# Patient Record
Sex: FEMALE | Race: WHITE | Employment: FULL TIME | URBAN - METROPOLITAN AREA
[De-identification: names, ages, dates, MRNs, and addresses within clinical notes are randomized per-mention and may not be internally consistent; named-entity substitution may affect disease eponyms.]

---

## 2017-03-03 DIAGNOSIS — E03.8 OTHER SPECIFIED HYPOTHYROIDISM: Primary | ICD-10-CM

## 2017-03-03 NOTE — LETTER
[facility]                                           [address] [place]  [address]    March 7, 2017    Marichuy Worrell  [address]  [place] [address]    Dear Marichuy,       We recently received a refill request for levothyroxine .  We have refilled this for a one time 30 day supply only because you are due for a:    Thyroid office visit and non-fasting lab appointment      Please schedule this lab appointment 4-5 days prior to the office visit.     Please call at your earliest convenience so that there will not be a delay with your future refills.          Thank you,   Your [facility] Care Team/kalyan  [phone]

## 2017-03-06 RX ORDER — LEVOTHYROXINE SODIUM 200 UG/1
200 TABLET ORAL DAILY
Qty: 30 TABLET | Refills: 0 | Status: SHIPPED | OUTPATIENT
Start: 2017-03-06 | End: 2017-04-11

## 2017-03-06 NOTE — TELEPHONE ENCOUNTER
send letter.  Pt due for nonfasting lab appointment for further levothyroxine refills.  Salina Bro RN

## 2017-04-10 DIAGNOSIS — E03.8 OTHER SPECIFIED HYPOTHYROIDISM: ICD-10-CM

## 2017-04-10 LAB — TSH SERPL DL<=0.005 MIU/L-ACNC: 1.67 MU/L (ref 0.4–4)

## 2017-04-10 PROCEDURE — 84443 ASSAY THYROID STIM HORMONE: CPT | Performed by: NURSE PRACTITIONER

## 2017-04-10 PROCEDURE — 36415 COLL VENOUS BLD VENIPUNCTURE: CPT | Performed by: NURSE PRACTITIONER

## 2017-04-11 RX ORDER — LEVOTHYROXINE SODIUM 200 UG/1
200 TABLET ORAL DAILY
Qty: 90 TABLET | Refills: 1 | Status: SHIPPED | OUTPATIENT
Start: 2017-04-11 | End: 2017-08-08

## 2017-05-03 DIAGNOSIS — N92.1 MENOMETRORRHAGIA: ICD-10-CM

## 2017-05-04 NOTE — TELEPHONE ENCOUNTER
Enskyce      Last Written Prescription Date: 11/15/16  Last Fill Quantity: 84,  # refills: 1   Last Office Visit with [facility], [facility] or [facility] prescribing provider: 11/15/16    Gianluca Duke CMA

## 2017-05-05 RX ORDER — DESOGESTREL AND ETHINYL ESTRADIOL 0.15-0.03
KIT ORAL
Qty: 84 TABLET | Refills: 0 | Status: SHIPPED | OUTPATIENT
Start: 2017-05-05 | End: 2017-08-08

## 2017-05-05 NOTE — TELEPHONE ENCOUNTER
Noted patient was seen by Kristen Kehr, PA-C on 01-25-16 for WWE  Reviewed last office visit notes from ROXI Perez CNP on 11-15-16. Unsure of f/u plan.  No future appt scheduled.    Will route to ROXI Perez CNP for review & orders. Tegan Aguilar RN, BAN

## 2017-05-05 NOTE — TELEPHONE ENCOUNTER
Phone call to patient and gave orders as below. Offered to assist patient in scheduling an appt now and patient declined stating she is too busy to schedule now. She stated she will call back in the next couple of weeks. Tegan Aguilar RN, BAN

## 2017-05-05 NOTE — TELEPHONE ENCOUNTER
3 month extension sent, patient should schedule follow up with [facility] as she is also due for her pap smear. Thank you. Marely DIANE CNP

## 2017-08-08 ENCOUNTER — OFFICE VISIT (OUTPATIENT)
Dept: OBGYN | Facility: CLINIC | Age: 27
End: 2017-08-08
Payer: COMMERCIAL

## 2017-08-08 VITALS
BODY MASS INDEX: 48.82 KG/M2 | TEMPERATURE: 97.8 F | HEIGHT: 65 IN | DIASTOLIC BLOOD PRESSURE: 84 MMHG | SYSTOLIC BLOOD PRESSURE: 142 MMHG | WEIGHT: 293 LBS | HEART RATE: 53 BPM

## 2017-08-08 DIAGNOSIS — Z23 NEED FOR PROPHYLACTIC VACCINATION AGAINST HUMAN PAPILLOMAVIRUS: ICD-10-CM

## 2017-08-08 DIAGNOSIS — E03.8 OTHER SPECIFIED HYPOTHYROIDISM: ICD-10-CM

## 2017-08-08 DIAGNOSIS — R63.5 WEIGHT GAIN: ICD-10-CM

## 2017-08-08 DIAGNOSIS — Z13.6 CARDIOVASCULAR SCREENING; LDL GOAL LESS THAN 160: ICD-10-CM

## 2017-08-08 DIAGNOSIS — N92.1 MENOMETRORRHAGIA: ICD-10-CM

## 2017-08-08 DIAGNOSIS — Z01.419 ENCOUNTER FOR GYNECOLOGICAL EXAMINATION WITHOUT ABNORMAL FINDING: Primary | ICD-10-CM

## 2017-08-08 PROCEDURE — 99395 PREV VISIT EST AGE 18-39: CPT | Performed by: NURSE PRACTITIONER

## 2017-08-08 PROCEDURE — 90651 9VHPV VACCINE 2/3 DOSE IM: CPT | Performed by: NURSE PRACTITIONER

## 2017-08-08 PROCEDURE — G0476 HPV COMBO ASSAY CA SCREEN: HCPCS | Performed by: NURSE PRACTITIONER

## 2017-08-08 PROCEDURE — G0145 SCR C/V CYTO,THINLAYER,RESCR: HCPCS | Performed by: NURSE PRACTITIONER

## 2017-08-08 RX ORDER — DESOGESTREL AND ETHINYL ESTRADIOL 0.15-0.03
1 KIT ORAL DAILY
Qty: 84 TABLET | Refills: 3 | Status: SHIPPED | OUTPATIENT
Start: 2017-08-08 | End: 2018-11-10

## 2017-08-08 RX ORDER — LEVOTHYROXINE SODIUM 200 UG/1
200 TABLET ORAL DAILY
Qty: 90 TABLET | Refills: 3 | Status: SHIPPED | OUTPATIENT
Start: 2017-08-08 | End: 2018-08-22

## 2017-08-08 ASSESSMENT — PAIN SCALES - GENERAL: PAINLEVEL: NO PAIN (0)

## 2017-08-08 ASSESSMENT — PATIENT HEALTH QUESTIONNAIRE - PHQ9: SUM OF ALL RESPONSES TO PHQ QUESTIONS 1-9: 0

## 2017-08-08 NOTE — NURSING NOTE
"Chief Complaint   Patient presents with     Physical       Initial /84  Pulse 53  Temp 97.8  F (36.6  C) (Oral)  Ht 5' 5.25\" (1.657 m)  Wt (!) 338 lb (153.3 kg)  LMP  (LMP Unknown)  BMI 55.82 kg/m2 Estimated body mass index is 55.82 kg/(m^2) as calculated from the following:    Height as of this encounter: 5' 5.25\" (1.657 m).    Weight as of this encounter: 338 lb (153.3 kg)..  BP completed using cuff size: regular on forearm    Screening Questionnaire for Adult Immunization    Are you sick today?   No   Do you have allergies to medications, food, a vaccine component or latex?   Yes   Have you ever had a serious reaction after receiving a vaccination?   No   Do you have a long-term health problem with heart disease, lung disease, asthma, kidney disease, metabolic disease (e.g. diabetes), anemia, or other blood disorder?   No   Do you have cancer, leukemia, HIV/AIDS, or any other immune system problem?   No   In the past 3 months, have you taken medications that affect  your immune system, such as prednisone, other steroids, or anticancer drugs; drugs for the treatment of rheumatoid arthritis, Crohn s disease, or psoriasis; or have you had radiation treatments?   No   Have you had a seizure, or a brain or other nervous system problem?   No   During the past year, have you received a transfusion of blood or blood     products, or been given immune (gamma) globulin or antiviral drug?   No   For women: Are you pregnant or is there a chance you could become        pregnant during the next month?   No   Have you received any vaccinations in the past 4 weeks?   No     Immunization questionnaire was positive for at least one answer.  Notified Marely DIANE CNP.      MNVFC doesn't apply on this patient    Per orders of Marely DIANE CNP, injection of HPV given by Angelita Sharp. Patient instructed to remain in clinic for 15 minutes afterwards, and to report any adverse reaction to me immediately.     "   Screening performed by Angelita Sharp on 8/8/2017 at 9:14 AM.        Angelita Sharp CMA

## 2017-08-08 NOTE — MR AVS SNAPSHOT
After Visit Summary   8/8/2017    Marichuy Worrell    MRN: [id number]           Patient Information     Date Of Birth          1990        Visit Information        Provider Department      8/8/2017 9:10 AM Marely Barrientos APRN CNP [facility]        Today's Diagnoses     Encounter for gynecological examination without abnormal finding    -  1    Need for prophylactic vaccination against human papillomavirus        Menometrorrhagia        Other specified hypothyroidism        Weight gain        CARDIOVASCULAR SCREENING; LDL GOAL LESS THAN 160           Follow-ups after your visit        Your next 10 appointments already scheduled     Aug 09, 2017  2:45 PM CDT   [facility]    [address] [facility] [address] [phone]           Patient must bring picture ID. Patient should be prepared to give a urine specimen  Please do not eat 10-12 hours before your appointment if you are coming in fasting for labs on lipids, cholesterol, or glucose (sugar). Pregnant women should follow their Care Team instructions. Water with medications is okay. Do not drink coffee or other fluids. If you have concerns about taking  your medications, please ask at office or if scheduling via [employer], send a message by clicking on Secure Messaging, Message Your Care Team.              Future tests that were ordered for you today     Open Future Orders        Priority Expected Expires Ordered    Lipid panel reflex to direct LDL Routine  8/8/2018 8/8/2017    TSH with free T4 reflex Routine  11/8/2017 8/8/2017    Glucose Routine  11/8/2017 8/8/2017            Who to contact     If you have questions or need follow up information about today's clinic visit or your schedule please contact [facility] directly at [phone].  Normal or non-critical lab and imaging results will be communicated to you by [employer], letter or phone  "within 4 business days after the clinic has received the results. If you do not hear from us within 7 days, please contact the clinic through [employer] or phone. If you have a critical or abnormal lab result, we will notify you by phone as soon as possible.  Submit refill requests through [employer] or call your pharmacy and they will forward the refill request to us. Please allow 3 business days for your refill to be completed.          Additional Information About Your Visit        [employer] Information     [employer] gives you secure access to your electronic health record. If you see a primary care provider, you can also send messages to your care team and make appointments. If you have questions, please call your primary care clinic.  If you do not have a primary care provider, please call [phone] and they will assist you.        Care EveryWhere ID     This is your Care EveryWhere ID. This could be used by other organizations to access your [facility] medical records  [id number]        Your Vitals Were     Pulse Temperature Height Last Period BMI (Body Mass Index)       53 97.8  F (36.6  C) (Oral) 5' 5.25\" (1.657 m) (LMP Unknown) 55.82 kg/m2        Blood Pressure from Last 3 Encounters:   08/08/17 142/84   11/15/16 144/89   01/25/16 132/78    Weight from Last 3 Encounters:   08/08/17 (!) 338 lb (153.3 kg)   11/15/16 (!) 361 lb 9.6 oz (164 kg)   01/25/16 (!) 352 lb (159.7 kg)              We Performed the Following     HC HPV VAC 9V 3 DOSE IM     Pap imaged thin layer screen reflex to HPV if ASCUS - recommend age 25 - 29          Today's Medication Changes          These changes are accurate as of: 8/8/17  9:42 AM.  If you have any questions, ask your nurse or doctor.               These medicines have changed or have updated prescriptions.        Dose/Directions    desogestrel-ethinyl estradiol 0.15-30 MG-MCG per tablet   Commonly known as:  ENSKYCE   This may have changed:  See the new instructions.   Used for:  " Menometrorrhagia   Changed by:  Marely Barrientos APRN CNP        Dose:  1 tablet   Take 1 tablet by mouth daily   Quantity:  84 tablet   Refills:  3            Where to get your medicines      These medications were sent to [id number]  [place] - [address]  [address] [place] [address]     Phone:  [phone]     desogestrel-ethinyl estradiol 0.15-30 MG-MCG per tablet    levothyroxine 200 MCG tablet                Primary Care Provider Office Phone # Fax #    Kristen M Kehr, PA-C [phone]       [facility] [address] [facility] [address]        Goals        Diet    Eat more fruits and vegetables     Reduce fast food intake     Reduce portion size        Exercise    Exercise 3x per week (30 min per time)       Equal Access to Services     KELLY LUCAS : Hadii mila john hadasho Soomaali, waaxda luqadaha, qaybta kaalmada adeegyada, virgil chappell . So [facility] [phone].    ATENCIÓN: Si habla español, tiene a aragon disposición servicios gratuitos de asistencia lingüística. Taya al [phone].    We comply with applicable federal civil rights laws and [place] laws. We do not discriminate on the basis of race, color, national origin, age, disability sex, sexual orientation or gender identity.            Thank you!     Thank you for choosing [facility]  for your care. Our goal is always to provide you with excellent care. Hearing back from our patients is one way we can continue to improve our services. Please take a few minutes to complete the written survey that you may receive in the mail after your visit with us. Thank you!             Your Updated Medication List - Protect others around you: Learn how to safely use, store and throw away your medicines at www.disposemymeds.org.          This list is accurate as of: 8/8/17  9:42 AM.  Always use your most recent med list.                   Brand Name  Dispense Instructions for use Diagnosis    desogestrel-ethinyl estradiol 0.15-30 MG-MCG per tablet    ENSKYCE    84 tablet    Take 1 tablet by mouth daily    Menometrorrhagia       levothyroxine 200 MCG tablet    SYNTHROID/LEVOTHROID    90 tablet    Take 1 tablet (200 mcg) by mouth daily    Other specified hypothyroidism

## 2017-08-08 NOTE — PROGRESS NOTES
S: Pt is a 27 year old  0 para 0 who presents today for an annual female exam. LMP: 2-3 weeks ago. Contraception: abstinence. Using combined oral contraceptive pills for cycle regulation.    Last Pap smear: 2014 and was NIL. Immunizations reviewed. Dental Exams: due. Diet and calcium reviewed. Exercise: regular.    Patient is frustrated due to minimal weight loss despite diet and physical activity changes for the better over the last 3 months. Would like her thyroid checked to be sure this continues to be in normal range, if so, planning to see PCP to discuss management/for evaluation.     Past Medical History:   Diagnosis Date     Hypothyroidism      Moderate major depression (H)      Morbidly obese (H)      Past Surgical History:   Procedure Laterality Date     CHOLECYSTECTOMY, LAPOROSCOPIC  2009     Social History   Substance Use Topics     Smoking status: Never Smoker     Smokeless tobacco: Never Used     Alcohol use No         REVIEW OF SYSTEMS:  CONSTITUTIONAL:NEGATIVE for fever, chills, change in weight  EYES: NEGATIVE for vision changes or irritation  ENT/MOUTH: NEGATIVE for ear, mouth and throat problems  RESP:NEGATIVE for significant cough or SOB  CV: NEGATIVE for chest pain, palpitations or peripheral edema  GI: NEGATIVE for nausea, abdominal pain, heartburn, or change in bowel habits  Periods are regular q 28-30 days, Cyclic symptoms include none. No intermenstrual bleeding.  MUSCULOSKELATAL:NEGATIVE for significant arthralgias or myalgia  INTEGUMENTARY/SKIN: NEGATIVE for worrisome rashes, moles or lesions  NEURO: NEGATIVE for weakness, dizziness or paresthesias  ENDOCRINE: NEGATIVE for temperature intolerance, skin/hair changes  HEME/ALLERGY/IMMUNE: NEGATIVE for bleeding problems  PSYCHIATRIC: NEGATIVE for changes in mood or affect      OBJECTIVE: This is a well appearing female in no acute distress. Answers questions and maintains eye contact appropriately. Vital signs noted.      EXAM:  EYES: Eyes grossly normal to inspection, PERRL and conjunctivae and sclerae normal  HENT: ear canals and TM's normal and nose and mouth without ulcers or lesions  NECK: no adenopathy, no asymmetry, masses, or scars and thyroid normal to palpation  RESP: lungs clear to auscultation - no rales, rhonchi or wheezes  CV: regular rates and rhythm, normal S1 S2, no S3 or S4 and no murmur, click or rub  LYMPH: normal ant/post cervical and supraclavicular nodes  ABD/GI: soft, nontender, without hepatosplenomegaly or masses  MS: extremities normal- no gross deformities noted  SKIN: no suspicious lesions or rashes  NEURO: Normal strength and tone, mentation intact and speech normal  PSYCH: mentation appears normal and affect normal/bright  Breast exam: Breasts are symmetrical without masses, lymphadenopathy, retraction, dimpling, or nipple discharge bilaterally.  Pelvic Exam: External genitalia without visible lesions or discharge. Normal BUS. Vaginal mucosa pink, rugated, moist, without lesions or discharge. Cervix is pink, nulliparous, midline, without cervical motion tenderness. Pap smear is obtained. Uterus normal size and shape without tenderness or masses. Adnexa without masses or tenderness bilaterally.    A/P:  1) Normal annual female exam. Health maintenance updated. Regular physical activity and healthy diet encouraged. Continue regular dental exams. Encouraged adequate calcium intake. Last Gardasil today.   2) Menometrorrhagia. Well controlled with oral contraceptive pill, refill x 1 year.  3) Hypothyroid. Refill current dose of medication and check TSH today. If dose adjustment needed, will send in new prescription and notify patient.  4) Weight gain. Check thyroid as we discussed and will also do fasting labs. Patient will plan follow up with her PCP for further discussion on weight concerns, consider nutrition referral.     Marely DIANE CNP

## 2017-08-09 DIAGNOSIS — Z13.6 CARDIOVASCULAR SCREENING; LDL GOAL LESS THAN 160: ICD-10-CM

## 2017-08-09 DIAGNOSIS — R63.5 WEIGHT GAIN: ICD-10-CM

## 2017-08-09 LAB
CHOLEST SERPL-MCNC: 150 MG/DL
GLUCOSE SERPL-MCNC: 81 MG/DL (ref 70–99)
HDLC SERPL-MCNC: 44 MG/DL
LDLC SERPL CALC-MCNC: 88 MG/DL
NONHDLC SERPL-MCNC: 106 MG/DL
T4 FREE SERPL-MCNC: 1.26 NG/DL (ref 0.76–1.46)
TRIGL SERPL-MCNC: 92 MG/DL
TSH SERPL DL<=0.005 MIU/L-ACNC: 14.61 MU/L (ref 0.4–4)

## 2017-08-09 PROCEDURE — 80061 LIPID PANEL: CPT | Performed by: NURSE PRACTITIONER

## 2017-08-09 PROCEDURE — 84439 ASSAY OF FREE THYROXINE: CPT | Performed by: NURSE PRACTITIONER

## 2017-08-09 PROCEDURE — 36415 COLL VENOUS BLD VENIPUNCTURE: CPT | Performed by: NURSE PRACTITIONER

## 2017-08-09 PROCEDURE — 84443 ASSAY THYROID STIM HORMONE: CPT | Performed by: NURSE PRACTITIONER

## 2017-08-09 PROCEDURE — 82947 ASSAY GLUCOSE BLOOD QUANT: CPT | Performed by: NURSE PRACTITIONER

## 2017-08-13 LAB
COPATH REPORT: ABNORMAL
PAP: ABNORMAL

## 2017-08-14 ENCOUNTER — OFFICE VISIT (OUTPATIENT)
Dept: FAMILY MEDICINE | Facility: CLINIC | Age: 27
End: 2017-08-14
Payer: COMMERCIAL

## 2017-08-14 VITALS
TEMPERATURE: 97.8 F | SYSTOLIC BLOOD PRESSURE: 116 MMHG | OXYGEN SATURATION: 99 % | BODY MASS INDEX: 56.15 KG/M2 | DIASTOLIC BLOOD PRESSURE: 80 MMHG | WEIGHT: 293 LBS | HEART RATE: 84 BPM

## 2017-08-14 DIAGNOSIS — E03.8 OTHER SPECIFIED HYPOTHYROIDISM: Primary | ICD-10-CM

## 2017-08-14 PROCEDURE — 99213 OFFICE O/P EST LOW 20 MIN: CPT | Performed by: PHYSICIAN ASSISTANT

## 2017-08-14 RX ORDER — LEVOTHYROXINE SODIUM 25 UG/1
25 TABLET ORAL DAILY
Qty: 90 TABLET | Refills: 1 | Status: SHIPPED | OUTPATIENT
Start: 2017-08-14 | End: 2018-03-12

## 2017-08-14 NOTE — NURSING NOTE
"Chief Complaint   Patient presents with     Results     lab       Initial /85  Pulse 84  Temp 97.8  F (36.6  C) (Oral)  Wt (!) 340 lb (154.2 kg)  LMP  (LMP Unknown)  SpO2 99%  BMI 56.15 kg/m2 Estimated body mass index is 56.15 kg/(m^2) as calculated from the following:    Height as of 8/8/17: 5' 5.25\" (1.657 m).    Weight as of this encounter: 340 lb (154.2 kg).  Medication Reconciliation: complete    NATIVIDAD Hawkins MA    "

## 2017-08-14 NOTE — PROGRESS NOTES
SUBJECTIVE:                                                    Marichuy Worrell is a 27 year old female who presents to clinic today for the following health issues:      Discuss labs.   She has been working on lifestyle changes and weight loss. She was having success with 20 pounds / month, but then stopped last month. She continues to exercise and work on diet. She is taking the 200 mcg of the levothyroxine. She had lab tests done at her routine exam with GYN and instructed for follow up here.       Problem list and histories reviewed & adjusted, as indicated.  Additional history: as documented    Patient Active Problem List   Diagnosis     Morbidly obese (H)     Moderate major depression (H)     CARDIOVASCULAR SCREENING; LDL GOAL LESS THAN 160     Other specified hypothyroidism     Major depression in complete remission (H)     Past Surgical History:   Procedure Laterality Date     CHOLECYSTECTOMY, LAPOROSCOPIC  12/2009       Social History   Substance Use Topics     Smoking status: Never Smoker     Smokeless tobacco: Never Used     Alcohol use No     Family History   Problem Relation Age of Onset     Asthma Mother      Other Cancer Mother      Depression Mother      Obesity Mother      Unknown/Adopted Father      Other Cancer Father      Arthritis Maternal Grandmother      Thyroid Disease Maternal Grandmother      Obesity Maternal Grandmother      CANCER Maternal Grandfather      lung cancer     Unknown/Adopted Paternal Grandmother      Unknown/Adopted Paternal Grandfather      Asthma Brother      Allergies Brother          Current Outpatient Prescriptions   Medication Sig Dispense Refill     levothyroxine (SYNTHROID/LEVOTHROID) 25 MCG tablet Take 1 tablet (25 mcg) by mouth daily 90 tablet 1     desogestrel-ethinyl estradiol (ENSKYCE) 0.15-30 MG-MCG per tablet Take 1 tablet by mouth daily 84 tablet 3     levothyroxine (SYNTHROID/LEVOTHROID) 200 MCG tablet Take 1 tablet (200 mcg) by mouth daily 90 tablet 3      Allergies   Allergen Reactions     Biaxin [Clarithromycin]      rash         Reviewed and updated as needed this visit by clinical staffTobacco  Allergies  Meds  Med Hx  Surg Hx  Fam Hx  Soc Hx      Reviewed and updated as needed this visit by Provider         ROS:  Constitutional, HEENT, cardiovascular, pulmonary, gi and gu systems are negative, except as otherwise noted.      OBJECTIVE:   /80  Pulse 84  Temp 97.8  F (36.6  C) (Oral)  Wt (!) 340 lb (154.2 kg)  LMP  (LMP Unknown)  SpO2 99%  BMI 56.15 kg/m2  Body mass index is 56.15 kg/(m^2).  GENERAL: healthy, alert and no distress  NECK: no adenopathy, no asymmetry, masses, or scars and thyroid normal to palpation  PSYCH: mentation appears normal, affect normal/bright    Diagnostic Test Results:  Results for orders placed or performed in visit on 08/09/17   TSH with free T4 reflex   Result Value Ref Range    TSH 14.61 (H) 0.40 - 4.00 mU/L   Glucose   Result Value Ref Range    Glucose 81 70 - 99 mg/dL   Lipid panel reflex to direct LDL   Result Value Ref Range    Cholesterol 150 <200 mg/dL    Triglycerides 92 <150 mg/dL    HDL Cholesterol 44 (L) >49 mg/dL    LDL Cholesterol Calculated 88 <100 mg/dL    Non HDL Cholesterol 106 <130 mg/dL   T4 free   Result Value Ref Range    T4 Free 1.26 0.76 - 1.46 ng/dL       ASSESSMENT/PLAN:       1. Other specified hypothyroidism  Increase the dose of the levothyroxine to 225 mcg daily.   Recheck with lab only appointment in 2 months.   Encouraged to continue with making lifestyle changes for weight loss.   Briefly discussed appointment or consultation with Dr. Ren in the future.   - levothyroxine (SYNTHROID/LEVOTHROID) 25 MCG tablet; Take 1 tablet (25 mcg) by mouth daily  Dispense: 90 tablet; Refill: 1  - TSH with free T4 reflex; Future      Kristen M. Kehr, PA-C  [facility]

## 2017-08-14 NOTE — MR AVS SNAPSHOT
After Visit Summary   8/14/2017    Marichuy Worrell    MRN: [id number]           Patient Information     Date Of Birth          1990        Visit Information        Provider Department      8/14/2017 11:20 AM Kehr, Kristen M, PA-C [facility]        Today's Diagnoses     Other specified hypothyroidism    -  1      Care Instructions    Appointment for lab only in October for thyroid testing          Follow-ups after your visit        Future tests that were ordered for you today     Open Future Orders        Priority Expected Expires Ordered    TSH with free T4 reflex Routine 10/14/2017 4/14/2018 8/14/2017            Who to contact     If you have questions or need follow up information about today's clinic visit or your schedule please contact [facility] directly at [phone].  Normal or non-critical lab and imaging results will be communicated to you by GIVINGtraxhart, letter or phone within 4 business days after the clinic has received the results. If you do not hear from us within 7 days, please contact the clinic through GIVINGtraxhart or phone. If you have a critical or abnormal lab result, we will notify you by phone as soon as possible.  Submit refill requests through [employer] or call your pharmacy and they will forward the refill request to us. Please allow 3 business days for your refill to be completed.          Additional Information About Your Visit        MyChart Information     [employer] gives you secure access to your electronic health record. If you see a primary care provider, you can also send messages to your care team and make appointments. If you have questions, please call your primary care clinic.  If you do not have a primary care provider, please call [phone] and they will assist you.        Care EveryWhere ID     This is your Care EveryWhere ID. This could be used by other organizations to access your [place] medical records  [id number]        Your Vitals  Were     Pulse Temperature Last Period Pulse Oximetry BMI (Body Mass Index)       84 97.8  F (36.6  C) (Oral) (LMP Unknown) 99% 56.15 kg/m2        Blood Pressure from Last 3 Encounters:   08/14/17 116/80   08/08/17 142/84   11/15/16 144/89    Weight from Last 3 Encounters:   08/14/17 (!) 340 lb (154.2 kg)   08/08/17 (!) 338 lb (153.3 kg)   11/15/16 (!) 361 lb 9.6 oz (164 kg)                 Today's Medication Changes          These changes are accurate as of: 8/14/17 11:57 AM.  If you have any questions, ask your nurse or doctor.               These medicines have changed or have updated prescriptions.        Dose/Directions    * levothyroxine 200 MCG tablet   Commonly known as:  SYNTHROID/LEVOTHROID   This may have changed:  Another medication with the same name was added. Make sure you understand how and when to take each.   Used for:  Other specified hypothyroidism   Changed by:  Marely Barrientos APRN CNP        Dose:  200 mcg   Take 1 tablet (200 mcg) by mouth daily   Quantity:  90 tablet   Refills:  3       * levothyroxine 25 MCG tablet   Commonly known as:  SYNTHROID/LEVOTHROID   This may have changed:  You were already taking a medication with the same name, and this prescription was added. Make sure you understand how and when to take each.   Used for:  Other specified hypothyroidism   Changed by:  Kehr, Kristen M, GIANNA        Dose:  25 mcg   Take 1 tablet (25 mcg) by mouth daily   Quantity:  90 tablet   Refills:  1       * Notice:  This list has 2 medication(s) that are the same as other medications prescribed for you. Read the directions carefully, and ask your doctor or other care provider to review them with you.         Where to get your medicines      These medications were sent to [id number]  [place] - [address]  [address] [place] [address]     Phone:  [phone]     levothyroxine 25 MCG tablet                Primary Care Provider Office Phone # Fax #     Kristen M Kehr, PA-C [phone]       [address] [facility] [address]        Goals        Diet    Eat more fruits and vegetables     Reduce fast food intake     Reduce portion size        Exercise    Exercise 3x per week (30 min per time)       Equal Access to Services     KELLY LUCAS : Hadii aad ku hadasho Soomaali, waaxda luqadaha, qaybta kaalmada adeegyada, waxay yakovin hayneymarn constanza mana misti rhodes. So [facility] [phone].    ATENCIÓN: Si habla español, tiene a aragon disposición servicios gratuitos de asistencia lingüística. Llame al [phone].    We comply with applicable federal civil rights laws and [place] laws. We do not discriminate on the basis of race, color, national origin, age, disability sex, sexual orientation or gender identity.            Thank you!     Thank you for choosing [facility]  for your care. Our goal is always to provide you with excellent care. Hearing back from our patients is one way we can continue to improve our services. Please take a few minutes to complete the written survey that you may receive in the mail after your visit with us. Thank you!             Your Updated Medication List - Protect others around you: Learn how to safely use, store and throw away your medicines at www.disposemymeds.org.          This list is accurate as of: 8/14/17 11:57 AM.  Always use your most recent med list.                   Brand Name Dispense Instructions for use Diagnosis    desogestrel-ethinyl estradiol 0.15-30 MG-MCG per tablet    ENSKYCE    84 tablet    Take 1 tablet by mouth daily    Menometrorrhagia       * levothyroxine 200 MCG tablet    SYNTHROID/LEVOTHROID    90 tablet    Take 1 tablet (200 mcg) by mouth daily    Other specified hypothyroidism       * levothyroxine 25 MCG tablet    SYNTHROID/LEVOTHROID    90 tablet    Take 1 tablet (25 mcg) by mouth daily    Other specified hypothyroidism       * Notice:  This list has 2 medication(s) that are the  same as other medications prescribed for you. Read the directions carefully, and ask your doctor or other care provider to review them with you.

## 2017-08-15 PROBLEM — R87.610 ASCUS OF CERVIX WITH NEGATIVE HIGH RISK HPV: Status: ACTIVE | Noted: 2017-08-08

## 2017-08-15 LAB
FINAL DIAGNOSIS: NORMAL
HPV HR 12 DNA CVX QL NAA+PROBE: NEGATIVE
HPV16 DNA SPEC QL NAA+PROBE: NEGATIVE
HPV18 DNA SPEC QL NAA+PROBE: NEGATIVE
SPECIMEN DESCRIPTION: NORMAL

## 2017-10-12 DIAGNOSIS — E03.8 OTHER SPECIFIED HYPOTHYROIDISM: ICD-10-CM

## 2017-10-12 LAB — TSH SERPL DL<=0.005 MIU/L-ACNC: 1.59 MU/L (ref 0.4–4)

## 2017-10-12 PROCEDURE — 36415 COLL VENOUS BLD VENIPUNCTURE: CPT | Performed by: PHYSICIAN ASSISTANT

## 2017-10-12 PROCEDURE — 84443 ASSAY THYROID STIM HORMONE: CPT | Performed by: PHYSICIAN ASSISTANT

## 2018-03-12 DIAGNOSIS — E03.8 OTHER SPECIFIED HYPOTHYROIDISM: ICD-10-CM

## 2018-03-13 RX ORDER — LEVOTHYROXINE SODIUM 25 UG/1
25 TABLET ORAL DAILY
Qty: 90 TABLET | Refills: 1 | Status: SHIPPED | OUTPATIENT
Start: 2018-03-13 | End: 2018-08-22

## 2018-03-28 ENCOUNTER — TELEPHONE (OUTPATIENT)
Dept: FAMILY MEDICINE | Facility: CLINIC | Age: 28
End: 2018-03-28

## 2018-03-28 DIAGNOSIS — E03.8 OTHER SPECIFIED HYPOTHYROIDISM: Primary | ICD-10-CM

## 2018-03-28 NOTE — TELEPHONE ENCOUNTER
Patient states she would like to come in for a thyroid lab but no orders.  Please call to schedule once orders are in.    Thank you.

## 2018-03-28 NOTE — TELEPHONE ENCOUNTER
Please review and sign Pending Pre-visit Labs in [place].PATIENT REQUESTING LABS FOR TSH,   Tegan JENNINGS

## 2018-04-18 ENCOUNTER — OFFICE VISIT (OUTPATIENT)
Dept: FAMILY MEDICINE | Facility: CLINIC | Age: 28
End: 2018-04-18
Payer: COMMERCIAL

## 2018-04-18 VITALS — RESPIRATION RATE: 16 BRPM | DIASTOLIC BLOOD PRESSURE: 68 MMHG | SYSTOLIC BLOOD PRESSURE: 116 MMHG

## 2018-04-18 DIAGNOSIS — E03.8 OTHER SPECIFIED HYPOTHYROIDISM: Primary | ICD-10-CM

## 2018-04-18 DIAGNOSIS — F32.5 MAJOR DEPRESSION IN COMPLETE REMISSION (H): ICD-10-CM

## 2018-04-18 DIAGNOSIS — E66.01 MORBIDLY OBESE (H): ICD-10-CM

## 2018-04-18 LAB — TSH SERPL DL<=0.005 MIU/L-ACNC: 1.52 MU/L (ref 0.4–4)

## 2018-04-18 PROCEDURE — 36415 COLL VENOUS BLD VENIPUNCTURE: CPT | Performed by: PHYSICIAN ASSISTANT

## 2018-04-18 PROCEDURE — 99213 OFFICE O/P EST LOW 20 MIN: CPT | Performed by: PHYSICIAN ASSISTANT

## 2018-04-18 PROCEDURE — 84443 ASSAY THYROID STIM HORMONE: CPT | Performed by: PHYSICIAN ASSISTANT

## 2018-04-18 ASSESSMENT — ANXIETY QUESTIONNAIRES
7. FEELING AFRAID AS IF SOMETHING AWFUL MIGHT HAPPEN: SEVERAL DAYS
IF YOU CHECKED OFF ANY PROBLEMS ON THIS QUESTIONNAIRE, HOW DIFFICULT HAVE THESE PROBLEMS MADE IT FOR YOU TO DO YOUR WORK, TAKE CARE OF THINGS AT HOME, OR GET ALONG WITH OTHER PEOPLE: VERY DIFFICULT
1. FEELING NERVOUS, ANXIOUS, OR ON EDGE: MORE THAN HALF THE DAYS
5. BEING SO RESTLESS THAT IT IS HARD TO SIT STILL: SEVERAL DAYS
GAD7 TOTAL SCORE: 14
2. NOT BEING ABLE TO STOP OR CONTROL WORRYING: NEARLY EVERY DAY
6. BECOMING EASILY ANNOYED OR IRRITABLE: NEARLY EVERY DAY
3. WORRYING TOO MUCH ABOUT DIFFERENT THINGS: NEARLY EVERY DAY

## 2018-04-18 ASSESSMENT — PATIENT HEALTH QUESTIONNAIRE - PHQ9
SUM OF ALL RESPONSES TO PHQ QUESTIONS 1-9: 17
10. IF YOU CHECKED OFF ANY PROBLEMS, HOW DIFFICULT HAVE THESE PROBLEMS MADE IT FOR YOU TO DO YOUR WORK, TAKE CARE OF THINGS AT HOME, OR GET ALONG WITH OTHER PEOPLE: SOMEWHAT DIFFICULT
5. POOR APPETITE OR OVEREATING: SEVERAL DAYS
SUM OF ALL RESPONSES TO PHQ QUESTIONS 1-9: 17

## 2018-04-18 ASSESSMENT — PAIN SCALES - GENERAL: PAINLEVEL: NO PAIN (0)

## 2018-04-18 NOTE — PATIENT INSTRUCTIONS
1. Check thyroid today    2. Counseling    3. Karley espinoza [place] [phone] for weight loss management

## 2018-04-18 NOTE — PROGRESS NOTES
SUBJECTIVE:   Marichuy Worrell is a 27 year old female who presents to clinic today for the following health issues:  Marichuy is here today for anxiety / depression and also for follow up for her thyroid and weight gain.     She was looking into options for bariatric surgery and found that her insurance does not cover it. She felt more depressed because of it and stopped working out and exercising. She is now trying to get back in a routine. She is frustrated. She has been obese since she was a child. This contributes to her depression. She declines any medications for depression because of the possibility of weight gain associated with the medications.     History of Present Illness     Depression & Anxiety Follow-up:     Depression/Anxiety:  Depression & Anxiety    Status since last visit::  Worsened    Other associated symptoms of depression and anxiety::  None    Significant life event::  YES    Current substance use::  None       Today's PHQ-9         PHQ-9 Total Score:     (P) 17   PHQ-9 Q9 Suicidal ideation:   (P) Not at all   Thoughts of suicide or self harm:      Self-harm Plan:        Self-harm Action:          Safety concerns for self or others:            Hypothyroidism:     Since last visit, patient describes the following symptoms::  Anxiety, Constipation, Depression and Fatigue    Diet:  Regular (no restrictions)  Frequency of exercise:  2-3 days/week  Duration of exercise:  15-30 minutes  Taking medications regularly:  Yes  Medication side effects:  None  Additional concerns today:  No    Problem list and histories reviewed & adjusted, as indicated.  Additional history: as documented      Patient Active Problem List   Diagnosis     Morbidly obese (H)     Moderate major depression (H)     CARDIOVASCULAR SCREENING; LDL GOAL LESS THAN 160     Other specified hypothyroidism     Major depression in complete remission (H)     ASCUS of cervix with negative high risk HPV     Past Surgical History:   Procedure  "Laterality Date     CHOLECYSTECTOMY, LAPOROSCOPIC  12/2009       Social History   Substance Use Topics     Smoking status: Never Smoker     Smokeless tobacco: Never Used     Alcohol use No     Family History   Problem Relation Age of Onset     Asthma Mother      Other Cancer Mother      Depression Mother      Obesity Mother      Melanoma Mother      Unknown/Adopted Father      Other Cancer Father      Arthritis Maternal Grandmother      Thyroid Disease Maternal Grandmother      Obesity Maternal Grandmother      CANCER Maternal Grandfather      lung cancer     Unknown/Adopted Paternal Grandmother      Unknown/Adopted Paternal Grandfather      Asthma Brother      Allergies Brother          Current Outpatient Prescriptions   Medication Sig Dispense Refill     desogestrel-ethinyl estradiol (ENSKYCE) 0.15-30 MG-MCG per tablet Take 1 tablet by mouth daily 84 tablet 3     levothyroxine (SYNTHROID/LEVOTHROID) 200 MCG tablet Take 1 tablet (200 mcg) by mouth daily 90 tablet 3     levothyroxine (SYNTHROID/LEVOTHROID) 25 MCG tablet Take 1 tablet (25 mcg) by mouth daily (Add to 25 mcg 200 mcg to equal 225 mcg daily) 90 tablet 1     Allergies   Allergen Reactions     Biaxin [Clarithromycin]      rash       ROS:  Constitutional, HEENT, cardiovascular, pulmonary, gi and gu systems are negative, except as otherwise noted.    OBJECTIVE:     /68  Pulse (P) 71  Temp (P) 98.3  F (36.8  C) (Oral)  Resp 16  Ht (P) 5' 4.5\" (1.638 m)  Wt (!) (P) 341 lb (154.7 kg)  LMP 03/29/2018  SpO2 (P) 100%  BMI (P) 57.63 kg/m2  Body mass index is 57.63 kg/(m^2) (pended).  GENERAL: healthy, alert and no distress  MS: no gross musculoskeletal defects noted, no edema  SKIN: no suspicious lesions or rashes  NEURO: Normal strength and tone, mentation intact and speech normal  PSYCH: mentation appears normal, affect normal/bright, judgement and insight intact and appearance well groomed    Diagnostic Test Results:  pending    ASSESSMENT/PLAN: "       1. Other specified hypothyroidism  Check her thyroid function and adjust medication if needed.   - TSH with free T4 reflex    2. Major depression in complete remission (H)  3. Morbidly obese (H)  She is agreeable to starting counseling for depression / anxiety. I will have Lucita from [facility] contact her to schedule an appointment. She declines medication. Her moods are closely related to her weight. I have given her contact information for Dr. Ren in [place] to discuss options to help with her weight loss.   Encouraged to work on gradually increasing activity and working on healthy eating habits.       20 minutes spent with Rodgerienriana today Over 50% of time taken for discussion of above, counseling and coordination of care.       Kristen M. Kehr, PA-C  [facility]

## 2018-04-18 NOTE — LETTER
My Depression Action Plan  Name: Marichuy Worrell   Date of Birth 1990  Date: 4/17/2018    My doctor: Kehr, Kristen M   My clinic: [facility]  [id number] [place] [address] [phone]          GREEN    ZONE   Good Control    What it looks like:     Things are going generally well. You have normal up s and down s. You may even feel depressed from time to time, but bad moods usually last less than a day.   What you need to do:  1. Continue to care for yourself (see self care plan)  2. Check your depression survival kit and update it as needed  3. Follow your physician s recommendations including any medication.  4. Do not stop taking medication unless you consult with your physician first.           YELLOW         ZONE Getting Worse    What it looks like:     Depression is starting to interfere with your life.     It may be hard to get out of bed; you may be starting to isolate yourself from others.    Symptoms of depression are starting to last most all day and this has happened for several days.     You may have suicidal thoughts but they are not constant.   What you need to do:     1. Call your care team, your response to treatment will improve if you keep your care team informed of your progress. Yellow periods are signs an adjustment may need to be made.     2. Continue your self-care, even if you have to fake it!    3. Talk to someone in your support network    4. Open up your depression survival kit           RED    ZONE Medical Alert - Get Help    What it looks like:     Depression is seriously interfering with your life.     You may experience these or other symptoms: You can t get out of bed most days, can t work or engage in other necessary activities, you have trouble taking care of basic hygiene, or basic responsibilities, thoughts of suicide or death that will not go away, self-injurious behavior.     What you need to do:  1. Call your care team and request a  same-day appointment. If they are not available (weekends or after hours) call your local crisis line, emergency room or 911.            Depression Self Care Plan / Survival Kit    Self-Care for Depression  Here s the deal. Your body and mind are really not as separate as most people think.  What you do and think affects how you feel and how you feel influences what you do and think. This means if you do things that people who feel good do, it will help you feel better.  Sometimes this is all it takes.  There is also a place for medication and therapy depending on how severe your depression is, so be sure to consult with your medical provider and/ or Behavioral Health Consultant if your symptoms are worsening or not improving.     In order to better manage my stress, I will:    Exercise  Get some form of exercise, every day. This will help reduce pain and release endorphins, the  feel good  chemicals in your brain. This is almost as good as taking antidepressants!  This is not the same as joining a gym and then never going! (they count on that by the way ) It can be as simple as just going for a walk or doing some gardening, anything that will get you moving.      Hygiene   Maintain good hygiene (Get out of bed in the morning, Make your bed, Brush your teeth, Take a shower, and Get dressed like you were going to work, even if you are unemployed).  If your clothes don't fit try to get ones that do.    Diet  I will strive to eat foods that are good for me, drink plenty of water, and avoid excessive sugar, caffeine, alcohol, and other mood-altering substances.  Some foods that are helpful in depression are: complex carbohydrates, B vitamins, flaxseed, fish or fish oil, fresh fruits and vegetables.    Psychotherapy  I agree to participate in Individual Therapy (if recommended).    Medication  If prescribed medications, I agree to take them.  Missing doses can result in serious side effects.  I understand that drinking  alcohol, or other illicit drug use, may cause potential side effects.  I will not stop my medication abruptly without first discussing it with my provider.    Staying Connected With Others  I will stay in touch with my friends, family members, and my primary care provider/team.    Use your imagination  Be creative.  We all have a creative side; it doesn t matter if it s oil painting, sand castles, or mud pies! This will also kick up the endorphins.    Witness Beauty  (AKA stop and smell the roses) Take a look outside, even in mid-winter. Notice colors, textures. Watch the squirrels and birds.     Service to others  Be of service to others.  There is always someone else in need.  By helping others we can  get out of ourselves  and remember the really important things.  This also provides opportunities for practicing all the other parts of the program.    Humor  Laugh and be silly!  Adjust your TV habits for less news and crime-drama and more comedy.    Control your stress  Try breathing deep, massage therapy, biofeedback, and meditation. Find time to relax each day.     My support system    Clinic Contact:  Phone number:    Contact 1:  Phone number:    Contact 2:  Phone number:    [employer]/:  Phone number:    Therapist:  Phone number:    Local crisis center:    Phone number:    Other community support:  Phone number:

## 2018-04-18 NOTE — NURSING NOTE
"Chief Complaint   Patient presents with     Health Maintenance     DAP and PHQ-9     Depression     Anxiety     Thyroid Problem       Initial /68  Pulse (P) 71  Temp (P) 98.3  F (36.8  C) (Oral)  Resp 16  Ht (P) 5' 4.5\" (1.638 m)  Wt (!) (P) 341 lb (154.7 kg)  LMP 03/29/2018  SpO2 (P) 100%  BMI (P) 57.63 kg/m2 Estimated body mass index is 57.63 kg/(m^2) (pended) as calculated from the following:    Height as of this encounter: (P) 5' 4.5\" (1.638 m).    Weight as of this encounter: (P) 341 lb (154.7 kg).  Medication Reconciliation: complete    NATIVIDAD Hawkins MA    "

## 2018-04-18 NOTE — LETTER
[facility]  [address] [facility] [address]  [phone]        April 18, 2018        RE: Marichuy Worrell      To Whom It May Concnern:      Marichuy Worrell is a patient at [facility]. She is being treated for depression and I has a pet cat. Pet's are often a good  and therapy for helping with depression. Please allow her to have the cat in her apartment.         Kristen Kehr PA-C

## 2018-04-18 NOTE — MR AVS SNAPSHOT
After Visit Summary   4/18/2018    Marichuy Worrell    MRN: [id number]           Patient Information     Date Of Birth          1990        Visit Information        Provider Department      4/18/2018 9:40 AM Kehr, Kristen M, PA-C [facility]        Today's Diagnoses     Other specified hypothyroidism    -  1    Major depression in complete remission (H)        Morbidly obese (H)          Care Instructions    1. Check thyroid today    2. Counseling    3. Karley Gray in [place] [phone] for weight loss management          Follow-ups after your visit        Who to contact     If you have questions or need follow up information about today's clinic visit or your schedule please contact [facility] directly at [phone].  Normal or non-critical lab and imaging results will be communicated to you by PowerMessagehart, letter or phone within 4 business days after the clinic has received the results. If you do not hear from us within 7 days, please contact the clinic through "[employer]t or phone. If you have a critical or abnormal lab result, we will notify you by phone as soon as possible.  Submit refill requests through [employer] or call your pharmacy and they will forward the refill request to us. Please allow 3 business days for your refill to be completed.          Additional Information About Your Visit        MyChart Information     [employer] gives you secure access to your electronic health record. If you see a primary care provider, you can also send messages to your care team and make appointments. If you have questions, please call your primary care clinic.  If you do not have a primary care provider, please call [phone] and they will assist you.        Care EveryWhere ID     This is your Care EveryWhere ID. This could be used by other organizations to access your [place] medical records  [id number]        Your Vitals Were     Respirations Last Period                16  03/29/2018           Blood Pressure from Last 3 Encounters:   04/18/18 116/68   08/14/17 116/80   08/08/17 142/84    Weight from Last 3 Encounters:   04/18/18 (!) (P) 341 lb (154.7 kg)   08/14/17 (!) 340 lb (154.2 kg)   08/08/17 (!) 338 lb (153.3 kg)              We Performed the Following     TSH with free T4 reflex        Primary Care Provider Office Phone # Fax #    Kristen M Kehr, PA-C [phone] [address] [facility] [address]        Goals        Diet    Eat more fruits and vegetables     Reduce fast food intake     Reduce portion size        Exercise    Exercise 3x per week (30 min per time)       Equal Access to Services     KELLY LUCAS : Bulmaro montenegroo Sozander, waaxda luqadaha, qaybta kaalmada adeegyada, virgil chappell . So [facility] [phone].    ATENCIÓN: Si habla español, tiene a aragon disposición servicios gratuitos de asistencia lingüística. Llame al [phone].    We comply with applicable federal civil rights laws and [place] laws. We do not discriminate on the basis of race, color, national origin, age, disability, sex, sexual orientation, or gender identity.            Thank you!     Thank you for choosing [facility]  for your care. Our goal is always to provide you with excellent care. Hearing back from our patients is one way we can continue to improve our services. Please take a few minutes to complete the written survey that you may receive in the mail after your visit with us. Thank you!             Your Updated Medication List - Protect others around you: Learn how to safely use, store and throw away your medicines at www.disposemymeds.org.          This list is accurate as of 4/18/18 10:23 AM.  Always use your most recent med list.                   Brand Name Dispense Instructions for use Diagnosis    desogestrel-ethinyl estradiol 0.15-30 MG-MCG per tablet    ENSKYCE    84 tablet    Take 1 tablet by mouth daily     Menometrorrhagia       * levothyroxine 200 MCG tablet    SYNTHROID/LEVOTHROID    90 tablet    Take 1 tablet (200 mcg) by mouth daily    Other specified hypothyroidism       * levothyroxine 25 MCG tablet    SYNTHROID/LEVOTHROID    90 tablet    Take 1 tablet (25 mcg) by mouth daily (Add to 25 mcg 200 mcg to equal 225 mcg daily)    Other specified hypothyroidism       * Notice:  This list has 2 medication(s) that are the same as other medications prescribed for you. Read the directions carefully, and ask your doctor or other care provider to review them with you.

## 2018-04-19 ASSESSMENT — ANXIETY QUESTIONNAIRES: GAD7 TOTAL SCORE: 14

## 2018-04-19 ASSESSMENT — PATIENT HEALTH QUESTIONNAIRE - PHQ9: SUM OF ALL RESPONSES TO PHQ QUESTIONS 1-9: 17

## 2018-04-20 ENCOUNTER — TELEPHONE (OUTPATIENT)
Dept: BEHAVIORAL HEALTH | Facility: CLINIC | Age: 28
End: 2018-04-20

## 2018-04-26 ENCOUNTER — TELEPHONE (OUTPATIENT)
Dept: FAMILY MEDICINE | Facility: CLINIC | Age: 28
End: 2018-04-26

## 2018-05-15 ENCOUNTER — OFFICE VISIT (OUTPATIENT)
Dept: BEHAVIORAL HEALTH | Facility: CLINIC | Age: 28
End: 2018-05-15
Payer: COMMERCIAL

## 2018-05-15 DIAGNOSIS — F41.1 GAD (GENERALIZED ANXIETY DISORDER): ICD-10-CM

## 2018-05-15 DIAGNOSIS — F32.1 MODERATE MAJOR DEPRESSION (H): Primary | ICD-10-CM

## 2018-05-15 PROCEDURE — 90791 PSYCH DIAGNOSTIC EVALUATION: CPT | Performed by: MARRIAGE & FAMILY THERAPIST

## 2018-05-15 ASSESSMENT — PATIENT HEALTH QUESTIONNAIRE - PHQ9
10. IF YOU CHECKED OFF ANY PROBLEMS, HOW DIFFICULT HAVE THESE PROBLEMS MADE IT FOR YOU TO DO YOUR WORK, TAKE CARE OF THINGS AT HOME, OR GET ALONG WITH OTHER PEOPLE: SOMEWHAT DIFFICULT
SUM OF ALL RESPONSES TO PHQ QUESTIONS 1-9: 13
SUM OF ALL RESPONSES TO PHQ QUESTIONS 1-9: 13

## 2018-05-15 ASSESSMENT — ANXIETY QUESTIONNAIRES
2. NOT BEING ABLE TO STOP OR CONTROL WORRYING: MORE THAN HALF THE DAYS
1. FEELING NERVOUS, ANXIOUS, OR ON EDGE: MORE THAN HALF THE DAYS
GAD7 TOTAL SCORE: 11
3. WORRYING TOO MUCH ABOUT DIFFERENT THINGS: MORE THAN HALF THE DAYS
6. BECOMING EASILY ANNOYED OR IRRITABLE: NEARLY EVERY DAY
GAD7 TOTAL SCORE: 11
7. FEELING AFRAID AS IF SOMETHING AWFUL MIGHT HAPPEN: SEVERAL DAYS
7. FEELING AFRAID AS IF SOMETHING AWFUL MIGHT HAPPEN: SEVERAL DAYS
5. BEING SO RESTLESS THAT IT IS HARD TO SIT STILL: NOT AT ALL
GAD7 TOTAL SCORE: 11
4. TROUBLE RELAXING: SEVERAL DAYS

## 2018-05-15 NOTE — MR AVS SNAPSHOT
After Visit Summary   5/15/2018    Marichuy Worrell    MRN: [id number]           Patient Information     Date Of Birth          1990        Visit Information        Provider Department      5/15/2018 9:00 AM Lucita Ring [facility]        Today's Diagnoses     Moderate major depression (H)    -  1    OSCAR (generalized anxiety disorder)           Follow-ups after your visit        Your next 10 appointments already scheduled     May 31, 2018  9:00 AM CDT   [facility]ita [facility]   [facility]    [address] [facility] [id number] [phone]            Jun 18, 2018 10:30 AM CDT   CONSULT with Abby Ren MD   [facility] ([facility]    [address] [facility] [address] [phone]              Who to contact     If you have questions or need follow up information about today's clinic visit or your schedule please contact [facility] directly at [phone].  Normal or non-critical lab and imaging results will be communicated to you by [employer], letter or phone within 4 business days after the clinic has received the results. If you do not hear from us within 7 days, please contact the clinic through Quiet[employer] or phone. If you have a critical or abnormal lab result, we will notify you by phone as soon as possible.  Submit refill requests through [employer] or call your pharmacy and they will forward the refill request to us. Please allow 3 business days for your refill to be completed.          Additional Information About Your Visit        [employer] Information     [employer] gives you secure access to your electronic health record. If you see a primary care provider, you can also send messages to your care team and make appointments. If you have questions, please call your primary care clinic.  If you do not have a primary care provider, please call  [phone] and they will assist you.        Care EveryWhere ID     This is your Care EveryWhere ID. This could be used by other organizations to access your [facility] medical records  [id number]         Blood Pressure from Last 3 Encounters:   04/18/18 116/68   08/14/17 116/80   08/08/17 142/84    Weight from Last 3 Encounters:   04/18/18 (!) (P) 154.7 kg (341 lb)   08/14/17 (!) 154.2 kg (340 lb)   08/08/17 (!) 153.3 kg (338 lb)              Today, you had the following     No orders found for display       Primary Care Provider Office Phone # Fax #    Kristen M Kehr, PA-C [phone] [address] [facility] [address]        Goals        Diet    Eat more fruits and vegetables     Reduce fast food intake     Reduce portion size        Exercise    Exercise 3x per week (30 min per time)       Equal Access to Services     KELLY LUCAS : Hadii mila ku hadasho Soomaali, waaxda luqadaha, qaybta kaalmada adeegyada, waxay bhumi chappell . So [facility] [phone].    ATENCIÓN: Si habla español, tiene a aragon disposición servicios gratuitos de asistencia lingüística. Llame al [phone].    We comply with applicable federal civil rights laws and [place] laws. We do not discriminate on the basis of race, color, national origin, age, disability, sex, sexual orientation, or gender identity.            Thank you!     Thank you for choosing [facility]  for your care. Our goal is always to provide you with excellent care. Hearing back from our patients is one way we can continue to improve our services. Please take a few minutes to complete the written survey that you may receive in the mail after your visit with us. Thank you!             Your Updated Medication List - Protect others around you: Learn how to safely use, store and throw away your medicines at www.disposemymeds.org.          This list is accurate as of 5/15/18 11:05 AM.  Always use your most recent med list.                    Brand Name Dispense Instructions for use Diagnosis    desogestrel-ethinyl estradiol 0.15-30 MG-MCG per tablet    ENSKYCE    84 tablet    Take 1 tablet by mouth daily    Menometrorrhagia       * levothyroxine 200 MCG tablet    SYNTHROID/LEVOTHROID    90 tablet    Take 1 tablet (200 mcg) by mouth daily    Other specified hypothyroidism       * levothyroxine 25 MCG tablet    SYNTHROID/LEVOTHROID    90 tablet    Take 1 tablet (25 mcg) by mouth daily (Add to 25 mcg 200 mcg to equal 225 mcg daily)    Other specified hypothyroidism       * Notice:  This list has 2 medication(s) that are the same as other medications prescribed for you. Read the directions carefully, and ask your doctor or other care provider to review them with you.
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
## 2018-05-16 ASSESSMENT — ANXIETY QUESTIONNAIRES: GAD7 TOTAL SCORE: 11

## 2018-05-16 ASSESSMENT — PATIENT HEALTH QUESTIONNAIRE - PHQ9: SUM OF ALL RESPONSES TO PHQ QUESTIONS 1-9: 13

## 2018-05-31 ENCOUNTER — OFFICE VISIT (OUTPATIENT)
Dept: BEHAVIORAL HEALTH | Facility: CLINIC | Age: 28
End: 2018-05-31
Payer: COMMERCIAL

## 2018-05-31 DIAGNOSIS — F41.1 GAD (GENERALIZED ANXIETY DISORDER): ICD-10-CM

## 2018-05-31 DIAGNOSIS — F32.1 MODERATE MAJOR DEPRESSION (H): Primary | ICD-10-CM

## 2018-05-31 PROCEDURE — 90834 PSYTX W PT 45 MINUTES: CPT | Performed by: MARRIAGE & FAMILY THERAPIST

## 2018-05-31 NOTE — MR AVS SNAPSHOT
After Visit Summary   5/31/2018    Marichuy Worrell    MRN: [id number]           Patient Information     Date Of Birth          1990        Visit Information        Provider Department      5/31/2018 9:00 AM Lucita Ring [facility]        Today's Diagnoses     Moderate major depression (H)    -  1    OSCAR (generalized anxiety disorder)           Follow-ups after your visit        Your next 10 appointments already scheduled     Jun 14, 2018  9:00 AM CDT   [facility] with Lucita Ring   [facility]    [address] [facility] [address] [phone]            Jun 18, 2018 10:30 AM CDT   CONSULT with Abby Ren MD   [facility] ([facility]    [address] [facility] [address] [phone]              Who to contact     If you have questions or need follow up information about today's clinic visit or your schedule please contact [facility] directly at [phone].  Normal or non-critical lab and imaging results will be communicated to you by [employer], letter or phone within 4 business days after the clinic has received the results. If you do not hear from us within 7 days, please contact the clinic through [employer] or phone. If you have a critical or abnormal lab result, we will notify you by phone as soon as possible.  Submit refill requests through [employer] or call your pharmacy and they will forward the refill request to us. Please allow 3 business days for your refill to be completed.          Additional Information About Your Visit        [employer] Information     [employer] gives you secure access to your electronic health record. If you see a primary care provider, you can also send messages to your care team and make appointments. If you have questions, please call your primary care clinic.  If you do not have a primary care provider, please call  [phone] and they will assist you.        Care EveryWhere ID     This is your Care EveryWhere ID. This could be used by other organizations to access your [place] medical records  [id number]         Blood Pressure from Last 3 Encounters:   04/18/18 116/68   08/14/17 116/80   08/08/17 142/84    Weight from Last 3 Encounters:   04/18/18 (!) (P) 154.7 kg (341 lb)   08/14/17 (!) 154.2 kg (340 lb)   08/08/17 (!) 153.3 kg (338 lb)              Today, you had the following     No orders found for display       Primary Care Provider Office Phone # Fax #    Kristen M Kehr, PA-C [phone] [address] [facility] [address]        Goals        Diet    Eat more fruits and vegetables     Reduce fast food intake     Reduce portion size        Exercise    Exercise 3x per week (30 min per time)       Equal Access to Services     KELLY LUCAS : Hadii mila ku hadasho Soomaali, waaxda luqadaha, qaybta kaalmada adeegyada, waxay bhumi chappell . So [facility] [phone].    ATENCIÓN: Si habla español, tiene a aragon disposición servicios gratuitos de asistencia lingüística. Llame al [phone].    We comply with applicable federal civil rights laws and [place] laws. We do not discriminate on the basis of race, color, national origin, age, disability, sex, sexual orientation, or gender identity.            Thank you!     Thank you for choosing [facility]  for your care. Our goal is always to provide you with excellent care. Hearing back from our patients is one way we can continue to improve our services. Please take a few minutes to complete the written survey that you may receive in the mail after your visit with us. Thank you!             Your Updated Medication List - Protect others around you: Learn how to safely use, store and throw away your medicines at www.disposemymeds.org.          This list is accurate as of 5/31/18 11:59 PM.  Always use your most recent med list.                    Brand Name Dispense Instructions for use Diagnosis    desogestrel-ethinyl estradiol 0.15-30 MG-MCG per tablet    ENSKYCE    84 tablet    Take 1 tablet by mouth daily    Menometrorrhagia       * levothyroxine 200 MCG tablet    SYNTHROID/LEVOTHROID    90 tablet    Take 1 tablet (200 mcg) by mouth daily    Other specified hypothyroidism       * levothyroxine 25 MCG tablet    SYNTHROID/LEVOTHROID    90 tablet    Take 1 tablet (25 mcg) by mouth daily (Add to 25 mcg 200 mcg to equal 225 mcg daily)    Other specified hypothyroidism       * Notice:  This list has 2 medication(s) that are the same as other medications prescribed for you. Read the directions carefully, and ask your doctor or other care provider to review them with you.
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
## 2018-06-18 ENCOUNTER — OFFICE VISIT (OUTPATIENT)
Dept: INTERNAL MEDICINE | Facility: CLINIC | Age: 28
End: 2018-06-18
Payer: COMMERCIAL

## 2018-06-18 VITALS
HEART RATE: 84 BPM | SYSTOLIC BLOOD PRESSURE: 128 MMHG | TEMPERATURE: 96.8 F | OXYGEN SATURATION: 100 % | HEIGHT: 65 IN | DIASTOLIC BLOOD PRESSURE: 62 MMHG | BODY MASS INDEX: 48.82 KG/M2 | WEIGHT: 293 LBS

## 2018-06-18 DIAGNOSIS — F41.1 GAD (GENERALIZED ANXIETY DISORDER): ICD-10-CM

## 2018-06-18 DIAGNOSIS — F32.1 MODERATE MAJOR DEPRESSION (H): ICD-10-CM

## 2018-06-18 DIAGNOSIS — E03.8 OTHER SPECIFIED HYPOTHYROIDISM: ICD-10-CM

## 2018-06-18 PROCEDURE — 99214 OFFICE O/P EST MOD 30 MIN: CPT | Performed by: INTERNAL MEDICINE

## 2018-06-18 RX ORDER — TOPIRAMATE 25 MG/1
TABLET, FILM COATED ORAL
Qty: 30 TABLET | Refills: 3 | Status: SHIPPED | OUTPATIENT
Start: 2018-06-18 | End: 2018-08-22

## 2018-06-18 RX ORDER — MULTIPLE VITAMINS W/ MINERALS TAB 9MG-400MCG
1 TAB ORAL DAILY
COMMUNITY
End: 2018-11-27

## 2018-06-18 ASSESSMENT — PAIN SCALES - GENERAL: PAINLEVEL: NO PAIN (0)

## 2018-06-18 NOTE — MR AVS SNAPSHOT
After Visit Summary   6/18/2018    Marichuy Worrell    MRN: [id number]           Patient Information     Date Of Birth          1990        Visit Information        Provider Department      6/18/2018 10:30 AM Abby Ren MD [facility]        Today's Diagnoses     BMI 50.0-59.9, adult (H)    -  1    Moderate major depression (H)        Other specified hypothyroidism        OSCAR (generalized anxiety disorder)          Care Instructions    Call your insurance and ask if Saxenda is covered.    Start Lomaira (phentermine) 1/2 tablet. You can get a coupon on their website.  This has to be taken in the morning.    Start on Topamax, 1/2 tablet daily for 1 week and then 1 tab daily.  Take this at bedtime if it makes you tired.    Eat fresh fruits and vegetables at all meals.    Start doing the 7-minute workout (American Academy of Sports Medicine)    Take your blood pressure and pulse in 1 week and send to me via [employer].    Follow up with myself or Bekah Doll in 1 month.    [facility]    If you have any questions regarding to your visit please contact your care team:     Team Pink:   Clinic Hours Telephone Number   Internal Medicine:  Dr. Abby Doll, NP       7am-7pm  Monday - Thursday   7am-5pm  Fridays  [phone]  (Appointment scheduling available 24/7)    Questions about your recent visit?  Team Line  [phone]   Urgent Care - [place] [facility] - 11am-9pm Monday-Friday Saturday-Sunday- 9am-5pm   [place] - 5pm-9pm Monday-Friday Saturday-Sunday- 9am-5pm  [phone] - [place]  [phone] - [place]       What options do I have for a visit other than an office visit? We offer electronic visits (e-visits) and telephone visits, when medically appropriate.  Please check with your medical insurance to see if these types of visits are covered, as you will be responsible for any charges that are  not paid by your insurance.      You can use [email or web address] (secure electronic communication) to access to your chart, send your primary care provider a message, or make an appointment. Ask a team member how to get started.     For a price quote for your services, please call our Consumer Price Line at [phone] or our Imaging Cost estimation line at [phone] (for imaging tests).    Angelita Gurrola CMA          Follow-ups after your visit        Your next 10 appointments already scheduled     Jun 21, 2018  9:00 AM CDT   [facility] Visit with Lucita Ring   [facility]    [address] [address] [phone]              Who to contact     If you have questions or need follow up information about today's clinic visit or your schedule please contact [facility] directly at [phone].  Normal or non-critical lab and imaging results will be communicated to you by [employer], letter or phone within 4 business days after the clinic has received the results. If you do not hear from us within 7 days, please contact the clinic through [employer] or phone. If you have a critical or abnormal lab result, we will notify you by phone as soon as possible.  Submit refill requests through [email or web address] or call your pharmacy and they will forward the refill request to us. Please allow 3 business days for your refill to be completed.          Additional Information About Your Visit        [employer] Information     [email or web address] gives you secure access to your electronic health record. If you see a primary care provider, you can also send messages to your care team and make appointments. If you have questions, please call your primary care clinic.  If you do not have a primary care provider, please call [phone] and they will assist you.        Care EveryWhere ID     This is your Care EveryWhere ID. This could be used by other organizations to access your [place]  "medical records  [id number]        Your Vitals Were     Pulse Temperature Height Last Period Pulse Oximetry Breastfeeding?    84 96.8  F (36  C) (Oral) 5' 5\" (1.651 m) 05/28/2018 (Approximate) 100% No    BMI (Body Mass Index)                   56.5 kg/m2            Blood Pressure from Last 3 Encounters:   06/18/18 128/62   04/18/18 116/68   08/14/17 116/80    Weight from Last 3 Encounters:   06/18/18 (!) 339 lb 8 oz (154 kg)   04/18/18 (!) (P) 341 lb (154.7 kg)   08/14/17 (!) 340 lb (154.2 kg)              Today, you had the following     No orders found for display         Today's Medication Changes          These changes are accurate as of 6/18/18 11:17 AM.  If you have any questions, ask your nurse or doctor.               Start taking these medicines.        Dose/Directions    Phentermine HCl 8 MG Tabs   Used for:  BMI 50.0-59.9, adult (H)   Started by:  Abby Ren MD        Dose:  4 mg   Take 4 mg by mouth daily Lomaira   Quantity:  30 tablet   Refills:  0       topiramate 25 MG tablet   Commonly known as:  TOPAMAX   Used for:  BMI 50.0-59.9, adult (H)   Started by:  Abby Ren MD        Take 1/2 tab daily for 1 week and then 1 tab daily.   Quantity:  30 tablet   Refills:  3            Where to get your medicines      These medications were sent to [facility] [address] [place] [address] [address]  [address], [place] [address]     Phone:  [phone]     topiramate 25 MG tablet         Some of these will need a paper prescription and others can be bought over the counter.  Ask your nurse if you have questions.     Bring a paper prescription for each of these medications     Phentermine HCl 8 MG Tabs                Primary Care Provider Office Phone # Fax #    Kristen M Kehr, PA-C [phone] [address] [facility] [address]        Goals        Diet    Eat more fruits and vegetables     Reduce fast food intake     Reduce portion size        Exercise    " Exercise 3x per week (30 min per time)       Equal Access to Services     KELLY LUCAS : Hadii aad ku haddeetrev Singh, vignesh gregory, levisahnnan khanmaernestine chamberlain, virgil rhodes. So [facility] [phone].    ATENCIÓN: Si habla español, tiene a aragon disposición servicios gratuitos de asistencia lingüística. Llame al [phone].    We comply with applicable federal civil rights laws and [place] laws. We do not discriminate on the basis of race, color, national origin, age, disability, sex, sexual orientation, or gender identity.            Thank you!     Thank you for choosing [facility]  for your care. Our goal is always to provide you with excellent care. Hearing back from our patients is one way we can continue to improve our services. Please take a few minutes to complete the written survey that you may receive in the mail after your visit with us. Thank you!             Your Updated Medication List - Protect others around you: Learn how to safely use, store and throw away your medicines at www.disposemymeds.org.          This list is accurate as of 6/18/18 11:17 AM.  Always use your most recent med list.                   Brand Name Dispense Instructions for use Diagnosis    desogestrel-ethinyl estradiol 0.15-30 MG-MCG per tablet    ENSKYCE    84 tablet    Take 1 tablet by mouth daily    Menometrorrhagia       * levothyroxine 200 MCG tablet    SYNTHROID/LEVOTHROID    90 tablet    Take 1 tablet (200 mcg) by mouth daily    Other specified hypothyroidism       * levothyroxine 25 MCG tablet    SYNTHROID/LEVOTHROID    90 tablet    Take 1 tablet (25 mcg) by mouth daily (Add to 25 mcg 200 mcg to equal 225 mcg daily)    Other specified hypothyroidism       MELATONIN PO      Take 3 mg by mouth At Bedtime        Multi-vitamin Tabs tablet      Take 1 tablet by mouth daily        Phentermine HCl 8 MG Tabs     30 tablet    Take 4 mg by mouth daily Lomaira    BMI 50.0-59.9, adult (H)        topiramate 25 MG tablet    TOPAMAX    30 tablet    Take 1/2 tab daily for 1 week and then 1 tab daily.    BMI 50.0-59.9, adult (H)       * Notice:  This list has 2 medication(s) that are the same as other medications prescribed for you. Read the directions carefully, and ask your doctor or other care provider to review them with you.

## 2018-06-18 NOTE — PATIENT INSTRUCTIONS
Call your insurance and ask if Saxenda is covered.    Start Lomaira (phentermine) 1/2 tablet. You can get a coupon on their website.  This has to be taken in the morning.    Start on Topamax, 1/2 tablet daily for 1 week and then 1 tab daily.  Take this at bedtime if it makes you tired.    Eat fresh fruits and vegetables at all meals.    Start doing the 7-minute workout (American Academy of Sports Medicine)    Take your blood pressure and pulse in 1 week and send to me via [employer].    Follow up with myself or Bekah Doll in 1 month.    [facility]3]    If you have any questions regarding to your visit please contact your care team:     Team Pink:   Clinic Hours Telephone Number   Internal Medicine:  Dr. Abby Doll, NP       7am-7pm  Monday - Thursday   7am-5pm  Fridays  [phone]  (Appointment scheduling available 24/7)    Questions about your recent visit?  Team Line  [phone]   Urgent Care - [place] [facility] - 11am-9pm Monday-Friday Saturday-Sunday- 9am-5pm   [place] - 5pm-9pm Monday-Friday Saturday-Sunday- 9am-5pm  [phone] - [place]  [phone] - [place]       What options do I have for a visit other than an office visit? We offer electronic visits (e-visits) and telephone visits, when medically appropriate.  Please check with your medical insurance to see if these types of visits are covered, as you will be responsible for any charges that are not paid by your insurance.      You can use [employer] (secure electronic communication) to access to your chart, send your primary care provider a message, or make an appointment. Ask a team member how to get started.     For a price quote for your services, please call our Consumer Price Line at [phone] or our Imaging Cost estimation line at [phone] (for imaging tests).    Angelita Gurrola, CMA

## 2018-06-18 NOTE — Clinical Note
Hi, Liz.  I am starting her on Qysmia for now although I think Saxenda would be more appropriate for her.  It was simply an insurance decision.  I think ultimately she will require surgery but we are going to exhaust all medication options first.  Thanks, Abby

## 2018-06-21 ENCOUNTER — OFFICE VISIT (OUTPATIENT)
Dept: BEHAVIORAL HEALTH | Facility: CLINIC | Age: 28
End: 2018-06-21
Payer: COMMERCIAL

## 2018-06-21 DIAGNOSIS — F32.1 MODERATE MAJOR DEPRESSION (H): Primary | ICD-10-CM

## 2018-06-21 DIAGNOSIS — F41.1 GAD (GENERALIZED ANXIETY DISORDER): ICD-10-CM

## 2018-06-21 PROCEDURE — 90834 PSYTX W PT 45 MINUTES: CPT | Performed by: MARRIAGE & FAMILY THERAPIST

## 2018-06-21 NOTE — MR AVS SNAPSHOT
After Visit Summary   6/21/2018    Marichuy Worrell    MRN: [id number]           Patient Information     Date Of Birth          1990        Visit Information        Provider Department      6/21/2018 9:00 AM Lucita Ring [facility]        Today's Diagnoses     Moderate major depression (H)    -  1    OSCAR (generalized anxiety disorder)           Follow-ups after your visit        Your next 10 appointments already scheduled     Jul 05, 2018  9:00 AM CDT   [facility] Visit with Lucita Ring   [facility]    [address] [facility] [id number] [phone]            Jul 18, 2018  9:00 AM CDT   Office Visit with ROXI Maloney CNP   [facility] ([facility]    [address] [id number] [phone]           Bring a current list of meds and any records pertaining to this visit. For Physicals, please bring immunization records and any forms needing to be filled out. Please arrive 10 minutes early to complete paperwork.              Who to contact     If you have questions or need follow up information about today's clinic visit or your schedule please contact [facility] directly at [phone].  Normal or non-critical lab and imaging results will be communicated to you by Prizzmhart, letter or phone within 4 business days after the clinic has received the results. If you do not hear from us within 7 days, please contact the clinic through Prizzmhart or phone. If you have a critical or abnormal lab result, we will notify you by phone as soon as possible.  Submit refill requests through Tixers or call your pharmacy and they will forward the refill request to us. Please allow 3 business days for your refill to be completed.          Additional Information About Your Visit        Tixers Information     Tixers gives you secure access to your electronic  health record. If you see a primary care provider, you can also send messages to your care team and make appointments. If you have questions, please call your primary care clinic.  If you do not have a primary care provider, please call [phone] and they will assist you.        Care EveryWhere ID     This is your Care EveryWhere ID. This could be used by other organizations to access your [place] medical records  [id number]        Your Vitals Were     Last Period                   05/28/2018 (Approximate)            Blood Pressure from Last 3 Encounters:   06/18/18 128/62   04/18/18 116/68   08/14/17 116/80    Weight from Last 3 Encounters:   06/18/18 (!) 154 kg (339 lb 8 oz)   04/18/18 (!) (P) 154.7 kg (341 lb)   08/14/17 (!) 154.2 kg (340 lb)              Today, you had the following     No orders found for display       Primary Care Provider Office Phone # Fax #    Kristen M Kehr, PA-C [phone] [address] [address] [address]        Goals        Diet    Eat more fruits and vegetables     Reduce fast food intake     Reduce portion size        Exercise    Exercise 3x per week (30 min per time)       Equal Access to Services     KELLY LUCAS : Hadii mila ku hadasho Soomaali, waaxda luqadaha, qaybta kaalmada adeegyada, virgil rhodes. So [facility] [phone].    ATENCIÓN: Si habla español, tiene a aragon disposición servicios gratuitos de asistencia lingüística. Llame al [phone].    We comply with applicable federal civil rights laws and [place] laws. We do not discriminate on the basis of race, color, national origin, age, disability, sex, sexual orientation, or gender identity.            Thank you!     Thank you for choosing [facility]  for your care. Our goal is always to provide you with excellent care. Hearing back from our patients is one way we can continue to improve our services. Please take a few minutes to complete the written  survey that you may receive in the mail after your visit with us. Thank you!             Your Updated Medication List - Protect others around you: Learn how to safely use, store and throw away your medicines at www.disposemymeds.org.          This list is accurate as of 6/21/18 11:59 PM.  Always use your most recent med list.                   Brand Name Dispense Instructions for use Diagnosis    desogestrel-ethinyl estradiol 0.15-30 MG-MCG per tablet    ENSKYCE    84 tablet    Take 1 tablet by mouth daily    Menometrorrhagia       * levothyroxine 200 MCG tablet    SYNTHROID/LEVOTHROID    90 tablet    Take 1 tablet (200 mcg) by mouth daily    Other specified hypothyroidism       * levothyroxine 25 MCG tablet    SYNTHROID/LEVOTHROID    90 tablet    Take 1 tablet (25 mcg) by mouth daily (Add to 25 mcg 200 mcg to equal 225 mcg daily)    Other specified hypothyroidism       MELATONIN PO      Take 3 mg by mouth At Bedtime        Multi-vitamin Tabs tablet      Take 1 tablet by mouth daily        Phentermine HCl 8 MG Tabs     30 tablet    Take 4 mg by mouth daily Lomaira    BMI 50.0-59.9, adult (H)       topiramate 25 MG tablet    TOPAMAX    30 tablet    Take 1/2 tab daily for 1 week and then 1 tab daily.    BMI 50.0-59.9, adult (H)       * Notice:  This list has 2 medication(s) that are the same as other medications prescribed for you. Read the directions carefully, and ask your doctor or other care provider to review them with you.

## 2018-06-22 NOTE — PROGRESS NOTES
[facility]   June 21, 2018      Behavioral Health Clinician Progress Note    Patient Name: Marichuy Worrell           Service Type:  Individual      Service Location:   Face to Face in Clinic     Session Start Time: 9:00  Session End Time: 10:00      Session Length: 53 - 60      Attendees: Patient    Visit Activities (Refresh list every visit): [facility] Only    Diagnostic Assessment Date: 5/15/18  Treatment Plan Review Date: 6/21/18  See Flowsheets for today's PHQ-9 and OSCAR-7 results  Previous PHQ-9:   PHQ-9 SCORE 4/18/2018 4/18/2018 5/15/2018   Total Score - - -   Total Score MyChart - 17 (Moderately severe depression) 13 (Moderate depression)   Total Score 17 17 13     Previous OSCAR-7:   OSCAR-7 SCORE 1/25/2016 4/18/2018 5/15/2018   Total Score - - -   Total Score - - 11 (moderate anxiety)   Total Score 2 14 11       RYAN LEVEL:  RYAN Score (Last Two) 4/1/2014   RYAN Raw Score 44   Activation Score 70.8   RYAN Level 4       DATA  Extended Session (60+ minutes): No  Interactive Complexity: No  Crisis: No  [facility] Patient: No    Treatment Objective(s) Addressed in This Session:  Target Behavior(s): diet/weight loss and disease management/lifestyle changes depression and anxiety     Depressed Mood: Increase interest, engagement, and pleasure in doing things  Decrease frequency and intensity of feeling down, depressed, hopeless  Improve quantity and quality of night time sleep / decrease daytime naps  Feel less tired and more energy during the day   Improve diet, appetite, mindful eating, and / or meal planning  Identify negative self-talk and behaviors: challenge core beliefs, myths, and actions  Improve concentration, focus, and mindfulness in daily activities   Feel less fidgety, restless or slow in daily activities / interpersonal interactions  Anxiety: will experience a reduction in anxiety, will develop more effective coping skills to manage anxiety symptoms, will develop healthy cognitive patterns and  "beliefs and will increase ability to function adaptively    Current Stressors / Issues:  Patient processed maintaining healthy boundaries with mother, brothers and sister in-law. Patient processed impacts, difficulties, barriers with weight management and anxiety/depression. Patient processed not being \"happy\" instead being \"content\". Patient processed negative self talk/negative core beliefs associated to her weight and other people's perceptions of her. Patient processed her 3 goals from weight management doctor appointment: 1. Fruits & Vegetables at every meal 2. 7 minute workout daily ([employer]) 3. Take medications daily        Progress on Treatment Objective(s) / Homework:  New Objective established this session - ACTION (Actively working towards change); Intervened by reinforcing change plan / affirming steps taken    Motivational Interviewing    MI Intervention: Co-Developed Goal: lose 10 pounds by next doctor appointment 7/18/18 , Expressed Empathy/Understanding, Supported Autonomy, Collaboration, Evocation, Permission to raise concern or advise, Open-ended questions, Reflections: simple and complex, Change talk (evoked) and Reframe     Change Talk Expressed by the Patient: Desire to change Reasons to change Need to change    Provider Response to Change Talk: E - Evoked more info from patient about behavior change, A - Affirmed patient's thoughts, decisions, or attempts at behavior change, R - Reflected patient's change talk and S - Summarized patient's change talk statements    Cognitive Behavioral Therapy   Thought Record Worksheet     Also provided psychoeducation about behavioral health condition, symptoms, and treatment options    Care Plan review completed: Yes    Medication Review:  No current psychiatric medications prescribed    Medication Compliance:  NA    Changes in Health Issues:   Yes: Weight / dietary issues, Associated Psychological Distress    Chemical Use Review:   Substance " Use: Chemical use reviewed, no active concerns identified      Tobacco Use: No current tobacco use.      Assessment: Current Emotional / Mental Status (status of significant symptoms):  Risk status (Self / Other harm or suicidal ideation)  Patient has had a history of suicidal ideation: 5-6 years ago  and self-injurious behavior: cutting high school years  Patient denies current fears or concerns for personal safety.  Patient denies current or recent suicidal ideation or behaviors.  Patient denies current or recent homicidal ideation or behaviors.  Patient denies current or recent self injurious behavior or ideation.  Patient denies other safety concerns.  A safety and risk management plan has not been developed at this time, however patient was encouraged to call [place] [phone] should there be a change in any of these risk factors.    Appearance:   Appropriate   Eye Contact:   Good   Psychomotor Behavior: Normal   Attitude:   Cooperative   Orientation:   All  Speech   Rate / Production: Normal    Volume:  Normal   Mood:    Anxious  Depressed  Sad   Affect:    Worrisome   Thought Content:  Rumination   Thought Form:  Coherent  Logical   Insight:    Good     Diagnoses:  1. Moderate major depression (H)    2. OSCAR (generalized anxiety disorder)        Collateral Reports Completed:  Not Applicable    Plan: (Homework, other):  Patient was given information about behavioral services and encouraged to schedule a follow up appointment with the clinic [facility] 7/5/18.  She was also given information about mental health symptoms and treatment options  and Cognitive Behavioral Therapy skills to practice when experiencing anxiety and depression.  CD Recommendations: No indications of CD issues.  SHLOMO Momin, [facility]       ______________________________________________________________________    Integrated Primary Care Behavioral Health Treatment Plan    Patient's Name: Marichuy Worrell  YOB: 1990    Date:  June 21, 2018    DSM-V Diagnoses:            296.32 (F33.1) Major Depressive Disorder, Recurrent Episode, Moderate With anxious distress  300.02 (F41.1) Generalized Anxiety Disorder  Psychosocial & Contextual Factors: None  WHODAS Score: 28    Referral / Collaboration:  Referral to another professional/service is not indicated at this time.  Anticipated number of session or this episode of care: 8    MeasurableTreatment Goal(s) related to diagnosis / functional impairment(s)  Goal 1: Patient will decrease symptoms by 80% and utilize healthy and effective coping strategies 75% of symptom occurrences.      I will know I've met my goal when feel happy, less anxious, healthier weight.      Objective #A (Patient Action)    Patient will use at least 4 coping skills for anxiety management in the next 3 weeks  Increase interest, engagement, and pleasure in doing things  Decrease frequency and intensity of feeling down, depressed, hopeless  Improve quantity and quality of night time sleep / decrease daytime naps  Feel less tired and more energy during the day   Improve diet, appetite, mindful eating, and / or meal planning  Identify negative self-talk and behaviors: challenge core beliefs, myths, and actions  Improve concentration, focus, and mindfulness in daily activities   Feel less fidgety, restless or slow in daily activities / interpersonal interactions  Decrease thoughts that you'd be better off dead or of suicide / self-harm  Status: Continued - Date(s):     Intervention(s)  [facility] will teach emotional recognition/identification. Emotional regulation. Identify unhealthy thought patterns, Re-frame secure/balanced thoguhts, CBT Mindfulness exercises, self esteem, positive behavior activation, estbalishing healthy boundaries .    Objective #B  Patient will use healthy and effective interventions to reach weight management goals  Status: Continued - Date(s):     Intervention(s)  [facility] will teach the client how to complete a  4-part pros and cons. Motivational Interviewing Skills to set and understand personal goals and identify hidden barriers .    Patient has reviewed and agreed to the above plan.    Written by  SHLOMO Momin, [facility]

## 2018-07-05 ENCOUNTER — OFFICE VISIT (OUTPATIENT)
Dept: BEHAVIORAL HEALTH | Facility: CLINIC | Age: 28
End: 2018-07-05
Payer: COMMERCIAL

## 2018-07-05 DIAGNOSIS — F41.1 GAD (GENERALIZED ANXIETY DISORDER): ICD-10-CM

## 2018-07-05 DIAGNOSIS — F32.1 MODERATE MAJOR DEPRESSION (H): Primary | ICD-10-CM

## 2018-07-05 PROCEDURE — 90834 PSYTX W PT 45 MINUTES: CPT | Performed by: MARRIAGE & FAMILY THERAPIST

## 2018-07-05 NOTE — MR AVS SNAPSHOT
After Visit Summary   7/5/2018    Marichuy Worrell    MRN: [id number]           Patient Information     Date Of Birth          1990        Visit Information        Provider Department      7/5/2018 9:00 AM Lucita Ring [facility]        Today's Diagnoses     Moderate major depression (H)    -  1    OSCAR (generalized anxiety disorder)           Follow-ups after your visit        Your next 10 appointments already scheduled     Jul 18, 2018  9:00 AM CDT   [facility] ROXI Maloney CNP   [facility]    [address] [id number] [phone]           Bring a current list of meds and any records pertaining to this visit. For Physicals, please bring immunization records and any forms needing to be filled out. Please arrive 10 minutes early to complete paperwork.            Jul 19, 2018 11:30 AM CDT   [facility] Visit with Lucita Ring   [facility]    [address] [facility] [address] [phone]              Who to contact     If you have questions or need follow up information about today's clinic visit or your schedule please contact [facility] directly at [phone].  Normal or non-critical lab and imaging results will be communicated to you by MyChart, letter or phone within 4 business days after the clinic has received the results. If you do not hear from us within 7 days, please contact the clinic through Flashpointhart or phone. If you have a critical or abnormal lab result, we will notify you by phone as soon as possible.  Submit refill requests through DeciZium or call your pharmacy and they will forward the refill request to us. Please allow 3 business days for your refill to be completed.          Additional Information About Your Visit        DeciZium Information     DeciZium gives you secure access to your electronic  health record. If you see a primary care provider, you can also send messages to your care team and make appointments. If you have questions, please call your primary care clinic.  If you do not have a primary care provider, please call [phone] and they will assist you.        Care EveryWhere ID     This is your Care EveryWhere ID. This could be used by other organizations to access your [place] medical records  [id number]         Blood Pressure from Last 3 Encounters:   06/18/18 128/62   04/18/18 116/68   08/14/17 116/80    Weight from Last 3 Encounters:   06/18/18 (!) 154 kg (339 lb 8 oz)   04/18/18 (!) (P) 154.7 kg (341 lb)   08/14/17 (!) 154.2 kg (340 lb)              Today, you had the following     No orders found for display       Primary Care Provider Office Phone # Fax #    Kristen M Kehr, PA-C [phone]       [address] [address] [address]        Goals        Diet    Eat more fruits and vegetables     Reduce fast food intake     Reduce portion size        Exercise    Exercise 3x per week (30 min per time)       Equal Access to Services     KELLY LUCAS : Hadii aad ku hadasho Soomaali, waaxda luqadaha, qaybta kaalmada adeegyada, waxay yakovin hayneymarn constanza rhodes. So [facility] [phone].    ATENCIÓN: Si habla español, tiene a aragon disposición servicios gratuitos de asistencia lingüística. Taya al [phone].    We comply with applicable federal civil rights laws and [place] laws. We do not discriminate on the basis of race, color, national origin, age, disability, sex, sexual orientation, or gender identity.            Thank you!     Thank you for choosing [facility]  for your care. Our goal is always to provide you with excellent care. Hearing back from our patients is one way we can continue to improve our services. Please take a few minutes to complete the written survey that you may receive in the mail after your visit with us. Thank you!              Your Updated Medication List - Protect others around you: Learn how to safely use, store and throw away your medicines at www.disposemymeds.org.          This list is accurate as of 7/5/18 12:55 PM.  Always use your most recent med list.                   Brand Name Dispense Instructions for use Diagnosis    desogestrel-ethinyl estradiol 0.15-30 MG-MCG per tablet    ENSKYCE    84 tablet    Take 1 tablet by mouth daily    Menometrorrhagia       * levothyroxine 200 MCG tablet    SYNTHROID/LEVOTHROID    90 tablet    Take 1 tablet (200 mcg) by mouth daily    Other specified hypothyroidism       * levothyroxine 25 MCG tablet    SYNTHROID/LEVOTHROID    90 tablet    Take 1 tablet (25 mcg) by mouth daily (Add to 25 mcg 200 mcg to equal 225 mcg daily)    Other specified hypothyroidism       MELATONIN PO      Take 3 mg by mouth At Bedtime        Multi-vitamin Tabs tablet      Take 1 tablet by mouth daily        Phentermine HCl 8 MG Tabs     30 tablet    Take 4 mg by mouth daily Lomaira    BMI 50.0-59.9, adult (H)       topiramate 25 MG tablet    TOPAMAX    30 tablet    Take 1/2 tab daily for 1 week and then 1 tab daily.    BMI 50.0-59.9, adult (H)       * Notice:  This list has 2 medication(s) that are the same as other medications prescribed for you. Read the directions carefully, and ask your doctor or other care provider to review them with you.

## 2018-07-05 NOTE — PROGRESS NOTES
[facility] [place]   July 5, 2018      Behavioral Health Clinician Progress Note    Patient Name: Marichuy Worrell           Service Type:  Individual      Service Location:   Face to Face in Clinic     Session Start Time: 9:00  Session End Time: 9:50      Session Length: 38 - 52      Attendees: Patient    Visit Activities (Refresh list every visit): [facility] Only    Diagnostic Assessment Date: 5/15/18  Treatment Plan Review Date: 6/21/18  See Flowsheets for today's PHQ-9 and OSCAR-7 results  Previous PHQ-9:   PHQ-9 SCORE 4/18/2018 4/18/2018 5/15/2018   Total Score - - -   Total Score MyChart - 17 (Moderately severe depression) 13 (Moderate depression)   Total Score 17 17 13     Previous OSCAR-7:   OSCAR-7 SCORE 1/25/2016 4/18/2018 5/15/2018   Total Score - - -   Total Score - - 11 (moderate anxiety)   Total Score 2 14 11       RYAN LEVEL:  RYAN Score (Last Two) 4/1/2014   RYAN Raw Score 44   Activation Score 70.8   RYAN Level 4       DATA  Extended Session (60+ minutes): No  Interactive Complexity: No  Crisis: No  [facility] Patient: No    Treatment Objective(s) Addressed in This Session:  Target Behavior(s): diet/weight loss and disease management/lifestyle changes depression and anxiety     Depressed Mood: Increase interest, engagement, and pleasure in doing things  Decrease frequency and intensity of feeling down, depressed, hopeless  Improve quantity and quality of night time sleep / decrease daytime naps  Feel less tired and more energy during the day   Improve diet, appetite, mindful eating, and / or meal planning  Identify negative self-talk and behaviors: challenge core beliefs, myths, and actions  Improve concentration, focus, and mindfulness in daily activities   Feel less fidgety, restless or slow in daily activities / interpersonal interactions  Anxiety: will experience a reduction in anxiety, will develop more effective coping skills to manage anxiety symptoms, will develop healthy cognitive patterns and  beliefs and will increase ability to function adaptively    Current Stressors / Issues:  Patient processed Thought Record Worksheet; situations related to conflicts with co-workers.   Patient reports roommate stefanie has arrived and she is looking forward to spending time with her and both of them motivating each other to be more active and eat healthier.   Patient processed weight management goals: eat veggie or fruit at every meal and exercise routine 7 minutes per day. Patient reports she was successful in adding fruit and vegetables to each meal and not eating fast food. Patient reports she did the 7 minute exercise one day. Patient processed barriers to completing the exercise routines and processed motivation factors. Patient reports she has been talking her medications daily as prescribed.        Progress on Treatment Objective(s) / Homework:  New Objective established this session - ACTION (Actively working towards change); Intervened by reinforcing change plan / affirming steps taken    Motivational Interviewing    MI Intervention: Co-Developed Goal: lose 10 pounds by next doctor appointment 7/18/18 , Expressed Empathy/Understanding, Supported Autonomy, Collaboration, Evocation, Permission to raise concern or advise, Open-ended questions, Reflections: simple and complex, Change talk (evoked) and Reframe     Change Talk Expressed by the Patient: Desire to change Reasons to change Need to change    Provider Response to Change Talk: E - Evoked more info from patient about behavior change, A - Affirmed patient's thoughts, decisions, or attempts at behavior change, R - Reflected patient's change talk and S - Summarized patient's change talk statements    Patient processed her list of things she wants to do once she has a healthier weight. Patient made this list 2 years ago.   Patient to post this list somewhere in her room where she can see it daily.     Cognitive Behavioral Therapy   Thought Record Worksheet      Also provided psychoeducation about behavioral health condition, symptoms, and treatment options    Care Plan review completed: Yes    Medication Review:  No current psychiatric medications prescribed    Medication Compliance:  NA    Changes in Health Issues:   Yes: Weight / dietary issues, Associated Psychological Distress    Chemical Use Review:   Substance Use: Chemical use reviewed, no active concerns identified      Tobacco Use: No current tobacco use.      Assessment: Current Emotional / Mental Status (status of significant symptoms):  Risk status (Self / Other harm or suicidal ideation)  Patient has had a history of suicidal ideation: 5-6 years ago  and self-injurious behavior: cutting high school years  Patient denies current fears or concerns for personal safety.  Patient denies current or recent suicidal ideation or behaviors.  Patient denies current or recent homicidal ideation or behaviors.  Patient denies current or recent self injurious behavior or ideation.  Patient denies other safety concerns.  A safety and risk management plan has not been developed at this time, however patient was encouraged to call [place] [phone] should there be a change in any of these risk factors.    Appearance:   Appropriate   Eye Contact:   Good   Psychomotor Behavior: Normal   Attitude:   Cooperative   Orientation:   All  Speech   Rate / Production: Normal    Volume:  Normal   Mood:    Anxious  Depressed  Sad   Affect:    Worrisome   Thought Content:  Rumination   Thought Form:  Coherent  Logical   Insight:    Good     Diagnoses:  1. Moderate major depression (H)    2. OSCAR (generalized anxiety disorder)        Collateral Reports Completed:  Not Applicable    Plan: (Homework, other):  Patient was given information about behavioral services and encouraged to schedule a follow up appointment with the clinic [facility] 7/19/18.  She was also given information about mental health symptoms and treatment options  and Cognitive  Behavioral Therapy skills to practice when experiencing anxiety and depression.  CD Recommendations: No indications of CD issues.  SHLOMO Momin, [facility]       ______________________________________________________________________    Integrated Primary Care Behavioral Health Treatment Plan    Patient's Name: Marichuy Worrell  YOB: 1990    Date: June 21, 2018    DSM-V Diagnoses:            296.32 (F33.1) Major Depressive Disorder, Recurrent Episode, Moderate With anxious distress  300.02 (F41.1) Generalized Anxiety Disorder  Psychosocial & Contextual Factors: None  WHODAS Score: 28    Referral / Collaboration:  Referral to another professional/service is not indicated at this time.  Anticipated number of session or this episode of care: 8    MeasurableTreatment Goal(s) related to diagnosis / functional impairment(s)  Goal 1: Patient will decrease symptoms by 80% and utilize healthy and effective coping strategies 75% of symptom occurrences.      I will know I've met my goal when feel happy, less anxious, healthier weight.      Objective #A (Patient Action)    Patient will use at least 4 coping skills for anxiety management in the next 3 weeks  Increase interest, engagement, and pleasure in doing things  Decrease frequency and intensity of feeling down, depressed, hopeless  Improve quantity and quality of night time sleep / decrease daytime naps  Feel less tired and more energy during the day   Improve diet, appetite, mindful eating, and / or meal planning  Identify negative self-talk and behaviors: challenge core beliefs, myths, and actions  Improve concentration, focus, and mindfulness in daily activities   Feel less fidgety, restless or slow in daily activities / interpersonal interactions  Decrease thoughts that you'd be better off dead or of suicide / self-harm  Status: Continued - Date(s):     Intervention(s)  [facility] will teach emotional recognition/identification. Emotional regulation.  Identify unhealthy thought patterns, Re-frame secure/balanced thoguhts, CBT Mindfulness exercises, self esteem, positive behavior activation, estbalishing healthy boundaries .    Objective #B  Patient will use healthy and effective interventions to reach weight management goals  Status: Continued - Date(s):     Intervention(s)  [facility] will teach the client how to complete a 4-part pros and cons. Motivational Interviewing Skills to set and understand personal goals and identify hidden barriers .    Patient has reviewed and agreed to the above plan.    Written by  SHLOMO Momin, [facility]

## 2018-07-18 ENCOUNTER — OFFICE VISIT (OUTPATIENT)
Dept: FAMILY MEDICINE | Facility: CLINIC | Age: 28
End: 2018-07-18
Payer: COMMERCIAL

## 2018-07-18 VITALS
BODY MASS INDEX: 48.82 KG/M2 | SYSTOLIC BLOOD PRESSURE: 120 MMHG | OXYGEN SATURATION: 97 % | HEART RATE: 78 BPM | RESPIRATION RATE: 18 BRPM | DIASTOLIC BLOOD PRESSURE: 60 MMHG | WEIGHT: 293 LBS | TEMPERATURE: 97 F | HEIGHT: 65 IN

## 2018-07-18 DIAGNOSIS — Z79.899 HIGH RISK MEDICATION USE: ICD-10-CM

## 2018-07-18 LAB
ANION GAP SERPL CALCULATED.3IONS-SCNC: 8 MMOL/L (ref 3–14)
BUN SERPL-MCNC: 10 MG/DL (ref 7–30)
CALCIUM SERPL-MCNC: 9 MG/DL (ref 8.5–10.1)
CHLORIDE SERPL-SCNC: 110 MMOL/L (ref 94–109)
CO2 SERPL-SCNC: 23 MMOL/L (ref 20–32)
CREAT SERPL-MCNC: 0.93 MG/DL (ref 0.52–1.04)
GFR SERPL CREATININE-BSD FRML MDRD: 73 ML/MIN/1.7M2
GLUCOSE SERPL-MCNC: 91 MG/DL (ref 70–99)
POTASSIUM SERPL-SCNC: 4.1 MMOL/L (ref 3.4–5.3)
SODIUM SERPL-SCNC: 141 MMOL/L (ref 133–144)

## 2018-07-18 PROCEDURE — 80048 BASIC METABOLIC PNL TOTAL CA: CPT | Performed by: NURSE PRACTITIONER

## 2018-07-18 PROCEDURE — 36415 COLL VENOUS BLD VENIPUNCTURE: CPT | Performed by: NURSE PRACTITIONER

## 2018-07-18 PROCEDURE — 99213 OFFICE O/P EST LOW 20 MIN: CPT | Performed by: NURSE PRACTITIONER

## 2018-07-18 ASSESSMENT — PAIN SCALES - GENERAL: PAINLEVEL: NO PAIN (0)

## 2018-07-18 NOTE — MR AVS SNAPSHOT
After Visit Summary   7/18/2018    Marichuy Worrell    MRN: [id number]           Patient Information     Date Of Birth          1990        Visit Information        Provider Department      7/18/2018 9:00 AM Bekah Doll APRN [facility]        Today's Diagnoses     BMI 50.0-59.9, adult (H)    -  1    High risk medication use          Care Instructions    Work on adding protein to lunch and dinner.  Aim for snacks to be fruit/veggie and protein.  Check out Fairlife milk.  Keep walking and continue strength training exercise.  Follow-up in 4-6 weeks.  [facility]    If you have any questions regarding to your visit please contact your care team:     Team Pink:   Clinic Hours Telephone Number   Internal Medicine:  Dr. Abby Doll, NP       7am-7pm  Monday - Thursday   7am-5pm  Fridays  [phone]  (Appointment scheduling available 24/7)    Questions about your recent visit?  Team Line  [phone]   Urgent Care - [place] and [place] - 11am-9pm Monday-Friday Saturday-Sunday- 9am-5pm   [place] - 5pm-9pm Monday-Friday Saturday-Sunday- 9am-5pm  [phone] - [place]  [phone] - [place]       What options do I have for a visit other than an office visit? We offer electronic visits (e-visits) and telephone visits, when medically appropriate.  Please check with your medical insurance to see if these types of visits are covered, as you will be responsible for any charges that are not paid by your insurance.      You can use [employer] (secure electronic communication) to access to your chart, send your primary care provider a message, or make an appointment. Ask a team member how to get started.     For a price quote for your services, please call our Consumer Price Line at [phone] or our Imaging Cost estimation line at [phone] (for imaging tests).  Linn Lee CMA              "Follow-ups after your visit        Your next 10 appointments already scheduled     Jul 20, 2018  9:00 AM CDT   [facility]    [id number] [facility] [address] [phone]              Who to contact     If you have questions or need follow up information about today's clinic visit or your schedule please contact [facility] directly at [phone].  Normal or non-critical lab and imaging results will be communicated to you by [employer], letter or phone within 4 business days after the clinic has received the results. If you do not hear from us within 7 days, please contact the clinic through Night[employer] or phone. If you have a critical or abnormal lab result, we will notify you by phone as soon as possible.  Submit refill requests through [employer] or call your pharmacy and they will forward the refill request to us. Please allow 3 business days for your refill to be completed.          Additional Information About Your Visit        [employer] Information     [employer] gives you secure access to your electronic health record. If you see a primary care provider, you can also send messages to your care team and make appointments. If you have questions, please call your primary care clinic.  If you do not have a primary care provider, please call [phone] and they will assist you.        Care EveryWhere ID     This is your Care EveryWhere ID. This could be used by other organizations to access your [facility] medical records  [id number]        Your Vitals Were     Pulse Temperature Respirations Height Last Period Pulse Oximetry    78 97  F (36.1  C) (Oral) 18 5' 5\" (1.651 m) 06/27/2018 97%    BMI (Body Mass Index)                   55.25 kg/m2            Blood Pressure from Last 3 Encounters:   07/18/18 120/60   06/18/18 128/62   04/18/18 116/68    Weight from Last 3 Encounters:   07/18/18 (!) 332 lb (150.6 kg) "   06/18/18 (!) 339 lb 8 oz (154 kg)   04/18/18 (!) (P) 341 lb (154.7 kg)              We Performed the Following     Basic metabolic panel        Primary Care Provider Office Phone # Fax #    Kristen M Kehr, PA-C [phone] [address] [address] [facility] [address]        Goals        Diet    Eat more fruits and vegetables     Reduce fast food intake     Reduce portion size        Exercise    Exercise 3x per week (30 min per time)       Equal Access to Services     KELLY LUCAS : Hadii aad ku hadasho Soomaali, waaxda luqadaha, qaybta kaalmada adeegyada, waxay idiin hayaan constanza kharacely chappell . So [facility] [phone].    ATENCIÓN: Si habla español, tiene a aragon disposición servicios gratuitos de asistencia lingüística. [facility] [phone].    We comply with applicable federal civil rights laws and [place] laws. We do not discriminate on the basis of race, color, national origin, age, disability, sex, sexual orientation, or gender identity.            Thank you!     Thank you for choosing [facility]  for your care. Our goal is always to provide you with excellent care. Hearing back from our patients is one way we can continue to improve our services. Please take a few minutes to complete the written survey that you may receive in the mail after your visit with us. Thank you!             Your Updated Medication List - Protect others around you: Learn how to safely use, store and throw away your medicines at www.disposemymeds.org.          This list is accurate as of 7/18/18  9:32 AM.  Always use your most recent med list.                   Brand Name Dispense Instructions for use Diagnosis    desogestrel-ethinyl estradiol 0.15-30 MG-MCG per tablet    ENSKYCE    84 tablet    Take 1 tablet by mouth daily    Menometrorrhagia       * levothyroxine 200 MCG tablet    SYNTHROID/LEVOTHROID    90 tablet    Take 1 tablet (200 mcg) by mouth daily    Other specified hypothyroidism       *  levothyroxine 25 MCG tablet    SYNTHROID/LEVOTHROID    90 tablet    Take 1 tablet (25 mcg) by mouth daily (Add to 25 mcg 200 mcg to equal 225 mcg daily)    Other specified hypothyroidism       MELATONIN PO      Take 3 mg by mouth At Bedtime        Multi-vitamin Tabs tablet      Take 1 tablet by mouth daily        Phentermine HCl 8 MG Tabs     30 tablet    Take 4 mg by mouth daily Lomaira    BMI 50.0-59.9, adult (H)       topiramate 25 MG tablet    TOPAMAX    30 tablet    Take 1/2 tab daily for 1 week and then 1 tab daily.    BMI 50.0-59.9, adult (H)       * Notice:  This list has 2 medication(s) that are the same as other medications prescribed for you. Read the directions carefully, and ask your doctor or other care provider to review them with you.

## 2018-07-18 NOTE — PROGRESS NOTES
Dear Marichuy,    Your recent test results are attached.      Normal kidney function and electrolytes.      If you have any questions please feel free to contact [phone] or myself via [employer].    Sincerely,  Bekah Doll, CNP

## 2018-07-18 NOTE — PATIENT INSTRUCTIONS
Work on adding protein to lunch and dinner.  Aim for snacks to be fruit/veggie and protein.  Check out Fairlife milk.  Keep walking and continue strength training exercise.  Follow-up in 4-6 weeks.  [facility]    If you have any questions regarding to your visit please contact your care team:     Team Pink:   Clinic Hours Telephone Number   Internal Medicine:  Dr. Abby Doll NP       7am-7pm  Monday - Thursday   7am-5pm  Fridays  [phone]  (Appointment scheduling available 24/7)    Questions about your recent visit?  Team Line  [phone]   Urgent Care - [place] and [facility] - 11am-9pm Monday-Friday Saturday-Sunday- 9am-5pm   [place] - 5pm-9pm Monday-Friday Saturday-Sunday- 9am-5pm  [phone] - [place]  [phone] - [place]       What options do I have for a visit other than an office visit? We offer electronic visits (e-visits) and telephone visits, when medically appropriate.  Please check with your medical insurance to see if these types of visits are covered, as you will be responsible for any charges that are not paid by your insurance.      You can use [employer] (secure electronic communication) to access to your chart, send your primary care provider a message, or make an appointment. Ask a team member how to get started.     For a price quote for your services, please call our Consumer Price Line at [phone] or our Imaging Cost estimation line at [phone] (for imaging tests).  Linn Lee CMA

## 2018-07-18 NOTE — PROGRESS NOTES
SUBJECTIVE:   Marichuy Worrell is a 28 year old female who presents to clinic today for the following health issues:      Chief Complaint   Patient presents with     Weight Check     INTERNAL MEDICINE  Medical Weight Loss - Return Visit      CHIEF COMPLAINT:  Recheck weight      Wt Readings from Last 5 Encounters:   07/18/18 (!) 332 lb (150.6 kg)   06/18/18 (!) 339 lb 8 oz (154 kg)   04/18/18 (!) (P) 341 lb (154.7 kg)   08/14/17 (!) 340 lb (154.2 kg)   08/08/17 (!) 338 lb (153.3 kg)         HISTORY OF PRESENT ILLNESS (HPI):    Patient returns today for medical weight loss follow-up visit. Patient was last seen by me on Visit date not found and has lost 7 pounds and 2.2 % body fat.    Patient notes some mild tingling to her feet with topamax, but otherwise denies side effects.  Patient denies palpitations, chest pain, increased anxiety with phentermine.  She feels both meds help with cravings and overeating.  Diet is as below.  Notes protein is hard for her, as she does not know how to cook it.    Breakfast- yogurt- dannon light, chobani, turkey baron, egg with veggies  Lunch- veggies- cabbage, zuchini and onions  Dinner- same as lunch.    Snacks- granola bar (has decreased quantity- 1 instead of 5); broccoli slaw with chicken    Drinks- soda intermittently, but has decreased.    Patient is going on a cruise next week and will hold her meds for that week.  She know she will be drinking alcohol, but does not plan to get drunk.    Patient has been walking about 30 minutes per day and doing some strength training.        MEDICATIONS:   Current Outpatient Prescriptions   Medication Sig Dispense Refill     desogestrel-ethinyl estradiol (ENSKYCE) 0.15-30 MG-MCG per tablet Take 1 tablet by mouth daily 84 tablet 3     levothyroxine (SYNTHROID/LEVOTHROID) 200 MCG tablet Take 1 tablet (200 mcg) by mouth daily 90 tablet 3     levothyroxine (SYNTHROID/LEVOTHROID) 25 MCG tablet Take 1 tablet (25 mcg) by mouth daily (Add to 25 mcg  "200 mcg to equal 225 mcg daily) 90 tablet 1     MELATONIN PO Take 3 mg by mouth At Bedtime       multivitamin, therapeutic with minerals (MULTI-VITAMIN) TABS tablet Take 1 tablet by mouth daily       Phentermine HCl 8 MG TABS Take 4 mg by mouth daily Lomaira 30 tablet 0     topiramate (TOPAMAX) 25 MG tablet Take 1/2 tab daily for 1 week and then 1 tab daily. 30 tablet 3       ALLERGIES:   Allergies   Allergen Reactions     Biaxin [Clarithromycin]      rash       PHYSICAL EXAMINATION:    VITALS: /60  Pulse 78  Temp 97  F (36.1  C) (Oral)  Resp 18  Ht 5' 5\" (1.651 m)  Wt (!) 332 lb (150.6 kg)  LMP 06/27/2018  SpO2 97%  BMI 55.25 kg/m2  GENERAL: Patient is a 28 year old year old female  in no acute distress.  Patient is alert and orientated x 4, pleasant and cooperative with exam.     CARDIOVASCULAR:  Regular rate and rhythm without murmurs, rubs, or gallops.  RESPIRATORY:  Lungs are clear to auscultation bilaterally, respiratory effort is normal.   No edema  PSYCH: mentation appears normal, affect normal and bright.      ASSESSMENT/PLAN:    1. BMI 50.0-59.9, adult (H)  Patient to continue topamax, phentermine at current doses.  See patient instructions.  Patient to set goals and develop a plan to stick to diet on her cruise.  Handouts given for My Plate and protein sources.    2. High risk medication use    - Basic metabolic panel         Patient is to call the clinic if she experiences any adverse reactions.     Follow up with Provider - 4-6 weeks.         I spent 20 minutes of time with the patient and >50% of it was in education and counseling regarding weight loss.     Bekah Doll, CNP  [facility]    "

## 2018-07-20 ENCOUNTER — OFFICE VISIT (OUTPATIENT)
Dept: BEHAVIORAL HEALTH | Facility: CLINIC | Age: 28
End: 2018-07-20
Payer: COMMERCIAL

## 2018-07-20 DIAGNOSIS — F32.1 MODERATE MAJOR DEPRESSION (H): Primary | ICD-10-CM

## 2018-07-20 DIAGNOSIS — F41.1 GAD (GENERALIZED ANXIETY DISORDER): ICD-10-CM

## 2018-07-20 PROCEDURE — 90834 PSYTX W PT 45 MINUTES: CPT | Performed by: MARRIAGE & FAMILY THERAPIST

## 2018-07-20 NOTE — MR AVS SNAPSHOT
After Visit Summary   7/20/2018    Marichuy Worrell    MRN: [id number]           Patient Information     Date Of Birth          1990        Visit Information        Provider Department      7/20/2018 9:00 AM Lucita Ring [facility]        Today's Diagnoses     Moderate major depression (H)    -  1    OSCAR (generalized anxiety disorder)           Follow-ups after your visit        Your next 10 appointments already scheduled     Aug 01, 2018  9:00 AM CDT   Return Visit with Lucita Ring   [facility] ([facility])    [address] [facility] [address]   [phone]            Aug 22, 2018  9:00 AM CDT   SHORT with ROXI Maloney CNP   [facility] ([facility])    [address] [place] [id number] [phone]            Sep 26, 2018 10:30 AM CDT   SHORT with Abby Ren MD   [facility] ([facility])    [address] [place] [id number] [phone]              Who to contact     If you have questions or need follow up information about today's clinic visit or your schedule please contact [facility] directly at [phone].  Normal or non-critical lab and imaging results will be communicated to you by MyChart, letter or phone within 4 business days after the clinic has received the results. If you do not hear from us within 7 days, please contact the clinic through MyChart or phone. If you have a critical or abnormal lab result, we will notify you by phone as soon as possible.  Submit refill requests through [facility] or call your pharmacy and they will forward the refill request to us. Please allow 3 business days for your refill to be completed.          Additional Information About Your Visit        MyChart Information     [facility] gives you secure access to your electronic health record. If you see a primary care  provider, you can also send messages to your care team and make appointments. If you have questions, please call your primary care clinic.  If you do not have a primary care provider, please call [phone] and they will assist you.        Care EveryWhere ID     This is your Care EveryWhere ID. This could be used by other organizations to access your [facility] medical records  [id number]        Your Vitals Were     Last Period                   06/27/2018            Blood Pressure from Last 3 Encounters:   07/18/18 120/60   06/18/18 128/62   04/18/18 116/68    Weight from Last 3 Encounters:   07/18/18 (!) 150.6 kg (332 lb)   06/18/18 (!) 154 kg (339 lb 8 oz)   04/18/18 (!) (P) 154.7 kg (341 lb)              Today, you had the following     No orders found for display       Primary Care Provider Office Phone # Fax #    Kristen M Kehr, PA-C [phone]       [address] [facility] [address]        Goals        Diet    Eat more fruits and vegetables     Reduce fast food intake     Reduce portion size        Exercise    Exercise 3x per week (30 min per time)       Equal Access to Services     KELLY LUCAS : Hadii aad ku hadasho Soomaali, waaxda luqadaha, qaybta kaalmada adeegyada, virgil rhodes. So [facility] [phone].    ATENCIÓN: Si habla español, tiene a aragon disposición servicios gratuitos de asistencia lingüística. Llame al [phone].    We comply with applicable federal civil rights laws and [place] laws. We do not discriminate on the basis of race, color, national origin, age, disability, sex, sexual orientation, or gender identity.            Thank you!     Thank you for choosing [facility]  for your care. Our goal is always to provide you with excellent care. Hearing back from our patients is one way we can continue to improve our services. Please take a few minutes to complete the written survey that you may receive in the mail after your visit  with us. Thank you!             Your Updated Medication List - Protect others around you: Learn how to safely use, store and throw away your medicines at www.disposemymeds.org.          This list is accurate as of 7/20/18 11:59 PM.  Always use your most recent med list.                   Brand Name Dispense Instructions for use Diagnosis    desogestrel-ethinyl estradiol 0.15-30 MG-MCG per tablet    ENSKYCE    84 tablet    Take 1 tablet by mouth daily    Menometrorrhagia       * levothyroxine 200 MCG tablet    SYNTHROID/LEVOTHROID    90 tablet    Take 1 tablet (200 mcg) by mouth daily    Other specified hypothyroidism       * levothyroxine 25 MCG tablet    SYNTHROID/LEVOTHROID    90 tablet    Take 1 tablet (25 mcg) by mouth daily (Add to 25 mcg 200 mcg to equal 225 mcg daily)    Other specified hypothyroidism       MELATONIN PO      Take 3 mg by mouth At Bedtime        Multi-vitamin Tabs tablet      Take 1 tablet by mouth daily        Phentermine HCl 8 MG Tabs     30 tablet    Take 4 mg by mouth daily Lomaira    BMI 50.0-59.9, adult (H)       topiramate 25 MG tablet    TOPAMAX    30 tablet    Take 1/2 tab daily for 1 week and then 1 tab daily.    BMI 50.0-59.9, adult (H)       * Notice:  This list has 2 medication(s) that are the same as other medications prescribed for you. Read the directions carefully, and ask your doctor or other care provider to review them with you.

## 2018-07-31 NOTE — PROGRESS NOTES
[facility] [place]   July 20, 2018      Behavioral Health Clinician Progress Note    Patient Name: Marichuy Worrell           Service Type:  Individual      Service Location:   Face to Face in Clinic     Session Start Time: 9:00  Session End Time: 9:50      Session Length: 38 - 52      Attendees: Patient    Visit Activities (Refresh list every visit): [facility] Only    Diagnostic Assessment Date: 5/15/18  Treatment Plan Review Date: 6/21/18  See Flowsheets for today's PHQ-9 and OSCAR-7 results  Previous PHQ-9:   PHQ-9 SCORE 4/18/2018 4/18/2018 5/15/2018   Total Score - - -   Total Score MyChart - 17 (Moderately severe depression) 13 (Moderate depression)   Total Score 17 17 13     Previous OSCAR-7:   OSCAR-7 SCORE 1/25/2016 4/18/2018 5/15/2018   Total Score - - -   Total Score - - 11 (moderate anxiety)   Total Score 2 14 11       RYAN LEVEL:  RYAN Score (Last Two) 4/1/2014   RYAN Raw Score 44   Activation Score 70.8   RYAN Level 4       DATA  Extended Session (60+ minutes): No  Interactive Complexity: No  Crisis: No  [facility] Patient: No    Treatment Objective(s) Addressed in This Session:  Target Behavior(s): diet/weight loss and disease management/lifestyle changes depression and anxiety     Depressed Mood: Increase interest, engagement, and pleasure in doing things  Decrease frequency and intensity of feeling down, depressed, hopeless  Improve quantity and quality of night time sleep / decrease daytime naps  Feel less tired and more energy during the day   Improve diet, appetite, mindful eating, and / or meal planning  Identify negative self-talk and behaviors: challenge core beliefs, myths, and actions  Improve concentration, focus, and mindfulness in daily activities   Feel less fidgety, restless or slow in daily activities / interpersonal interactions  Anxiety: will experience a reduction in anxiety, will develop more effective coping skills to manage anxiety symptoms, will develop healthy cognitive patterns and  beliefs and will increase ability to function adaptively    Current Stressors / Issues:  Patient processed her weight loss goal of getting below 230's pounds. Patient reports she has lost 7 pounds so far.   Patient reports she has been talking her medications daily as prescribed.   Patient processed her worries and anxiety provoked thoughts about going on vacation/ cruise with family  (airplane/fitting in the seat, healthy boundaries with family)  Patient processed her negative habit of eating ice cream at night, she has been choosing healthier foods since her best friend and roommate arrived.   Patient reports she has been doing more walking; she has not been doing the 7minute exercises; she does not like the exercise videos and it is not motivating for her.      Progress on Treatment Objective(s) / Homework:  New Objective established this session - ACTION (Actively working towards change); Intervened by reinforcing change plan / affirming steps taken    Motivational Interviewing    MI Intervention: Co-Developed Goal: lose 10 pounds by next doctor appointment 7/18/18 , Expressed Empathy/Understanding, Supported Autonomy, Collaboration, Evocation, Permission to raise concern or advise, Open-ended questions, Reflections: simple and complex, Change talk (evoked) and Reframe     Change Talk Expressed by the Patient: Desire to change Reasons to change Need to change    Provider Response to Change Talk: E - Evoked more info from patient about behavior change, A - Affirmed patient's thoughts, decisions, or attempts at behavior change, R - Reflected patient's change talk and S - Summarized patient's change talk statements    Patient processed her list of things she wants to do once she has a healthier weight. Patient made this list 2 years ago.   Patient to post this list somewhere in her room where she can see it daily.     Cognitive Behavioral Therapy   Thought Record Worksheet     Also provided psychoeducation about  behavioral health condition, symptoms, and treatment options    Care Plan review completed: Yes    Medication Review:  No current psychiatric medications prescribed    Medication Compliance:  NA    Changes in Health Issues:   Yes: Weight / dietary issues, Associated Psychological Distress    Chemical Use Review:   Substance Use: Chemical use reviewed, no active concerns identified      Tobacco Use: No current tobacco use.      Assessment: Current Emotional / Mental Status (status of significant symptoms):  Risk status (Self / Other harm or suicidal ideation)  Patient has had a history of suicidal ideation: 5-6 years ago  and self-injurious behavior: cutting high school years  Patient denies current fears or concerns for personal safety.  Patient denies current or recent suicidal ideation or behaviors.  Patient denies current or recent homicidal ideation or behaviors.  Patient denies current or recent self injurious behavior or ideation.  Patient denies other safety concerns.  A safety and risk management plan has not been developed at this time, however patient was encouraged to call [place] [phone] should there be a change in any of these risk factors.    Appearance:   Appropriate   Eye Contact:   Good   Psychomotor Behavior: Normal   Attitude:   Cooperative   Orientation:   All  Speech   Rate / Production: Normal    Volume:  Normal   Mood:    Anxious  Depressed  Sad   Affect:    Worrisome   Thought Content:  Rumination   Thought Form:  Coherent  Logical   Insight:    Good     Diagnoses:  1. Moderate major depression (H)    2. OSCAR (generalized anxiety disorder)        Collateral Reports Completed:  Not Applicable    Plan: (Homework, other):  Patient was given information about behavioral services and encouraged to schedule a follow up appointment with the clinic [facility] 8/1/18.  She was also given information about mental health symptoms and treatment options  and Cognitive Behavioral Therapy skills to practice  when experiencing anxiety and depression.  CD Recommendations: No indications of CD issues.  SHLOMO Momin, [facility]       ______________________________________________________________________    Integrated Primary Care Behavioral Health Treatment Plan    Patient's Name: Marichuy Worrell  YOB: 1990    Date: June 21, 2018    DSM-V Diagnoses:            296.32 (F33.1) Major Depressive Disorder, Recurrent Episode, Moderate With anxious distress  300.02 (F41.1) Generalized Anxiety Disorder  Psychosocial & Contextual Factors: None  WHODAS Score: 28    Referral / Collaboration:  Referral to another professional/service is not indicated at this time.  Anticipated number of session or this episode of care: 8    MeasurableTreatment Goal(s) related to diagnosis / functional impairment(s)  Goal 1: Patient will decrease symptoms by 80% and utilize healthy and effective coping strategies 75% of symptom occurrences.      I will know I've met my goal when feel happy, less anxious, healthier weight.      Objective #A (Patient Action)    Patient will use at least 4 coping skills for anxiety management in the next 3 weeks  Increase interest, engagement, and pleasure in doing things  Decrease frequency and intensity of feeling down, depressed, hopeless  Improve quantity and quality of night time sleep / decrease daytime naps  Feel less tired and more energy during the day   Improve diet, appetite, mindful eating, and / or meal planning  Identify negative self-talk and behaviors: challenge core beliefs, myths, and actions  Improve concentration, focus, and mindfulness in daily activities   Feel less fidgety, restless or slow in daily activities / interpersonal interactions  Decrease thoughts that you'd be better off dead or of suicide / self-harm  Status: Continued - Date(s):     Intervention(s)  [facility] will teach emotional recognition/identification. Emotional regulation. Identify unhealthy thought patterns, Re-frame  secure/balanced thoguhts, CBT Mindfulness exercises, self esteem, positive behavior activation, estbalishing healthy boundaries .    Objective #B  Patient will use healthy and effective interventions to reach weight management goals  Status: Continued - Date(s):     Intervention(s)  [facility] will teach the client how to complete a 4-part pros and cons. Motivational Interviewing Skills to set and understand personal goals and identify hidden barriers .    Patient has reviewed and agreed to the above plan.    Written by  SHLOMO Momin, [facility]

## 2018-08-09 ENCOUNTER — OFFICE VISIT (OUTPATIENT)
Dept: BEHAVIORAL HEALTH | Facility: CLINIC | Age: 28
End: 2018-08-09
Payer: COMMERCIAL

## 2018-08-09 DIAGNOSIS — F32.5 MAJOR DEPRESSION IN COMPLETE REMISSION (H): ICD-10-CM

## 2018-08-09 DIAGNOSIS — F41.1 GAD (GENERALIZED ANXIETY DISORDER): Primary | ICD-10-CM

## 2018-08-09 PROCEDURE — 90832 PSYTX W PT 30 MINUTES: CPT | Performed by: MARRIAGE & FAMILY THERAPIST

## 2018-08-10 NOTE — PROGRESS NOTES
[facility] [place]   August 9, 2018      Behavioral Health Clinician Progress Note    Patient Name: Marichuy Worrell           Service Type:  Individual      Service Location:   Face to Face in Clinic     Session Start Time: 8:30  Session End Time: 9:00      Session Length: 16 - 37      Attendees: Patient    Visit Activities (Refresh list every visit): [facility] Only    Diagnostic Assessment Date: 5/15/18  Treatment Plan Review Date: 6/21/18  See Flowsheets for today's PHQ-9 and OSCAR-7 results  Previous PHQ-9:   PHQ-9 SCORE 4/18/2018 4/18/2018 5/15/2018   Total Score - - -   Total Score MyChart - 17 (Moderately severe depression) 13 (Moderate depression)   Total Score 17 17 13     Previous OSCAR-7:   OSCAR-7 SCORE 1/25/2016 4/18/2018 5/15/2018   Total Score - - -   Total Score - - 11 (moderate anxiety)   Total Score 2 14 11       RYAN LEVEL:  RYAN Score (Last Two) 4/1/2014   RYAN Raw Score 44   Activation Score 70.8   RYAN Level 4       DATA  Extended Session (60+ minutes): No  Interactive Complexity: No  Crisis: No  [facility] Patient: No    Treatment Objective(s) Addressed in This Session:  Target Behavior(s): diet/weight loss and disease management/lifestyle changes depression and anxiety     Depressed Mood: Increase interest, engagement, and pleasure in doing things  Decrease frequency and intensity of feeling down, depressed, hopeless  Improve quantity and quality of night time sleep / decrease daytime naps  Feel less tired and more energy during the day   Improve diet, appetite, mindful eating, and / or meal planning  Identify negative self-talk and behaviors: challenge core beliefs, myths, and actions  Improve concentration, focus, and mindfulness in daily activities   Feel less fidgety, restless or slow in daily activities / interpersonal interactions  Anxiety: will experience a reduction in anxiety, will develop more effective coping skills to manage anxiety symptoms, will develop healthy cognitive patterns and  beliefs and will increase ability to function adaptively    Current Stressors / Issues:  Patient processed work stressors and conflicts. Patient reports a co-worker who has not been competent in his work responsibilities and not open to feedback was involved in money being missing/taken from a resident. Patient reports she is frustrated with management as she has made many complaints about this co-worker and she feels they have not taken action to correct the issue. Patient processed parallels of feeling angry and unfairness as it relates to her family dynamics; her mother letting her brother's get away with treating patient bad.    Patient reports this issue at work is so bothersome that she is thinking of quitting.      Progress on Treatment Objective(s) / Homework:  New Objective established this session - ACTION (Actively working towards change); Intervened by reinforcing change plan / affirming steps taken    Motivational Interviewing    MI Intervention: Co-Developed Goal: lose 10 pounds by next doctor appointment 7/18/18 , Expressed Empathy/Understanding, Supported Autonomy, Collaboration, Evocation, Permission to raise concern or advise, Open-ended questions, Reflections: simple and complex, Change talk (evoked) and Reframe     Change Talk Expressed by the Patient: Desire to change Reasons to change Need to change    Provider Response to Change Talk: E - Evoked more info from patient about behavior change, A - Affirmed patient's thoughts, decisions, or attempts at behavior change, R - Reflected patient's change talk and S - Summarized patient's change talk statements    Patient processed her list of things she wants to do once she has a healthier weight. Patient made this list 2 years ago.   Patient to post this list somewhere in her room where she can see it daily.     Cognitive Behavioral Therapy  -Identified areas in which patient does have personal power     Also provided psychoeducation about behavioral  health condition, symptoms, and treatment options    Care Plan review completed: Yes    Medication Review:  No current psychiatric medications prescribed    Medication Compliance:  NA    Changes in Health Issues:   Yes: Weight / dietary issues, Associated Psychological Distress    Chemical Use Review:   Substance Use: Chemical use reviewed, no active concerns identified      Tobacco Use: No current tobacco use.      Assessment: Current Emotional / Mental Status (status of significant symptoms):  Risk status (Self / Other harm or suicidal ideation)  Patient has had a history of suicidal ideation: 5-6 years ago  and self-injurious behavior: cutting high school years  Patient denies current fears or concerns for personal safety.  Patient denies current or recent suicidal ideation or behaviors.  Patient denies current or recent homicidal ideation or behaviors.  Patient denies current or recent self injurious behavior or ideation.  Patient denies other safety concerns.  A safety and risk management plan has not been developed at this time, however patient was encouraged to call [place] [phone] should there be a change in any of these risk factors.    Appearance:   Appropriate   Eye Contact:   Good   Psychomotor Behavior: Normal   Attitude:   Cooperative   Orientation:   All  Speech   Rate / Production: Normal    Volume:  Normal   Mood:    Anxious  Depressed  Sad   Affect:    Worrisome   Thought Content:  Rumination   Thought Form:  Coherent  Logical   Insight:    Good     Diagnoses:  1. OSCAR (generalized anxiety disorder)    2. Major depression in complete remission (H)        Collateral Reports Completed:  Not Applicable    Plan: (Homework, other):  Patient was given information about behavioral services and encouraged to schedule a follow up appointment with the clinic [facility] 8/30/18.  She was also given information about mental health symptoms and treatment options  and Cognitive Behavioral Therapy skills to practice  when experiencing anxiety and depression.  CD Recommendations: No indications of CD issues.  SHLOMO Momin, [facility]       ______________________________________________________________________    Integrated Primary Care Behavioral Health Treatment Plan    Patient's Name: Marichuy Worrell  YOB: 1990    Date: June 21, 2018    DSM-V Diagnoses:            296.32 (F33.1) Major Depressive Disorder, Recurrent Episode, Moderate With anxious distress  300.02 (F41.1) Generalized Anxiety Disorder  Psychosocial & Contextual Factors: None  WHODAS Score: 28    Referral / Collaboration:  Referral to another professional/service is not indicated at this time.  Anticipated number of session or this episode of care: 8    MeasurableTreatment Goal(s) related to diagnosis / functional impairment(s)  Goal 1: Patient will decrease symptoms by 80% and utilize healthy and effective coping strategies 75% of symptom occurrences.      I will know I've met my goal when feel happy, less anxious, healthier weight.      Objective #A (Patient Action)    Patient will use at least 4 coping skills for anxiety management in the next 3 weeks  Increase interest, engagement, and pleasure in doing things  Decrease frequency and intensity of feeling down, depressed, hopeless  Improve quantity and quality of night time sleep / decrease daytime naps  Feel less tired and more energy during the day   Improve diet, appetite, mindful eating, and / or meal planning  Identify negative self-talk and behaviors: challenge core beliefs, myths, and actions  Improve concentration, focus, and mindfulness in daily activities   Feel less fidgety, restless or slow in daily activities / interpersonal interactions  Decrease thoughts that you'd be better off dead or of suicide / self-harm  Status: Continued - Date(s):     Intervention(s)  [facility] will teach emotional recognition/identification. Emotional regulation. Identify unhealthy thought patterns, Re-frame  secure/balanced thoguhts, CBT Mindfulness exercises, self esteem, positive behavior activation, estbalishing healthy boundaries .    Objective #B  Patient will use healthy and effective interventions to reach weight management goals  Status: Continued - Date(s):     Intervention(s)  [facility] will teach the client how to complete a 4-part pros and cons. Motivational Interviewing Skills to set and understand personal goals and identify hidden barriers .    Patient has reviewed and agreed to the above plan.    Written by  SHLOMO Momin, [facility]

## 2018-08-22 ENCOUNTER — OFFICE VISIT (OUTPATIENT)
Dept: FAMILY MEDICINE | Facility: CLINIC | Age: 28
End: 2018-08-22
Payer: COMMERCIAL

## 2018-08-22 VITALS
BODY MASS INDEX: 48.82 KG/M2 | WEIGHT: 293 LBS | TEMPERATURE: 97.7 F | HEIGHT: 65 IN | OXYGEN SATURATION: 99 % | RESPIRATION RATE: 16 BRPM | DIASTOLIC BLOOD PRESSURE: 70 MMHG | SYSTOLIC BLOOD PRESSURE: 130 MMHG | HEART RATE: 82 BPM

## 2018-08-22 DIAGNOSIS — E03.8 OTHER SPECIFIED HYPOTHYROIDISM: ICD-10-CM

## 2018-08-22 PROCEDURE — 99213 OFFICE O/P EST LOW 20 MIN: CPT | Performed by: NURSE PRACTITIONER

## 2018-08-22 RX ORDER — LEVOTHYROXINE SODIUM 25 UG/1
25 TABLET ORAL DAILY
Qty: 90 TABLET | Refills: 1 | Status: SHIPPED | OUTPATIENT
Start: 2018-08-22 | End: 2019-02-28

## 2018-08-22 RX ORDER — TOPIRAMATE 25 MG/1
25 TABLET, FILM COATED ORAL DAILY
Qty: 30 TABLET | Refills: 3 | Status: SHIPPED | OUTPATIENT
Start: 2018-08-22 | End: 2018-09-26

## 2018-08-22 RX ORDER — LEVOTHYROXINE SODIUM 200 UG/1
200 TABLET ORAL DAILY
Qty: 90 TABLET | Refills: 1 | Status: SHIPPED | OUTPATIENT
Start: 2018-08-22 | End: 2019-02-08

## 2018-08-22 ASSESSMENT — PAIN SCALES - GENERAL: PAINLEVEL: NO PAIN (0)

## 2018-08-22 NOTE — MR AVS SNAPSHOT
After Visit Summary   8/22/2018    Marichuy Worrell    MRN: [id number]           Patient Information     Date Of Birth          1990        Visit Information        Provider Department      8/22/2018 9:00 AM Bekah Doll APRN [facility]        Today's Diagnoses     BMI 50.0-59.9, adult (H)    -  1    Other specified hypothyroidism          Care Instructions    Increase lomaira to 8 mg (1 tab daily).  MyChart with blood pressure and pulse in 2 weeks.  Follow-up in 1 month.    [facility]    If you have any questions regarding to your visit please contact your care team:     Team Pink:   Clinic Hours Telephone Number   Internal Medicine:  Dr. Abby Doll, NP       7am-7pm  Monday - Thursday   7am-5pm  Fridays  [phone]  (Appointment scheduling available 24/7)    Questions about your recent visit?  Team Line  [phone]   Urgent Care - [place] and [facility] - 11am-9pm Monday-Friday Saturday-Sunday- 9am-5pm   [place] - 5pm-9pm Monday-Friday Saturday-Sunday- 9am-5pm  [phone] - Lita Chavis  [phone] - [place]       What options do I have for a visit other than an office visit? We offer electronic visits (e-visits) and telephone visits, when medically appropriate.  Please check with your medical insurance to see if these types of visits are covered, as you will be responsible for any charges that are not paid by your insurance.      You can use [employer] (secure electronic communication) to access to your chart, send your primary care provider a message, or make an appointment. Ask a team member how to get started.     For a price quote for your services, please call our Consumer Price Line at [phone] or our Imaging Cost estimation line at [phone] (for imaging tests).  Linn Lee CMA             Follow-ups after your visit        Your next 10 appointments already scheduled      "Aug 30, 2018  9:00 AM CDT   Return Visit with Lucita Ring   [facility] ([facility]    [address] [facility] [address] [phone]            Sep 26, 2018 10:30 AM CDT   SHORT with Abby Ren MD   [facility]    [address] [facility] [address] [phone]              Who to contact     If you have questions or need follow up information about today's clinic visit or your schedule please contact [facility] directly at [phone].  Normal or non-critical lab and imaging results will be communicated to you by IRIhart, letter or phone within 4 business days after the clinic has received the results. If you do not hear from us within 7 days, please contact the clinic through Tiempot or phone. If you have a critical or abnormal lab result, we will notify you by phone as soon as possible.  Submit refill requests through [employer] or call your pharmacy and they will forward the refill request to us. Please allow 3 business days for your refill to be completed.          Additional Information About Your Visit        [employer] Information     [employer] gives you secure access to your electronic health record. If you see a primary care provider, you can also send messages to your care team and make appointments. If you have questions, please call your primary care clinic.  If you do not have a primary care provider, please call [phone] and they will assist you.        Care EveryWhere ID     This is your Care EveryWhere ID. This could be used by other organizations to access your [place] medical records  [id number]        Your Vitals Were     Pulse Temperature Respirations Height Last Period Pulse Oximetry    82 97.7  F (36.5  C) (Oral) 16 5' 5\" (1.651 m) 08/21/2018 99%    BMI (Body Mass Index)                   55.08 kg/m2            Blood Pressure from Last 3 Encounters:   08/22/18 130/70 "   07/18/18 120/60   06/18/18 128/62    Weight from Last 3 Encounters:   08/22/18 (!) 331 lb (150.1 kg)   07/18/18 (!) 332 lb (150.6 kg)   06/18/18 (!) 339 lb 8 oz (154 kg)              Today, you had the following     No orders found for display         Today's Medication Changes          These changes are accurate as of 8/22/18  9:28 AM.  If you have any questions, ask your nurse or doctor.               These medicines have changed or have updated prescriptions.        Dose/Directions    Phentermine HCl 8 MG Tabs   This may have changed:  how much to take   Used for:  BMI 50.0-59.9, adult (H)   Changed by:  Bekah Doll APRN CNP        Dose:  8 mg   Take 8 mg by mouth daily Lomaira   Quantity:  30 tablet   Refills:  0       topiramate 25 MG tablet   Commonly known as:  TOPAMAX   This may have changed:    - how much to take  - how to take this  - when to take this  - additional instructions   Used for:  BMI 50.0-59.9, adult (H)   Changed by:  Bekah Doll APRN CNP        Dose:  25 mg   Take 1 tablet (25 mg) by mouth daily   Quantity:  30 tablet   Refills:  3            Where to get your medicines      These medications were sent to [facility] [address] [place] [address] [id number]  [id number], [place] [address]     Phone:  [phone]     levothyroxine 200 MCG tablet    levothyroxine 25 MCG tablet    topiramate 25 MG tablet         Some of these will need a paper prescription and others can be bought over the counter.  Ask your nurse if you have questions.     Bring a paper prescription for each of these medications     Phentermine HCl 8 MG Tabs                Primary Care Provider Office Phone # Fax #    Kristen M Kehr, PA-C [phone] [address] [facility] [address]        Goals        Diet    Eat more fruits and vegetables     Reduce fast food intake     Reduce portion size        Exercise    Exercise 3x per week (30 min per time)       Equal  Access to Services     [facility]: Hadii aad ku haddeetrev Bernardali, wajenniferda luqadaha, qaybta kasvetlanavirgil monreal. So [facility] [phone].    ATENCIÓN: Si kandice hodges, tiene a aragon disposición servicios gratuitos de asistencia lingüística. Llame al [phone].    We comply with applicable federal civil rights laws and [place] laws. We do not discriminate on the basis of race, color, national origin, age, disability, sex, sexual orientation, or gender identity.            Thank you!     Thank you for choosing [facility]  for your care. Our goal is always to provide you with excellent care. Hearing back from our patients is one way we can continue to improve our services. Please take a few minutes to complete the written survey that you may receive in the mail after your visit with us. Thank you!             Your Updated Medication List - Protect others around you: Learn how to safely use, store and throw away your medicines at www.disposemymeds.org.          This list is accurate as of 8/22/18  9:28 AM.  Always use your most recent med list.                   Brand Name Dispense Instructions for use Diagnosis    desogestrel-ethinyl estradiol 0.15-30 MG-MCG per tablet    ENSKYCE    84 tablet    Take 1 tablet by mouth daily    Menometrorrhagia       * levothyroxine 25 MCG tablet    SYNTHROID/LEVOTHROID    90 tablet    Take 1 tablet (25 mcg) by mouth daily (Add to 25 mcg 200 mcg to equal 225 mcg daily)    Other specified hypothyroidism       * levothyroxine 200 MCG tablet    SYNTHROID/LEVOTHROID    90 tablet    Take 1 tablet (200 mcg) by mouth daily    Other specified hypothyroidism       MELATONIN PO      Take 3 mg by mouth At Bedtime        Multi-vitamin Tabs tablet      Take 1 tablet by mouth daily        Phentermine HCl 8 MG Tabs     30 tablet    Take 8 mg by mouth daily Lomaira    BMI 50.0-59.9, adult (H)       topiramate 25 MG tablet    TOPAMAX    30 tablet     Take 1 tablet (25 mg) by mouth daily    BMI 50.0-59.9, adult (H)       * Notice:  This list has 2 medication(s) that are the same as other medications prescribed for you. Read the directions carefully, and ask your doctor or other care provider to review them with you.

## 2018-08-22 NOTE — PROGRESS NOTES
SUBJECTIVE:   Marichuy Worrell is a 28 year old female who presents to clinic today for the following health issues:      Chief Complaint   Patient presents with     Weight Check     follow up     Health Maintenance     HIV screening     INTERNAL MEDICINE  Medical Weight Loss - Return Visit      CHIEF COMPLAINT:  Recheck weight      Wt Readings from Last 5 Encounters:   08/22/18 (!) 331 lb (150.1 kg)   07/18/18 (!) 332 lb (150.6 kg)   06/18/18 (!) 339 lb 8 oz (154 kg)   04/18/18 (!) (P) 341 lb (154.7 kg)   08/14/17 (!) 340 lb (154.2 kg)         HISTORY OF PRESENT ILLNESS (HPI):    Patient returns today for medical weight loss follow-up visit. Patient was last seen by me on 7/18/2018 and has lost 1 pounds and gained 3.6 % body fat.      Patient recently went a cruise.  She found it difficult to stick to diet, as choices were limited.  She also notes increased bingeing since she returned.  She denies any side effects from phentermine and topamax.      Patient continues to exercise regularly and do some strength training.    MEDICATIONS:   Current Outpatient Prescriptions   Medication Sig Dispense Refill     desogestrel-ethinyl estradiol (ENSKYCE) 0.15-30 MG-MCG per tablet Take 1 tablet by mouth daily 84 tablet 3     levothyroxine (SYNTHROID/LEVOTHROID) 200 MCG tablet Take 1 tablet (200 mcg) by mouth daily 90 tablet 3     levothyroxine (SYNTHROID/LEVOTHROID) 25 MCG tablet Take 1 tablet (25 mcg) by mouth daily (Add to 25 mcg 200 mcg to equal 225 mcg daily) 90 tablet 1     MELATONIN PO Take 3 mg by mouth At Bedtime       multivitamin, therapeutic with minerals (MULTI-VITAMIN) TABS tablet Take 1 tablet by mouth daily       Phentermine HCl 8 MG TABS Take 4 mg by mouth daily Lomaira 30 tablet 0     topiramate (TOPAMAX) 25 MG tablet Take 1/2 tab daily for 1 week and then 1 tab daily. 30 tablet 3       ALLERGIES:   Allergies   Allergen Reactions     Biaxin [Clarithromycin]      rash       PHYSICAL EXAMINATION:    VITALS: BP  "130/70  Pulse 82  Temp 97.7  F (36.5  C) (Oral)  Resp 16  Ht 5' 5\" (1.651 m)  Wt (!) 331 lb (150.1 kg)  LMP 08/21/2018  SpO2 99%  BMI 55.08 kg/m2  GENERAL: Patient is a 28 year old year old female  in no acute distress.  Patient is alert and orientated x 4, pleasant and cooperative with exam.     CARDIOVASCULAR:  Regular rate and rhythm without murmurs, rubs, or gallops.  RESPIRATORY:  Lungs are clear to auscultation bilaterally, respiratory effort is normal.   No edema  PSYCH: mentation appears normal, affect normal and bright.      ASSESSMENT/PLAN:    1. BMI 50.0-59.9, adult (H)  Will increase phentermine to 8 mg daily to help with bingeing.  Patient to MyChart blood pressure and pulse in 2 weeks and follow-up in 1 month as planned.  - topiramate (TOPAMAX) 25 MG tablet; Take 1 tablet (25 mg) by mouth daily  Dispense: 30 tablet; Refill: 3  - Phentermine HCl 8 MG TABS; Take 8 mg by mouth daily Lomaira  Dispense: 30 tablet; Refill: 0    2. Other specified hypothyroidism    - levothyroxine (SYNTHROID/LEVOTHROID) 25 MCG tablet; Take 1 tablet (25 mcg) by mouth daily (Add to 25 mcg 200 mcg to equal 225 mcg daily)  Dispense: 90 tablet; Refill: 1  - levothyroxine (SYNTHROID/LEVOTHROID) 200 MCG tablet; Take 1 tablet (200 mcg) by mouth daily  Dispense: 90 tablet; Refill: 1       Patient is to call the clinic if she experiences any adverse reactions.     Follow up with Provider - 1 month         I spent 15 minutes of time with the patient and >50% of it was in education and counseling regarding weight loss.     Bekah Doll, CNP  [facility]    "

## 2018-08-22 NOTE — PATIENT INSTRUCTIONS
Increase lomaira to 8 mg (1 tab daily).  MyChart with blood pressure and pulse in 2 weeks.  Follow-up in 1 month.    [facility]    If you have any questions regarding to your visit please contact your care team:     Team Pink:   Clinic Hours Telephone Number   Internal Medicine:  Dr. Abby Doll, NP       7am-7pm  Monday - Thursday   7am-5pm  Fridays  [phone]  (Appointment scheduling available 24/7)    Questions about your recent visit?  Team Line  [phone]   Urgent Care - [place] - 11am-9pm Monday-Friday Saturday-Sunday- 9am-5pm   [place] - 5pm-9pm Monday-Friday Saturday-Sunday- 9am-5pm  [phone] - [place]  [phone] - [place]       What options do I have for a visit other than an office visit? We offer electronic visits (e-visits) and telephone visits, when medically appropriate.  Please check with your medical insurance to see if these types of visits are covered, as you will be responsible for any charges that are not paid by your insurance.      You can use [employer] (secure electronic communication) to access to your chart, send your primary care provider a message, or make an appointment. Ask a team member how to get started.     For a price quote for your services, please call our Consumer Price Line at [phone] or our Imaging Cost estimation line at [phone] (for imaging tests).  Linn Lee CMA

## 2018-08-30 ENCOUNTER — OFFICE VISIT (OUTPATIENT)
Dept: BEHAVIORAL HEALTH | Facility: CLINIC | Age: 28
End: 2018-08-30
Payer: COMMERCIAL

## 2018-08-30 DIAGNOSIS — F41.1 GAD (GENERALIZED ANXIETY DISORDER): ICD-10-CM

## 2018-08-30 DIAGNOSIS — F32.5 MAJOR DEPRESSION IN COMPLETE REMISSION (H): Primary | ICD-10-CM

## 2018-08-30 PROCEDURE — 90832 PSYTX W PT 30 MINUTES: CPT | Performed by: MARRIAGE & FAMILY THERAPIST

## 2018-08-30 NOTE — MR AVS SNAPSHOT
After Visit Summary   8/30/2018    Marichuy Worrell    MRN: [id number]           Patient Information     Date Of Birth          1990        Visit Information        Provider Department      8/30/2018 9:00 AM Lucita Ring [facility]        Today's Diagnoses     Major depression in complete remission (H)    -  1    OSCAR (generalized anxiety disorder)           Follow-ups after your visit        Your next 10 appointments already scheduled     Sep 19, 2018  1:30 PM CDT   Return Visit with Lucita Ring   [facility] ([facility])    [address] [facility] [id number] [phone]            Sep 26, 2018 10:30 AM CDT   SHORT with Abby Ren MD   [facility] ([facility]    [address] [facility] [address] [phone]              Who to contact     If you have questions or need follow up information about today's clinic visit or your schedule please contact [facility] directly at [phone].  Normal or non-critical lab and imaging results will be communicated to you by CloudC[employer], letter or phone within 4 business days after the clinic has received the results. If you do not hear from us within 7 days, please contact the clinic through WorkingPointt or phone. If you have a critical or abnormal lab result, we will notify you by phone as soon as possible.  Submit refill requests through [employer] or call your pharmacy and they will forward the refill request to us. Please allow 3 business days for your refill to be completed.          Additional Information About Your Visit        [employer] Information     [employer] gives you secure access to your electronic health record. If you see a primary care provider, you can also send messages to your care team and make appointments. If you have questions, please call your primary care clinic.  If you do not have a primary care provider, please  call [phone] and they will assist you.        Care EveryWhere ID     This is your Care EveryWhere ID. This could be used by other organizations to access your [place] medical records  [id number]        Your Vitals Were     Last Period                   08/21/2018            Blood Pressure from Last 3 Encounters:   08/22/18 130/70   07/18/18 120/60   06/18/18 128/62    Weight from Last 3 Encounters:   08/22/18 (!) 150.1 kg (331 lb)   07/18/18 (!) 150.6 kg (332 lb)   06/18/18 (!) 154 kg (339 lb 8 oz)              Today, you had the following     No orders found for display       Primary Care Provider Office Phone # Fax #    Kristen M Kehr, PA-C [phone] [address] [facility] [address]        Goals        Diet    Eat more fruits and vegetables     Reduce fast food intake     Reduce portion size        Exercise    Exercise 3x per week (30 min per time)       Equal Access to Services     [facility] : Hadii aad ku hadasho Soomaali, waaxda luqadaha, qaybta kaalmada adeegyada, waxay idiin haylance chappell . So [facility] [phone].    ATENCIÓN: Si habla español, tiene a aragon disposición servicios gratuitos de asistencia lingüística. Llame al [phone].    We comply with applicable federal civil rights laws and [place] laws. We do not discriminate on the basis of race, color, national origin, age, disability, sex, sexual orientation, or gender identity.            Thank you!     Thank you for choosing [facility] [place]  for your care. Our goal is always to provide you with excellent care. Hearing back from our patients is one way we can continue to improve our services. Please take a few minutes to complete the written survey that you may receive in the mail after your visit with us. Thank you!             Your Updated Medication List - Protect others around you: Learn how to safely use, store and throw away your medicines at www.disposemymeds.org.          This list  is accurate as of 8/30/18 11:59 PM.  Always use your most recent med list.                   Brand Name Dispense Instructions for use Diagnosis    desogestrel-ethinyl estradiol 0.15-30 MG-MCG per tablet    ENSKYCE    84 tablet    Take 1 tablet by mouth daily    Menometrorrhagia       * levothyroxine 25 MCG tablet    SYNTHROID/LEVOTHROID    90 tablet    Take 1 tablet (25 mcg) by mouth daily (Add to 25 mcg 200 mcg to equal 225 mcg daily)    Other specified hypothyroidism       * levothyroxine 200 MCG tablet    SYNTHROID/LEVOTHROID    90 tablet    Take 1 tablet (200 mcg) by mouth daily    Other specified hypothyroidism       MELATONIN PO      Take 3 mg by mouth At Bedtime        Multi-vitamin Tabs tablet      Take 1 tablet by mouth daily        Phentermine HCl 8 MG Tabs     30 tablet    Take 8 mg by mouth daily Lomaira    BMI 50.0-59.9, adult (H)       topiramate 25 MG tablet    TOPAMAX    30 tablet    Take 1 tablet (25 mg) by mouth daily    BMI 50.0-59.9, adult (H)       * Notice:  This list has 2 medication(s) that are the same as other medications prescribed for you. Read the directions carefully, and ask your doctor or other care provider to review them with you.
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
## 2018-09-19 ENCOUNTER — OFFICE VISIT (OUTPATIENT)
Dept: BEHAVIORAL HEALTH | Facility: CLINIC | Age: 28
End: 2018-09-19
Payer: COMMERCIAL

## 2018-09-19 DIAGNOSIS — F41.1 GAD (GENERALIZED ANXIETY DISORDER): ICD-10-CM

## 2018-09-19 DIAGNOSIS — F32.1 MODERATE MAJOR DEPRESSION (H): Primary | ICD-10-CM

## 2018-09-19 PROCEDURE — 90834 PSYTX W PT 45 MINUTES: CPT | Performed by: MARRIAGE & FAMILY THERAPIST

## 2018-09-19 NOTE — MR AVS SNAPSHOT
After Visit Summary   9/19/2018    Marichuy Worrell    MRN: [id number]           Patient Information     Date Of Birth          1990        Visit Information        Provider Department      9/19/2018 1:30 PM Lucita Ring [facility]        Today's Diagnoses     Moderate major depression (H)    -  1    OSCAR (generalized anxiety disorder)           Follow-ups after your visit        Your next 10 appointments already scheduled     Sep 26, 2018 10:30 AM CDT   SHORT with Abby Ren MD   [facility] ([facility]    [address] [facility] [id number] [phone]            Oct 03, 2018  2:30 PM CDT   [facility] Visit with Lucita Ring   [facility]    [address] [facility] [address] [phone]              Who to contact     If you have questions or need follow up information about today's clinic visit or your schedule please contact [facility] directly at [phone].  Normal or non-critical lab and imaging results will be communicated to you by MoBeamhart, letter or phone within 4 business days after the clinic has received the results. If you do not hear from us within 7 days, please contact the clinic through [employer] Airl[employer] or phone. If you have a critical or abnormal lab result, we will notify you by phone as soon as possible.  Submit refill requests through TherMark or call your pharmacy and they will forward the refill request to us. Please allow 3 business days for your refill to be completed.          Additional Information About Your Visit        MoBeamhart Information     TherMark gives you secure access to your electronic health record. If you see a primary care provider, you can also send messages to your care team and make appointments. If you have questions, please call your primary care clinic.  If you do not have a primary care provider, please call  [phone] and they will assist you.        Care EveryWhere ID     This is your Care EveryWhere ID. This could be used by other organizations to access your [facility] records  [id number]        Your Vitals Were     Last Period                   08/21/2018            Blood Pressure from Last 3 Encounters:   08/22/18 130/70   07/18/18 120/60   06/18/18 128/62    Weight from Last 3 Encounters:   08/22/18 (!) 150.1 kg (331 lb)   07/18/18 (!) 150.6 kg (332 lb)   06/18/18 (!) 154 kg (339 lb 8 oz)              Today, you had the following     No orders found for display       Primary Care Provider Office Phone # Fax #    Kristen M Kehr, PA-C [phone] [address] [facility] [address]        Goals        Diet    Eat more fruits and vegetables     Reduce fast food intake     Reduce portion size        Exercise    Exercise 3x per week (30 min per time)       Equal Access to Services     KELLY LUCAS : Hadii aad ku hadasho Soomaali, waaxda luqadaha, qaybta kaalmada adeegyada, waxay idiin hayneymarn constanza chappell . So [facility] [phone].    ATENCIÓN: Si habla español, tiene a aragon disposición servicios gratuitos de asistencia lingüística. Llame al [phone].    We comply with applicable federal civil rights laws and [place] laws. We do not discriminate on the basis of race, color, national origin, age, disability, sex, sexual orientation, or gender identity.            Thank you!     Thank you for choosing [facility]  for your care. Our goal is always to provide you with excellent care. Hearing back from our patients is one way we can continue to improve our services. Please take a few minutes to complete the written survey that you may receive in the mail after your visit with us. Thank you!             Your Updated Medication List - Protect others around you: Learn how to safely use, store and throw away your medicines at www.disposemymeds.org.          This list is  accurate as of 9/19/18 11:59 PM.  Always use your most recent med list.                   Brand Name Dispense Instructions for use Diagnosis    desogestrel-ethinyl estradiol 0.15-30 MG-MCG per tablet    ENSKYCE    84 tablet    Take 1 tablet by mouth daily    Menometrorrhagia       * levothyroxine 25 MCG tablet    SYNTHROID/LEVOTHROID    90 tablet    Take 1 tablet (25 mcg) by mouth daily (Add to 25 mcg 200 mcg to equal 225 mcg daily)    Other specified hypothyroidism       * levothyroxine 200 MCG tablet    SYNTHROID/LEVOTHROID    90 tablet    Take 1 tablet (200 mcg) by mouth daily    Other specified hypothyroidism       MELATONIN PO      Take 3 mg by mouth At Bedtime        Multi-vitamin Tabs tablet      Take 1 tablet by mouth daily        Phentermine HCl 8 MG Tabs     30 tablet    Take 8 mg by mouth daily Lomaira    BMI 50.0-59.9, adult (H)       topiramate 25 MG tablet    TOPAMAX    30 tablet    Take 1 tablet (25 mg) by mouth daily    BMI 50.0-59.9, adult (H)       * Notice:  This list has 2 medication(s) that are the same as other medications prescribed for you. Read the directions carefully, and ask your doctor or other care provider to review them with you.

## 2018-09-21 NOTE — PROGRESS NOTES
[facility]   [place] 2018      Behavioral Health Clinician Progress Note    Patient Name: Marichuy Worrell           Service Type:  Individual      Service Location:   Face to Face in Clinic     Session Start Time: 1:30  Session End Time: 2:30      Session Length: 53 - 60      Attendees: Patient    Visit Activities (Refresh list every visit): [facility] Only    Diagnostic Assessment Date: 5/15/18  Treatment Plan Review Date: 6/21/18  See Flowsheets for today's PHQ-9 and OSCAR-7 results  Previous PHQ-9:   PHQ-9 SCORE 4/18/2018 4/18/2018 5/15/2018   Total Score - - -   Total Score MyChart - 17 (Moderately severe depression) 13 (Moderate depression)   Total Score 17 17 13     Previous OSCAR-7:   OSCAR-7 SCORE 1/25/2016 4/18/2018 5/15/2018   Total Score - - -   Total Score - - 11 (moderate anxiety)   Total Score 2 14 11       RYAN LEVEL:  RYAN Score (Last Two) 4/1/2014   RYAN Raw Score 44   Activation Score 70.8   RYAN Level 4       DATA  Extended Session (60+ minutes): No  Interactive Complexity: No  Crisis: No  [facility] Patient: No    Treatment Objective(s) Addressed in This Session:  Target Behavior(s): diet/weight loss and disease management/lifestyle changes depression and anxiety     Depressed Mood: Increase interest, engagement, and pleasure in doing things  Decrease frequency and intensity of feeling down, depressed, hopeless  Improve quantity and quality of night time sleep / decrease daytime naps  Feel less tired and more energy during the day   Improve diet, appetite, mindful eating, and / or meal planning  Identify negative self-talk and behaviors: challenge core beliefs, myths, and actions  Improve concentration, focus, and mindfulness in daily activities   Feel less fidgety, restless or slow in daily activities / interpersonal interactions  Anxiety: will experience a reduction in anxiety, will develop more effective coping skills to manage anxiety symptoms, will develop healthy cognitive patterns  and beliefs and will increase ability to function adaptively    Current Stressors / Issues:  Patient reports did not get the job at [employer]. Patient reports she did interview for a scheduling job at [employer] and she did get the job offer, but needs to decide if she will take it as her current employer is creating a new job role for her.  Patent processed her unhealthy reactions to not getting the job at [employer]; isolating, withdrawing to her bedroom, making unhealthy food choices. Patient also balanced these unhealthy reactions with healthy reaction of editing her resume and applying to more jobs.    Patient processed her lack of self confidence and negative core beliefs about herself.       Progress on Treatment Objective(s) / Homework:  New Objective established this session - ACTION (Actively working towards change); Intervened by reinforcing change plan / affirming steps taken    Motivational Interviewing    MI Intervention: Co-Developed Goal: lose 10 pounds by next doctor appointment 7/18/18 , Expressed Empathy/Understanding, Supported Autonomy, Collaboration, Evocation, Permission to raise concern or advise, Open-ended questions, Reflections: simple and complex, Change talk (evoked) and Reframe     Change Talk Expressed by the Patient: Desire to change Reasons to change Need to change    Provider Response to Change Talk: E - Evoked more info from patient about behavior change, A - Affirmed patient's thoughts, decisions, or attempts at behavior change, R - Reflected patient's change talk and S - Summarized patient's change talk statements    Patient processed her list of things she wants to do once she has a healthier weight. Patient made this list 2 years ago.   Patient to post this list somewhere in her room where she can see it daily.     Cognitive Behavioral Therapy  -Identified patterns of patient's trust in other's can be vulnerability     Also provided psychoeducation about behavioral  health condition, symptoms, and treatment options    Care Plan review completed: Yes    Medication Review:  No current psychiatric medications prescribed    Medication Compliance:  NA    Changes in Health Issues:   Yes: Weight / dietary issues, Associated Psychological Distress    Chemical Use Review:   Substance Use: Chemical use reviewed, no active concerns identified      Tobacco Use: No current tobacco use.      Assessment: Current Emotional / Mental Status (status of significant symptoms):  Risk status (Self / Other harm or suicidal ideation)  Patient has had a history of suicidal ideation: 5-6 years ago  and self-injurious behavior: cutting high school years  Patient denies current fears or concerns for personal safety.  Patient denies current or recent suicidal ideation or behaviors.  Patient denies current or recent homicidal ideation or behaviors.  Patient denies current or recent self injurious behavior or ideation.  Patient denies other safety concerns.  A safety and risk management plan has not been developed at this time, however patient was encouraged to call [place] [phone] should there be a change in any of these risk factors.    Appearance:   Appropriate   Eye Contact:   Good   Psychomotor Behavior: Normal   Attitude:   Cooperative   Orientation:   All  Speech   Rate / Production: Normal    Volume:  Normal   Mood:    Anxious  Depressed  Sad   Affect:    Worrisome   Thought Content:  Rumination   Thought Form:  Coherent  Logical   Insight:    Good     Diagnoses:  1. Moderate major depression (H)    2. OSCAR (generalized anxiety disorder)        Collateral Reports Completed:  Not Applicable    Plan: (Homework, other):  Patient was given information about behavioral services and encouraged to schedule a follow up appointment with the clinic [facility] 10/3//18.  She was also given information about mental health symptoms and treatment options  and Cognitive Behavioral Therapy skills to practice when  experiencing anxiety and depression.  CD Recommendations: No indications of CD issues.  SHLOMO Momin, [facility] [place]       ______________________________________________________________________    Integrated Primary Care Behavioral Health Treatment Plan    Patient's Name: Marichuy Worrell  YOB: 1990    Date: June 21, 2018    DSM-V Diagnoses:            296.32 (F33.1) Major Depressive Disorder, Recurrent Episode, Moderate With anxious distress  300.02 (F41.1) Generalized Anxiety Disorder  Psychosocial & Contextual Factors: None  WHODAS Score: 28    Referral / Collaboration:  Referral to another professional/service is not indicated at this time.  Anticipated number of session or this episode of care: 8    MeasurableTreatment Goal(s) related to diagnosis / functional impairment(s)  Goal 1: Patient will decrease symptoms by 80% and utilize healthy and effective coping strategies 75% of symptom occurrences.      I will know I've met my goal when feel happy, less anxious, healthier weight.      Objective #A (Patient Action)    Patient will use at least 4 coping skills for anxiety management in the next 3 weeks  Increase interest, engagement, and pleasure in doing things  Decrease frequency and intensity of feeling down, depressed, hopeless  Improve quantity and quality of night time sleep / decrease daytime naps  Feel less tired and more energy during the day   Improve diet, appetite, mindful eating, and / or meal planning  Identify negative self-talk and behaviors: challenge core beliefs, myths, and actions  Improve concentration, focus, and mindfulness in daily activities   Feel less fidgety, restless or slow in daily activities / interpersonal interactions  Decrease thoughts that you'd be better off dead or of suicide / self-harm  Status: Continued - Date(s):     Intervention(s)  [facility] will teach emotional recognition/identification. Emotional regulation. Identify unhealthy thought patterns, Re-frame  secure/balanced thoguhts, CBT Mindfulness exercises, self esteem, positive behavior activation, estbalishing healthy boundaries .    Objective #B  Patient will use healthy and effective interventions to reach weight management goals  Status: Continued - Date(s):     Intervention(s)  [facility] will teach the client how to complete a 4-part pros and cons. Motivational Interviewing Skills to set and understand personal goals and identify hidden barriers .    Patient has reviewed and agreed to the above plan.    Written by  SHLOMO Momin, [facility]

## 2018-09-26 ENCOUNTER — OFFICE VISIT (OUTPATIENT)
Dept: INTERNAL MEDICINE | Facility: CLINIC | Age: 28
End: 2018-09-26
Payer: COMMERCIAL

## 2018-09-26 VITALS
SYSTOLIC BLOOD PRESSURE: 128 MMHG | HEART RATE: 95 BPM | WEIGHT: 293 LBS | TEMPERATURE: 97 F | BODY MASS INDEX: 54.17 KG/M2 | DIASTOLIC BLOOD PRESSURE: 74 MMHG | OXYGEN SATURATION: 97 % | RESPIRATION RATE: 18 BRPM

## 2018-09-26 DIAGNOSIS — E66.01 MORBIDLY OBESE (H): Primary | ICD-10-CM

## 2018-09-26 DIAGNOSIS — F41.1 GAD (GENERALIZED ANXIETY DISORDER): ICD-10-CM

## 2018-09-26 PROCEDURE — 99213 OFFICE O/P EST LOW 20 MIN: CPT | Performed by: INTERNAL MEDICINE

## 2018-09-26 RX ORDER — TOPIRAMATE 25 MG/1
25 TABLET, FILM COATED ORAL DAILY
Qty: 90 TABLET | Refills: 1 | Status: SHIPPED | OUTPATIENT
Start: 2018-09-26 | End: 2018-10-31

## 2018-09-26 ASSESSMENT — PAIN SCALES - GENERAL: PAINLEVEL: NO PAIN (0)

## 2018-09-26 NOTE — PROGRESS NOTES
INTERNAL MEDICINE  Medical Weight Loss - Return Visit      CHIEF COMPLAINT:  Recheck weight      Wt Readings from Last 5 Encounters:   08/22/18 (!) 331 lb (150.1 kg)   07/18/18 (!) 332 lb (150.6 kg)   06/18/18 (!) 339 lb 8 oz (154 kg)   04/18/18 (!) (P) 341 lb (154.7 kg)   08/14/17 (!) 340 lb (154.2 kg)         HISTORY OF PRESENT ILLNESS (HPI):    Patient returns today for medical weight loss follow-up visit. Patient was last seen by me on 6/18/2018     She was binging by taking extra helpings and not having spacing between meals.  The phentermine was increased to 8 mg and that helped.  She grazes more when she works the night shift.  She is no longer going to work the night shift.     She has lost 14 pounds in 12 weeks but body fat is unchanged.   She is working on eating more protein since her last appointment.   She gets tired on meats.  She prefers eggs, yogurt.  Exercise has been better now that she works days.  This has helped her weight loss the last week or two.  Has an exercise journal as well.    Patient denies any exertional chest pain, dyspnea, palpitations, syncope, orthopnea, edema or paroxysmal nocturnal dyspnea. On phentermine and topamax.     MEDICATIONS:   Current Outpatient Prescriptions   Medication Sig Dispense Refill     desogestrel-ethinyl estradiol (ENSKYCE) 0.15-30 MG-MCG per tablet Take 1 tablet by mouth daily 84 tablet 3     levothyroxine (SYNTHROID/LEVOTHROID) 200 MCG tablet Take 1 tablet (200 mcg) by mouth daily 90 tablet 1     levothyroxine (SYNTHROID/LEVOTHROID) 25 MCG tablet Take 1 tablet (25 mcg) by mouth daily (Add to 25 mcg 200 mcg to equal 225 mcg daily) 90 tablet 1     MELATONIN PO Take 3 mg by mouth At Bedtime       multivitamin, therapeutic with minerals (MULTI-VITAMIN) TABS tablet Take 1 tablet by mouth daily       Phentermine HCl 8 MG TABS Take 8 mg by mouth daily Lomaira 30 tablet 0     topiramate (TOPAMAX) 25 MG tablet Take 1 tablet (25 mg) by mouth daily 30 tablet 3        ALLERGIES:   Allergies   Allergen Reactions     Biaxin [Clarithromycin]      rash       PHYSICAL EXAMINATION:    VITALS: There were no vitals taken for this visit.  GENERAL: Patient is a 28 year old year old female  in no acute distress.  Patient is alert and orientated x 4, pleasant and cooperative with exam.     CARDIOVASCULAR:  Regular rate and rhythm without murmurs, rubs, or gallops.  RESPIRATORY:  Lungs are clear to auscultation bilaterally, respiratory effort is normal.   No edema  PSYCH: mentation appears normal, affect normal and bright.    LAB RESULTS:   Lab Results   Component Value Date    TSH 1.52 04/18/2018     Last Comprehensive Metabolic Panel:  Sodium   Date Value Ref Range Status   07/18/2018 141 133 - 144 mmol/L Final     Potassium   Date Value Ref Range Status   07/18/2018 4.1 3.4 - 5.3 mmol/L Final     Chloride   Date Value Ref Range Status   07/18/2018 110 (H) 94 - 109 mmol/L Final     Carbon Dioxide   Date Value Ref Range Status   07/18/2018 23 20 - 32 mmol/L Final     Anion Gap   Date Value Ref Range Status   07/18/2018 8 3 - 14 mmol/L Final     Glucose   Date Value Ref Range Status   07/18/2018 91 70 - 99 mg/dL Final     Urea Nitrogen   Date Value Ref Range Status   07/18/2018 10 7 - 30 mg/dL Final     Creatinine   Date Value Ref Range Status   07/18/2018 0.93 0.52 - 1.04 mg/dL Final     GFR Estimate   Date Value Ref Range Status   07/18/2018 73 >60 mL/min/1.7m2 Final     Comment:     Non  GFR Calc     Calcium   Date Value Ref Range Status   07/18/2018 9.0 8.5 - 10.1 mg/dL Final       ASSESSMENT/PLAN:    1. BMI 50.0-59.9, adult (H)  Doing well with current treatment plan.  Per patient instructions.   - Phentermine HCl 8 MG TABS; Take 8 mg by mouth daily Lomaira  Dispense: 30 tablet; Refill: 1  - topiramate (TOPAMAX) 25 MG tablet; Take 1 tablet (25 mg) by mouth daily  Dispense: 90 tablet; Refill: 1     Anxiety - no change with the phentermine   Patient is to call the  clinic if she experiences any adverse reactions.     Patient Instructions     Consider adding in protein supplement (see handout).  Move your medication to lunch or later and see if this helps.  Follow up in 6-8 weeks with Bekah Doll or myself.  [facility]    If you have any questions regarding to your visit please contact your care team:     Team Pink:   Clinic Hours Telephone Number   Internal Medicine:  Dr. Abby Doll, NP       7am-7pm  Monday - Thursday   7am-5pm  Fridays  [phone]  (Appointment scheduling available 24/7)    Questions about your visit?  Team Line  [phone]   Ur[facility] - [place] [facility] - 11am-9pm Monday-Friday Saturday-Sunday- 9am-5pm   [place] - 5pm-9pm Monday-Friday Saturday-Sunday- 9am-5pm  [phone] - [place]   [phone] - [place]       What options do I have for visits at the clinic other than the traditional office visit?  To expand how we care for you, many of our providers are utilizing electronic visits (e-visits) and telephone visits, when medically appropriate, for interactions with their patients rather than a visit in the clinic.   We also offer nurse visits for many medical concerns. Just like any other service, we will bill your insurance company for this type of visit based on time spent on the phone with your provider. Not all insurance companies cover these visits. Please check with your medical insurance if this type of visit is covered. You will be responsible for any charges that are not paid by your insurance.      E-visits via [employer]:  generally incur a $35.00 fee.  Telephone visits:  Time spent on the phone: *charged based on time that is spent on the phone in increments of 10 minutes. Estimated cost:   5-10 mins $30.00   11-20 mins. $59.00   21-30 mins. $85.00   Use [employer] (secure email communication and access to your chart) to send your primary care provider a  message or make an appointment. Ask someone on your Team how to sign up for Qbix.    For a Price Quote for your services, please call our Consumer Price Line at [phone].    As always, Thank you for trusting us with your health care needs  Ruel Ren MD  Internal Medicine    American Board of Obesity Medicine Diplomate

## 2018-09-26 NOTE — MR AVS SNAPSHOT
After Visit Summary   9/26/2018    Marichuy Worrell    MRN: [id number]           Patient Information     Date Of Birth          1990        Visit Information        Provider Department      9/26/2018 10:30 AM Abby Ren MD [facility]        Today's Diagnoses     BMI 50.0-59.9, adult (H)          Care Instructions    Consider adding in protein supplement (see handout).  Move your medication to lunch or later and see if this helps.  Follow up in 6-8 weeks with Bekah Doll or myself.  [facility]    If you have any questions regarding to your visit please contact your care team:     Team Pink:   Clinic Hours Telephone Number   Internal Medicine:  Dr. Abby Doll, NP       7am-7pm  Monday - Thursday   7am-5pm  Fridays  [phone]  (Appointment scheduling available 24/7)    Questions about your visit?  Team Line  [phone]   Urgent Care - [place] - 11am-9pm Monday-Friday Saturday-Sunday- 9am-5pm   [place] - 5pm-9pm Monday-Friday Saturday-Sunday- 9am-5pm  [phone] - [place]   [phone] - [place]       What options do I have for visits at the clinic other than the traditional office visit?  To expand how we care for you, many of our providers are utilizing electronic visits (e-visits) and telephone visits, when medically appropriate, for interactions with their patients rather than a visit in the clinic.   We also offer nurse visits for many medical concerns. Just like any other service, we will bill your insurance company for this type of visit based on time spent on the phone with your provider. Not all insurance companies cover these visits. Please check with your medical insurance if this type of visit is covered. You will be responsible for any charges that are not paid by your insurance.      E-visits via [employer]:  generally incur a $35.00 fee.  Telephone visits:  Time spent  on the phone: *charged based on time that is spent on the phone in increments of 10 minutes. Estimated cost:   5-10 mins $30.00   11-20 mins. $59.00   21-30 mins. $85.00   Use [employer] (secure email communication and access to your chart) to send your primary care provider a message or make an appointment. Ask someone on your Team how to sign up for [employer].    For a Price Quote for your services, please call our [employer] Line at [phone].    As always, Thank you for trusting us with your health care needs  Ruel Santamaria            Follow-ups after your visit        Follow-up notes from your care team     Return in about 6 weeks (around 11/7/2018) for weight management.      Your next 10 appointments already scheduled     Oct 03, 2018  2:30 PM CDT   Return Visit with Lucita Ring   [facility]    [address] [address] [phone]              Who to contact     If you have questions or need follow up information about today's clinic visit or your schedule please contact [facility] directly at [phone].  Normal or non-critical lab and imaging results will be communicated to you by MyChart, letter or phone within 4 business days after the clinic has received the results. If you do not hear from us within 7 days, please contact the clinic through MyChart or phone. If you have a critical or abnormal lab result, we will notify you by phone as soon as possible.  Submit refill requests through [employer] or call your pharmacy and they will forward the refill request to us. Please allow 3 business days for your refill to be completed.          Additional Information About Your Visit        GMG33hart Information     [employer] gives you secure access to your electronic health record. If you see a primary care provider, you can also send messages to your care team and make appointments. If you have questions, please call your  primary care clinic.  If you do not have a primary care provider, please call [phone] and they will assist you.        Care EveryWhere ID     This is your Care EveryWhere ID. This could be used by other organizations to access your [place] medical records  [id number]        Your Vitals Were     Pulse Temperature Respirations Pulse Oximetry BMI (Body Mass Index)       95 97  F (36.1  C) (Oral) 18 97% 54.17 kg/m2        Blood Pressure from Last 3 Encounters:   09/26/18 128/74   08/22/18 130/70   07/18/18 120/60    Weight from Last 3 Encounters:   09/26/18 325 lb 8 oz (147.6 kg)   08/22/18 (!) 331 lb (150.1 kg)   07/18/18 (!) 332 lb (150.6 kg)              Today, you had the following     No orders found for display         Where to get your medicines      These medications were sent to [facility] [address] [place] [address] [address]  [address], [place] [address]     Phone:  [phone]     topiramate 25 MG tablet         Some of these will need a paper prescription and others can be bought over the counter.  Ask your nurse if you have questions.     Bring a paper prescription for each of these medications     Phentermine HCl 8 MG Tabs          Primary Care Provider Office Phone # Fax #    Kristen M Kehr, PA-C [phone] [address] [facility] [address]        Goals        Diet    Eat more fruits and vegetables     Reduce fast food intake     Reduce portion size        Exercise    Exercise 3x per week (30 min per time)       Equal Access to Services     [facility] : Hadii mila john hadasho Sozander, waaxda luqadaha, qaybta kaalmada virgil chamberlain idialexis rhodes. So [facility] [phone].    ATENCIÓN: Si habla español, tiene a aragon disposición servicios gratuitos de asistencia lingüística. Llame al [phone].    We comply with applicable federal civil rights laws and [place] laws. We do not discriminate on the basis of race, color, national  origin, age, disability, sex, sexual orientation, or gender identity.            Thank you!     Thank you for choosing [facility]  for your care. Our goal is always to provide you with excellent care. Hearing back from our patients is one way we can continue to improve our services. Please take a few minutes to complete the written survey that you may receive in the mail after your visit with us. Thank you!             Your Updated Medication List - Protect others around you: Learn how to safely use, store and throw away your medicines at www.disposemymeds.org.          This list is accurate as of 9/26/18 10:51 AM.  Always use your most recent med list.                   Brand Name Dispense Instructions for use Diagnosis    desogestrel-ethinyl estradiol 0.15-30 MG-MCG per tablet    ENSKYCE    84 tablet    Take 1 tablet by mouth daily    Menometrorrhagia       * levothyroxine 25 MCG tablet    SYNTHROID/LEVOTHROID    90 tablet    Take 1 tablet (25 mcg) by mouth daily (Add to 25 mcg 200 mcg to equal 225 mcg daily)    Other specified hypothyroidism       * levothyroxine 200 MCG tablet    SYNTHROID/LEVOTHROID    90 tablet    Take 1 tablet (200 mcg) by mouth daily    Other specified hypothyroidism       MELATONIN PO      Take 3 mg by mouth At Bedtime        Multi-vitamin Tabs tablet      Take 1 tablet by mouth daily        Phentermine HCl 8 MG Tabs     30 tablet    Take 8 mg by mouth daily Lomaira    BMI 50.0-59.9, adult (H)       topiramate 25 MG tablet    TOPAMAX    90 tablet    Take 1 tablet (25 mg) by mouth daily    BMI 50.0-59.9, adult (H)       * Notice:  This list has 2 medication(s) that are the same as other medications prescribed for you. Read the directions carefully, and ask your doctor or other care provider to review them with you.

## 2018-09-26 NOTE — PATIENT INSTRUCTIONS
Consider adding in protein supplement (see handout).  Move your medication to lunch or later and see if this helps.  Follow up in 6-8 weeks with Bekah Doll or myself.  [facility]    If you have any questions regarding to your visit please contact your care team:     Team Pink:   Clinic Hours Telephone Number   Internal Medicine:  Dr. Abby Doll, NP       7am-7pm  Monday - Thursday   7am-5pm  Fridays  [phone]  (Appointment scheduling available 24/7)    Questions about your visit?  Team Line  [phone]   [facility] [place] [facility] 11am-9pm Monday-Friday Saturday-Sunday- 9am-5pm   [place] - 5pm-9pm Monday-Friday Saturday-Sunday- 9am-5pm  [phone] - [place]   [phone] - [place]       What options do I have for visits at the clinic other than the traditional office visit?  To expand how we care for you, many of our providers are utilizing electronic visits (e-visits) and telephone visits, when medically appropriate, for interactions with their patients rather than a visit in the clinic.   We also offer nurse visits for many medical concerns. Just like any other service, we will bill your insurance company for this type of visit based on time spent on the phone with your provider. Not all insurance companies cover these visits. Please check with your medical insurance if this type of visit is covered. You will be responsible for any charges that are not paid by your insurance.      E-visits via [employer]:  generally incur a $35.00 fee.  Telephone visits:  Time spent on the phone: *charged based on time that is spent on the phone in increments of 10 minutes. Estimated cost:   5-10 mins $30.00   11-20 mins. $59.00   21-30 mins. $85.00   Use [employer] (secure email communication and access to your chart) to send your primary care provider a message or make an appointment. Ask someone on your Team how to sign up for  Lisa.    For a Price Quote for your services, please call our Consumer Price Line at [phone].    As always, Thank you for trusting us with your health care needs  Ruel Santamaria

## 2018-10-03 ENCOUNTER — OFFICE VISIT (OUTPATIENT)
Dept: BEHAVIORAL HEALTH | Facility: CLINIC | Age: 28
End: 2018-10-03
Payer: COMMERCIAL

## 2018-10-03 DIAGNOSIS — F41.1 GAD (GENERALIZED ANXIETY DISORDER): ICD-10-CM

## 2018-10-03 DIAGNOSIS — F32.1 MODERATE MAJOR DEPRESSION (H): Primary | ICD-10-CM

## 2018-10-03 PROCEDURE — 90834 PSYTX W PT 45 MINUTES: CPT | Performed by: MARRIAGE & FAMILY THERAPIST

## 2018-10-03 NOTE — MR AVS SNAPSHOT
After Visit Summary   10/3/2018    Marichuy Worrell    MRN: [id number]           Patient Information     Date Of Birth          1990        Visit Information        Provider Department      10/3/2018 2:30 PM Lucita Ring [facility]        Today's Diagnoses     Moderate major depression (H)    -  1    OSCAR (generalized anxiety disorder)           Follow-ups after your visit        Your next 10 appointments already scheduled     Oct 24, 2018  8:30 AM CDT   Return Visit with Lucita Ring   [facility]    [address] [address] [phone]              Who to contact     If you have questions or need follow up information about today's clinic visit or your schedule please contact [facility] directly at [phone].  Normal or non-critical lab and imaging results will be communicated to you by MyChart, letter or phone within 4 business days after the clinic has received the results. If you do not hear from us within 7 days, please contact the clinic through MyChart or phone. If you have a critical or abnormal lab result, we will notify you by phone as soon as possible.  Submit refill requests through [employer] or call your pharmacy and they will forward the refill request to us. Please allow 3 business days for your refill to be completed.          Additional Information About Your Visit        MyChart Information     [employer] gives you secure access to your electronic health record. If you see a primary care provider, you can also send messages to your care team and make appointments. If you have questions, please call your primary care clinic.  If you do not have a primary care provider, please call [phone] and they will assist you.        Care EveryWhere ID     This is your Care EveryWhere ID. This could be used by other organizations to access your [facility]  records  [id number]         Blood Pressure from Last 3 Encounters:   09/26/18 128/74   08/22/18 130/70   07/18/18 120/60    Weight from Last 3 Encounters:   09/26/18 147.6 kg (325 lb 8 oz)   08/22/18 (!) 150.1 kg (331 lb)   07/18/18 (!) 150.6 kg (332 lb)              Today, you had the following     No orders found for display       Primary Care Provider Office Phone # Fax #    Kristen M Kehr, PA-C [phone] [address] [facility] [address]        Goals        Diet    Eat more fruits and vegetables     Reduce fast food intake     Reduce portion size        Exercise    Exercise 3x per week (30 min per time)       Equal Access to Services     KELLY LUCAS : Hadelma montenegroo Sozander, waaxda luqadaha, qaybta kaalmada adeegyada, virgil chappell . So [facility] [phone].    ATENCIÓN: Si habla español, tiene a aragon disposición servicios gratuitos de asistencia lingüística. Llame al [phone].    We comply with applicable federal civil rights laws and [place] laws. We do not discriminate on the basis of race, color, national origin, age, disability, sex, sexual orientation, or gender identity.            Thank you!     Thank you for choosing [facility] [place]  for your care. Our goal is always to provide you with excellent care. Hearing back from our patients is one way we can continue to improve our services. Please take a few minutes to complete the written survey that you may receive in the mail after your visit with us. Thank you!             Your Updated Medication List - Protect others around you: Learn how to safely use, store and throw away your medicines at www.disposemymeds.org.          This list is accurate as of 10/3/18 11:59 PM.  Always use your most recent med list.                   Brand Name Dispense Instructions for use Diagnosis    desogestrel-ethinyl estradiol 0.15-30 MG-MCG per tablet    ENSKYCE    84 tablet    Take 1 tablet by mouth  daily    Menometrorrhagia       * levothyroxine 25 MCG tablet    SYNTHROID/LEVOTHROID    90 tablet    Take 1 tablet (25 mcg) by mouth daily (Add to 25 mcg 200 mcg to equal 225 mcg daily)    Other specified hypothyroidism       * levothyroxine 200 MCG tablet    SYNTHROID/LEVOTHROID    90 tablet    Take 1 tablet (200 mcg) by mouth daily    Other specified hypothyroidism       MELATONIN PO      Take 3 mg by mouth At Bedtime        Multi-vitamin Tabs tablet      Take 1 tablet by mouth daily        Phentermine HCl 8 MG Tabs     30 tablet    Take 8 mg by mouth daily Lomaira    BMI 50.0-59.9, adult (H)       topiramate 25 MG tablet    TOPAMAX    90 tablet    Take 1 tablet (25 mg) by mouth daily    BMI 50.0-59.9, adult (H)       * Notice:  This list has 2 medication(s) that are the same as other medications prescribed for you. Read the directions carefully, and ask your doctor or other care provider to review them with you.

## 2018-10-03 NOTE — PROGRESS NOTES
[facility]   October 3, 2018      Behavioral Health Clinician Progress Note    Patient Name: Marichuy Worrell           Service Type:  Individual      Service Location:   Face to Face in Clinic     Session Start Time: 2:30  Session End Time: 3:30      Session Length: 53 - 60      Attendees: Patient    Visit Activities (Refresh list every visit): [facility] Only    Diagnostic Assessment Date: 5/15/18  Treatment Plan Review Date: 6/21/18  See Flowsheets for today's PHQ-9 and OSCAR-7 results  Previous PHQ-9:   PHQ-9 SCORE 4/18/2018 4/18/2018 5/15/2018   Total Score - - -   Total Score MyChart - 17 (Moderately severe depression) 13 (Moderate depression)   Total Score 17 17 13     Previous OSCAR-7:   OSCAR-7 SCORE 1/25/2016 4/18/2018 5/15/2018   Total Score - - -   Total Score - - 11 (moderate anxiety)   Total Score 2 14 11       RYAN LEVEL:  RYAN Score (Last Two) 4/1/2014   RYAN Raw Score 44   Activation Score 70.8   RYAN Level 4       DATA  Extended Session (60+ minutes): No  Interactive Complexity: No  Crisis: No  [facility] Patient: No    Treatment Objective(s) Addressed in This Session:  Target Behavior(s): diet/weight loss and disease management/lifestyle changes depression and anxiety     Depressed Mood: Increase interest, engagement, and pleasure in doing things  Decrease frequency and intensity of feeling down, depressed, hopeless  Improve quantity and quality of night time sleep / decrease daytime naps  Feel less tired and more energy during the day   Improve diet, appetite, mindful eating, and / or meal planning  Identify negative self-talk and behaviors: challenge core beliefs, myths, and actions  Improve concentration, focus, and mindfulness in daily activities   Feel less fidgety, restless or slow in daily activities / interpersonal interactions  Anxiety: will experience a reduction in anxiety, will develop more effective coping skills to manage anxiety symptoms, will develop healthy cognitive patterns and  beliefs and will increase ability to function adaptively    Current Stressors / Issues:  Patient got the new job with her employer at [employer] and she has switched to daytime hours. Patient reports had stressful week during interview process due to another co-worker who applied for same role. Patient reports during stressful time of waiting to here about job patient increased her isolation in her bedroom, poor sleep, didn't care what she ate, bad food choices, but she did not binge eat/over eat, and stopped exercise routine. Patient reports she did try to distract herself from negative thoughts by engaging in other activities. Patient reports she is able ot have healthier breakfast and lunch, but tends to have less healthy foods for dinner. Patient is focusing more on her exercise routine, and she has lost 5 pounds in this past month. Patient processed ACE -Adverse Childhood Experiences and how they impact her today (physical health, mental/emotional health, self esteem, relational dynamics).           Progress on Treatment Objective(s) / Homework:  New Objective established this session - ACTION (Actively working towards change); Intervened by reinforcing change plan / affirming steps taken    Motivational Interviewing    MI Intervention: Co-Developed Goal: lose 10 pounds by next doctor appointment 7/18/18 , Expressed Empathy/Understanding, Supported Autonomy, Collaboration, Evocation, Permission to raise concern or advise, Open-ended questions, Reflections: simple and complex, Change talk (evoked) and Reframe     Change Talk Expressed by the Patient: Desire to change Reasons to change Need to change    Provider Response to Change Talk: E - Evoked more info from patient about behavior change, A - Affirmed patient's thoughts, decisions, or attempts at behavior change, R - Reflected patient's change talk and S - Summarized patient's change talk statements    Patient processed her list of things she wants to do once she  has a healthier weight. Patient made this list 2 years ago.   Patient to post this list somewhere in her room where she can see it daily.     Cognitive Behavioral Therapy  -Identified barriers to positive self esteem/ patient not knowing howng to have confidence in her body   -Identified her difficulties with not being close to men (brothers, supervisors, co-workers, friends, romantic)  -Identified her difficulties with physical touching and difficulties with emotional vulnerability      Homework:  -complete ACE questionnaire   -track automatic negative thoughts and replace with secure thought  -Motivation strategies for exercise: pack work out bag/in the car, go exercise right after work, work out with partner/mother, track in work out journal, join roommate when she is working out at home      Also provided psychoeducation about behavioral health condition, symptoms, and treatment options    Care Plan review completed: Yes    Medication Review:  No current psychiatric medications prescribed    Medication Compliance:  NA    Changes in Health Issues:   Yes: Weight / dietary issues, Associated Psychological Distress    Chemical Use Review:   Substance Use: Chemical use reviewed, no active concerns identified      Tobacco Use: No current tobacco use.      Assessment: Current Emotional / Mental Status (status of significant symptoms):  Risk status (Self / Other harm or suicidal ideation)  Patient has had a history of suicidal ideation: 5-6 years ago  and self-injurious behavior: cutting high school years  Patient denies current fears or concerns for personal safety.  Patient denies current or recent suicidal ideation or behaviors.  Patient denies current or recent homicidal ideation or behaviors.  Patient denies current or recent self injurious behavior or ideation.  Patient denies other safety concerns.  A safety and risk management plan has not been developed at this time, however patient was encouraged to call  [place] [phone] should there be a change in any of these risk factors.    Appearance:   Appropriate   Eye Contact:   Good   Psychomotor Behavior: Normal   Attitude:   Cooperative   Orientation:   All  Speech   Rate / Production: Normal    Volume:  Normal   Mood:    Anxious  Depressed  Sad   Affect:    Worrisome   Thought Content:  Rumination   Thought Form:  Coherent  Logical   Insight:    Good     Diagnoses:  1. Moderate major depression (H)    2. OSCAR (generalized anxiety disorder)        Collateral Reports Completed:  Not Applicable    Plan: (Homework, other):  Patient was given information about behavioral services and encouraged to schedule a follow up appointment with the clinic [facility] 10/24//18.  She was also given information about mental health symptoms and treatment options  and Cognitive Behavioral Therapy skills to practice when experiencing anxiety and depression.  CD Recommendations: No indications of CD issues.  SHLOMO Momin, [facility]       ______________________________________________________________________    Integrated Primary Care Behavioral Health Treatment Plan    Patient's Name: Marichuy Worrell  YOB: 1990    Date: June 21, 2018    DSM-V Diagnoses:            296.32 (F33.1) Major Depressive Disorder, Recurrent Episode, Moderate With anxious distress  300.02 (F41.1) Generalized Anxiety Disorder  Psychosocial & Contextual Factors: None  WHODAS Score: 28    Referral / Collaboration:  Referral to another professional/service is not indicated at this time.  Anticipated number of session or this episode of care: 8    MeasurableTreatment Goal(s) related to diagnosis / functional impairment(s)  Goal 1: Patient will decrease symptoms by 80% and utilize healthy and effective coping strategies 75% of symptom occurrences.      I will know I've met my goal when feel happy, less anxious, healthier weight.      Objective #A (Patient Action)    Patient will use at least 4 coping skills  for anxiety management in the next 3 weeks  Increase interest, engagement, and pleasure in doing things  Decrease frequency and intensity of feeling down, depressed, hopeless  Improve quantity and quality of night time sleep / decrease daytime naps  Feel less tired and more energy during the day   Improve diet, appetite, mindful eating, and / or meal planning  Identify negative self-talk and behaviors: challenge core beliefs, myths, and actions  Improve concentration, focus, and mindfulness in daily activities   Feel less fidgety, restless or slow in daily activities / interpersonal interactions  Decrease thoughts that you'd be better off dead or of suicide / self-harm  Status: Continued - Date(s):     Intervention(s)  [facility] will teach emotional recognition/identification. Emotional regulation. Identify unhealthy thought patterns, Re-frame secure/balanced thoguhts, CBT Mindfulness exercises, self esteem, positive behavior activation, estbalishing healthy boundaries .    Objective #B  Patient will use healthy and effective interventions to reach weight management goals  Status: Continued - Date(s):     Intervention(s)  [facility] will teach the client how to complete a 4-part pros and cons. Motivational Interviewing Skills to set and understand personal goals and identify hidden barriers .    Patient has reviewed and agreed to the above plan.    Written by  SHLOMO Momin, [facility]

## 2018-10-24 ENCOUNTER — OFFICE VISIT (OUTPATIENT)
Dept: BEHAVIORAL HEALTH | Facility: CLINIC | Age: 28
End: 2018-10-24
Payer: COMMERCIAL

## 2018-10-24 DIAGNOSIS — F32.1 MODERATE MAJOR DEPRESSION (H): ICD-10-CM

## 2018-10-24 DIAGNOSIS — F41.1 GAD (GENERALIZED ANXIETY DISORDER): Primary | ICD-10-CM

## 2018-10-24 PROCEDURE — 90834 PSYTX W PT 45 MINUTES: CPT | Performed by: MARRIAGE & FAMILY THERAPIST

## 2018-10-24 NOTE — MR AVS SNAPSHOT
After Visit Summary   10/24/2018    Marichuy Worrell    MRN: [id number]           Patient Information     Date Of Birth          1990        Visit Information        Provider Department      10/24/2018 8:30 AM Lucita Ring [facility]        Today's Diagnoses     OSCAR (generalized anxiety disorder)    -  1    Moderate major depression (H)           Follow-ups after your visit        Your next 10 appointments already scheduled     Oct 31, 2018  8:20 AM CDT   [facility] with ROXI Maloney CNP   [facility]    [address] [address] [phone]            Nov 07, 2018  8:30 AM CST   [facility]ita [facility]   [facility]    [address] [facility] [place] [address] [phone]              Who to contact     If you have questions or need follow up information about today's clinic visit or your schedule please contact [facility] directly at [phone].  Normal or non-critical lab and imaging results will be communicated to you by [employer], letter or phone within 4 business days after the clinic has received the results. If you do not hear from us within 7 days, please contact the clinic through [email or web address] or phone. If you have a critical or abnormal lab result, we will notify you by phone as soon as possible.  Submit refill requests through [email or web address] or call your pharmacy and they will forward the refill request to us. Please allow 3 business days for your refill to be completed.          Additional Information About Your Visit        [employer] Information     [email or web address] gives you secure access to your electronic health record. If you see a primary care provider, you can also send messages to your care team and make appointments. If you have questions, please call your primary care clinic.  If you do not have a primary care provider, please  call [phone] and they will assist you.        Care EveryWhere ID     This is your Care EveryWhere ID. This could be used by other organizations to access your [facility] medical records  [id number]         Blood Pressure from Last 3 Encounters:   09/26/18 128/74   08/22/18 130/70   07/18/18 120/60    Weight from Last 3 Encounters:   09/26/18 147.6 kg (325 lb 8 oz)   08/22/18 (!) 150.1 kg (331 lb)   07/18/18 (!) 150.6 kg (332 lb)              Today, you had the following     No orders found for display       Primary Care Provider Office Phone # Fax #    Kristen M Kehr, PA-C [phone] [address] [facility] [address]        Goals        Diet    Eat more fruits and vegetables     Reduce fast food intake     Reduce portion size        Exercise    Exercise 3x per week (30 min per time)       Equal Access to Services     KELLY LUCAS : Hadii mila ku hadasho Soomaali, waaxda luqadaha, qaybta kaalmada adeegyada, virgil chappell . So [facility] [phone].    ATENCIÓN: Si habla español, tiene a aragon disposición servicios gratuitos de asistencia lingüística. Llame al [phone].    We comply with applicable federal civil rights laws and [place] laws. We do not discriminate on the basis of race, color, national origin, age, disability, sex, sexual orientation, or gender identity.            Thank you!     Thank you for choosing [facility]  for your care. Our goal is always to provide you with excellent care. Hearing back from our patients is one way we can continue to improve our services. Please take a few minutes to complete the written survey that you may receive in the mail after your visit with us. Thank you!             Your Updated Medication List - Protect others around you: Learn how to safely use, store and throw away your medicines at www.disposemymeds.org.          This list is accurate as of 10/24/18 11:59 PM.  Always use your most recent med  list.                   Brand Name Dispense Instructions for use Diagnosis    desogestrel-ethinyl estradiol 0.15-30 MG-MCG per tablet    ENSKYCE    84 tablet    Take 1 tablet by mouth daily    Menometrorrhagia       * levothyroxine 25 MCG tablet    SYNTHROID/LEVOTHROID    90 tablet    Take 1 tablet (25 mcg) by mouth daily (Add to 25 mcg 200 mcg to equal 225 mcg daily)    Other specified hypothyroidism       * levothyroxine 200 MCG tablet    SYNTHROID/LEVOTHROID    90 tablet    Take 1 tablet (200 mcg) by mouth daily    Other specified hypothyroidism       MELATONIN PO      Take 3 mg by mouth At Bedtime        Multi-vitamin Tabs tablet      Take 1 tablet by mouth daily        Phentermine HCl 8 MG Tabs     30 tablet    Take 8 mg by mouth daily Lomaira    BMI 50.0-59.9, adult (H)       topiramate 25 MG tablet    TOPAMAX    90 tablet    Take 1 tablet (25 mg) by mouth daily    BMI 50.0-59.9, adult (H)       * Notice:  This list has 2 medication(s) that are the same as other medications prescribed for you. Read the directions carefully, and ask your doctor or other care provider to review them with you.

## 2018-10-29 NOTE — PROGRESS NOTES
[facility]   October 24, 2018      Behavioral Health Clinician Progress Note    Patient Name: Marichuy Worrell           Service Type:  Individual      Service Location:   Face to Face in Clinic     Session Start Time: 8:30  Session End Time: 9:30      Session Length: 53 - 60      Attendees: Patient    Visit Activities (Refresh list every visit): [facility] Only    Diagnostic Assessment Date: 5/15/18  Treatment Plan Review Date: 6/21/18  See Flowsheets for today's PHQ-9 and OSCAR-7 results  Previous PHQ-9:   PHQ-9 SCORE 4/18/2018 4/18/2018 5/15/2018   Total Score - - -   Total Score MyChart - 17 (Moderately severe depression) 13 (Moderate depression)   Total Score 17 17 13     Previous OSCAR-7:   OSCAR-7 SCORE 1/25/2016 4/18/2018 5/15/2018   Total Score - - -   Total Score - - 11 (moderate anxiety)   Total Score 2 14 11       RYAN LEVEL:  RYAN Score (Last Two) 4/1/2014   RYAN Raw Score 44   Activation Score 70.8   RYAN Level 4       DATA  Extended Session (60+ minutes): No  Interactive Complexity: No  Crisis: No  [facility] Patient: No    Treatment Objective(s) Addressed in This Session:  Target Behavior(s): diet/weight loss and disease management/lifestyle changes depression and anxiety     Depressed Mood: Increase interest, engagement, and pleasure in doing things  Decrease frequency and intensity of feeling down, depressed, hopeless  Improve quantity and quality of night time sleep / decrease daytime naps  Feel less tired and more energy during the day   Improve diet, appetite, mindful eating, and / or meal planning  Identify negative self-talk and behaviors: challenge core beliefs, myths, and actions  Improve concentration, focus, and mindfulness in daily activities   Feel less fidgety, restless or slow in daily activities / interpersonal interactions  Anxiety: will experience a reduction in anxiety, will develop more effective coping skills to manage anxiety symptoms, will develop healthy cognitive patterns  and beliefs and will increase ability to function adaptively    Current Stressors / Issues:  Patient processed low self esteem, self acceptance and barriers to self acceptance.          Progress on Treatment Objective(s) / Homework:  New Objective established this session - ACTION (Actively working towards change); Intervened by reinforcing change plan / affirming steps taken    Motivational Interviewing    MI Intervention: Co-Developed Goal: lose 10 pounds by next doctor appointment 7/18/18 , Expressed Empathy/Understanding, Supported Autonomy, Collaboration, Evocation, Permission to raise concern or advise, Open-ended questions, Reflections: simple and complex, Change talk (evoked) and Reframe     Change Talk Expressed by the Patient: Desire to change Reasons to change Need to change    Provider Response to Change Talk: E - Evoked more info from patient about behavior change, A - Affirmed patient's thoughts, decisions, or attempts at behavior change, R - Reflected patient's change talk and S - Summarized patient's change talk statements    Patient processed her list of things she wants to do once she has a healthier weight. Patient made this list 2 years ago.   Patient to post this list somewhere in her room where she can see it daily.     Cognitive Behavioral Therapy  -Identified barriers to positive self esteem/ patient not knowing howng to have confidence in her body   -Identified items she does not accept about herself (intellegence, weight/body, decision making)  -Identified items she does accept about herself (work ethic, career/job, dedication to work projects)   -Processed social and personal experiences of body image impacts and beauty standards     Homework:  -Bring plus size images of beauty   -track automatic negative thoughts and replace with secure thought  -Motivation strategies for exercise: pack work out bag/in the car, go exercise right after work, work out with partner/mother, track in work out  journal, join roommate when she is working out at home      Also provided psychoeducation about behavioral health condition, symptoms, and treatment options    Care Plan review completed: Yes    Medication Review:  No current psychiatric medications prescribed    Medication Compliance:  NA    Changes in Health Issues:   Yes: Weight / dietary issues, Associated Psychological Distress    Chemical Use Review:   Substance Use: Chemical use reviewed, no active concerns identified      Tobacco Use: No current tobacco use.      Assessment: Current Emotional / Mental Status (status of significant symptoms):  Risk status (Self / Other harm or suicidal ideation)  Patient has had a history of suicidal ideation: 5-6 years ago  and self-injurious behavior: cutting high school years  Patient denies current fears or concerns for personal safety.  Patient denies current or recent suicidal ideation or behaviors.  Patient denies current or recent homicidal ideation or behaviors.  Patient denies current or recent self injurious behavior or ideation.  Patient denies other safety concerns.  A safety and risk management plan has not been developed at this time, however patient was encouraged to call [place] [phone] should there be a change in any of these risk factors.    Appearance:   Appropriate   Eye Contact:   Good   Psychomotor Behavior: Normal   Attitude:   Cooperative   Orientation:   All  Speech   Rate / Production: Normal    Volume:  Normal   Mood:    Anxious  Depressed  Sad   Affect:    Worrisome   Thought Content:  Rumination   Thought Form:  Coherent  Logical   Insight:    Good     Diagnoses:  1. OSCAR (generalized anxiety disorder)    2. Moderate major depression (H)        Collateral Reports Completed:  Not Applicable    Plan: (Homework, other):  Patient was given information about behavioral services and encouraged to schedule a follow up appointment with the clinic [facility] 11/7/18.  She was also given information about  mental health symptoms and treatment options  and Cognitive Behavioral Therapy skills to practice when experiencing anxiety and depression.  CD Recommendations: No indications of CD issues.  SHLOMO Momin, [facility]       ______________________________________________________________________    Integrated Primary Care Behavioral Health Treatment Plan    Patient's Name: Marichuy Worrell  YOB: 1990    Date: June 21, 2018    DSM-V Diagnoses:            296.32 (F33.1) Major Depressive Disorder, Recurrent Episode, Moderate With anxious distress  300.02 (F41.1) Generalized Anxiety Disorder  Psychosocial & Contextual Factors: None  WHODAS Score: 28    Referral / Collaboration:  Referral to another professional/service is not indicated at this time.  Anticipated number of session or this episode of care: 8    MeasurableTreatment Goal(s) related to diagnosis / functional impairment(s)  Goal 1: Patient will decrease symptoms by 80% and utilize healthy and effective coping strategies 75% of symptom occurrences.      I will know I've met my goal when feel happy, less anxious, healthier weight.      Objective #A (Patient Action)    Patient will use at least 4 coping skills for anxiety management in the next 3 weeks  Increase interest, engagement, and pleasure in doing things  Decrease frequency and intensity of feeling down, depressed, hopeless  Improve quantity and quality of night time sleep / decrease daytime naps  Feel less tired and more energy during the day   Improve diet, appetite, mindful eating, and / or meal planning  Identify negative self-talk and behaviors: challenge core beliefs, myths, and actions  Improve concentration, focus, and mindfulness in daily activities   Feel less fidgety, restless or slow in daily activities / interpersonal interactions  Decrease thoughts that you'd be better off dead or of suicide / self-harm  Status: Continued - Date(s):     Intervention(s)  [facility] will teach  emotional recognition/identification. Emotional regulation. Identify unhealthy thought patterns, Re-frame secure/balanced thoguhts, CBT Mindfulness exercises, self esteem, positive behavior activation, estbalishing healthy boundaries .    Objective #B  Patient will use healthy and effective interventions to reach weight management goals  Status: Continued - Date(s):     Intervention(s)  [facility] will teach the client how to complete a 4-part pros and cons. Motivational Interviewing Skills to set and understand personal goals and identify hidden barriers .    Patient has reviewed and agreed to the above plan.    Written by  SHLOMO Momin, [facility]

## 2018-10-30 NOTE — PROGRESS NOTES
SUBJECTIVE:   Marichuy Worrell is a 28 year old female who presents to clinic today for the following health issues:        Chief Complaint   Patient presents with     Weight Check     Health Maintenance     HIV Screen, FLU, PHQ9     INTERNAL MEDICINE  Medical Weight Loss - Return Visit      CHIEF COMPLAINT:  Recheck weight      Wt Readings from Last 5 Encounters:   10/31/18 321 lb (145.6 kg)   09/26/18 325 lb 8 oz (147.6 kg)   08/22/18 (!) 331 lb (150.1 kg)   07/18/18 (!) 332 lb (150.6 kg)   06/18/18 (!) 339 lb 8 oz (154 kg)         HISTORY OF PRESENT ILLNESS (HPI):    Patient returns today for medical weight loss follow-up visit. Patient was last seen by me on 8/22/2018 and has lost 4 pounds and 0.3 % body fat.    Patient has lost 60 lbs total.    Patient notes that she has not been making as good of food choices this past 2 months.  She has been eating more carbs at lunch, instead of eating salads.  She is trying to eat more protein, but finds it difficult.  She has not been bingeing, but notes increased cravings.  She is unsure of how many calories she should aim for.  She continues to work with a psychologist.    Patient is exercising 3-4 days per week, 15-30 minutes each time.    MEDICATIONS:   Current Outpatient Prescriptions   Medication Sig Dispense Refill     desogestrel-ethinyl estradiol (ENSKYCE) 0.15-30 MG-MCG per tablet Take 1 tablet by mouth daily 84 tablet 3     levothyroxine (SYNTHROID/LEVOTHROID) 200 MCG tablet Take 1 tablet (200 mcg) by mouth daily 90 tablet 1     levothyroxine (SYNTHROID/LEVOTHROID) 25 MCG tablet Take 1 tablet (25 mcg) by mouth daily (Add to 25 mcg 200 mcg to equal 225 mcg daily) 90 tablet 1     MELATONIN PO Take 3 mg by mouth At Bedtime       multivitamin, therapeutic with minerals (MULTI-VITAMIN) TABS tablet Take 1 tablet by mouth daily       Phentermine HCl 8 MG TABS Take 8 mg by mouth daily Lomaira 30 tablet 1     topiramate (TOPAMAX) 25 MG tablet Take 1 tablet (25 mg) by mouth  "daily 90 tablet 1       ALLERGIES:   Allergies   Allergen Reactions     Biaxin [Clarithromycin]      rash       PHYSICAL EXAMINATION:    VITALS: /70  Pulse 99  Temp 96.6  F (35.9  C) (Oral)  Resp 20  Ht 5' 5\" (1.651 m)  Wt 321 lb (145.6 kg)  SpO2 100%  BMI 53.42 kg/m2  GENERAL: Patient is a 28 year old year old female  in no acute distress.  Patient is alert and orientated x 4, pleasant and cooperative with exam.     CARDIOVASCULAR:  Regular rate and rhythm without murmurs, rubs, or gallops.  RESPIRATORY:  Lungs are clear to auscultation bilaterally, respiratory effort is normal.   No edema  PSYCH: mentation appears normal, affect normal and bright.      ASSESSMENT/PLAN:    1. BMI 50.0-59.9, adult (H)  Patient to work on increasing protein, tracking food.  She will also work on making better choices and monitoring portions.  Patient to increase physical activity as well, aiming for 5-6 days per week.  Will increase topamax to help with cravings.  - Phentermine HCl 8 MG TABS; Take 8 mg by mouth daily Lomaira  Dispense: 30 tablet; Refill: 1  - topiramate (TOPAMAX) 50 MG tablet; Take 1 tablet (50 mg) by mouth daily  Dispense: 90 tablet; Refill: 1       Patient is to call the clinic if she experiences any adverse reactions.     Follow up with Provider - 6-8 weeks.         I spent 15 minutes of time with the patient and >50% of it was in education and counseling regarding weight loss.     Bekah Doll, CNP  [facility]    "

## 2018-10-31 ENCOUNTER — OFFICE VISIT (OUTPATIENT)
Dept: FAMILY MEDICINE | Facility: CLINIC | Age: 28
End: 2018-10-31
Payer: COMMERCIAL

## 2018-10-31 VITALS
BODY MASS INDEX: 48.82 KG/M2 | SYSTOLIC BLOOD PRESSURE: 120 MMHG | RESPIRATION RATE: 20 BRPM | WEIGHT: 293 LBS | DIASTOLIC BLOOD PRESSURE: 70 MMHG | HEIGHT: 65 IN | TEMPERATURE: 96.6 F | HEART RATE: 99 BPM | OXYGEN SATURATION: 100 %

## 2018-10-31 PROCEDURE — 99213 OFFICE O/P EST LOW 20 MIN: CPT | Performed by: NURSE PRACTITIONER

## 2018-10-31 RX ORDER — TOPIRAMATE 50 MG/1
50 TABLET, FILM COATED ORAL DAILY
Qty: 90 TABLET | Refills: 1 | Status: SHIPPED | OUTPATIENT
Start: 2018-10-31 | End: 2019-04-03

## 2018-10-31 ASSESSMENT — PAIN SCALES - GENERAL: PAINLEVEL: NO PAIN (0)

## 2018-10-31 ASSESSMENT — PATIENT HEALTH QUESTIONNAIRE - PHQ9: SUM OF ALL RESPONSES TO PHQ QUESTIONS 1-9: 2

## 2018-10-31 NOTE — MR AVS SNAPSHOT
After Visit Summary   10/31/2018    Marichuy Worrell    MRN: [id number]           Patient Information     Date Of Birth          1990        Visit Information        Provider Department      10/31/2018 8:20 AM Bekah Doll APRN [facility]        Today's Diagnoses     BMI 50.0-59.9, adult (H)    -  1      Care Instructions    Aim for 1340-3409 calories per day.  Aim for 110 g of protein each day.  [facility]    If you have any questions regarding to your visit please contact your care team:     Team Pink:   Clinic Hours Telephone Number   Internal Medicine:  Dr. Abby Doll, NP 7am-7pm  Monday - Thursday   7am-5pm  Fridays  [phone]  (Appointment scheduling available 24/7)   Urgent Care - [place] and [facility] - 11am-9pm Monday-Friday Saturday-Sunday- 9am-5pm   [place] - 5pm-9pm Monday-Friday Saturday-Sunday- 9am-5pm  [phone] - [place]  [phone] - [place]       What options do I have for a visit other than an office visit? We offer electronic visits (e-visits) and telephone visits, when medically appropriate.  Please check with your medical insurance to see if these types of visits are covered, as you will be responsible for any charges that are not paid by your insurance.      You can use [employer] (secure electronic communication) to access to your chart, send your primary care provider a message, or make an appointment. Ask a team member how to get started.     For a price quote for your services, please call our Consumer Price Line at [phone] or our Imaging Cost estimation line at [phone] (for imaging tests).  Annette Zamora MA            Follow-ups after your visit        Follow-up notes from your care team     Return in about 6 weeks (around 12/12/2018).      Your next 10 appointments already scheduled     Nov 07, 2018  8:30 AM CST   Return [facility] with Lucita LAW  [facility]hon[facility]    [address] [place] [facility] [address] [phone]              Who to contact     If you have questions or need follow up information about today's clinic visit or your schedule please contact [facility] directly at [phone].  Normal or non-critical lab and imaging results will be communicated to you by MyChart, letter or phone within 4 business days after the clinic has received the results. If you do not hear from us within 7 days, please contact the clinic through Maana Mobilehart or phone. If you have a critical or abnormal lab result, we will notify you by phone as soon as possible.  Submit refill requests through [employer] or call your pharmacy and they will forward the refill request to us. Please allow 3 business days for your refill to be completed.          Additional Information About Your Visit        Maana MobileharShip It Bag Check Information     [employer] gives you secure access to your electronic health record. If you see a primary care provider, you can also send messages to your care team and make appointments. If you have questions, please call your primary care clinic.  If you do not have a primary care provider, please call [phone] and they will assist you.        Care EveryWhere ID     This is your Care EveryWhere ID. This could be used by other organizations to access your [place] medical records  [id number]        Your Vitals Were     Pulse Temperature Respirations Height Pulse Oximetry BMI (Body Mass Index)    99 96.6  F (35.9  C) (Oral) 20 5' 5\" (1.651 m) 100% 53.42 kg/m2       Blood Pressure from Last 3 Encounters:   10/31/18 120/70   09/26/18 128/74   08/22/18 130/70    Weight from Last 3 Encounters:   10/31/18 321 lb (145.6 kg)   09/26/18 325 lb 8 oz (147.6 kg)   08/22/18 (!) 331 lb (150.1 kg)              Today, you had the following     No orders found for display         Today's Medication Changes          These " changes are accurate as of 10/31/18  9:06 AM.  If you have any questions, ask your nurse or doctor.               These medicines have changed or have updated prescriptions.        Dose/Directions    topiramate 50 MG tablet   Commonly known as:  TOPAMAX   This may have changed:    - medication strength  - how much to take   Used for:  BMI 50.0-59.9, adult (H)   Changed by:  Bekah Doll APRN CNP        Dose:  50 mg   Take 1 tablet (50 mg) by mouth daily   Quantity:  90 tablet   Refills:  1            Where to get your medicines      These medications were sent to [facility] [address] [place], [place] - [address]  [address], [facility] [address]     Phone:  [phone]     topiramate 50 MG tablet         Some of these will need a paper prescription and others can be bought over the counter.  Ask your nurse if you have questions.     Bring a paper prescription for each of these medications     Phentermine HCl 8 MG Tabs                Primary Care Provider Office Phone # Fax #    Kristen M Kehr, PA-C [phone] [address] [address]        Goals        Diet    Eat more fruits and vegetables     Reduce fast food intake     Reduce portion size        Exercise    Exercise 3x per week (30 min per time)       Equal Access to Services     KELLY LUCAS AH: Hadii mila ku hadasho Soomaali, waaxda luqadaha, qaybta kaalmada adeegyada, waxay bhumi rhodes. So [facility] [phone].    ATENCIÓN: Si habla español, tiene a aragon disposición servicios gratuitos de asistencia lingüística. Llame al [phone].    We comply with applicable federal civil rights laws and [place] laws. We do not discriminate on the basis of race, color, national origin, age, disability, sex, sexual orientation, or gender identity.            Thank you!     Thank you for choosing [facility]  for your care. Our goal is always to provide you with excellent care.  Hearing back from our patients is one way we can continue to improve our services. Please take a few minutes to complete the written survey that you may receive in the mail after your visit with us. Thank you!             Your Updated Medication List - Protect others around you: Learn how to safely use, store and throw away your medicines at www.disposemymeds.org.          This list is accurate as of 10/31/18  9:06 AM.  Always use your most recent med list.                   Brand Name Dispense Instructions for use Diagnosis    desogestrel-ethinyl estradiol 0.15-30 MG-MCG per tablet    ENSKYCE    84 tablet    Take 1 tablet by mouth daily    Menometrorrhagia       * levothyroxine 25 MCG tablet    SYNTHROID/LEVOTHROID    90 tablet    Take 1 tablet (25 mcg) by mouth daily (Add to 25 mcg 200 mcg to equal 225 mcg daily)    Other specified hypothyroidism       * levothyroxine 200 MCG tablet    SYNTHROID/LEVOTHROID    90 tablet    Take 1 tablet (200 mcg) by mouth daily    Other specified hypothyroidism       MELATONIN PO      Take 3 mg by mouth At Bedtime        Multi-vitamin Tabs tablet      Take 1 tablet by mouth daily        Phentermine HCl 8 MG Tabs     30 tablet    Take 8 mg by mouth daily Lomaira    BMI 50.0-59.9, adult (H)       topiramate 50 MG tablet    TOPAMAX    90 tablet    Take 1 tablet (50 mg) by mouth daily    BMI 50.0-59.9, adult (H)       * Notice:  This list has 2 medication(s) that are the same as other medications prescribed for you. Read the directions carefully, and ask your doctor or other care provider to review them with you.

## 2018-10-31 NOTE — PATIENT INSTRUCTIONS
Aim for 3216-8689 calories per day.  Aim for 110 g of protein each day.  [facility]    If you have any questions regarding to your visit please contact your care team:     Team Pink:   Clinic Hours Telephone Number   Internal Medicine:  Dr. Abby Doll NP 7am-7pm  Monday - Thursday   7am-5pm  Fridays  [phone]  (Appointment scheduling available 24/7)   Urgent Care - [place] and [facility] - 11am-9pm Monday-Friday Saturday-Sunday- 9am-5pm   [place] - 5pm-9pm Monday-Friday Saturday-Sunday- 9am-5pm  [phone] - [place]  [phone] - [place]       What options do I have for a visit other than an office visit? We offer electronic visits (e-visits) and telephone visits, when medically appropriate.  Please check with your medical insurance to see if these types of visits are covered, as you will be responsible for any charges that are not paid by your insurance.      You can use [employer] (secure electronic communication) to access to your chart, send your primary care provider a message, or make an appointment. Ask a team member how to get started.     For a price quote for your services, please call our Consumer Price Line at [phone] or our Imaging Cost estimation line at [phone] (for imaging tests).  Annette Zamora MA

## 2018-11-07 ENCOUNTER — OFFICE VISIT (OUTPATIENT)
Dept: BEHAVIORAL HEALTH | Facility: CLINIC | Age: 28
End: 2018-11-07
Payer: COMMERCIAL

## 2018-11-07 DIAGNOSIS — F41.1 GAD (GENERALIZED ANXIETY DISORDER): ICD-10-CM

## 2018-11-07 DIAGNOSIS — F32.1 MODERATE MAJOR DEPRESSION (H): Primary | ICD-10-CM

## 2018-11-07 PROCEDURE — 90834 PSYTX W PT 45 MINUTES: CPT | Performed by: MARRIAGE & FAMILY THERAPIST

## 2018-11-07 NOTE — MR AVS SNAPSHOT
After Visit Summary   11/7/2018    Marichuy Worrell    MRN: [id number]           Patient Information     Date Of Birth          1990        Visit Information        Provider Department      11/7/2018 8:30 AM Lucita Ring [facility]        Today's Diagnoses     Moderate major depression (H)    -  1    OSCAR (generalized anxiety disorder)           Follow-ups after your visit        Your next 10 appointments already scheduled     Nov 29, 2018  3:00 PM CST   Return Visit with Lucita Ring   [facility] ([facility])    [address] [facility] [id number] [phone]            Dec 12, 2018  8:00 AM CST   Office Visit with ROXI Maloney CNP   [facility]    [address] [facility] [id number] [phone]           Bring a current list of meds and any records pertaining to this visit. For Physicals, please bring immunization records and any forms needing to be filled out. Please arrive 10 minutes early to complete paperwork.              Who to contact     If you have questions or need follow up information about today's clinic visit or your schedule please contact [facility] directly at [phone].  Normal or non-critical lab and imaging results will be communicated to you by fos4Xhart, letter or phone within 4 business days after the clinic has received the results. If you do not hear from us within 7 days, please contact the clinic through fos4Xhart or phone. If you have a critical or abnormal lab result, we will notify you by phone as soon as possible.  Submit refill requests through [employer] or call your pharmacy and they will forward the refill request to us. Please allow 3 business days for your refill to be completed.          Additional Information About Your Visit        Shopping Mail Information     [employer] gives you secure access to your electronic  health record. If you see a primary care provider, you can also send messages to your care team and make appointments. If you have questions, please call your primary care clinic.  If you do not have a primary care provider, please call [phone] and they will assist you.        Care EveryWhere ID     This is your Care EveryWhere ID. This could be used by other organizations to access your [place] medical records  [id number]         Blood Pressure from Last 3 Encounters:   10/31/18 120/70   09/26/18 128/74   08/22/18 130/70    Weight from Last 3 Encounters:   10/31/18 145.6 kg (321 lb)   09/26/18 147.6 kg (325 lb 8 oz)   08/22/18 (!) 150.1 kg (331 lb)              Today, you had the following     No orders found for display       Primary Care Provider Office Phone # Fax #    Kristen M Kehr, PA-C [phone] [address] [facility] [address]        Goals        Diet    Eat more fruits and vegetables     Reduce fast food intake     Reduce portion size        Exercise    Exercise 3x per week (30 min per time)       Equal Access to Services     KELLY LUCAS : Hadii mila ku hadasho Soomaali, waaxda luqadaha, qaybta kaalmada adeegyada, virgil chappell . So [facility] [phone].    ATENCIÓN: Si habla español, tiene a aragon disposición servicios gratuitos de asistencia lingüística. Rejiame al [phone].    We comply with applicable federal civil rights laws and [place] laws. We do not discriminate on the basis of race, color, national origin, age, disability, sex, sexual orientation, or gender identity.            Thank you!     Thank you for choosing [facility]  for your care. Our goal is always to provide you with excellent care. Hearing back from our patients is one way we can continue to improve our services. Please take a few minutes to complete the written survey that you may receive in the mail after your visit with us. Thank you!             Your  Updated Medication List - Protect others around you: Learn how to safely use, store and throw away your medicines at www.disposemymeds.org.          This list is accurate as of 11/7/18 11:59 PM.  Always use your most recent med list.                   Brand Name Dispense Instructions for use Diagnosis    * levothyroxine 25 MCG tablet    SYNTHROID/LEVOTHROID    90 tablet    Take 1 tablet (25 mcg) by mouth daily (Add to 25 mcg 200 mcg to equal 225 mcg daily)    Other specified hypothyroidism       * levothyroxine 200 MCG tablet    SYNTHROID/LEVOTHROID    90 tablet    Take 1 tablet (200 mcg) by mouth daily    Other specified hypothyroidism       MELATONIN PO      Take 3 mg by mouth At Bedtime        Multi-vitamin Tabs tablet      Take 1 tablet by mouth daily        Phentermine HCl 8 MG Tabs     30 tablet    Take 8 mg by mouth daily Lomaira    BMI 50.0-59.9, adult (H)       topiramate 50 MG tablet    TOPAMAX    90 tablet    Take 1 tablet (50 mg) by mouth daily    BMI 50.0-59.9, adult (H)       * Notice:  This list has 2 medication(s) that are the same as other medications prescribed for you. Read the directions carefully, and ask your doctor or other care provider to review them with you.

## 2018-11-10 DIAGNOSIS — N92.1 MENOMETRORRHAGIA: ICD-10-CM

## 2018-11-10 NOTE — LETTER
[facility]  [address] [facility] [address]  Phone: [phone]    11/13/18    Marichuy Worrell  [address]   [place] [address]      Dear Marichuy-    Regarding a recent refill request we received- one refill was sent to the pharmacy.  You will be due to be seen in clinic prior to receiving additional refill's.  Please contact our office to schedule this appointment.     Sincerely,      ROXI Henderson CNP

## 2018-11-12 NOTE — TELEPHONE ENCOUNTER
"Requested Prescriptions   Pending Prescriptions Disp Refills     ENSKYCE 0.15-30 MG-MCG per tablet [Pharmacy Med Name: ENSKYCE TAB] 84 tablet 3     Sig: TAKE ONE TABLET BY MOUTH ONCE DAILY    Contraceptives Protocol Failed    11/12/2018  3:31 PM       Failed - Recent (12 mo) or future (30 days) visit within the authorizing provider's specialty    Patient had office visit in the last 12 months or has a visit in the next 30 days with authorizing provider or within the authorizing provider's specialty.  See \"Patient Info\" tab in inbasket, or \"Choose Columns\" in Meds & Orders section of the refill encounter.             Passed - Patient is not a current smoker if age is 35 or older       Passed - No active pregnancy on record       Passed - No positive pregnancy test in past 12 months        Routing refill request to provider for review/approval because:  Patient needs to be seen because it has been more than 1 year since last office visit.  Last physical with prescribing provider was on 8/8/17.  Break in medication.  Last prescribed on 8/8/17 with a year supply.    Nuha Weinberg RN          "

## 2018-11-13 RX ORDER — DESOGESTREL AND ETHINYL ESTRADIOL 0.15-0.03
1 KIT ORAL DAILY
Qty: 28 TABLET | Refills: 0 | Status: SHIPPED | OUTPATIENT
Start: 2018-11-13 | End: 2018-11-27

## 2018-11-13 NOTE — PROGRESS NOTES
[facility] [place]   November 7, 2018      Behavioral Health Clinician Progress Note    Patient Name: Marichuy Worrell           Service Type:  Individual      Service Location:   Face to Face in Clinic     Session Start Time: 8:30  Session End Time: 9:30      Session Length: 53 - 60      Attendees: Patient    Visit Activities (Refresh list every visit): [facility] Only    Diagnostic Assessment Date: 5/15/18  Treatment Plan Review Date: 6/21/18  See Flowsheets for today's PHQ-9 and OSCAR-7 results  Previous PHQ-9:   PHQ-9 SCORE 4/18/2018 5/15/2018 10/31/2018   Total Score - - -   Total Score MyChart 17 (Moderately severe depression) 13 (Moderate depression) -   Total Score 17 13 2     Previous OSCAR-7:   OSCAR-7 SCORE 1/25/2016 4/18/2018 5/15/2018   Total Score - - -   Total Score - - 11 (moderate anxiety)   Total Score 2 14 11       RYAN LEVEL:  RYAN Score (Last Two) 4/1/2014   RYAN Raw Score 44   Activation Score 70.8   RYAN Level 4       DATA  Extended Session (60+ minutes): No  Interactive Complexity: No  Crisis: No  [facility] Patient: No    Treatment Objective(s) Addressed in This Session:  Target Behavior(s): diet/weight loss and disease management/lifestyle changes depression and anxiety     Depressed Mood: Increase interest, engagement, and pleasure in doing things  Decrease frequency and intensity of feeling down, depressed, hopeless  Improve quantity and quality of night time sleep / decrease daytime naps  Feel less tired and more energy during the day   Improve diet, appetite, mindful eating, and / or meal planning  Identify negative self-talk and behaviors: challenge core beliefs, myths, and actions  Improve concentration, focus, and mindfulness in daily activities   Feel less fidgety, restless or slow in daily activities / interpersonal interactions  Anxiety: will experience a reduction in anxiety, will develop more effective coping skills to manage anxiety symptoms, will develop healthy cognitive patterns  and beliefs and will increase ability to function adaptively    Current Stressors / Issues:  Patient processed images of plus size model she follows on [employer], that she feels has the same body type. Patient processed her barriers to self confidence and her struggle to understand how this plus size model got the confidence she has.   Patient processed her needing to focus on in her characteristics and positive traits about her personality before she can feel confident with her body size and physical appearance.   Patient processed low self esteem, self acceptance and barriers to self acceptance.      Progress on Treatment Objective(s) / Homework:  New Objective established this session - ACTION (Actively working towards change); Intervened by reinforcing change plan / affirming steps taken    Motivational Interviewing    MI Intervention: Co-Developed Goal: lose 10 pounds by next doctor appointment 7/18/18 , Expressed Empathy/Understanding, Supported Autonomy, Collaboration, Evocation, Permission to raise concern or advise, Open-ended questions, Reflections: simple and complex, Change talk (evoked) and Reframe     Change Talk Expressed by the Patient: Desire to change Reasons to change Need to change    Provider Response to Change Talk: E - Evoked more info from patient about behavior change, A - Affirmed patient's thoughts, decisions, or attempts at behavior change, R - Reflected patient's change talk and S - Summarized patient's change talk statements    Patient processed her list of things she wants to do once she has a healthier weight. Patient made this list 2 years ago.   Patient to post this list somewhere in her room where she can see it daily.     Cognitive Behavioral Therapy  -Identified barriers to positive self esteem/ patient not knowing howng to have confidence in her body   -Identified items she does not accept about herself (intellegence, weight/body, decision making)  -Identified items she does  accept about herself (work ethic, career/job, dedication to work projects)   -Processed social and personal experiences of body image impacts and beauty standards     Homework:  -Positive traits and characteristics worksheet   -track automatic negative thoughts and replace with secure thought  -Motivation strategies for exercise: pack work out bag/in the car, go exercise right after work, work out with partner/mother, track in work out journal, join roommate when she is working out at home      Also provided psychoeducation about behavioral health condition, symptoms, and treatment options    Care Plan review completed: Yes    Medication Review:  No current psychiatric medications prescribed    Medication Compliance:  NA    Changes in Health Issues:   Yes: Weight / dietary issues, Associated Psychological Distress    Chemical Use Review:   Substance Use: Chemical use reviewed, no active concerns identified      Tobacco Use: No current tobacco use.      Assessment: Current Emotional / Mental Status (status of significant symptoms):  Risk status (Self / Other harm or suicidal ideation)  Patient has had a history of suicidal ideation: 5-6 years ago  and self-injurious behavior: cutting high school years  Patient denies current fears or concerns for personal safety.  Patient denies current or recent suicidal ideation or behaviors.  Patient denies current or recent homicidal ideation or behaviors.  Patient denies current or recent self injurious behavior or ideation.  Patient denies other safety concerns.  A safety and risk management plan has not been developed at this time, however patient was encouraged to call [facility] should there be a change in any of these risk factors.    Appearance:   Appropriate   Eye Contact:   Good   Psychomotor Behavior: Normal   Attitude:   Cooperative   Orientation:   All  Speech   Rate / Production: Normal    Volume:  Normal   Mood:    Anxious  Depressed  Sad    Affect:    Worrisome   Thought Content:  Rumination   Thought Form:  Coherent  Logical   Insight:    Good     Diagnoses:  1. Moderate major depression (H)    2. OSCAR (generalized anxiety disorder)        Collateral Reports Completed:  Not Applicable    Plan: (Homework, other):  Patient was given information about behavioral services and encouraged to schedule a follow up appointment with the clinic [facility] 11/29/18.  She was also given information about mental health symptoms and treatment options  and Cognitive Behavioral Therapy skills to practice when experiencing anxiety and depression.  CD Recommendations: No indications of CD issues.  SHLOMO Momin, [facility]       ______________________________________________________________________    Integrated Primary Care Behavioral Health Treatment Plan    Patient's Name: Marichuy Worrell  YOB: 1990    Date: June 21, 2018    DSM-V Diagnoses:            296.32 (F33.1) Major Depressive Disorder, Recurrent Episode, Moderate With anxious distress  300.02 (F41.1) Generalized Anxiety Disorder  Psychosocial & Contextual Factors: None  WHODAS Score: 28    Referral / Collaboration:  Referral to another professional/service is not indicated at this time.  Anticipated number of session or this episode of care: 8    MeasurableTreatment Goal(s) related to diagnosis / functional impairment(s)  Goal 1: Patient will decrease symptoms by 80% and utilize healthy and effective coping strategies 75% of symptom occurrences.      I will know I've met my goal when feel happy, less anxious, healthier weight.      Objective #A (Patient Action)    Patient will use at least 4 coping skills for anxiety management in the next 3 weeks  Increase interest, engagement, and pleasure in doing things  Decrease frequency and intensity of feeling down, depressed, hopeless  Improve quantity and quality of night time sleep / decrease daytime naps  Feel less tired and more energy during the day    Improve diet, appetite, mindful eating, and / or meal planning  Identify negative self-talk and behaviors: challenge core beliefs, myths, and actions  Improve concentration, focus, and mindfulness in daily activities   Feel less fidgety, restless or slow in daily activities / interpersonal interactions  Decrease thoughts that you'd be better off dead or of suicide / self-harm  Status: Continued - Date(s):     Intervention(s)  [facility] [place] will teach emotional recognition/identification. Emotional regulation. Identify unhealthy thought patterns, Re-frame secure/balanced thoguhts, CBT Mindfulness exercises, self esteem, positive behavior activation, estbalishing healthy boundaries .    Objective #B  Patient will use healthy and effective interventions to reach weight management goals  Status: Continued - Date(s):     Intervention(s)  [facility] [place] will teach the client how to complete a 4-part pros and cons. Motivational Interviewing Skills to set and understand personal goals and identify hidden barriers .    Patient has reviewed and agreed to the above plan.    Written by  SHLOMO Momin, [facility] [place]

## 2018-11-13 NOTE — TELEPHONE ENCOUNTER
1 pack extension sent, please notify patient she needs to be seen for further refills. Thank you. Marely DIANE CNP

## 2018-11-27 ENCOUNTER — OFFICE VISIT (OUTPATIENT)
Dept: FAMILY MEDICINE | Facility: CLINIC | Age: 28
End: 2018-11-27
Payer: COMMERCIAL

## 2018-11-27 VITALS
WEIGHT: 293 LBS | SYSTOLIC BLOOD PRESSURE: 122 MMHG | DIASTOLIC BLOOD PRESSURE: 70 MMHG | TEMPERATURE: 97.2 F | HEART RATE: 95 BPM | OXYGEN SATURATION: 100 % | BODY MASS INDEX: 53.08 KG/M2 | RESPIRATION RATE: 14 BRPM

## 2018-11-27 DIAGNOSIS — N92.1 MENOMETRORRHAGIA: ICD-10-CM

## 2018-11-27 PROCEDURE — 99213 OFFICE O/P EST LOW 20 MIN: CPT | Performed by: PHYSICIAN ASSISTANT

## 2018-11-27 RX ORDER — LEVOTHYROXINE SODIUM 25 UG/1
25 TABLET ORAL DAILY
Qty: 90 TABLET | Refills: 1 | Status: CANCELLED | OUTPATIENT
Start: 2018-11-27

## 2018-11-27 RX ORDER — DESOGESTREL AND ETHINYL ESTRADIOL 0.15-0.03
1 KIT ORAL DAILY
Qty: 84 TABLET | Refills: 3 | Status: SHIPPED | OUTPATIENT
Start: 2018-11-27 | End: 2020-02-13

## 2018-11-27 RX ORDER — LEVOTHYROXINE SODIUM 200 UG/1
200 TABLET ORAL DAILY
Qty: 90 TABLET | Refills: 1 | Status: CANCELLED | OUTPATIENT
Start: 2018-11-27

## 2018-11-27 ASSESSMENT — PAIN SCALES - GENERAL: PAINLEVEL: NO PAIN (0)

## 2018-11-27 NOTE — PROGRESS NOTES
SUBJECTIVE:   Marichuy Worrell is a 28 year old female who presents to clinic today for the following health issues:      Refill meds.   She is due for refills of her contraception. This was previously prescribed by GYN for control of her heavy menses. It has been working well.     She is seeing Dr. Ren in [place] for weight management and she started topamax. She feels she is doing well.       Problem list and histories reviewed & adjusted, as indicated.  Additional history: as documented    Patient Active Problem List   Diagnosis     Morbidly obese (H)     Moderate major depression (H)     CARDIOVASCULAR SCREENING; LDL GOAL LESS THAN 160     Other specified hypothyroidism     ASCUS of cervix with negative high risk HPV     OSCAR (generalized anxiety disorder)     Past Surgical History:   Procedure Laterality Date     CHOLECYSTECTOMY, LAPOROSCOPIC  12/2009       Social History   Substance Use Topics     Smoking status: Never Smoker     Smokeless tobacco: Never Used     Alcohol use No     Family History   Problem Relation Age of Onset     Asthma Mother      Other Cancer Mother      Depression Mother      Obesity Mother      Melanoma Mother      Unknown/Adopted Father      Other Cancer Father      Arthritis Maternal Grandmother      Thyroid Disease Maternal Grandmother      Obesity Maternal Grandmother      Cancer Maternal Grandfather      lung cancer     Unknown/Adopted Paternal Grandmother      Unknown/Adopted Paternal Grandfather      Asthma Brother      Allergies Brother          Current Outpatient Prescriptions   Medication Sig Dispense Refill     desogestrel-ethinyl estradiol (ENSKYCE) 0.15-30 MG-MCG per tablet Take 1 tablet by mouth daily 84 tablet 3     levothyroxine (SYNTHROID/LEVOTHROID) 200 MCG tablet Take 1 tablet (200 mcg) by mouth daily 90 tablet 1     levothyroxine (SYNTHROID/LEVOTHROID) 25 MCG tablet Take 1 tablet (25 mcg) by mouth daily (Add to 25 mcg 200 mcg to equal 225 mcg daily) 90 tablet 1      Phentermine HCl 8 MG TABS Take 8 mg by mouth daily Lomaira 30 tablet 1     topiramate (TOPAMAX) 50 MG tablet Take 1 tablet (50 mg) by mouth daily 90 tablet 1     [DISCONTINUED] desogestrel-ethinyl estradiol (ENSKYCE) 0.15-30 MG-MCG per tablet Take 1 tablet by mouth daily Needs to be seen for further refills. 28 tablet 0     Allergies   Allergen Reactions     Biaxin [Clarithromycin]      rash       Reviewed and updated as needed this visit by clinical staff  Tobacco  Med Hx  Surg Hx  Fam Hx  Soc Hx      Reviewed and updated as needed this visit by Provider         ROS:  Constitutional, HEENT, cardiovascular, pulmonary, GI, , musculoskeletal, neuro, skin, endocrine and psych systems are negative, except as otherwise noted.    OBJECTIVE:     /70  Pulse 95  Temp 97.2  F (36.2  C) (Oral)  Resp 14  Wt 319 lb (144.7 kg)  LMP 11/04/2018  SpO2 100%  BMI 53.08 kg/m2  Body mass index is 53.08 kg/(m^2).  GENERAL: healthy, alert and no distress  MS: no gross musculoskeletal defects noted, no edema  SKIN: no suspicious lesions or rashes  PSYCH: mentation appears normal, affect normal/bright    Diagnostic Test Results:  none     ASSESSMENT/PLAN:       1. Menometrorrhagia  Stable with the use of contraception.   Refills sent to the pharmacy.   - desogestrel-ethinyl estradiol (ENSKYCE) 0.15-30 MG-MCG per tablet; Take 1 tablet by mouth daily  Dispense: 84 tablet; Refill: 3    She will be due for thyroid check in April with lab tests. She can contact via [facility] for a lab only appointment and refills of medication.   Health maintenance reviewed and she is up to date.   Continue working on weight loss.       Kristen M. Kehr, PA-C  [facility]

## 2018-11-27 NOTE — MR AVS SNAPSHOT
After Visit Summary   11/27/2018    Marichuy Worrell    MRN: [id number]           Patient Information     Date Of Birth          1990        Visit Information        Provider Department      11/27/2018 8:20 AM Kehr, Kristen M, [facility]        Today's Diagnoses     Menometrorrhagia           Follow-ups after your visit        Follow-up notes from your care team     Return in about 5 months (around 4/27/2019) for with primary provider, medication check.      Your next 10 appointments already scheduled     Nov 29, 2018  3:00 PM CST   [facility] Lucita Ring   [facility]    [address] [place] [address] [phone]            Dec 12, 2018  8:00 AM CST   Office Visit with ROXI Maloney CNP   [facility]    [address] [address] [phone]           Bring a current list of meds and any records pertaining to this visit. For Physicals, please bring immunization records and any forms needing to be filled out. Please arrive 10 minutes early to complete paperwork.              Who to contact     If you have questions or need follow up information about today's clinic visit or your schedule please contact [facility] directly at [phone].  Normal or non-critical lab and imaging results will be communicated to you by MyChart, letter or phone within 4 business days after the clinic has received the results. If you do not hear from us within 7 days, please contact the clinic through MyChart or phone. If you have a critical or abnormal lab result, we will notify you by phone as soon as possible.  Submit refill requests through [employer] or call your pharmacy and they will forward the refill request to us. Please allow 3 business days for your refill to be completed.          Additional Information About Your Visit         [employer] Information     [employer] gives you secure access to your electronic health record. If you see a primary care provider, you can also send messages to your care team and make appointments. If you have questions, please call your primary care clinic.  If you do not have a primary care provider, please call [phone] and they will assist you.        Care EveryWhere ID     This is your Care EveryWhere ID. This could be used by other organizations to access your [place] medical records  [id number]        Your Vitals Were     Pulse Temperature Respirations Last Period Pulse Oximetry BMI (Body Mass Index)    95 97.2  F (36.2  C) (Oral) 14 11/04/2018 100% 53.08 kg/m2       Blood Pressure from Last 3 Encounters:   11/27/18 122/70   10/31/18 120/70   09/26/18 128/74    Weight from Last 3 Encounters:   11/27/18 319 lb (144.7 kg)   10/31/18 321 lb (145.6 kg)   09/26/18 325 lb 8 oz (147.6 kg)              Today, you had the following     No orders found for display         Today's Medication Changes          These changes are accurate as of 11/27/18  8:48 AM.  If you have any questions, ask your nurse or doctor.               These medicines have changed or have updated prescriptions.        Dose/Directions    desogestrel-ethinyl estradiol 0.15-30 MG-MCG per tablet   Commonly known as:  ENSKYCE   This may have changed:  additional instructions   Used for:  Menometrorrhagia   Changed by:  Kehr, Kristen M, PA-C        Dose:  1 tablet   Take 1 tablet by mouth daily   Quantity:  84 tablet   Refills:  3            Where to get your medicines      These medications were sent to [id number]  [place] - [address]  [address], [place] [address]     Phone:  [phone]     desogestrel-ethinyl estradiol 0.15-30 MG-MCG per tablet                Primary Care Provider Office Phone # Fax #    Kristen M Kehr, PA-C [phone] [address] [address] [facility] [address]        Goals         Diet    Eat more fruits and vegetables     Reduce fast food intake     Reduce portion size        Exercise    Exercise 3x per week (30 min per time)       Equal Access to Services     KELLY LUCAS : Hadii aad ku haddeetrev Singh, cristoda peytonfranciscoha, kevin lynn emabayron, waxalex bhumi suyapaariel fishereliseocely rhodes. So [facility] [phone].    ATENCIÓN: Si habla español, tiene a aragon disposición servicios gratuitos de asistencia lingüística. Llame al [phone].    We comply with applicable federal civil rights laws and [place] laws. We do not discriminate on the basis of race, color, national origin, age, disability, sex, sexual orientation, or gender identity.            Thank you!     [facility] you for [facility]. Our goal is always to provide you with excellent care. Hearing back from our patients is one way we can continue to improve our services. Please take a few minutes to complete the written survey that you may receive in the mail after your visit with us. Thank you!             Your Updated Medication List - Protect others around you: Learn how to safely use, store and throw away your medicines at www.disposemymeds.org.          This list is accurate as of 11/27/18  8:48 AM.  Always use your most recent med list.                   Brand Name Dispense Instructions for use Diagnosis    desogestrel-ethinyl estradiol 0.15-30 MG-MCG per tablet    ENSKYCE    84 tablet    Take 1 tablet by mouth daily    Menometrorrhagia       * levothyroxine 25 MCG tablet    SYNTHROID/LEVOTHROID    90 tablet    Take 1 tablet (25 mcg) by mouth daily (Add to 25 mcg 200 mcg to equal 225 mcg daily)    Other specified hypothyroidism       * levothyroxine 200 MCG tablet    SYNTHROID/LEVOTHROID    90 tablet    Take 1 tablet (200 mcg) by mouth daily    Other specified hypothyroidism       Phentermine HCl 8 MG Tabs     30 tablet    Take 8 mg by mouth daily Lomaira    BMI 50.0-59.9, adult (H)        topiramate 50 MG tablet    TOPAMAX    90 tablet    Take 1 tablet (50 mg) by mouth daily    BMI 50.0-59.9, adult (H)       * Notice:  This list has 2 medication(s) that are the same as other medications prescribed for you. Read the directions carefully, and ask your doctor or other care provider to review them with you.

## 2018-11-27 NOTE — NURSING NOTE
"Chief Complaint   Patient presents with     Refill Request       Initial /74  Pulse 95  Temp 97.2  F (36.2  C) (Oral)  Resp 14  Wt 319 lb (144.7 kg)  LMP 11/04/2018  SpO2 100%  BMI 53.08 kg/m2 Estimated body mass index is 53.08 kg/(m^2) as calculated from the following:    Height as of 10/31/18: 5' 5\" (1.651 m).    Weight as of this encounter: 319 lb (144.7 kg).  Medication Reconciliation: complete    NATIVIDAD Hawkins MA      "

## 2018-11-29 ENCOUNTER — OFFICE VISIT (OUTPATIENT)
Dept: BEHAVIORAL HEALTH | Facility: CLINIC | Age: 28
End: 2018-11-29
Payer: COMMERCIAL

## 2018-11-29 DIAGNOSIS — F41.1 GAD (GENERALIZED ANXIETY DISORDER): Primary | ICD-10-CM

## 2018-11-29 DIAGNOSIS — F32.1 MODERATE MAJOR DEPRESSION (H): ICD-10-CM

## 2018-11-29 PROCEDURE — 90832 PSYTX W PT 30 MINUTES: CPT | Performed by: MARRIAGE & FAMILY THERAPIST

## 2018-11-29 NOTE — MR AVS SNAPSHOT
After Visit Summary   11/29/2018    Marichuy Worrell    MRN: [id number]           Patient Information     Date Of Birth          1990        Visit Information        Provider Department      11/29/2018 3:00 PM Lucita Ring [facility]        Today's Diagnoses     OSCAR (generalized anxiety disorder)    -  1    Moderate major depression (H)           Follow-ups after your visit        Your next 10 appointments already scheduled     Dec 12, 2018  8:00 AM CST   Office Visit with ROXI Maloney CNP   [facility]    [address] [facility] [id number] [phone]           Bring a current list of meds and any records pertaining to this visit. For Physicals, please bring immunization records and any forms needing to be filled out. Please arrive 10 minutes early to complete paperwork.            Dec 12, 2018  2:30 PM CST   [facility] Return with Lucita Ring   [facility]    [address] [facility] [address] [phone]              Who to contact     If you have questions or need follow up information about today's clinic visit or your schedule please contact [facility] directly at [phone].  Normal or non-critical lab and imaging results will be communicated to you by MyChart, letter or phone within 4 business days after the clinic has received the results. If you do not hear from us within 7 days, please contact the clinic through MyChart or phone. If you have a critical or abnormal lab result, we will notify you by phone as soon as possible.  Submit refill requests through [employer] or call your pharmacy and they will forward the refill request to us. Please allow 3 business days for your refill to be completed.          Additional Information About Your Visit        MyChart Information     [facility] gives you  secure access to your electronic health record. If you see a primary care provider, you can also send messages to your care team and make appointments. If you have questions, please call your primary care clinic.  If you do not have a primary care provider, please call [phone] and they will assist you.        Care EveryWhere ID     This is your Care EveryWhere ID. This could be used by other organizations to access your [place] medical records  [id number]        Your Vitals Were     Last Period                   11/04/2018            Blood Pressure from Last 3 Encounters:   11/27/18 122/70   10/31/18 120/70   09/26/18 128/74    Weight from Last 3 Encounters:   11/27/18 144.7 kg (319 lb)   10/31/18 145.6 kg (321 lb)   09/26/18 147.6 kg (325 lb 8 oz)              Today, you had the following     No orders found for display       Primary Care Provider Office Phone # Fax #    Kristen M Kehr, PA-C [phone]       [address] [facility] [address]        Goals        Diet    Eat more fruits and vegetables     Reduce fast food intake     Reduce portion size        Exercise    Exercise 3x per week (30 min per time)       Equal Access to Services     KELLY LUCAS : Hadii mila john hadasho Soanaali, waaxda luqadaha, qaybta kaalmada adeegyada, virgil rhodes. So [facility] [phone].    ATENCIÓN: Si habla español, tiene a aragon disposición servicios gratuitos de asistencia lingüística. [facility] [phone].    We comply with applicable federal civil rights laws and [place] laws. We do not discriminate on the basis of race, color, national origin, age, disability, sex, sexual orientation, or gender identity.            Thank you!     Thank you for choosing [facility]  for your care. Our goal is always to provide you with excellent care. Hearing back from our patients is one way we can continue to improve our services. Please take a few minutes to complete the  written survey that you may receive in the mail after your visit with us. Thank you!             Your Updated Medication List - Protect others around you: Learn how to safely use, store and throw away your medicines at www.disposemymeds.org.          This list is accurate as of 11/29/18 11:59 PM.  Always use your most recent med list.                   Brand Name Dispense Instructions for use Diagnosis    desogestrel-ethinyl estradiol 0.15-30 MG-MCG tablet    ENSKYCE    84 tablet    Take 1 tablet by mouth daily    Menometrorrhagia       * levothyroxine 25 MCG tablet    SYNTHROID/LEVOTHROID    90 tablet    Take 1 tablet (25 mcg) by mouth daily (Add to 25 mcg 200 mcg to equal 225 mcg daily)    Other specified hypothyroidism       * levothyroxine 200 MCG tablet    SYNTHROID/LEVOTHROID    90 tablet    Take 1 tablet (200 mcg) by mouth daily    Other specified hypothyroidism       Phentermine HCl 8 MG Tabs     30 tablet    Take 8 mg by mouth daily Lomaira    BMI 50.0-59.9, adult (H)       topiramate 50 MG tablet    TOPAMAX    90 tablet    Take 1 tablet (50 mg) by mouth daily    BMI 50.0-59.9, adult (H)       * Notice:  This list has 2 medication(s) that are the same as other medications prescribed for you. Read the directions carefully, and ask your doctor or other care provider to review them with you.

## 2018-12-03 NOTE — PROGRESS NOTES
[facility] [place]   November 29, 2018      Behavioral Health Clinician Progress Note    Patient Name: Marichuy Worrell           Service Type:  Individual      Service Location:   Face to Face in Clinic     Session Start Time: 3:00  Session End Time: 3:30      Session Length: 16 - 37      Attendees: Patient    Visit Activities (Refresh list every visit): [facility]    Diagnostic Assessment Date: 5/15/18  Treatment Plan Review Date: 6/21/18  See Flowsheets for today's PHQ-9 and OSCAR-7 results  Previous PHQ-9:   PHQ-9 SCORE 4/18/2018 5/15/2018 10/31/2018   PHQ-9 Total Score - - -   PHQ-9 Total Score MyChart 17 (Moderately severe depression) 13 (Moderate depression) -   PHQ-9 Total Score 17 13 2     Previous OSCAR-7:   OSCAR-7 SCORE 1/25/2016 4/18/2018 5/15/2018   Total Score - - -   Total Score - - 11 (moderate anxiety)   Total Score 2 14 11       RYAN LEVEL:  RYAN Score (Last Two) 4/1/2014   RYAN Raw Score 44   Activation Score 70.8   RYAN Level 4       DATA  Extended Session (60+ minutes): No  Interactive Complexity: No  Crisis: No  [facility] Patient: No    Treatment Objective(s) Addressed in This Session:  Target Behavior(s): diet/weight loss and disease management/lifestyle changes depression and anxiety     Depressed Mood: Increase interest, engagement, and pleasure in doing things  Decrease frequency and intensity of feeling down, depressed, hopeless  Improve quantity and quality of night time sleep / decrease daytime naps  Feel less tired and more energy during the day   Improve diet, appetite, mindful eating, and / or meal planning  Identify negative self-talk and behaviors: challenge core beliefs, myths, and actions  Improve concentration, focus, and mindfulness in daily activities   Feel less fidgety, restless or slow in daily activities / interpersonal interactions  Anxiety: will experience a reduction in anxiety, will develop more effective coping skills to manage anxiety symptoms, will develop healthy  cognitive patterns and beliefs and will increase ability to function adaptively    Current Stressors / Issues:  Patient reports adjusting well to new job role and feeling more confident and comfortable with co-workers. Patient processed ACE results and identified current mental and physical health conditions related to adverse childhood experiences.     Progress on Treatment Objective(s) / Homework:  New Objective established this session - ACTION (Actively working towards change); Intervened by reinforcing change plan / affirming steps taken    Motivational Interviewing    MI Intervention: Co-Developed Goal: lose 10 pounds by next doctor appointment 7/18/18 , Expressed Empathy/Understanding, Supported Autonomy, Collaboration, Evocation, Permission to raise concern or advise, Open-ended questions, Reflections: simple and complex, Change talk (evoked) and Reframe     Change Talk Expressed by the Patient: Desire to change Reasons to change Need to change    Provider Response to Change Talk: E - Evoked more info from patient about behavior change, A - Affirmed patient's thoughts, decisions, or attempts at behavior change, R - Reflected patient's change talk and S - Summarized patient's change talk statements    Patient processed her list of things she wants to do once she has a healthier weight. Patient made this list 2 years ago.   Patient to post this list somewhere in her room where she can see it daily.     Cognitive Behavioral Therapy  -Identified barriers to positive self esteem/ patient not knowing howng to have confidence in her body   -Identified items she does not accept about herself (intellegence, weight/body, decision making)  -Identified items she does accept about herself (work ethic, career/job, dedication to work projects)   -Processed social and personal experiences of body image impacts and beauty standards     Homework:  -Positive traits and characteristics worksheet   -track automatic negative  thoughts and replace with secure thought  -Motivation strategies for exercise: pack work out bag/in the car, go exercise right after work, work out with partner/mother, track in work out journal, join roommate when she is working out at home      Also provided psychoeducation about behavioral health condition, symptoms, and treatment options    Care Plan review completed: Yes    Medication Review:  No current psychiatric medications prescribed    Medication Compliance:  NA    Changes in Health Issues:   Yes: Weight / dietary issues, Associated Psychological Distress    Chemical Use Review:   Substance Use: Chemical use reviewed, no active concerns identified      Tobacco Use: No current tobacco use.      Assessment: Current Emotional / Mental Status (status of significant symptoms):  Risk status (Self / Other harm or suicidal ideation)  Patient has had a history of suicidal ideation: 5-6 years ago  and self-injurious behavior: cutting high school years  Patient denies current fears or concerns for personal safety.  Patient denies current or recent suicidal ideation or behaviors.  Patient denies current or recent homicidal ideation or behaviors.  Patient denies current or recent self injurious behavior or ideation.  Patient denies other safety concerns.  A safety and risk management plan has not been developed at this time, however patient was encouraged to call [facility] should there be a change in any of these risk factors.    Appearance:   Appropriate   Eye Contact:   Good   Psychomotor Behavior: Normal   Attitude:   Cooperative   Orientation:   All  Speech   Rate / Production: Normal    Volume:  Normal   Mood:    Anxious  Depressed  Sad   Affect:    Worrisome   Thought Content:  Rumination   Thought Form:  Coherent  Logical   Insight:    Good     Diagnoses:  1. OSCAR (generalized anxiety disorder)    2. Moderate major depression (H)        Collateral Reports Completed:  Not Applicable    Plan: (Homework,  other):  Patient was given information about behavioral services and encouraged to schedule a follow up appointment with the clinic [facility] 11/29/18.  She was also given information about mental health symptoms and treatment options  and Cognitive Behavioral Therapy skills to practice when experiencing anxiety and depression.  CD Recommendations: No indications of CD issues.  Lucita Ring, SHLOMO, [facility]       ______________________________________________________________________    Integrated Primary Care Behavioral Health Treatment Plan    Patient's Name: Marichuy Worrell  YOB: 1990    Date: June 21, 2018    DSM-V Diagnoses:            296.32 (F33.1) Major Depressive Disorder, Recurrent Episode, Moderate With anxious distress  300.02 (F41.1) Generalized Anxiety Disorder  Psychosocial & Contextual Factors: None  WHODAS Score: 28    Referral / Collaboration:  Referral to another professional/service is not indicated at this time.  Anticipated number of session or this episode of care: 8    MeasurableTreatment Goal(s) related to diagnosis / functional impairment(s)  Goal 1: Patient will decrease symptoms by 80% and utilize healthy and effective coping strategies 75% of symptom occurrences.      I will know I've met my goal when feel happy, less anxious, healthier weight.      Objective #A (Patient Action)    Patient will use at least 4 coping skills for anxiety management in the next 3 weeks  Increase interest, engagement, and pleasure in doing things  Decrease frequency and intensity of feeling down, depressed, hopeless  Improve quantity and quality of night time sleep / decrease daytime naps  Feel less tired and more energy during the day   Improve diet, appetite, mindful eating, and / or meal planning  Identify negative self-talk and behaviors: challenge core beliefs, myths, and actions  Improve concentration, focus, and mindfulness in daily activities   Feel less fidgety, restless or slow in daily  activities / interpersonal interactions  Decrease thoughts that you'd be better off dead or of suicide / self-harm  Status: Continued - Date(s):     Intervention(s)  [facility] will teach emotional recognition/identification. Emotional regulation. Identify unhealthy thought patterns, Re-frame secure/balanced thoguhts, CBT Mindfulness exercises, self esteem, positive behavior activation, estbalishing healthy boundaries .    Objective #B  Patient will use healthy and effective interventions to reach weight management goals  Status: Continued - Date(s):     Intervention(s)  [facility] will teach the client how to complete a 4-part pros and cons. Motivational Interviewing Skills to set and understand personal goals and identify hidden barriers .    Patient has reviewed and agreed to the above plan.    Written by  SHLOMO Momin, [facility]

## 2018-12-11 NOTE — PROGRESS NOTES
SUBJECTIVE:   Marichuy Worrell is a 28 year old female who presents to clinic today for the following health issues:      Chief Complaint   Patient presents with     Weight Check     INTERNAL MEDICINE  Medical Weight Loss - Return Visit      CHIEF COMPLAINT:  Recheck weight      Wt Readings from Last 5 Encounters:   12/12/18 139.9 kg (308 lb 8.2 oz)   11/27/18 144.7 kg (319 lb)   10/31/18 145.6 kg (321 lb)   09/26/18 147.6 kg (325 lb 8 oz)   08/22/18 (!) 150.1 kg (331 lb)         HISTORY OF PRESENT ILLNESS (HPI):    Patient returns today for medical weight loss follow-up visit. Patient was last seen by me on 10/31/2018 and has lost 13 pounds and 0.6 % body fat.  Patient has lost 73 lbs total.  She has had close to 10% weight loss since starting meds in 6/18.    Patient notes that she has been working on protein, but is only eating about 50 g per day.  She notes that she overate and ate the wrong foods around Thanksgiving and did not feel well. She has been persistent with eating fruits and vegetables and managing portions.  She feels good about her diet.  The increased dose of topamax has helped her cravings and she denies additional side effects.    Patient has been going to gym every other day.  She has been alternating cardiovascular exercise and strength training.  She wants to add 10-15 minutes of exercise at home on the days she doesn't go to the gym.    MEDICATIONS:   Current Outpatient Medications   Medication Sig Dispense Refill     desogestrel-ethinyl estradiol (ENSKYCE) 0.15-30 MG-MCG per tablet Take 1 tablet by mouth daily 84 tablet 3     levothyroxine (SYNTHROID/LEVOTHROID) 200 MCG tablet Take 1 tablet (200 mcg) by mouth daily 90 tablet 1     levothyroxine (SYNTHROID/LEVOTHROID) 25 MCG tablet Take 1 tablet (25 mcg) by mouth daily (Add to 25 mcg 200 mcg to equal 225 mcg daily) 90 tablet 1     Phentermine HCl 8 MG TABS Take 8 mg by mouth daily Lomaira 30 tablet 1     topiramate (TOPAMAX) 50 MG tablet Take 1  "tablet (50 mg) by mouth daily 90 tablet 1       ALLERGIES:   Allergies   Allergen Reactions     Biaxin [Clarithromycin]      rash       PHYSICAL EXAMINATION:    VITALS: /68   Pulse 121   Temp 97.3  F (36.3  C) (Oral)   Resp 20   Ht 1.651 m (5' 5\")   Wt 139.9 kg (308 lb 8.2 oz)   SpO2 98%   BMI 51.34 kg/m    GENERAL: Patient is a 28 year old year old female  in no acute distress.  Patient is alert and orientated x 4, pleasant and cooperative with exam.     CARDIOVASCULAR:  Regular rate and rhythm without murmurs, rubs, or gallops.  RESPIRATORY:  Lungs are clear to auscultation bilaterally, respiratory effort is normal.   No edema  PSYCH: mentation appears normal, affect normal and bright.      ASSESSMENT/PLAN:    1. BMI 50.0-59.9, adult (H)  Patient to continue current dose of meds.  See patient instructions below.    - Phentermine HCl 8 MG TABS; Take 8 mg by mouth daily Lomaira  Dispense: 30 tablet; Refill: 1  - topiramate (TOPAMAX) 50 MG tablet; Take 1 tablet (50 mg) by mouth daily  Dispense: 90 tablet; Refill: 1         Patient is to call the clinic if she experiences any adverse reactions.     Follow up with Provider - 8 weeks.     Patient Instructions     Keep trying to work on protein intake.  Maybe aim for 60-70 grams per day and we'll work on gradually increasing to goal.  Aim to add 10-15 minutes of exercise at home when you do not exercise.      [facility]    If you have any questions regarding to your visit please contact your care team:     Team Pink:   Clinic Hours Telephone Number   Internal Medicine:  Dr. Abby Doll NP 7am-7pm  Monday - Thursday   7am-5pm  Fridays  [phone]  (Appointment scheduling available 24/7)   Urgent Care - [place] - 11am-9pm Monday-Friday Saturday-Sunday- 9am-5pm   [place] - 5pm-9pm Monday-Friday Saturday-Sunday- 9am-5pm  [phone] - Lita Chavis  [phone] - [place] "       What options do I have for a visit other than an office visit? We offer electronic visits (e-visits) and telephone visits, when medically appropriate.  Please check with your medical insurance to see if these types of visits are covered, as you will be responsible for any charges that are not paid by your insurance.      You can use [employer] (secure electronic communication) to access to your chart, send your primary care provider a message, or make an appointment. Ask a team member how to get started.     For a price quote for your services, please call our Consumer Price Line at [phone] or our Imaging Cost estimation line at [phone] (for imaging tests).            I spent 15 minutes of time with the patient and >50% of it was in education and counseling regarding weight loss.     Bekah Doll, CNP

## 2018-12-12 ENCOUNTER — OFFICE VISIT (OUTPATIENT)
Dept: BEHAVIORAL HEALTH | Facility: CLINIC | Age: 28
End: 2018-12-12
Payer: COMMERCIAL

## 2018-12-12 ENCOUNTER — OFFICE VISIT (OUTPATIENT)
Dept: FAMILY MEDICINE | Facility: CLINIC | Age: 28
End: 2018-12-12
Payer: COMMERCIAL

## 2018-12-12 VITALS
HEIGHT: 65 IN | SYSTOLIC BLOOD PRESSURE: 104 MMHG | HEART RATE: 78 BPM | RESPIRATION RATE: 20 BRPM | DIASTOLIC BLOOD PRESSURE: 68 MMHG | TEMPERATURE: 97.3 F | WEIGHT: 293 LBS | BODY MASS INDEX: 48.82 KG/M2 | OXYGEN SATURATION: 98 %

## 2018-12-12 DIAGNOSIS — F32.1 MODERATE MAJOR DEPRESSION (H): Primary | ICD-10-CM

## 2018-12-12 DIAGNOSIS — F41.1 GAD (GENERALIZED ANXIETY DISORDER): ICD-10-CM

## 2018-12-12 PROCEDURE — 99213 OFFICE O/P EST LOW 20 MIN: CPT | Performed by: NURSE PRACTITIONER

## 2018-12-12 PROCEDURE — 90834 PSYTX W PT 45 MINUTES: CPT | Performed by: MARRIAGE & FAMILY THERAPIST

## 2018-12-12 RX ORDER — TOPIRAMATE 50 MG/1
50 TABLET, FILM COATED ORAL DAILY
Qty: 90 TABLET | Refills: 1 | Status: CANCELLED | OUTPATIENT
Start: 2018-12-12

## 2018-12-12 ASSESSMENT — MIFFLIN-ST. JEOR: SCORE: 2140.27

## 2018-12-12 ASSESSMENT — PAIN SCALES - GENERAL: PAINLEVEL: NO PAIN (0)

## 2018-12-12 NOTE — PATIENT INSTRUCTIONS
Keep trying to work on protein intake.  Maybe aim for 60-70 grams per day and we'll work on gradually increasing to goal.  Aim to add 10-15 minutes of exercise at home when you do not exercise.      [facility]    If you have any questions regarding to your visit please contact your care team:     Team Pink:   Clinic Hours Telephone Number   Internal Medicine:  Dr. Abby Doll, NP 7am-7pm  Monday - Thursday   7am-5pm  Fridays  [phone]  (Appointment scheduling available 24/7)   Urgent Care - [place] and [facility] - 11am-9pm Monday-Friday Saturday-Sunday- 9am-5pm   [place] - 5pm-9pm Monday-Friday Saturday-Sunday- 9am-5pm  [phone] - [place]  [phone] - [place]       What options do I have for a visit other than an office visit? We offer electronic visits (e-visits) and telephone visits, when medically appropriate.  Please check with your medical insurance to see if these types of visits are covered, as you will be responsible for any charges that are not paid by your insurance.      You can use [employer] (secure electronic communication) to access to your chart, send your primary care provider a message, or make an appointment. Ask a team member how to get started.     For a price quote for your services, please call our Consumer Price Line at [phone] or our Imaging Cost estimation line at [phone] (for imaging tests).

## 2018-12-13 NOTE — PROGRESS NOTES
[facility] [place], [place]      Behavioral Health Clinician Progress Note    Patient Name: Marichuy Worrell           Service Type:  Individual      Service Location:   Face to Face in Clinic     Session Start Time: 2:30  Session End Time: 3:20      Session Length: 38 - 52      Attendees: Patient    Visit Activities (Refresh list every visit): [facility] [place] Only    Diagnostic Assessment Date: 5/15/18  Treatment Plan Review Date: 6/21/18  See Flowsheets for today's PHQ-9 and OSCAR-7 results  Previous PHQ-9:   PHQ-9 SCORE 4/18/2018 5/15/2018 10/31/2018   PHQ-9 Total Score - - -   PHQ-9 Total Score MyChart 17 (Moderately severe depression) 13 (Moderate depression) -   PHQ-9 Total Score 17 13 2     Previous OSCAR-7:   OSCAR-7 SCORE 1/25/2016 4/18/2018 5/15/2018   Total Score - - -   Total Score - - 11 (moderate anxiety)   Total Score 2 14 11       RYAN LEVEL:  RYAN Score (Last Two) 4/1/2014   RYAN Raw Score 44   Activation Score 70.8   RYAN Level 4       DATA  Extended Session (60+ minutes): No  Interactive Complexity: No  Crisis: No  [facility] Patient: No    Treatment Objective(s) Addressed in This Session:  Target Behavior(s): diet/weight loss and disease management/lifestyle changes depression and anxiety     Depressed Mood: Increase interest, engagement, and pleasure in doing things  Decrease frequency and intensity of feeling down, depressed, hopeless  Improve quantity and quality of night time sleep / decrease daytime naps  Feel less tired and more energy during the day   Improve diet, appetite, mindful eating, and / or meal planning  Identify negative self-talk and behaviors: challenge core beliefs, myths, and actions  Improve concentration, focus, and mindfulness in daily activities   Feel less fidgety, restless or slow in daily activities / interpersonal interactions  Anxiety: will experience a reduction in anxiety, will develop more effective coping skills to manage anxiety symptoms, will develop healthy  "cognitive patterns and beliefs and will increase ability to function adaptively    Current Stressors / Issues:  Patient processed feeling inadequate at work and comparing herself to other coworkers. Patient processed how she responded to these negative self talk and negative self core beliefs about her work performance in new and healthier ways.   Patient processed relational dynamics with her brother Napoleon and his partner Miriam and their daughter Alexsandra 7yr old. Patient processed her role as their \"helper\" and how these roles can be unhealthy. Patient processed her worries and concerns with niece Alexsandra being exposed to their negative family patterns. Patient processed adjustment of the boundaries with her brother and Miriam and how she also is triangulated in their relationship conflicts.   Reviewed with patient mental health diagnosis.       Progress on Treatment Objective(s) / Homework:  New Objective established this session - ACTION (Actively working towards change); Intervened by reinforcing change plan / affirming steps taken    Motivational Interviewing    MI Intervention: Co-Developed Goal: lose 10 pounds by next doctor appointment 7/18/18 , Expressed Empathy/Understanding, Supported Autonomy, Collaboration, Evocation, Permission to raise concern or advise, Open-ended questions, Reflections: simple and complex, Change talk (evoked) and Reframe     Change Talk Expressed by the Patient: Desire to change Reasons to change Need to change    Provider Response to Change Talk: E - Evoked more info from patient about behavior change, A - Affirmed patient's thoughts, decisions, or attempts at behavior change, R - Reflected patient's change talk and S - Summarized patient's change talk statements    Patient processed her list of things she wants to do once she has a healthier weight. Patient made this list 2 years ago.   Patient to post this list somewhere in her room where she can see it daily.     Cognitive Behavioral " Therapy  -Identified barriers to positive self esteem/ patient not knowing howng to have confidence in her body   -Identified items she does not accept about herself (intellegence, weight/body, decision making)  -Identified items she does accept about herself (work ethic, career/job, dedication to work projects)   -Processed social and personal experiences of body image impacts and beauty standards     Homework:  -Positive traits and characteristics worksheet   -track automatic negative thoughts and replace with secure thought  -Motivation strategies for exercise: pack work out bag/in the car, go exercise right after work, work out with partner/mother, track in work out journal, join roommate when she is working out at home      Also provided psychoeducation about behavioral health condition, symptoms, and treatment options    Care Plan review completed: Yes    Medication Review:  No current psychiatric medications prescribed    Medication Compliance:  NA    Changes in Health Issues:   Yes: Weight / dietary issues, Associated Psychological Distress    Chemical Use Review:   Substance Use: Chemical use reviewed, no active concerns identified      Tobacco Use: No current tobacco use.      Assessment: Current Emotional / Mental Status (status of significant symptoms):  Risk status (Self / Other harm or suicidal ideation)  Patient has had a history of suicidal ideation: 5-6 years ago  and self-injurious behavior: cutting high school years  Patient denies current fears or concerns for personal safety.  Patient denies current or recent suicidal ideation or behaviors.  Patient denies current or recent homicidal ideation or behaviors.  Patient denies current or recent self injurious behavior or ideation.  Patient denies other safety concerns.  A safety and risk management plan has not been developed at this time, however patient was encouraged to call [facility] / [facility] should there be a change in any of these risk  factors.    Appearance:   Appropriate   Eye Contact:   Good   Psychomotor Behavior: Normal   Attitude:   Cooperative   Orientation:   All  Speech   Rate / Production: Normal    Volume:  Normal   Mood:    Anxious  Depressed  Sad   Affect:    Worrisome   Thought Content:  Rumination   Thought Form:  Coherent  Logical   Insight:    Good     Diagnoses:  No diagnosis found.    Collateral Reports Completed:  Not Applicable    Plan: (Homework, other):  Patient was given information about behavioral services and encouraged to schedule a follow up appointment with the clinic [facility] 1/4/19.  She was also given information about mental health symptoms and treatment options  and Cognitive Behavioral Therapy skills to practice when experiencing anxiety and depression.  CD Recommendations: No indications of CD issues.  SHLOMO Momin, [facility]       ______________________________________________________________________    Integrated Primary Care Behavioral Health Treatment Plan    Patient's Name: Marichuy Worrell  YOB: 1990    Date: June 21, 2018    DSM-V Diagnoses:            296.32 (F33.1) Major Depressive Disorder, Recurrent Episode, Moderate With anxious distress  300.02 (F41.1) Generalized Anxiety Disorder  Psychosocial & Contextual Factors: None  WHODAS Score: 28    Referral / Collaboration:  Referral to another professional/service is not indicated at this time.  Anticipated number of session or this episode of care: 8    MeasurableTreatment Goal(s) related to diagnosis / functional impairment(s)  Goal 1: Patient will decrease symptoms by 80% and utilize healthy and effective coping strategies 75% of symptom occurrences.      I will know I've met my goal when feel happy, less anxious, healthier weight.      Objective #A (Patient Action)    Patient will use at least 4 coping skills for anxiety management in the next 3 weeks  Increase interest, engagement, and pleasure in doing things  Decrease frequency and  intensity of feeling down, depressed, hopeless  Improve quantity and quality of night time sleep / decrease daytime naps  Feel less tired and more energy during the day   Improve diet, appetite, mindful eating, and / or meal planning  Identify negative self-talk and behaviors: challenge core beliefs, myths, and actions  Improve concentration, focus, and mindfulness in daily activities   Feel less fidgety, restless or slow in daily activities / interpersonal interactions  Decrease thoughts that you'd be better off dead or of suicide / self-harm  Status: Continued - Date(s):     Intervention(s)  [facility] will teach emotional recognition/identification. Emotional regulation. Identify unhealthy thought patterns, Re-frame secure/balanced thoguhts, CBT Mindfulness exercises, self esteem, positive behavior activation, estbalishing healthy boundaries .    Objective #B  Patient will use healthy and effective interventions to reach weight management goals  Status: Continued - Date(s):     Intervention(s)  [facility] will teach the client how to complete a 4-part pros and cons. Motivational Interviewing Skills to set and understand personal goals and identify hidden barriers .    Patient has reviewed and agreed to the above plan.    Written by  SHLOMO Momin, [facility]

## 2019-01-04 ENCOUNTER — OFFICE VISIT (OUTPATIENT)
Dept: BEHAVIORAL HEALTH | Facility: CLINIC | Age: 29
End: 2019-01-04
Payer: COMMERCIAL

## 2019-01-04 DIAGNOSIS — F41.1 GAD (GENERALIZED ANXIETY DISORDER): ICD-10-CM

## 2019-01-04 DIAGNOSIS — F32.1 MODERATE MAJOR DEPRESSION (H): Primary | ICD-10-CM

## 2019-01-04 PROCEDURE — 90834 PSYTX W PT 45 MINUTES: CPT | Performed by: MARRIAGE & FAMILY THERAPIST

## 2019-01-08 NOTE — PROGRESS NOTES
[facility] [place] [facility] [place], [place]      Behavioral Health Clinician Progress Note    Patient Name: Marichuy Worrell           Service Type:  Individual      Service Location:   Face to Face in Clinic     Session Start Time: 2:00  Session End Time: 2:50      Session Length: 38 - 52      Attendees: Patient    Visit Activities (Refresh list every visit): [facility]    Diagnostic Assessment Date: 5/15/18  Treatment Plan Review Date: 6/21/18  See Flowsheets for today's PHQ-9 and OSCAR-7 results  Previous PHQ-9:   PHQ-9 SCORE 4/18/2018 5/15/2018 10/31/2018   PHQ-9 Total Score - - -   PHQ-9 Total Score MyChart 17 (Moderately severe depression) 13 (Moderate depression) -   PHQ-9 Total Score 17 13 2     Previous OSCAR-7:   OSCAR-7 SCORE 1/25/2016 4/18/2018 5/15/2018   Total Score - - -   Total Score - - 11 (moderate anxiety)   Total Score 2 14 11       RYAN LEVEL:  RYAN Score (Last Two) 4/1/2014   RYAN Raw Score 44   Activation Score 70.8   RYAN Level 4       DATA  Extended Session (60+ minutes): No  Interactive Complexity: No  Crisis: No  [facility] Patient: No    Treatment Objective(s) Addressed in This Session:  Target Behavior(s): diet/weight loss and disease management/lifestyle changes depression and anxiety     Depressed Mood: Increase interest, engagement, and pleasure in doing things  Decrease frequency and intensity of feeling down, depressed, hopeless  Improve quantity and quality of night time sleep / decrease daytime naps  Feel less tired and more energy during the day   Improve diet, appetite, mindful eating, and / or meal planning  Identify negative self-talk and behaviors: challenge core beliefs, myths, and actions  Improve concentration, focus, and mindfulness in daily activities   Feel less fidgety, restless or slow in daily activities / interpersonal interactions  Anxiety: will experience a reduction in anxiety, will develop more effective coping skills to manage anxiety symptoms, will develop healthy  cognitive patterns and beliefs and will increase ability to function adaptively    Current Stressors / Issues:  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 Patient processed mental health diagnosis if anxiety and depression and symptom criteria.         Progress on Treatment Objective(s) / Homework:  New Objective established this session - ACTION (Actively working towards change); Intervened by reinforcing change plan / affirming steps taken    Motivational Interviewing    MI Intervention: Co-Developed Goal: lose 10 pounds by next doctor appointment 7/18/18 , Expressed Empathy/Understanding, Supported Autonomy, Collaboration, Evocation, Permission to raise concern or advise, Open-ended questions, Reflections: simple and complex, Change talk (evoked) and Reframe     Change Talk Expressed by the Patient: Desire to change Reasons to change Need to change    Provider Response to Change Talk: E - Evoked more info from patient about behavior change, A - Affirmed patient's thoughts, decisions, or attempts at behavior change, R - Reflected patient's change talk and S - Summarized patient's change talk statements    Patient processed her list of things she wants to do once she has a healthier weight. Patient made this list 2 years ago.   Patient to post this list somewhere in her room where she can see it daily.     Cognitive Behavioral Therapy  -Identified barriers to positive self esteem/ patient not knowing howng to have confidence in her body   -Identified items she does not accept about herself (intellegence, weight/body, decision  making)  -Identified items she does accept about herself (work ethic, career/job, dedication to work projects)   -Processed social and personal experiences of body image impacts and beauty standards     Homework:  -Positive traits and characteristics worksheet   -track automatic negative thoughts and replace with secure thought  -Motivation strategies for exercise: pack work out bag/in the car, go exercise right after work, work out with partner/mother, track in work out journal, join roommate when she is working out at home      Also provided psychoeducation about behavioral health condition, symptoms, and treatment options    Care Plan review completed: Yes    Medication Review:  No current psychiatric medications prescribed    Medication Compliance:  NA    Changes in Health Issues:   Yes: Weight / dietary issues, Associated Psychological Distress    Chemical Use Review:   Substance Use: Chemical use reviewed, no active concerns identified      Tobacco Use: No current tobacco use.      Assessment: Current Emotional / Mental Status (status of significant symptoms):  Risk status (Self / Other harm or suicidal ideation)  Patient has had a history of suicidal ideation: 5-6 years ago  and self-injurious behavior: cutting high school years  Patient denies current fears or concerns for personal safety.  Patient denies current or recent suicidal ideation or behaviors.  Patient denies current or recent homicidal ideation or behaviors.  Patient denies current or recent self injurious behavior or ideation.  Patient denies other safety concerns.  A safety and risk management plan has not been developed at this time, however patient was encouraged to call [facility] / [facility] should there be a change in any of these risk factors.    Appearance:   Appropriate   Eye Contact:   Good   Psychomotor Behavior: Normal   Attitude:   Cooperative   Orientation:   All  Speech   Rate / Production: Normal    Volume:  Normal    Mood:    Anxious  Depressed  Sad   Affect:    Worrisome   Thought Content:  Rumination   Thought Form:  Coherent  Logical   Insight:    Good     Diagnoses:  1. mild major depression (H)    2. OSCAR (generalized anxiety disorder)        Collateral Reports Completed:  Not Applicable    Plan: (Homework, other):  Patient was given information about behavioral services and encouraged to schedule a follow up appointment with the clinic [facility] 1/18/19.  She was also given information about mental health symptoms and treatment options  and Cognitive Behavioral Therapy skills to practice when experiencing anxiety and depression.  CD Recommendations: No indications of CD issues.  SHLOMO Momin, [facility]       ______________________________________________________________________    Integrated Primary Care Behavioral Health Treatment Plan    Patient's Name: Marichuy Worrell  YOB: 1990    Date: June 21, 2018    DSM-V Diagnoses:            296.32 (F33.1) Major Depressive Disorder, Recurrent Episode, Moderate With anxious distress  300.02 (F41.1) Generalized Anxiety Disorder  Psychosocial & Contextual Factors: None  WHODAS Score: 28    Referral / Collaboration:  Referral to another professional/service is not indicated at this time.  Anticipated number of session or this episode of care: 8    MeasurableTreatment Goal(s) related to diagnosis / functional impairment(s)  Goal 1: Patient will decrease symptoms by 80% and utilize healthy and effective coping strategies 75% of symptom occurrences.      I will know I've met my goal when feel happy, less anxious, healthier weight.      Objective #A (Patient Action)    Patient will use at least 4 coping skills for anxiety management in the next 3 weeks  Increase interest, engagement, and pleasure in doing things  Decrease frequency and intensity of feeling down, depressed, hopeless  Improve quantity and quality of night time sleep / decrease daytime naps  Feel less tired  and more energy during the day   Improve diet, appetite, mindful eating, and / or meal planning  Identify negative self-talk and behaviors: challenge core beliefs, myths, and actions  Improve concentration, focus, and mindfulness in daily activities   Feel less fidgety, restless or slow in daily activities / interpersonal interactions  Decrease thoughts that you'd be better off dead or of suicide / self-harm  Status: Continued - Date(s):     Intervention(s)  [facility] [place] will teach emotional recognition/identification. Emotional regulation. Identify unhealthy thought patterns, Re-frame secure/balanced thoguhts, CBT Mindfulness exercises, self esteem, positive behavior activation, estbalishing healthy boundaries .    Objective #B  Patient will use healthy and effective interventions to reach weight management goals  Status: Continued - Date(s):     Intervention(s)  [facility] [place] will teach the client how to complete a 4-part pros and cons. Motivational Interviewing Skills to set and understand personal goals and identify hidden barriers .    Patient has reviewed and agreed to the above plan.    Written by  SHLOMO Momin, [facility] [place]

## 2019-01-18 ENCOUNTER — OFFICE VISIT (OUTPATIENT)
Dept: BEHAVIORAL HEALTH | Facility: CLINIC | Age: 29
End: 2019-01-18
Payer: COMMERCIAL

## 2019-01-18 DIAGNOSIS — F41.1 GAD (GENERALIZED ANXIETY DISORDER): ICD-10-CM

## 2019-01-18 DIAGNOSIS — F32.1 MODERATE MAJOR DEPRESSION (H): Primary | ICD-10-CM

## 2019-01-18 PROCEDURE — 90834 PSYTX W PT 45 MINUTES: CPT | Performed by: MARRIAGE & FAMILY THERAPIST

## 2019-02-01 NOTE — PROGRESS NOTES
[facility] [place]   January 18, 2019      Behavioral Health Clinician Progress Note    Patient Name: Marichuy Worrell           Service Type:  Individual      Service Location:   Face to Face in Clinic     Session Start Time: 8:30  Session End Time: 9:20      Session Length: 38 - 52      Attendees: Patient    Visit Activities (Refresh list every visit): [facility] Only    Diagnostic Assessment Date: 5/15/18  Treatment Plan Review Date: 6/21/18  See Flowsheets for today's PHQ-9 and OSCAR-7 results  Previous PHQ-9:   PHQ-9 SCORE 4/18/2018 5/15/2018 10/31/2018   PHQ-9 Total Score - - -   PHQ-9 Total Score MyChart 17 (Moderately severe depression) 13 (Moderate depression) -   PHQ-9 Total Score 17 13 2     Previous OSCAR-7:   OSCAR-7 SCORE 1/25/2016 4/18/2018 5/15/2018   Total Score - - -   Total Score - - 11 (moderate anxiety)   Total Score 2 14 11       RYAN LEVEL:  RYAN Score (Last Two) 4/1/2014   RYAN Raw Score 44   Activation Score 70.8   RYAN Level 4       DATA  Extended Session (60+ minutes): No  Interactive Complexity: No  Crisis: No  [facility] Patient: No    Treatment Objective(s) Addressed in This Session:  Target Behavior(s): diet/weight loss and disease management/lifestyle changes depression and anxiety     Depressed Mood: Increase interest, engagement, and pleasure in doing things  Decrease frequency and intensity of feeling down, depressed, hopeless  Improve quantity and quality of night time sleep / decrease daytime naps  Feel less tired and more energy during the day   Improve diet, appetite, mindful eating, and / or meal planning  Identify negative self-talk and behaviors: challenge core beliefs, myths, and actions  Improve concentration, focus, and mindfulness in daily activities   Feel less fidgety, restless or slow in daily activities / interpersonal interactions  Anxiety: will experience a reduction in anxiety, will develop more effective coping skills to manage anxiety symptoms, will develop healthy  "cognitive patterns and beliefs and will increase ability to function adaptively    Current Stressors / Issues:  Patient processed negative interaction with her mother. Patient reports her mother was being overly critical and mean towards her while patient was driving with her mother and two friends to an event. Patient reports she ended up yelling at her mother after mother would not stop talking when patient was asking her calmly. Patient processed how she is hurt by her relationship with mother as she feels mother does not make attempts to connect with her, patient is the one always trying to \"fix\" or improve their interactions.    Patient reports she is away with friends at bed and breakfast in [facility], but left early this morning to attend therapy appointment then will drive back up there. Patient reports she did this because she did not want to cancel the appointment even though it was a big inconvenience for her. Patient processed her pattern of always trying to make things \"work\" instead of simply saying \"no\" or it will not work, and how this connects pattern of her constantly putting other people's needs before hers.          Progress on Treatment Objective(s) / Homework:  New Objective established this session - ACTION (Actively working towards change); Intervened by reinforcing change plan / affirming steps taken    Motivational Interviewing    MI Intervention: Co-Developed Goal: lose 10 pounds by next doctor appointment 7/18/18 , Expressed Empathy/Understanding, Supported Autonomy, Collaboration, Evocation, Permission to raise concern or advise, Open-ended questions, Reflections: simple and complex, Change talk (evoked) and Reframe     Change Talk Expressed by the Patient: Desire to change Reasons to change Need to change    Provider Response to Change Talk: E - Evoked more info from patient about behavior change, A - Affirmed patient's thoughts, decisions, or attempts at behavior change, R - " Reflected patient's change talk and S - Summarized patient's change talk statements    Patient processed her list of things she wants to do once she has a healthier weight. Patient made this list 2 years ago.   Patient to post this list somewhere in her room where she can see it daily.     Cognitive Behavioral Therapy  -Setting boundaries/getting comfortable with saying no    -Identified barriers to positive self esteem/ patient not knowing howng to have confidence in her body   -Identified items she does not accept about herself (intellegence, weight/body, decision making)  -Identified items she does accept about herself (work ethic, career/job, dedication to work projects)   -Processed social and personal experiences of body image impacts and beauty standards     Homework:  -Practice setting boundaries with canceling an appointment and saying no without any excuses or reasons      Also provided psychoeducation about behavioral health condition, symptoms, and treatment options    Care Plan review completed: Yes    Medication Review:  No current psychiatric medications prescribed    Medication Compliance:  NA    Changes in Health Issues:   Yes: Weight / dietary issues, Associated Psychological Distress    Chemical Use Review:   Substance Use: Chemical use reviewed, no active concerns identified      Tobacco Use: No current tobacco use.      Assessment: Current Emotional / Mental Status (status of significant symptoms):  Risk status (Self / Other harm or suicidal ideation)  Patient has had a history of suicidal ideation: 5-6 years ago  and self-injurious behavior: cutting high school years  Patient denies current fears or concerns for personal safety.  Patient denies current or recent suicidal ideation or behaviors.  Patient denies current or recent homicidal ideation or behaviors.  Patient denies current or recent self injurious behavior or ideation.  Patient denies other safety concerns.  A safety and risk  management plan has not been developed at this time, however patient was encouraged to call [place] [phone] should there be a change in any of these risk factors.    Appearance:   Appropriate   Eye Contact:   Good   Psychomotor Behavior: Normal   Attitude:   Cooperative   Orientation:   All  Speech   Rate / Production: Normal    Volume:  Normal   Mood:    Anxious  Depressed  Sad   Affect:    Worrisome   Thought Content:  Rumination   Thought Form:  Coherent  Logical   Insight:    Good     Diagnoses:  1. mild major depression (H)    2. OSCAR (generalized anxiety disorder)        Collateral Reports Completed:  Not Applicable    Plan: (Homework, other):  Patient was given information about behavioral services and encouraged to schedule a follow up appointment with the clinic [facility] 2/7/19.  She was also given information about mental health symptoms and treatment options  and Cognitive Behavioral Therapy skills to practice when experiencing anxiety and depression.  CD Recommendations: No indications of CD issues.  SHLOMO Momin, [facility]       ______________________________________________________________________    Integrated Primary Care Behavioral Health Treatment Plan    Patient's Name: Marichuy Worrell  YOB: 1990    Date: June 21, 2018    DSM-V Diagnoses:            296.32 (F33.1) Major Depressive Disorder, Recurrent Episode, Moderate With anxious distress  300.02 (F41.1) Generalized Anxiety Disorder  Psychosocial & Contextual Factors: None  WHODAS Score: 28    Referral / Collaboration:  Referral to another professional/service is not indicated at this time.  Anticipated number of session or this episode of care: 8    MeasurableTreatment Goal(s) related to diagnosis / functional impairment(s)  Goal 1: Patient will decrease symptoms by 80% and utilize healthy and effective coping strategies 75% of symptom occurrences.      I will know I've met my goal when feel happy, less anxious, healthier  weight.      Objective #A (Patient Action)    Patient will use at least 4 coping skills for anxiety management in the next 3 weeks  Increase interest, engagement, and pleasure in doing things  Decrease frequency and intensity of feeling down, depressed, hopeless  Improve quantity and quality of night time sleep / decrease daytime naps  Feel less tired and more energy during the day   Improve diet, appetite, mindful eating, and / or meal planning  Identify negative self-talk and behaviors: challenge core beliefs, myths, and actions  Improve concentration, focus, and mindfulness in daily activities   Feel less fidgety, restless or slow in daily activities / interpersonal interactions  Decrease thoughts that you'd be better off dead or of suicide / self-harm  Status: Continued - Date(s):     Intervention(s)  [facility] [place] will teach emotional recognition/identification. Emotional regulation. Identify unhealthy thought patterns, Re-frame secure/balanced thoguhts, CBT Mindfulness exercises, self esteem, positive behavior activation, estbalishing healthy boundaries .    Objective #B  Patient will use healthy and effective interventions to reach weight management goals  Status: Continued - Date(s):     Intervention(s)  [facility] [place] will teach the client how to complete a 4-part pros and cons. Motivational Interviewing Skills to set and understand personal goals and identify hidden barriers .    Patient has reviewed and agreed to the above plan.    Written by  SHLOMO Momin, [facility] [place]

## 2019-02-06 NOTE — PROGRESS NOTES
SUBJECTIVE:   Marichuy Worrell is a 28 year old female who presents to clinic today for the following health issues:      Chief Complaint   Patient presents with     Weight Check     INTERNAL MEDICINE  Medical Weight Loss - Return Visit      CHIEF COMPLAINT:  Recheck weight      Wt Readings from Last 5 Encounters:   02/08/19 140.8 kg (310 lb 8 oz)   12/12/18 139.9 kg (308 lb 8.2 oz)   11/27/18 144.7 kg (319 lb)   10/31/18 145.6 kg (321 lb)   09/26/18 147.6 kg (325 lb 8 oz)         HISTORY OF PRESENT ILLNESS (HPI):    Patient returns today for medical weight loss follow-up visit. Patient was last seen by me on 12/12/2018 and has gained 2 pounds and 0 % body fat.      Patient notes that she has been working 10-12 hour days and has started school full time in the evening for a psychology degree.  She feels overwhelmed.  She notes that she has not been doing well with her diet at dinner.  She has been getting home late from work and then reverting to eating what's easiest and not necessarily healthy.  She has also not been exercising as much and finds it difficult to make it to the gym.  She denies side effects from phentermine and topamax.    MEDICATIONS:   Current Outpatient Medications   Medication Sig Dispense Refill     desogestrel-ethinyl estradiol (ENSKYCE) 0.15-30 MG-MCG per tablet Take 1 tablet by mouth daily 84 tablet 3     levothyroxine (SYNTHROID/LEVOTHROID) 200 MCG tablet Take 1 tablet (200 mcg) by mouth daily 90 tablet 1     levothyroxine (SYNTHROID/LEVOTHROID) 25 MCG tablet Take 1 tablet (25 mcg) by mouth daily (Add to 25 mcg 200 mcg to equal 225 mcg daily) 90 tablet 1     Phentermine HCl 8 MG TABS Take 8 mg by mouth daily Lomaira 30 tablet 1     topiramate (TOPAMAX) 50 MG tablet Take 1 tablet (50 mg) by mouth daily 90 tablet 1       ALLERGIES:   Allergies   Allergen Reactions     Biaxin [Clarithromycin]      rash       PHYSICAL EXAMINATION:    VITALS: /60   Pulse 109   Temp 96.6  F (35.9  C) (Oral)  "  Resp 20   Ht 1.651 m (5' 5\")   Wt 140.8 kg (310 lb 8 oz)   LMP  (LMP Unknown)   SpO2 100%   Breastfeeding? No   BMI 51.67 kg/m    GENERAL: alert, no distress and obese  RESP: lungs clear to auscultation - no rales, rhonchi or wheezes  CV: regular rate and rhythm, normal S1 S2, no S3 or S4, no murmur, click or rub, no peripheral edema and peripheral pulses strong  MS: no gross musculoskeletal defects noted, no edema    LAB RESULTS:   Reviewed in Epic    ASSESSMENT/PLAN:    1. BMI 50.0-59.9, adult (H)  See patient instructions.  - Phentermine HCl 8 MG TABS; Take 8 mg by mouth daily Lomaira  Dispense: 30 tablet; Refill: 1    2. Other specified hypothyroidism    - levothyroxine (SYNTHROID/LEVOTHROID) 200 MCG tablet; Take 1 tablet (200 mcg) by mouth daily  Dispense: 90 tablet; Refill: 0       Patient is to call the clinic if she experiences any adverse reactions.     Follow up with Provider - 8 weeks.   Patient Instructions     Try to meal prep 3-4 healthy dinners per week.  Aim to add more activity into everyday life.    [facility]    If you have any questions regarding to your visit please contact your care team:     Team Pink:   Clinic Hours Telephone Number   Internal Medicine:  Dr. Abby Doll, NP 7am-7pm  Monday - Thursday   7am-5pm  Fridays  [phone]  (Appointment scheduling available 24/7)   Urgent Care - [place] and [place] - 11am-9pm Monday-Friday Saturday-Sunday- 9am-5pm   [place] - 5pm-9pm Monday-Friday Saturday-Sunday- 9am-5pm  [phone] - [place]  [phone] - [place]       What options do I have for a visit other than an office visit? We offer electronic visits (e-visits) and telephone visits, when medically appropriate.  Please check with your medical insurance to see if these types of visits are covered, as you will be responsible for any charges that are not paid by your insurance.      You can use InVisMt " (secure electronic communication) to access to your chart, send your primary care provider a message, or make an appointment. Ask a team member how to get started.     For a price quote for your services, please call our Consumer Price Line at [phone] or our Imaging Cost estimation line at [phone] (for imaging tests).  Linn BRASWELL CMA            I spent 15 minutes of time with the patient and >50% of it was in education and counseling regarding weight loss.     ROXI Song [facility]

## 2019-02-08 ENCOUNTER — OFFICE VISIT (OUTPATIENT)
Dept: FAMILY MEDICINE | Facility: CLINIC | Age: 29
End: 2019-02-08
Payer: COMMERCIAL

## 2019-02-08 VITALS
OXYGEN SATURATION: 100 % | WEIGHT: 293 LBS | HEART RATE: 109 BPM | SYSTOLIC BLOOD PRESSURE: 106 MMHG | HEIGHT: 65 IN | RESPIRATION RATE: 20 BRPM | BODY MASS INDEX: 48.82 KG/M2 | DIASTOLIC BLOOD PRESSURE: 60 MMHG | TEMPERATURE: 96.6 F

## 2019-02-08 DIAGNOSIS — E03.8 OTHER SPECIFIED HYPOTHYROIDISM: ICD-10-CM

## 2019-02-08 PROCEDURE — 99213 OFFICE O/P EST LOW 20 MIN: CPT | Performed by: NURSE PRACTITIONER

## 2019-02-08 RX ORDER — LEVOTHYROXINE SODIUM 200 UG/1
200 TABLET ORAL DAILY
Qty: 90 TABLET | Refills: 0 | Status: SHIPPED | OUTPATIENT
Start: 2019-02-08 | End: 2019-05-02

## 2019-02-08 ASSESSMENT — PAIN SCALES - GENERAL: PAINLEVEL: NO PAIN (0)

## 2019-02-08 ASSESSMENT — MIFFLIN-ST. JEOR: SCORE: 2149.3

## 2019-02-08 NOTE — PATIENT INSTRUCTIONS
Try to meal prep 3-4 healthy dinners per week.  Aim to add more activity into everyday life.    [facility]    If you have any questions regarding to your visit please contact your care team:     Team Pink:   Clinic Hours Telephone Number   Internal Medicine:  Dr. Abby Doll NP 7am-7pm  Monday - Thursday   7am-5pm  Fridays  [phone]  (Appointment scheduling available 24/7)   Urgent Care - [place] and [facility] - 11am-9pm Monday-Friday Saturday-Sunday- 9am-5pm   [place] - 5pm-9pm Monday-Friday Saturday-Sunday- 9am-5pm  [phone] - [place]  [phone] - [place]       What options do I have for a visit other than an office visit? We offer electronic visits (e-visits) and telephone visits, when medically appropriate.  Please check with your medical insurance to see if these types of visits are covered, as you will be responsible for any charges that are not paid by your insurance.      You can use [employer] (secure electronic communication) to access to your chart, send your primary care provider a message, or make an appointment. Ask a team member how to get started.     For a price quote for your services, please call our Consumer Price Line at [phone] or our Imaging Cost estimation line at [phone] (for imaging tests).  Linn BRASWELL CMA

## 2019-02-14 ENCOUNTER — OFFICE VISIT (OUTPATIENT)
Dept: BEHAVIORAL HEALTH | Facility: CLINIC | Age: 29
End: 2019-02-14
Payer: COMMERCIAL

## 2019-02-14 DIAGNOSIS — F41.1 GAD (GENERALIZED ANXIETY DISORDER): ICD-10-CM

## 2019-02-14 DIAGNOSIS — F32.1 MODERATE MAJOR DEPRESSION (H): Primary | ICD-10-CM

## 2019-02-14 PROCEDURE — 90834 PSYTX W PT 45 MINUTES: CPT | Performed by: MARRIAGE & FAMILY THERAPIST
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
## 2019-02-27 DIAGNOSIS — E03.8 OTHER SPECIFIED HYPOTHYROIDISM: ICD-10-CM

## 2019-02-28 RX ORDER — LEVOTHYROXINE SODIUM 25 UG/1
25 TABLET ORAL DAILY
Qty: 90 TABLET | Refills: 0 | Status: SHIPPED | OUTPATIENT
Start: 2019-02-28 | End: 2019-05-02 | Stop reason: DRUGHIGH

## 2019-03-13 ENCOUNTER — OFFICE VISIT (OUTPATIENT)
Dept: BEHAVIORAL HEALTH | Facility: CLINIC | Age: 29
End: 2019-03-13
Payer: COMMERCIAL

## 2019-03-13 DIAGNOSIS — F41.1 GAD (GENERALIZED ANXIETY DISORDER): ICD-10-CM

## 2019-03-13 DIAGNOSIS — F32.1 MODERATE MAJOR DEPRESSION (H): Primary | ICD-10-CM

## 2019-03-13 PROCEDURE — 90834 PSYTX W PT 45 MINUTES: CPT | Performed by: MARRIAGE & FAMILY THERAPIST
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
## 2019-03-26 ENCOUNTER — OFFICE VISIT (OUTPATIENT)
Dept: BEHAVIORAL HEALTH | Facility: CLINIC | Age: 29
End: 2019-03-26
Payer: COMMERCIAL

## 2019-03-26 DIAGNOSIS — F32.1 MODERATE MAJOR DEPRESSION (H): Primary | ICD-10-CM

## 2019-03-26 DIAGNOSIS — F41.1 GAD (GENERALIZED ANXIETY DISORDER): ICD-10-CM

## 2019-03-26 PROCEDURE — 90834 PSYTX W PT 45 MINUTES: CPT | Performed by: MARRIAGE & FAMILY THERAPIST

## 2019-04-02 NOTE — PROGRESS NOTES
SUBJECTIVE:   Marichuy Worrell is a 28 year old female who presents to clinic today for the following health issues:    Chief Complaint   Patient presents with     Weight Check     INTERNAL MEDICINE  Medical Weight Loss - Return Visit      CHIEF COMPLAINT:  Recheck weight      Wt Readings from Last 5 Encounters:   04/03/19 137.9 kg (304 lb)   02/08/19 140.8 kg (310 lb 8 oz)   12/12/18 139.9 kg (308 lb 8.2 oz)   11/27/18 144.7 kg (319 lb)   10/31/18 145.6 kg (321 lb)         HISTORY OF PRESENT ILLNESS (HPI):    Patient returns today for medical weight loss follow-up visit. Patient was last seen by me on 2/8/2019 and has lost 6 pounds and 0.1 % body fat.      Patient notes that she continues to be under a lot of stress.  She has been trying to meal plan, but sometimes notes that she stress eats.  She denies bingeing, which she feels is a huge improvement.  She also notes that she has been trying to focus on protein when stress eating to decrease the amount she is eating.  Patient has not been able to exercise much, but returned to the gym last week.  She has plans to go with a friend this week.  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  She denies any side effects from phentermine or topamax.    MEDICATIONS:   Current Outpatient Medications   Medication Sig Dispense Refill     desogestrel-ethinyl estradiol (ENSKYCE) 0.15-30 MG-MCG per tablet Take 1 tablet by mouth daily 84 tablet 3     levothyroxine (SYNTHROID/LEVOTHROID) 200 MCG tablet Take 1 tablet (200 mcg) by mouth daily 90 tablet 0     levothyroxine (SYNTHROID/LEVOTHROID) 25 MCG tablet Take 1 tablet (25 mcg) by mouth daily (Add to 25 mcg 200 mcg to equal 225 mcg daily) 90 tablet 0     Phentermine HCl 8 MG TABS Take 8 mg by mouth daily Lomaira 30 tablet 1     topiramate (TOPAMAX) 50 MG tablet Take 1 tablet (50 mg) by mouth daily 90 tablet 1       ALLERGIES:   Allergies   Allergen Reactions     Biaxin [Clarithromycin]       "rash       PHYSICAL EXAMINATION:    VITALS: /68   Pulse 74   Temp 97  F (36.1  C) (Oral)   Resp 20   Ht 1.651 m (5' 5\")   Wt 137.9 kg (304 lb)   SpO2 100%   BMI 50.59 kg/m    GENERAL: alert, no distress and obese  RESP: lungs clear to auscultation - no rales, rhonchi or wheezes  CV: regular rate and rhythm, normal S1 S2, no S3 or S4, no murmur, click or rub, no peripheral edema and peripheral pulses strong  MS: no gross musculoskeletal defects noted, no edema    LAB RESULTS:   Reviewed in Epic    ASSESSMENT/PLAN:    1. BMI 50.0-59.9, adult (H)  Patient to work on increasing exercise. Weight has decreased, but body fat percentage has not.  Patient congratulated on new approach to handling stress eating.  She continues to work with her counselor.  Patient to continue meds as below.  - topiramate (TOPAMAX) 50 MG tablet; Take 1 tablet (50 mg) by mouth daily  Dispense: 90 tablet; Refill: 1  - Phentermine HCl 8 MG TABS; Take 8 mg by mouth daily Lomaira  Dispense: 30 tablet; Refill: 1         Patient is to call the clinic if she experiences any adverse reactions.     Follow up with Provider - 8 weeks.         I spent 15 minutes of time with the patient and >50% of it was in education and counseling regarding weight loss.       [facility]XI Song [facility]  "

## 2019-04-03 ENCOUNTER — OFFICE VISIT (OUTPATIENT)
Dept: FAMILY MEDICINE | Facility: CLINIC | Age: 29
End: 2019-04-03
Payer: COMMERCIAL

## 2019-04-03 VITALS
RESPIRATION RATE: 20 BRPM | DIASTOLIC BLOOD PRESSURE: 68 MMHG | HEIGHT: 65 IN | OXYGEN SATURATION: 100 % | TEMPERATURE: 97 F | WEIGHT: 293 LBS | BODY MASS INDEX: 48.82 KG/M2 | HEART RATE: 74 BPM | SYSTOLIC BLOOD PRESSURE: 104 MMHG

## 2019-04-03 PROCEDURE — 99213 OFFICE O/P EST LOW 20 MIN: CPT | Performed by: NURSE PRACTITIONER

## 2019-04-03 RX ORDER — TOPIRAMATE 50 MG/1
50 TABLET, FILM COATED ORAL DAILY
Qty: 90 TABLET | Refills: 1 | Status: SHIPPED | OUTPATIENT
Start: 2019-04-03 | End: 2019-08-07

## 2019-04-03 ASSESSMENT — PAIN SCALES - GENERAL: PAINLEVEL: NO PAIN (0)

## 2019-04-03 ASSESSMENT — MIFFLIN-ST. JEOR: SCORE: 2119.81

## 2019-04-05 NOTE — PROGRESS NOTES
[facility] [place]      Behavioral Health Clinician Progress Note    Patient Name: Marichuy Worrell           Service Type:  Individual      Service Location:   Face to Face in Clinic     Session Start Time: 3:30  Session End Time: 4:20      Session Length: 38 - 52      Attendees: Patient    Visit Activities (Refresh list every visit): [facility] Only    Diagnostic Assessment Date: 5/15/18  Treatment Plan Review Date: 3/26/19   See Flowsheets for today's PHQ-9 and OSCAR-7 results  Previous PHQ-9:   PHQ-9 SCORE 4/18/2018 5/15/2018 10/31/2018   PHQ-9 Total Score - - -   PHQ-9 Total Score MyChart 17 (Moderately severe depression) 13 (Moderate depression) -   PHQ-9 Total Score 17 13 2     Previous OSCAR-7:   OSCAR-7 SCORE 1/25/2016 4/18/2018 5/15/2018   Total Score - - -   Total Score - - 11 (moderate anxiety)   Total Score 2 14 11       RYAN LEVEL:  RYAN Score (Last Two) 4/1/2014   RYAN Raw Score 44   Activation Score 70.8   RYAN Level 4       DATA  Extended Session (60+ minutes): No  Interactive Complexity: No  Crisis: No  [facility] Patient: No    Treatment Objective(s) Addressed in This Session:  Target Behavior(s): diet/weight loss and disease management/lifestyle changes depression and anxiety     Depressed Mood: Increase interest, engagement, and pleasure in doing things  Decrease frequency and intensity of feeling down, depressed, hopeless  Improve quantity and quality of night time sleep / decrease daytime naps  Feel less tired and more energy during the day   Improve diet, appetite, mindful eating, and / or meal planning  Identify negative self-talk and behaviors: challenge core beliefs, myths, and actions  Improve concentration, focus, and mindfulness in daily activities   Feel less fidgety, restless or slow in daily activities / interpersonal interactions  Anxiety: will experience a reduction in anxiety, will develop more effective coping skills to manage anxiety symptoms, will develop healthy  cognitive patterns and beliefs and will increase ability to function adaptively    Current Stressors / Issues:  Patient 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 Patient processed increased irritation and frustration with other people, being more agitated. Patient reports difficulty with keeping nutrition goals and exercise. Patient processed upcoming changes to roommate and living situation as roommate will be getting  and moving out and patient will be rooming with a friend from work. Patient processed conflicted interactions with mother, patient self defense of pushing people away.        Progress on Treatment Objective(s) / Homework:  New Objective established this session - ACTION (Actively working towards change); Intervened by reinforcing change plan / affirming steps taken    Motivational Interviewing    MI Intervention: Co-Developed Goal: lose 10 pounds by next doctor appointment 7/18/18 , Expressed Empathy/Understanding, Supported Autonomy, Collaboration, Evocation, Permission to raise concern or advise, Open-ended questions, Reflections: simple and complex, Change talk (evoked) and Reframe     Change Talk Expressed by the Patient: Desire to change Reasons to change Need to change    Provider Response to Change Talk: E - Evoked more info from patient about behavior change, A - Affirmed patient's thoughts, decisions, or attempts at behavior change, R - Reflected patient's change talk and S - Summarized patient's change talk statements    Patient processed her list of things she wants to do once she has a healthier weight. Patient made this list 2 years ago.   Patient to post this list somewhere in her room where she can see it daily.     Cognitive Behavioral Therapy    Patient is ready for EMDR referral      Also provided psychoeducation about behavioral health condition, symptoms, and treatment options    Care Plan  review completed: Yes    Medication Review:  No current psychiatric medications prescribed    Medication Compliance:  NA    Changes in Health Issues:   Yes: Weight / dietary issues, Associated Psychological Distress    Chemical Use Review:   Substance Use: Chemical use reviewed, no active concerns identified      Tobacco Use: No current tobacco use.      Assessment: Current Emotional / Mental Status (status of significant symptoms):  Risk status (Self / Other harm or suicidal ideation)  Patient has had a history of suicidal ideation: 5-6 years ago  and self-injurious behavior: cutting high school years  Patient denies current fears or concerns for personal safety.  Patient denies current or recent suicidal ideation or behaviors.  Patient denies current or recent homicidal ideation or behaviors.  Patient denies current or recent self injurious behavior or ideation.  Patient denies other safety concerns.  A safety and risk management plan has not been developed at this time, however patient was encouraged to call [place] [phone] should there be a change in any of these risk factors.    Appearance:   Appropriate   Eye Contact:   Good   Psychomotor Behavior: Normal   Attitude:   Cooperative   Orientation:   All  Speech   Rate / Production: Normal    Volume:  Normal   Mood:    Anxious  Depressed  Sad   Affect:    Worrisome   Thought Content:  Rumination   Thought Form:  Coherent  Logical   Insight:    Good     Diagnoses:  1. mild major depression (H)    2. OSCAR (generalized anxiety disorder)        Collateral Reports Completed:  Not Applicable    Plan: (Homework, other):  Patient was given information about behavioral services and encouraged to schedule a follow up appointment with the clinic [facility] 4/23/19.  She was also given information about mental health symptoms and treatment options  and Cognitive Behavioral Therapy skills to practice when experiencing anxiety and depression.  CD Recommendations: No indications of  CD issues.  Lucita Ring, SHLOMO, [facility] [place]       ______________________________________________________________________    Integrated Primary Care Behavioral Health Treatment Plan    Patient's Name: Marichuy Worrell  YOB: 1990    Date: March 26, 2019    DSM-V Diagnoses:            296.32 (F33.1) Major Depressive Disorder, Recurrent Episode, Moderate With anxious distress  300.02 (F41.1) Generalized Anxiety Disorder  Psychosocial & Contextual Factors: None  WHODAS Score: 28    Referral / Collaboration:  The following referral(s) will be initiated: EMDR therapy   Anticipated number of session or this episode of care: 8    MeasurableTreatment Goal(s) related to diagnosis / functional impairment(s)  Goal 1: Patient will decrease symptoms by 80% and utilize healthy and effective coping strategies 75% of symptom occurrences.      I will know I've met my goal when feel happy, less anxious, healthier weight.      Objective #A (Patient Action)    Patient will use at least 4 coping skills for anxiety management in the next 3 weeks  Increase interest, engagement, and pleasure in doing things  Decrease frequency and intensity of feeling down, depressed, hopeless  Improve quantity and quality of night time sleep / decrease daytime naps  Feel less tired and more energy during the day   Improve diet, appetite, mindful eating, and / or meal planning  Identify negative self-talk and behaviors: challenge core beliefs, myths, and actions  Improve concentration, focus, and mindfulness in daily activities   Feel less fidgety, restless or slow in daily activities / interpersonal interactions  Decrease thoughts that you'd be better off dead or of suicide / self-harm  Status: Continued - Date(s):     Intervention(s)  [facility] [place] will teach emotional recognition/identification. Emotional regulation. Identify unhealthy thought patterns, Re-frame secure/balanced thoguhts, CBT Mindfulness exercises, self esteem, positive behavior  activation, estbalishing healthy boundaries .    Objective #B  Patient will use healthy and effective interventions to reach weight management goals  Status: Continued - Date(s):     Intervention(s)  [facility] will teach the client how to complete a 4-part pros and cons. Motivational Interviewing Skills to set and understand personal goals and identify hidden barriers .    Patient has reviewed and agreed to the above plan.    Written by  SHLOMO Momin, [facility]

## 2019-04-23 ENCOUNTER — OFFICE VISIT (OUTPATIENT)
Dept: BEHAVIORAL HEALTH | Facility: CLINIC | Age: 29
End: 2019-04-23
Payer: COMMERCIAL

## 2019-04-23 DIAGNOSIS — F32.1 MODERATE MAJOR DEPRESSION (H): Primary | ICD-10-CM

## 2019-04-23 DIAGNOSIS — F41.1 GAD (GENERALIZED ANXIETY DISORDER): ICD-10-CM

## 2019-04-23 PROCEDURE — 90834 PSYTX W PT 45 MINUTES: CPT | Performed by: MARRIAGE & FAMILY THERAPIST

## 2019-04-24 ENCOUNTER — TELEPHONE (OUTPATIENT)
Dept: FAMILY MEDICINE | Facility: CLINIC | Age: 29
End: 2019-04-24

## 2019-04-24 DIAGNOSIS — E03.8 OTHER SPECIFIED HYPOTHYROIDISM: Primary | ICD-10-CM

## 2019-04-24 NOTE — TELEPHONE ENCOUNTER
Reason for Call: Request for an order or referral:    Order or referral being requested: lab orders    Date needed: as soon as possible    Has the patient been seen by the PCP for this problem? Not Applicable    Additional comments: Patient is calling for lab orders to check thyroid. Patient has an lab appointment on 4.30.19    Phone number Patient can be reached at:  Home number on file [phone] (home)    Best Time:  Anytime     Can we leave a detailed message on this number?  YES    Call taken on 4/24/2019 at 11:56 AM by Ema Mariee

## 2019-04-30 DIAGNOSIS — E03.8 OTHER SPECIFIED HYPOTHYROIDISM: ICD-10-CM

## 2019-04-30 LAB
T4 FREE SERPL-MCNC: 1.5 NG/DL (ref 0.76–1.46)
TSH SERPL DL<=0.005 MIU/L-ACNC: 0.07 MU/L (ref 0.4–4)

## 2019-04-30 PROCEDURE — 36415 COLL VENOUS BLD VENIPUNCTURE: CPT | Performed by: PHYSICIAN ASSISTANT

## 2019-04-30 PROCEDURE — 84443 ASSAY THYROID STIM HORMONE: CPT | Performed by: PHYSICIAN ASSISTANT

## 2019-04-30 PROCEDURE — 84439 ASSAY OF FREE THYROXINE: CPT | Performed by: PHYSICIAN ASSISTANT

## 2019-05-02 ENCOUNTER — TELEPHONE (OUTPATIENT)
Dept: FAMILY MEDICINE | Facility: CLINIC | Age: 29
End: 2019-05-02

## 2019-05-02 DIAGNOSIS — E03.8 OTHER SPECIFIED HYPOTHYROIDISM: ICD-10-CM

## 2019-05-02 RX ORDER — LEVOTHYROXINE SODIUM 200 UG/1
200 TABLET ORAL DAILY
Qty: 90 TABLET | Refills: 0 | Status: SHIPPED | OUTPATIENT
Start: 2019-05-02 | End: 2019-09-09

## 2019-05-02 NOTE — TELEPHONE ENCOUNTER
Please call Marichuy and let her know that I am going to adjust her thyroid medication based on her recent tests.     Stop the 25 mcg tablet.   She will just be taking the 200 mcg tablet daily.   A new prescription for the 200  Mcg dose was sent.     Repeat testing in 2 months.         Results for orders placed or performed in visit on 04/30/19   **TSH with free T4 reflex FUTURE anytime   Result Value Ref Range    TSH 0.07 (L) 0.40 - 4.00 mU/L   T4 free   Result Value Ref Range    T4 Free 1.50 (H) 0.76 - 1.46 ng/dL

## 2019-05-02 NOTE — TELEPHONE ENCOUNTER
Attempted to reach pt regarding provider message below. There was no answer. LM to return call to RN at [phone].    Ines Carcamo RN

## 2019-05-02 NOTE — TELEPHONE ENCOUNTER
Pt returned call to clinic. Pt notified via phone of results and plan per provider note below as written. Pt indicates understanding of issues and agrees with the plan.    Ines Carcamo RN

## 2019-05-02 NOTE — PROGRESS NOTES
[facility] [place]      Behavioral Health Clinician Progress Note    Patient Name: Marichuy Worrell           Service Type:  Individual      Service Location:   Face to Face in Clinic     Session Start Time: 3:00  Session End Time: 4:00      Session Length: 53 - 60      Attendees: Patient    Visit Activities (Refresh list every visit): [facility] Only    Diagnostic Assessment Date: 5/15/18  Treatment Plan Review Date: 3/26/19   See Flowsheets for today's PHQ-9 and OSCAR-7 results  Previous PHQ-9:   PHQ-9 SCORE 4/18/2018 5/15/2018 10/31/2018   PHQ-9 Total Score - - -   PHQ-9 Total Score MyChart 17 (Moderately severe depression) 13 (Moderate depression) -   PHQ-9 Total Score 17 13 2     Previous OSCAR-7:   OSCAR-7 SCORE 1/25/2016 4/18/2018 5/15/2018   Total Score - - -   Total Score - - 11 (moderate anxiety)   Total Score 2 14 11       RYAN LEVEL:  RYAN Score (Last Two) 4/1/2014   RYAN Raw Score 44   Activation Score 70.8   RYAN Level 4       DATA  Extended Session (60+ minutes): No  Interactive Complexity: No  Crisis: No  [facility] Patient: No    Treatment Objective(s) Addressed in This Session:  Target Behavior(s): diet/weight loss and disease management/lifestyle changes depression and anxiety     Depressed Mood: Increase interest, engagement, and pleasure in doing things  Decrease frequency and intensity of feeling down, depressed, hopeless  Improve quantity and quality of night time sleep / decrease daytime naps  Feel less tired and more energy during the day   Improve diet, appetite, mindful eating, and / or meal planning  Identify negative self-talk and behaviors: challenge core beliefs, myths, and actions  Improve concentration, focus, and mindfulness in daily activities   Feel less fidgety, restless or slow in daily activities / interpersonal interactions  Anxiety: will experience a reduction in anxiety, will develop more effective coping skills to manage anxiety symptoms, will develop healthy  cognitive patterns and beliefs and will increase ability to function adaptively    Current Stressors / Issues:  Patient processed transition and adjustment with best friend and roommate leaving earlier. Patient processed pattern of avoiding difficult emotions and vulnerability in relationships.       Progress on Treatment Objective(s) / Homework:  New Objective established this session - ACTION (Actively working towards change); Intervened by reinforcing change plan / affirming steps taken    Motivational Interviewing    MI Intervention: Co-Developed Goal: lose 10 pounds by next doctor appointment 7/18/18 , Expressed Empathy/Understanding, Supported Autonomy, Collaboration, Evocation, Permission to raise concern or advise, Open-ended questions, Reflections: simple and complex, Change talk (evoked) and Reframe     Change Talk Expressed by the Patient: Desire to change Reasons to change Need to change    Provider Response to Change Talk: E - Evoked more info from patient about behavior change, A - Affirmed patient's thoughts, decisions, or attempts at behavior change, R - Reflected patient's change talk and S - Summarized patient's change talk statements    Patient processed her list of things she wants to do once she has a healthier weight. Patient made this list 2 years ago.   Patient to post this list somewhere in her room where she can see it daily.     Cognitive Behavioral Therapy    EMDR referral- [facility]       Also provided psychoeducation about behavioral health condition, symptoms, and treatment options    Care Plan review completed: Yes    Medication Review:  No current psychiatric medications prescribed    Medication Compliance:  NA    Changes in Health Issues:   Yes: Weight / dietary issues, Associated Psychological Distress    Chemical Use Review:   Substance Use: Chemical use reviewed, no active concerns identified      Tobacco Use: No current tobacco use.      Assessment: Current Emotional  / Mental Status (status of significant symptoms):  Risk status (Self / Other harm or suicidal ideation)  Patient has had a history of suicidal ideation: 5-6 years ago  and self-injurious behavior: cutting high school years  Patient denies current fears or concerns for personal safety.  Patient denies current or recent suicidal ideation or behaviors.  Patient denies current or recent homicidal ideation or behaviors.  Patient denies current or recent self injurious behavior or ideation.  Patient denies other safety concerns.  A safety and risk management plan has not been developed at this time, however patient was encouraged to call [place] [phone] should there be a change in any of these risk factors.    Appearance:   Appropriate   Eye Contact:   Good   Psychomotor Behavior: Normal   Attitude:   Cooperative   Orientation:   All  Speech   Rate / Production: Normal    Volume:  Normal   Mood:    Anxious  Depressed  Sad   Affect:    Worrisome   Thought Content:  Rumination   Thought Form:  Coherent  Logical   Insight:    Good     Diagnoses:  1. mild major depression (H)    2. OSCAR (generalized anxiety disorder)        Collateral Reports Completed:  Not Applicable    Plan: (Homework, other):  Patient was given information about behavioral services and encouraged to schedule a follow up appointment with the clinic [facility] 5/9/19.  She was also given information about mental health symptoms and treatment options  and Cognitive Behavioral Therapy skills to practice when experiencing anxiety and depression.  CD Recommendations: No indications of CD issues.  SHLOMO Momin, [facility]       ______________________________________________________________________    Integrated Primary Care Behavioral Health Treatment Plan    Patient's Name: Marichuy Worrell  YOB: 1990    Date: March 26, 2019    DSM-V Diagnoses:            296.32 (F33.1) Major Depressive Disorder, Recurrent Episode, Moderate With anxious  distress  300.02 (F41.1) Generalized Anxiety Disorder  Psychosocial & Contextual Factors: None  WHODAS Score: 28    Referral / Collaboration:  The following referral(s) will be initiated: EMDR therapy   Anticipated number of session or this episode of care: 8    MeasurableTreatment Goal(s) related to diagnosis / functional impairment(s)  Goal 1: Patient will decrease symptoms by 80% and utilize healthy and effective coping strategies 75% of symptom occurrences.      I will know I've met my goal when feel happy, less anxious, healthier weight.      Objective #A (Patient Action)    Patient will use at least 4 coping skills for anxiety management in the next 3 weeks  Increase interest, engagement, and pleasure in doing things  Decrease frequency and intensity of feeling down, depressed, hopeless  Improve quantity and quality of night time sleep / decrease daytime naps  Feel less tired and more energy during the day   Improve diet, appetite, mindful eating, and / or meal planning  Identify negative self-talk and behaviors: challenge core beliefs, myths, and actions  Improve concentration, focus, and mindfulness in daily activities   Feel less fidgety, restless or slow in daily activities / interpersonal interactions  Decrease thoughts that you'd be better off dead or of suicide / self-harm  Status: Continued - Date(s):     Intervention(s)  [facility] will teach emotional recognition/identification. Emotional regulation. Identify unhealthy thought patterns, Re-frame secure/balanced thoguhts, CBT Mindfulness exercises, self esteem, positive behavior activation, estbalishing healthy boundaries .    Objective #B  Patient will use healthy and effective interventions to reach weight management goals  Status: Continued - Date(s):     Intervention(s)  [facility] will teach the client how to complete a 4-part pros and cons. Motivational Interviewing Skills to set and understand personal goals and identify hidden barriers .    Patient has  reviewed and agreed to the above plan.    Written by  Lucita Ring, SHLOMO, [facility]

## 2019-05-09 ENCOUNTER — OFFICE VISIT (OUTPATIENT)
Dept: BEHAVIORAL HEALTH | Facility: CLINIC | Age: 29
End: 2019-05-09
Payer: COMMERCIAL

## 2019-05-09 DIAGNOSIS — F32.1 MODERATE MAJOR DEPRESSION (H): Primary | ICD-10-CM

## 2019-05-09 DIAGNOSIS — F41.1 GAD (GENERALIZED ANXIETY DISORDER): ICD-10-CM

## 2019-05-09 PROCEDURE — 90834 PSYTX W PT 45 MINUTES: CPT | Performed by: MARRIAGE & FAMILY THERAPIST

## 2019-05-15 NOTE — PROGRESS NOTES
[facility] [place]    May 9, 2019      Behavioral Health Clinician Progress Note    Patient Name: Marichuy Worrell           Service Type:  Individual      Service Location:   Face to Face in Clinic     Session Start Time: 3:30  Session End Time: 4:30      Session Length: 53 - 60      Attendees: Patient    Visit Activities (Refresh list every visit): [facility] Only    Diagnostic Assessment Date: 5/15/18  Treatment Plan Review Date: 3/26/19   See Flowsheets for today's PHQ-9 and OSCAR-7 results  Previous PHQ-9:   PHQ-9 SCORE 4/18/2018 5/15/2018 10/31/2018   PHQ-9 Total Score - - -   PHQ-9 Total Score MyChart 17 (Moderately severe depression) 13 (Moderate depression) -   PHQ-9 Total Score 17 13 2     Previous OSCAR-7:   OSCAR-7 SCORE 1/25/2016 4/18/2018 5/15/2018   Total Score - - -   Total Score - - 11 (moderate anxiety)   Total Score 2 14 11       RYAN LEVEL:  RYAN Score (Last Two) 4/1/2014   RYAN Raw Score 44   Activation Score 70.8   RYAN Level 4       DATA  Extended Session (60+ minutes): No  Interactive Complexity: No  Crisis: No  [facility] Patient: No    Treatment Objective(s) Addressed in This Session:  Target Behavior(s): diet/weight loss and disease management/lifestyle changes depression and anxiety     Depressed Mood: Increase interest, engagement, and pleasure in doing things  Decrease frequency and intensity of feeling down, depressed, hopeless  Improve quantity and quality of night time sleep / decrease daytime naps  Feel less tired and more energy during the day   Improve diet, appetite, mindful eating, and / or meal planning  Identify negative self-talk and behaviors: challenge core beliefs, myths, and actions  Improve concentration, focus, and mindfulness in daily activities   Feel less fidgety, restless or slow in daily activities / interpersonal interactions  Anxiety: will experience a reduction in anxiety, will develop more effective coping skills to manage anxiety symptoms, will develop healthy  cognitive patterns and beliefs and will increase ability to function adaptively    Current Stressors / Issues:  Patient processed barriers in healthy nutrition, exercise and weight management.   Patient processed emotional stressors at work with her supervisor and authority role.       Progress on Treatment Objective(s) / Homework:  New Objective established this session - ACTION (Actively working towards change); Intervened by reinforcing change plan / affirming steps taken    Motivational Interviewing    MI Intervention: Co-Developed Goal: lose 10 pounds by next doctor appointment 7/18/18 , Expressed Empathy/Understanding, Supported Autonomy, Collaboration, Evocation, Permission to raise concern or advise, Open-ended questions, Reflections: simple and complex, Change talk (evoked) and Reframe     Change Talk Expressed by the Patient: Desire to change Reasons to change Need to change    Provider Response to Change Talk: E - Evoked more info from patient about behavior change, A - Affirmed patient's thoughts, decisions, or attempts at behavior change, R - Reflected patient's change talk and S - Summarized patient's change talk statements    Patient processed her list of things she wants to do once she has a healthier weight. Patient made this list 2 years ago.   Patient to post this list somewhere in her room where she can see it daily.     Cognitive Behavioral Therapy  -Preparation (diet and exercise)  -Mindfulness practice before meals/ to assist with food choices     EMDR referral- [facility]       Also provided psychoeducation about behavioral health condition, symptoms, and treatment options    Care Plan review completed: Yes    Medication Review:  No current psychiatric medications prescribed    Medication Compliance:  NA    Changes in Health Issues:   Yes: Weight / dietary issues, Associated Psychological Distress    Chemical Use Review:   Substance Use: Chemical use reviewed, no active concerns  identified      Tobacco Use: No current tobacco use.      Assessment: Current Emotional / Mental Status (status of significant symptoms):  Risk status (Self / Other harm or suicidal ideation)  Patient has had a history of suicidal ideation: 5-6 years ago  and self-injurious behavior: cutting high school years  Patient denies current fears or concerns for personal safety.  Patient denies current or recent suicidal ideation or behaviors.  Patient denies current or recent homicidal ideation or behaviors.  Patient denies current or recent self injurious behavior or ideation.  Patient denies other safety concerns.  A safety and risk management plan has not been developed at this time, however patient was encouraged to call [place] [phone] should there be a change in any of these risk factors.    Appearance:   Appropriate   Eye Contact:   Good   Psychomotor Behavior: Normal   Attitude:   Cooperative   Orientation:   All  Speech   Rate / Production: Normal    Volume:  Normal   Mood:    Anxious  Depressed  Sad   Affect:    Worrisome   Thought Content:  Rumination   Thought Form:  Coherent  Logical   Insight:    Good     Diagnoses:  1. mild major depression (H)    2. OSCAR (generalized anxiety disorder)        Collateral Reports Completed:  Not Applicable    Plan: (Homework, other):  Patient was given information about behavioral services and encouraged to schedule a follow up appointment with the clinic [facility] 6/6/19.  She was also given information about mental health symptoms and treatment options  and Cognitive Behavioral Therapy skills to practice when experiencing anxiety and depression.  CD Recommendations: No indications of CD issues.  SHLOMO Momin, [facility]       ______________________________________________________________________    Integrated Primary Care Behavioral Health Treatment Plan    Patient's Name: Marichuy Worrell  YOB: 1990    Date: March 26, 2019    DSM-V Diagnoses:             296.32 (F33.1) Major Depressive Disorder, Recurrent Episode, Moderate With anxious distress  300.02 (F41.1) Generalized Anxiety Disorder  Psychosocial & Contextual Factors: None  WHODAS Score: 28    Referral / Collaboration:  The following referral(s) will be initiated: EMDR therapy   Anticipated number of session or this episode of care: 8    MeasurableTreatment Goal(s) related to diagnosis / functional impairment(s)  Goal 1: Patient will decrease symptoms by 80% and utilize healthy and effective coping strategies 75% of symptom occurrences.      I will know I've met my goal when feel happy, less anxious, healthier weight.      Objective #A (Patient Action)    Patient will use at least 4 coping skills for anxiety management in the next 3 weeks  Increase interest, engagement, and pleasure in doing things  Decrease frequency and intensity of feeling down, depressed, hopeless  Improve quantity and quality of night time sleep / decrease daytime naps  Feel less tired and more energy during the day   Improve diet, appetite, mindful eating, and / or meal planning  Identify negative self-talk and behaviors: challenge core beliefs, myths, and actions  Improve concentration, focus, and mindfulness in daily activities   Feel less fidgety, restless or slow in daily activities / interpersonal interactions  Decrease thoughts that you'd be better off dead or of suicide / self-harm  Status: Continued - Date(s):     Intervention(s)  [facility] will teach emotional recognition/identification. Emotional regulation. Identify unhealthy thought patterns, Re-frame secure/balanced thoguhts, CBT Mindfulness exercises, self esteem, positive behavior activation, estbalishing healthy boundaries .    Objective #B  Patient will use healthy and effective interventions to reach weight management goals  Status: Continued - Date(s):     Intervention(s)  [facility] will teach the client how to complete a 4-part pros and cons. Motivational Interviewing Skills to  set and understand personal goals and identify hidden barriers .    Patient has reviewed and agreed to the above plan.    Written by  SHLOMO Momin, [facility]

## 2019-05-28 NOTE — PROGRESS NOTES
Subjective     Marichuy Worrell is a 28 year old female who presents to clinic today for the following health issues:    HPI     Chief Complaint   Patient presents with     Weight Check     Patient/info Update     went through the med list , pt acknowledge taking them. pended the med that she needed refilled     INTERNAL MEDICINE  Medical Weight Loss - Return Visit      CHIEF COMPLAINT:  Recheck weight      Wt Readings from Last 5 Encounters:   05/29/19 137.7 kg (303 lb 8 oz)   04/03/19 137.9 kg (304 lb)   02/08/19 140.8 kg (310 lb 8 oz)   12/12/18 139.9 kg (308 lb 8.2 oz)   11/27/18 144.7 kg (319 lb)       Patient returns today for medical weight loss follow-up visit. Patient was last seen by me on 4/3/2019 and has lost 1 pounds and 1.1 % body fat.      Patient notes increased work stress and work hours.  She notes that she is not bingeing, but is sometimes skipping meals due to work hours.  She is using food for comfort.  She notes that she is working with her therapist to develop different techniques.  She notes cravings.  She continues on phentermine and topamax, but is unsure if they are helping as much as they used to.  Patient notes that she has not been exercising as much due to stress.  She notes feeling more depressed recently.  She feels it may be situational and has follow-up planned with her therapist next week.  She denies suicidal plan or ideation.  She has not been on medication in the past for depression.        Patient Active Problem List   Diagnosis     Morbidly obese (H)     Moderate major depression (H)     CARDIOVASCULAR SCREENING; LDL GOAL LESS THAN 160     Other specified hypothyroidism     ASCUS of cervix with negative high risk HPV     OSCAR (generalized anxiety disorder)     Past Surgical History:   Procedure Laterality Date     CHOLECYSTECTOMY, LAPOROSCOPIC  12/2009       Social History     Tobacco Use     Smoking status: Never Smoker     Smokeless tobacco: Never Used   Substance Use Topics      Alcohol use: No     Family History   Problem Relation Age of Onset     Asthma Mother      Other Cancer Mother      Depression Mother      Obesity Mother      Melanoma Mother      Unknown/Adopted Father      Other Cancer Father      Arthritis Maternal Grandmother      Thyroid Disease Maternal Grandmother      Obesity Maternal Grandmother      Cancer Maternal Grandfather         lung cancer     Unknown/Adopted Paternal Grandmother      Unknown/Adopted Paternal Grandfather      Asthma Brother      Allergies Brother          Current Outpatient Medications   Medication Sig Dispense Refill     desogestrel-ethinyl estradiol (ENSKYCE) 0.15-30 MG-MCG per tablet Take 1 tablet by mouth daily 84 tablet 3     levothyroxine (SYNTHROID/LEVOTHROID) 200 MCG tablet Take 1 tablet (200 mcg) by mouth daily 90 tablet 0     phentermine (ADIPEX-P) 15 MG capsule Take 1 capsule (15 mg) by mouth every morning 30 capsule 1     topiramate (TOPAMAX) 50 MG tablet Take 1 tablet (50 mg) by mouth daily 90 tablet 1     Allergies   Allergen Reactions     Biaxin [Clarithromycin]      rash     BP Readings from Last 3 Encounters:   05/29/19 124/60   04/03/19 104/68   02/08/19 106/60    Wt Readings from Last 3 Encounters:   05/29/19 137.7 kg (303 lb 8 oz)   04/03/19 137.9 kg (304 lb)   02/08/19 140.8 kg (310 lb 8 oz)                    Reviewed and updated as needed this visit by Provider         Review of Systems   Constitutional: Negative for chills, fever and unexpected weight change.   Respiratory: Negative for cough and shortness of breath.    Cardiovascular: Negative for chest pain, palpitations and peripheral edema.   Skin: Negative for rash.   Psychiatric/Behavioral: Positive for mood changes. The patient is not nervous/anxious.             Objective    There were no vitals taken for this visit.  There is no height or weight on file to calculate BMI.  Physical Exam   Constitutional: She is oriented to person, place, and time. She appears  "well-developed and well-nourished. No distress.   Cardiovascular: Normal rate, regular rhythm and normal heart sounds. Exam reveals no friction rub.   No murmur heard.  Pulmonary/Chest: Effort normal and breath sounds normal. No respiratory distress.   Musculoskeletal: Normal range of motion. She exhibits no deformity.   Neurological: She is alert and oriented to person, place, and time.   Psychiatric: Her speech is normal and behavior is normal. Judgment and thought content normal. She exhibits a depressed mood.   Tearful   Nursing note and vitals reviewed.                 Assessment & Plan     1. BMI 50.0-59.9, adult (H)  Patient to increase phentermine to 15 mg as below to help with cravings.  Patient to work on self care, mindfulness.  She will continue to follow-up with her counselor.  - phentermine (ADIPEX-P) 15 MG capsule; Take 1 capsule (15 mg) by mouth every morning  Dispense: 30 capsule; Refill: 1    2. Major depression in complete remission (H)  Patient to consider starting sertraline if mood not improving.       BMI:   Estimated body mass index is 50.51 kg/m  as calculated from the following:    Height as of this encounter: 1.651 m (5' 5\").    Weight as of this encounter: 137.7 kg (303 lb 8 oz).   Weight management plan: Discussed healthy diet and exercise guidelines            Return in about 8 weeks (around 7/24/2019) for Weight Recheck.    ROXI Song [facility] FRIALICIAY    "

## 2019-05-29 ENCOUNTER — OFFICE VISIT (OUTPATIENT)
Dept: FAMILY MEDICINE | Facility: CLINIC | Age: 29
End: 2019-05-29
Payer: COMMERCIAL

## 2019-05-29 VITALS
OXYGEN SATURATION: 97 % | SYSTOLIC BLOOD PRESSURE: 124 MMHG | HEIGHT: 65 IN | WEIGHT: 293 LBS | TEMPERATURE: 98.7 F | BODY MASS INDEX: 48.82 KG/M2 | RESPIRATION RATE: 20 BRPM | DIASTOLIC BLOOD PRESSURE: 60 MMHG | HEART RATE: 87 BPM

## 2019-05-29 DIAGNOSIS — F32.5 MAJOR DEPRESSION IN COMPLETE REMISSION (H): Primary | ICD-10-CM

## 2019-05-29 PROCEDURE — 99213 OFFICE O/P EST LOW 20 MIN: CPT | Performed by: NURSE PRACTITIONER

## 2019-05-29 RX ORDER — PHENTERMINE HYDROCHLORIDE 15 MG/1
15 CAPSULE ORAL EVERY MORNING
Qty: 30 CAPSULE | Refills: 1 | Status: SHIPPED | OUTPATIENT
Start: 2019-05-29 | End: 2019-08-07

## 2019-05-29 ASSESSMENT — PAIN SCALES - GENERAL: PAINLEVEL: NO PAIN (0)

## 2019-05-29 ASSESSMENT — ENCOUNTER SYMPTOMS
COUGH: 0
UNEXPECTED WEIGHT CHANGE: 0
FEVER: 0
SHORTNESS OF BREATH: 0
CHILLS: 0
PALPITATIONS: 0
NERVOUS/ANXIOUS: 0

## 2019-05-29 ASSESSMENT — MIFFLIN-ST. JEOR: SCORE: 2117.55

## 2019-05-29 ASSESSMENT — PATIENT HEALTH QUESTIONNAIRE - PHQ9: SUM OF ALL RESPONSES TO PHQ QUESTIONS 1-9: 18

## 2019-06-20 ENCOUNTER — OFFICE VISIT (OUTPATIENT)
Dept: BEHAVIORAL HEALTH | Facility: CLINIC | Age: 29
End: 2019-06-20
Payer: COMMERCIAL

## 2019-06-20 DIAGNOSIS — F32.5 MAJOR DEPRESSION IN COMPLETE REMISSION (H): ICD-10-CM

## 2019-06-20 DIAGNOSIS — F41.1 GAD (GENERALIZED ANXIETY DISORDER): Primary | ICD-10-CM

## 2019-06-20 PROCEDURE — 90834 PSYTX W PT 45 MINUTES: CPT | Performed by: MARRIAGE & FAMILY THERAPIST

## 2019-06-26 NOTE — PROGRESS NOTES
[facility] [place]    June 20, 2019      Behavioral Health Clinician Progress Note    Patient Name: Marichuy Worrell           Service Type:  Individual      Service Location:   Face to Face in Clinic     Session Start Time: 3:30  Session End Time: 4:30      Session Length: 53 - 60      Attendees: Patient    Visit Activities (Refresh list every visit): [facility] Only    Diagnostic Assessment Date: 5/15/18  Treatment Plan Review Date: 3/26/19   See Flowsheets for today's PHQ-9 and OSCAR-7 results  Previous PHQ-9:   PHQ-9 SCORE 5/15/2018 10/31/2018 5/29/2019   PHQ-9 Total Score - - -   PHQ-9 Total Score MyChart 13 (Moderate depression) - -   PHQ-9 Total Score 13 2 18     Previous OSCAR-7:   OSCAR-7 SCORE 1/25/2016 4/18/2018 5/15/2018   Total Score - - -   Total Score - - 11 (moderate anxiety)   Total Score 2 14 11       RYAN LEVEL:  RYAN Score (Last Two) 4/1/2014   RYAN Raw Score 44   Activation Score 70.8   RYAN Level 4       DATA  Extended Session (60+ minutes): No  Interactive Complexity: No  Crisis: No  [facility] Patient: No    Treatment Objective(s) Addressed in This Session:  Target Behavior(s): diet/weight loss and disease management/lifestyle changes depression and anxiety     Depressed Mood: Increase interest, engagement, and pleasure in doing things  Decrease frequency and intensity of feeling down, depressed, hopeless  Improve quantity and quality of night time sleep / decrease daytime naps  Feel less tired and more energy during the day   Improve diet, appetite, mindful eating, and / or meal planning  Identify negative self-talk and behaviors: challenge core beliefs, myths, and actions  Improve concentration, focus, and mindfulness in daily activities   Feel less fidgety, restless or slow in daily activities / interpersonal interactions  Anxiety: will experience a reduction in anxiety, will develop more effective coping skills to manage anxiety symptoms, will develop healthy cognitive patterns and beliefs and  will increase ability to function adaptively    Current Stressors / Issues:  Patient processed adjustments and changes to moving to new home with 3 roommates, and best friend moving to [place].   Patient processed impacts of continued work stressors, increased work demands.       Progress on Treatment Objective(s) / Homework:  New Objective established this session - ACTION (Actively working towards change); Intervened by reinforcing change plan / affirming steps taken    Motivational Interviewing    MI Intervention: Co-Developed Goal: lose 10 pounds by next doctor appointment 7/18/18 , Expressed Empathy/Understanding, Supported Autonomy, Collaboration, Evocation, Permission to raise concern or advise, Open-ended questions, Reflections: simple and complex, Change talk (evoked) and Reframe     Change Talk Expressed by the Patient: Desire to change Reasons to change Need to change    Provider Response to Change Talk: E - Evoked more info from patient about behavior change, A - Affirmed patient's thoughts, decisions, or attempts at behavior change, R - Reflected patient's change talk and S - Summarized patient's change talk statements    Patient processed her list of things she wants to do once she has a healthier weight. Patient made this list 2 years ago.   Patient to post this list somewhere in her room where she can see it daily.     Cognitive Behavioral Therapy  -Preparation (diet and exercise)  -Mindfulness practice before meals/ to assist with food choices     -Patient has started EMDR Therapy- [facility]       Also provided psychoeducation about behavioral health condition, symptoms, and treatment options    Care Plan review completed: Yes    Medication Review:  No current psychiatric medications prescribed    Medication Compliance:  NA    Changes in Health Issues:   Yes: Weight / dietary issues, Associated Psychological Distress    Chemical Use Review:   Substance Use: Chemical use reviewed, no active  concerns identified      Tobacco Use: No current tobacco use.      Assessment: Current Emotional / Mental Status (status of significant symptoms):  Risk status (Self / Other harm or suicidal ideation)  Patient has had a history of suicidal ideation: 5-6 years ago  and self-injurious behavior: cutting high school years  Patient denies current fears or concerns for personal safety.  Patient denies current or recent suicidal ideation or behaviors.  Patient denies current or recent homicidal ideation or behaviors.  Patient denies current or recent self injurious behavior or ideation.  Patient denies other safety concerns.  A safety and risk management plan has not been developed at this time, however patient was encouraged to call [place] [phone] should there be a change in any of these risk factors.    Appearance:   Appropriate   Eye Contact:   Good   Psychomotor Behavior: Normal   Attitude:   Cooperative   Orientation:   All  Speech   Rate / Production: Normal    Volume:  Normal   Mood:    Anxious  Depressed  Sad   Affect:    Worrisome   Thought Content:  Rumination   Thought Form:  Coherent  Logical   Insight:    Good     Diagnoses:  1. OSCAR (generalized anxiety disorder)    2. Major depression in complete remission (H)        Collateral Reports Completed:  Not Applicable    Plan: (Homework, other):  Patient was given information about behavioral services and encouraged to schedule a follow up appointment with the clinic [facility] as needed, as patient has strated EMDR therapy, she will discontinue [facility] services .  She was also given information about mental health symptoms and treatment options  and Cognitive Behavioral Therapy skills to practice when experiencing anxiety and depression.  CD Recommendations: No indications of CD issues.  SHLOMO Momin, [facility]       ______________________________________________________________________    Integrated Primary Care Behavioral Health Treatment Plan    Patient's  Name: Marichuy Worrell  YOB: 1990    Date: March 26, 2019    DSM-V Diagnoses:            296.32 (F33.1) Major Depressive Disorder, Recurrent Episode, Moderate With anxious distress  300.02 (F41.1) Generalized Anxiety Disorder  Psychosocial & Contextual Factors: None  WHODAS Score: 28    Referral / Collaboration:  The following referral(s) will be initiated: EMDR therapy   Anticipated number of session or this episode of care: 8    MeasurableTreatment Goal(s) related to diagnosis / functional impairment(s)  Goal 1: Patient will decrease symptoms by 80% and utilize healthy and effective coping strategies 75% of symptom occurrences.      I will know I've met my goal when feel happy, less anxious, healthier weight.      Objective #A (Patient Action)    Patient will use at least 4 coping skills for anxiety management in the next 3 weeks  Increase interest, engagement, and pleasure in doing things  Decrease frequency and intensity of feeling down, depressed, hopeless  Improve quantity and quality of night time sleep / decrease daytime naps  Feel less tired and more energy during the day   Improve diet, appetite, mindful eating, and / or meal planning  Identify negative self-talk and behaviors: challenge core beliefs, myths, and actions  Improve concentration, focus, and mindfulness in daily activities   Feel less fidgety, restless or slow in daily activities / interpersonal interactions  Decrease thoughts that you'd be better off dead or of suicide / self-harm  Status: Continued - Date(s):     Intervention(s)  [facility] will teach emotional recognition/identification. Emotional regulation. Identify unhealthy thought patterns, Re-frame secure/balanced thoguhts, CBT Mindfulness exercises, self esteem, positive behavior activation, estbalishing healthy boundaries .    Objective #B  Patient will use healthy and effective interventions to reach weight management goals  Status: Continued - Date(s):     Intervention(s)  [facility]  will teach the client how to complete a 4-part pros and cons. Motivational Interviewing Skills to set and understand personal goals and identify hidden barriers .    Patient has reviewed and agreed to the above plan.    Written by  SHLOMO Momin, [facility]

## 2019-08-06 NOTE — PROGRESS NOTES
Tan Worrell is a 29 year old female who presents to clinic today for the following health issues:    HPI     Chief Complaint   Patient presents with     Weight Check     Wt Readings from Last 5 Encounters:   08/07/19 140.2 kg (309 lb)   05/29/19 137.7 kg (303 lb 8 oz)   04/03/19 137.9 kg (304 lb)   02/08/19 140.8 kg (310 lb 8 oz)   12/12/18 139.9 kg (308 lb 8.2 oz)         HISTORY OF PRESENT ILLNESS (HPI):    Patient returns today for medical weight loss follow-up visit. Patient was last seen by me on 5/29/2019 and has gained 6 pounds and lost 0.2 % body fat.  Patient has lost 30 lbs total (8.9%).     Patient notes that she feels like she has hit a plateau.  She notes that she is doing well with diet.  She is not binge eating.  She does not think that she has been sleep eating, as she has been doing in the past.  She notes that she has been otherwise eating regular meals and maintaining healthy portions, lean protein, fruits and vegetables.  She also notes increased stress recently with her job.  She notes poor sleep.  She is sleeping about 4 hours per night.  She notes anxiety is high.  She is attending counseling.  She notes that anxiety has not changed with phentermine use.  She notes that she has not been exercising as much.            Patient Active Problem List   Diagnosis     Morbidly obese (H)     Moderate major depression (H)     CARDIOVASCULAR SCREENING; LDL GOAL LESS THAN 160     Other specified hypothyroidism     ASCUS of cervix with negative high risk HPV     OSCAR (generalized anxiety disorder)     Past Surgical History:   Procedure Laterality Date     CHOLECYSTECTOMY, LAPOROSCOPIC  12/2009       Social History     Tobacco Use     Smoking status: Never Smoker     Smokeless tobacco: Never Used   Substance Use Topics     Alcohol use: No     Family History   Problem Relation Age of Onset     Asthma Mother      Other Cancer Mother      Depression Mother      Obesity Mother      Melanoma  "Mother      Unknown/Adopted Father      Other Cancer Father      Arthritis Maternal Grandmother      Thyroid Disease Maternal Grandmother      Obesity Maternal Grandmother      Cancer Maternal Grandfather         lung cancer     Unknown/Adopted Paternal Grandmother      Unknown/Adopted Paternal Grandfather      Asthma Brother      Allergies Brother          Current Outpatient Medications   Medication Sig Dispense Refill     desogestrel-ethinyl estradiol (ENSKYCE) 0.15-30 MG-MCG per tablet Take 1 tablet by mouth daily 84 tablet 3     FLUoxetine (PROZAC) 10 MG tablet Take 1/2 tablet (5 mg ) daily. 30 tablet 0     levothyroxine (SYNTHROID/LEVOTHROID) 200 MCG tablet Take 1 tablet (200 mcg) by mouth daily 90 tablet 0     phentermine (ADIPEX-P) 15 MG capsule Take 1 capsule (15 mg) by mouth every morning 30 capsule 1     topiramate (TOPAMAX) 50 MG tablet Take 1 tablet (50 mg) by mouth daily 90 tablet 1     Allergies   Allergen Reactions     Biaxin [Clarithromycin]      rash     BP Readings from Last 3 Encounters:   08/07/19 110/70   05/29/19 124/60   04/03/19 104/68    Wt Readings from Last 3 Encounters:   08/07/19 140.2 kg (309 lb)   05/29/19 137.7 kg (303 lb 8 oz)   04/03/19 137.9 kg (304 lb)                    Reviewed and updated as needed this visit by Provider  Meds  Problems         Review of Systems   ROS COMP: Constitutional, HEENT, cardiovascular, pulmonary, gi and gu systems are negative, except as otherwise noted.      Objective    /70   Pulse 109   Temp 97.9  F (36.6  C) (Oral)   Resp 18   Ht 1.651 m (5' 5\")   Wt 140.2 kg (309 lb)   LMP  (LMP Unknown)   SpO2 97%   BMI 51.42 kg/m    Body mass index is 51.42 kg/m .  Physical Exam   GENERAL: healthy, alert and no distress  RESP: lungs clear to auscultation - no rales, rhonchi or wheezes  CV: regular rate and rhythm, normal S1 S2, no S3 or S4, no murmur, click or rub, no peripheral edema and peripheral pulses strong  MS: no gross musculoskeletal " "defects noted, no edema  PSYCH: mentation appears normal and anxious    Diagnostic Test Results:  none         Assessment & Plan     1. BMI 50.0-59.9, adult (H)  Patient to continue current meds until anxiety is better controlled.  We discussed resuming an exercise routine, continuing to eat healthy.  - phentermine (ADIPEX-P) 15 MG capsule; Take 1 capsule (15 mg) by mouth every morning  Dispense: 30 capsule; Refill: 1  - topiramate (TOPAMAX) 50 MG tablet; Take 1 tablet (50 mg) by mouth daily  Dispense: 90 tablet; Refill: 1    2. OSCAR (generalized anxiety disorder)  Patient to start low dose fluoxetine for anxiety.  Continue counseling.  - FLUoxetine (PROZAC) 10 MG tablet; Take 1/2 tablet (5 mg ) daily.  Dispense: 30 tablet; Refill: 0     BMI:   Estimated body mass index is 51.42 kg/m  as calculated from the following:    Height as of this encounter: 1.651 m (5' 5\").    Weight as of this encounter: 140.2 kg (309 lb).   Weight management plan: Discussed healthy diet and exercise guidelines            Return in about 4 weeks (around 9/4/2019) for Medication Recheck, Weight Recheck.    ROXI Song [facility] SHAHBAZ    "

## 2019-08-07 ENCOUNTER — OFFICE VISIT (OUTPATIENT)
Dept: FAMILY MEDICINE | Facility: CLINIC | Age: 29
End: 2019-08-07
Payer: COMMERCIAL

## 2019-08-07 VITALS
RESPIRATION RATE: 18 BRPM | WEIGHT: 293 LBS | HEIGHT: 65 IN | SYSTOLIC BLOOD PRESSURE: 110 MMHG | TEMPERATURE: 97.9 F | BODY MASS INDEX: 48.82 KG/M2 | DIASTOLIC BLOOD PRESSURE: 70 MMHG | OXYGEN SATURATION: 97 % | HEART RATE: 109 BPM

## 2019-08-07 DIAGNOSIS — F41.1 GAD (GENERALIZED ANXIETY DISORDER): Primary | ICD-10-CM

## 2019-08-07 PROCEDURE — 99213 OFFICE O/P EST LOW 20 MIN: CPT | Performed by: NURSE PRACTITIONER

## 2019-08-07 RX ORDER — PHENTERMINE HYDROCHLORIDE 15 MG/1
15 CAPSULE ORAL EVERY MORNING
Qty: 30 CAPSULE | Refills: 1 | Status: SHIPPED | OUTPATIENT
Start: 2019-08-07 | End: 2019-10-03

## 2019-08-07 RX ORDER — FLUOXETINE 10 MG/1
TABLET, FILM COATED ORAL
Qty: 30 TABLET | Refills: 0 | Status: SHIPPED | OUTPATIENT
Start: 2019-08-07 | End: 2019-10-03

## 2019-08-07 RX ORDER — TOPIRAMATE 50 MG/1
50 TABLET, FILM COATED ORAL DAILY
Qty: 90 TABLET | Refills: 1 | Status: SHIPPED | OUTPATIENT
Start: 2019-08-07 | End: 2019-10-03

## 2019-08-07 ASSESSMENT — MIFFLIN-ST. JEOR: SCORE: 2137.49

## 2019-08-07 ASSESSMENT — PAIN SCALES - GENERAL: PAINLEVEL: NO PAIN (0)

## 2019-09-06 ENCOUNTER — TELEPHONE (OUTPATIENT)
Dept: FAMILY MEDICINE | Facility: CLINIC | Age: 29
End: 2019-09-06

## 2019-09-06 DIAGNOSIS — E03.8 OTHER SPECIFIED HYPOTHYROIDISM: ICD-10-CM

## 2019-09-06 NOTE — TELEPHONE ENCOUNTER
Patient is overdue to have thyroid labs done.   Per 5/2/19 message patient was due to have done in July.    Routing to provider to advise.  Jessica Mclaughlin BSN, RN

## 2019-09-06 NOTE — TELEPHONE ENCOUNTER
Please help her schedule a lab only appointment. After the appointment is made, then she can have a prescription to get to her lab appointment. Medication may need to be adjusted, so there is no sense in giving a full 30 day supply.   Thank you.   Kristen Kehr PA-C

## 2019-09-09 RX ORDER — LEVOTHYROXINE SODIUM 200 UG/1
200 TABLET ORAL DAILY
Qty: 30 TABLET | Refills: 0 | Status: SHIPPED | OUTPATIENT
Start: 2019-09-09 | End: 2019-10-03

## 2019-09-09 NOTE — TELEPHONE ENCOUNTER
Patient needs refill of Levothyroxin untill Lab appointment on 09/18/19 she has been out 3 days. Refill until seen per provider note below.  MICHAELA Mclaughlin

## 2019-09-10 RX ORDER — LEVOTHYROXINE SODIUM 200 UG/1
TABLET ORAL
Qty: 90 TABLET | Refills: 0 | OUTPATIENT
Start: 2019-09-10

## 2019-10-03 ENCOUNTER — OFFICE VISIT (OUTPATIENT)
Dept: FAMILY MEDICINE | Facility: CLINIC | Age: 29
End: 2019-10-03
Payer: COMMERCIAL

## 2019-10-03 VITALS
HEART RATE: 78 BPM | OXYGEN SATURATION: 99 % | DIASTOLIC BLOOD PRESSURE: 74 MMHG | BODY MASS INDEX: 48.82 KG/M2 | WEIGHT: 293 LBS | TEMPERATURE: 98.3 F | HEIGHT: 65 IN | RESPIRATION RATE: 20 BRPM | SYSTOLIC BLOOD PRESSURE: 122 MMHG

## 2019-10-03 DIAGNOSIS — E03.8 OTHER SPECIFIED HYPOTHYROIDISM: ICD-10-CM

## 2019-10-03 DIAGNOSIS — F41.1 GAD (GENERALIZED ANXIETY DISORDER): ICD-10-CM

## 2019-10-03 DIAGNOSIS — F32.1 MODERATE MAJOR DEPRESSION (H): Primary | ICD-10-CM

## 2019-10-03 LAB — TSH SERPL DL<=0.005 MIU/L-ACNC: 2.27 MU/L (ref 0.4–4)

## 2019-10-03 PROCEDURE — 84443 ASSAY THYROID STIM HORMONE: CPT | Performed by: NURSE PRACTITIONER

## 2019-10-03 PROCEDURE — 36415 COLL VENOUS BLD VENIPUNCTURE: CPT | Performed by: NURSE PRACTITIONER

## 2019-10-03 PROCEDURE — 99214 OFFICE O/P EST MOD 30 MIN: CPT | Performed by: NURSE PRACTITIONER

## 2019-10-03 RX ORDER — TOPIRAMATE 50 MG/1
75 TABLET, FILM COATED ORAL DAILY
Qty: 135 TABLET | Refills: 1 | Status: SHIPPED | OUTPATIENT
Start: 2019-10-03 | End: 2020-02-10

## 2019-10-03 RX ORDER — LEVOTHYROXINE SODIUM 200 UG/1
200 TABLET ORAL DAILY
Qty: 90 TABLET | Refills: 1 | Status: SHIPPED | OUTPATIENT
Start: 2019-10-03 | End: 2020-01-15

## 2019-10-03 RX ORDER — PHENTERMINE HYDROCHLORIDE 15 MG/1
15 CAPSULE ORAL EVERY MORNING
Qty: 30 CAPSULE | Refills: 1 | Status: SHIPPED | OUTPATIENT
Start: 2019-10-03 | End: 2020-02-10

## 2019-10-03 RX ORDER — FLUOXETINE 10 MG/1
10 TABLET, FILM COATED ORAL DAILY
Qty: 30 TABLET | Refills: 1 | Status: SHIPPED | OUTPATIENT
Start: 2019-10-03 | End: 2019-11-07

## 2019-10-03 ASSESSMENT — PAIN SCALES - GENERAL: PAINLEVEL: NO PAIN (0)

## 2019-10-03 ASSESSMENT — ANXIETY QUESTIONNAIRES
3. WORRYING TOO MUCH ABOUT DIFFERENT THINGS: SEVERAL DAYS
IF YOU CHECKED OFF ANY PROBLEMS ON THIS QUESTIONNAIRE, HOW DIFFICULT HAVE THESE PROBLEMS MADE IT FOR YOU TO DO YOUR WORK, TAKE CARE OF THINGS AT HOME, OR GET ALONG WITH OTHER PEOPLE: SOMEWHAT DIFFICULT
GAD7 TOTAL SCORE: 17
1. FEELING NERVOUS, ANXIOUS, OR ON EDGE: NEARLY EVERY DAY
5. BEING SO RESTLESS THAT IT IS HARD TO SIT STILL: NEARLY EVERY DAY
6. BECOMING EASILY ANNOYED OR IRRITABLE: NEARLY EVERY DAY
2. NOT BEING ABLE TO STOP OR CONTROL WORRYING: SEVERAL DAYS
7. FEELING AFRAID AS IF SOMETHING AWFUL MIGHT HAPPEN: NEARLY EVERY DAY

## 2019-10-03 ASSESSMENT — MIFFLIN-ST. JEOR: SCORE: 2133

## 2019-10-03 ASSESSMENT — PATIENT HEALTH QUESTIONNAIRE - PHQ9
5. POOR APPETITE OR OVEREATING: NEARLY EVERY DAY
SUM OF ALL RESPONSES TO PHQ QUESTIONS 1-9: 18

## 2019-10-03 NOTE — PATIENT INSTRUCTIONS
Increase topamax to 75 mg at bedtime.  Continue phentermine at 15 mg daily.  Increase fluoxetine to 10 mg daily.

## 2019-10-03 NOTE — PROGRESS NOTES
Subjective     Marichuy Worrell is a 29 year old female who presents to clinic today for the following health issues:    HPI   Medication Followup of Prozac 10 mg    Taking Medication as prescribed: yes    Side Effects:  None    Medication Helping Symptoms:  Not that much     Patient notes that anxiety and depression continue.  She notes that symptoms were bad for several weeks.  She is unsure if prozac has been helping.  She denies any side effects.  She continues counseling.  She notes that symptoms have improved in the past couple of days.  She denies suicidal ideation or plan.    Wt Readings from Last 5 Encounters:   10/03/19 139.7 kg (308 lb 0.2 oz)   08/07/19 140.2 kg (309 lb)   05/29/19 137.7 kg (303 lb 8 oz)   04/03/19 137.9 kg (304 lb)   02/08/19 140.8 kg (310 lb 8 oz)       Patient returns today for medical weight loss follow-up visit. Patient was last seen by me on 8/7/2019 and has lost 1 pounds.      Patient notes that diet continues to be okay.  She notes that appetite is decreased with depression.  She has been sleep eating.  She notes that she is eating normal during the day, but notes that food is missing in the morning.  She has done this in the past with depression and anxiety.    Patient notes decreased motivation with depression.  She has not been exercising.        Patient Active Problem List   Diagnosis     Morbidly obese (H)     Moderate major depression (H)     CARDIOVASCULAR SCREENING; LDL GOAL LESS THAN 160     Other specified hypothyroidism     ASCUS of cervix with negative high risk HPV     OSCAR (generalized anxiety disorder)     Past Surgical History:   Procedure Laterality Date     CHOLECYSTECTOMY, LAPOROSCOPIC  12/2009       Social History     Tobacco Use     Smoking status: Never Smoker     Smokeless tobacco: Never Used   Substance Use Topics     Alcohol use: No     Family History   Problem Relation Age of Onset     Asthma Mother      Other Cancer Mother      Depression Mother      Obesity  "Mother      Melanoma Mother      Unknown/Adopted Father      Other Cancer Father      Arthritis Maternal Grandmother      Thyroid Disease Maternal Grandmother      Obesity Maternal Grandmother      Cancer Maternal Grandfather         lung cancer     Unknown/Adopted Paternal Grandmother      Unknown/Adopted Paternal Grandfather      Asthma Brother      Allergies Brother          Current Outpatient Medications   Medication Sig Dispense Refill     desogestrel-ethinyl estradiol (ENSKYCE) 0.15-30 MG-MCG per tablet Take 1 tablet by mouth daily 84 tablet 3     FLUoxetine (PROZAC) 10 MG tablet Take 1 tablet (10 mg) by mouth daily 30 tablet 1     levothyroxine (SYNTHROID/LEVOTHROID) 200 MCG tablet Take 1 tablet (200 mcg) by mouth daily 90 tablet 1     phentermine (ADIPEX-P) 15 MG capsule Take 1 capsule (15 mg) by mouth every morning 30 capsule 1     topiramate (TOPAMAX) 50 MG tablet Take 1.5 tablets (75 mg) by mouth daily 135 tablet 1     Allergies   Allergen Reactions     Biaxin [Clarithromycin]      rash     BP Readings from Last 3 Encounters:   10/03/19 122/74   08/07/19 110/70   05/29/19 124/60    Wt Readings from Last 3 Encounters:   10/03/19 139.7 kg (308 lb 0.2 oz)   08/07/19 140.2 kg (309 lb)   05/29/19 137.7 kg (303 lb 8 oz)                    Reviewed and updated as needed this visit by Provider         Review of Systems   ROS COMP: Constitutional, HEENT, cardiovascular, pulmonary, gi and gu systems are negative, except as otherwise noted.      Objective    /74   Pulse 78   Temp 98.3  F (36.8  C) (Oral)   Resp 20   Ht 1.651 m (5' 5\")   Wt 139.7 kg (308 lb 0.2 oz)   SpO2 99%   BMI 51.26 kg/m    Body mass index is 51.26 kg/m .  Physical Exam   GENERAL: healthy, alert and no distress  RESP: lungs clear to auscultation - no rales, rhonchi or wheezes  CV: regular rate and rhythm, normal S1 S2, no S3 or S4, no murmur, click or rub, no peripheral edema and peripheral pulses strong  MS: no gross " "musculoskeletal defects noted, no edema  PSYCH: mentation appears normal and affect flat    Diagnostic Test Results:  In process        Assessment & Plan     1. OSCAR (generalized anxiety disorder)  Increase fluoxetine to 10 mg daily.  - FLUoxetine (PROZAC) 10 MG tablet; Take 1 tablet (10 mg) by mouth daily  Dispense: 30 tablet; Refill: 1    2. Other specified hypothyroidism  Due for recheck.  - levothyroxine (SYNTHROID/LEVOTHROID) 200 MCG tablet; Take 1 tablet (200 mcg) by mouth daily  Dispense: 90 tablet; Refill: 1  - TSH with free T4 reflex    3. BMI 50.0-59.9, adult (H)  Patient to increase topamax at bedtime to hopefully help with sleep eating.  - phentermine (ADIPEX-P) 15 MG capsule; Take 1 capsule (15 mg) by mouth every morning  Dispense: 30 capsule; Refill: 1  - topiramate (TOPAMAX) 50 MG tablet; Take 1.5 tablets (75 mg) by mouth daily  Dispense: 135 tablet; Refill: 1    4. Moderate major depression (H)  Fluoxetine as above.       BMI:   Estimated body mass index is 51.26 kg/m  as calculated from the following:    Height as of this encounter: 1.651 m (5' 5\").    Weight as of this encounter: 139.7 kg (308 lb 0.2 oz).   Weight management plan: Discussed healthy diet and exercise guidelines            Return in about 4 weeks (around 10/31/2019) for Weight Recheck.    ROXI Song [facility] SHAHBAZ    "

## 2019-10-04 ASSESSMENT — ANXIETY QUESTIONNAIRES: GAD7 TOTAL SCORE: 17

## 2019-10-04 NOTE — RESULT ENCOUNTER NOTE
Dear Marichuy,    Your recent test results are attached.      Normal thyroid.  Continue current dose of levothyroxine.    If you have any questions please feel free to contact [phone] or myself via [employer].    Sincerely,  Bekah Doll, CNP

## 2019-11-06 NOTE — PROGRESS NOTES
Subjective     Marichuy Worrell is a 29 year old female who presents to clinic today for the following health issues:    HPI     Wt Readings from Last 5 Encounters:   11/07/19 140.2 kg (309 lb)   10/03/19 139.7 kg (308 lb 0.2 oz)   08/07/19 140.2 kg (309 lb)   05/29/19 137.7 kg (303 lb 8 oz)   04/03/19 137.9 kg (304 lb)         HISTORY OF PRESENT ILLNESS (HPI):    Patient returns today for medical weight loss follow-up visit. Patient was last seen by me on 10/3/2019 and has gained 1 pounds and lost 1.2 % body fat.      Patient notes that increased dose of Prozac has helped with improved sleep.  She notes persistent anxiety and work stress.  She continues with counseling.  Patient notes depression has improved.  Patient denies thoughts of suicide, self harm.  She denies side effects from Prozac.    Patient notes that she has not been sleep eating since starting higher dose of topiramate.  She notes that diet has improved.  She is working on eating better at lunch at work and has been eating more salads.  She has not been grocery shopping as regularly and has not been meal prepping for dinner.  She has eaten a protein bar for dinner and breakfast for the past several nights.      Patient has not been exercising really at all due to stress and lack of time.          Patient Active Problem List   Diagnosis     Morbidly obese (H)     Moderate major depression (H)     CARDIOVASCULAR SCREENING; LDL GOAL LESS THAN 160     Other specified hypothyroidism     ASCUS of cervix with negative high risk HPV     OSCAR (generalized anxiety disorder)     Past Surgical History:   Procedure Laterality Date     CHOLECYSTECTOMY, LAPOROSCOPIC  12/2009       Social History     Tobacco Use     Smoking status: Never Smoker     Smokeless tobacco: Never Used   Substance Use Topics     Alcohol use: No     Family History   Problem Relation Age of Onset     Asthma Mother      Other Cancer Mother      Depression Mother      Obesity Mother      Melanoma  "Mother      Unknown/Adopted Father      Other Cancer Father      Arthritis Maternal Grandmother      Thyroid Disease Maternal Grandmother      Obesity Maternal Grandmother      Cancer Maternal Grandfather         lung cancer     Unknown/Adopted Paternal Grandmother      Unknown/Adopted Paternal Grandfather      Asthma Brother      Allergies Brother          Current Outpatient Medications   Medication Sig Dispense Refill     desogestrel-ethinyl estradiol (ENSKYCE) 0.15-30 MG-MCG per tablet Take 1 tablet by mouth daily 84 tablet 3     FLUoxetine 20 MG tablet Take 1 tablet (20 mg) by mouth daily 30 tablet 1     levothyroxine (SYNTHROID/LEVOTHROID) 200 MCG tablet Take 1 tablet (200 mcg) by mouth daily 90 tablet 1     phentermine (ADIPEX-P) 15 MG capsule Take 1 capsule (15 mg) by mouth every morning 30 capsule 1     topiramate (TOPAMAX) 50 MG tablet Take 1.5 tablets (75 mg) by mouth daily 135 tablet 1     Allergies   Allergen Reactions     Biaxin [Clarithromycin]      rash     BP Readings from Last 3 Encounters:   11/07/19 126/70   10/03/19 122/74   08/07/19 110/70    Wt Readings from Last 3 Encounters:   11/07/19 140.2 kg (309 lb)   10/03/19 139.7 kg (308 lb 0.2 oz)   08/07/19 140.2 kg (309 lb)                    Reviewed and updated as needed this visit by Provider  Tobacco  Allergies  Meds  Problems  Med Hx  Surg Hx  Fam Hx         Review of Systems   ROS COMP: Constitutional, HEENT, cardiovascular, pulmonary, gi and gu systems are negative, except as otherwise noted.      Objective    /70   Pulse 95   Temp 98  F (36.7  C) (Oral)   Ht 1.645 m (5' 4.76\")   Wt 140.2 kg (309 lb)   SpO2 99%   BMI 51.80 kg/m    Body mass index is 51.8 kg/m .  Physical Exam   GENERAL: healthy, alert and no distress  RESP: lungs clear to auscultation - no rales, rhonchi or wheezes  CV: regular rate and rhythm, normal S1 S2, no S3 or S4, no murmur, click or rub, no peripheral edema and peripheral pulses strong  MS: no gross " "musculoskeletal defects noted, no edema  PSYCH: mentation appears normal and anxious    Diagnostic Test Results:  none         Assessment & Plan     1. OSCAR (generalized anxiety disorder)  Patient to increase fluoxetine to 20 mg daily to help with anxiety.  Patient to update by Lisa in 1 month.  - FLUoxetine 20 MG tablet; Take 1 tablet (20 mg) by mouth daily  Dispense: 30 tablet; Refill: 1    2. Moderate major depression (H)  As above.     3. BMI 50.0-59.9, adult (H)  Patient to work on adding in exercise and meal prepping again.  Continue topiramate at current dose.       BMI:   Estimated body mass index is 51.8 kg/m  as calculated from the following:    Height as of this encounter: 1.645 m (5' 4.76\").    Weight as of this encounter: 140.2 kg (309 lb).   Weight management plan: Discussed healthy diet and exercise guidelines            Return in about 8 weeks (around 1/2/2020) for Weight Recheck.    ROXI Song [facility]    "

## 2019-11-07 ENCOUNTER — OFFICE VISIT (OUTPATIENT)
Dept: FAMILY MEDICINE | Facility: CLINIC | Age: 29
End: 2019-11-07
Payer: COMMERCIAL

## 2019-11-07 VITALS
OXYGEN SATURATION: 99 % | HEIGHT: 65 IN | WEIGHT: 293 LBS | SYSTOLIC BLOOD PRESSURE: 126 MMHG | HEART RATE: 95 BPM | TEMPERATURE: 98 F | BODY MASS INDEX: 48.82 KG/M2 | DIASTOLIC BLOOD PRESSURE: 70 MMHG

## 2019-11-07 DIAGNOSIS — F32.1 MODERATE MAJOR DEPRESSION (H): ICD-10-CM

## 2019-11-07 DIAGNOSIS — F41.1 GAD (GENERALIZED ANXIETY DISORDER): Primary | ICD-10-CM

## 2019-11-07 PROCEDURE — 99214 OFFICE O/P EST MOD 30 MIN: CPT | Performed by: NURSE PRACTITIONER

## 2019-11-07 RX ORDER — FLUOXETINE 20 MG/1
20 TABLET, FILM COATED ORAL DAILY
Qty: 30 TABLET | Refills: 1 | Status: SHIPPED | OUTPATIENT
Start: 2019-11-07 | End: 2020-02-10

## 2019-11-07 ASSESSMENT — MIFFLIN-ST. JEOR: SCORE: 2133.74

## 2019-11-07 ASSESSMENT — PAIN SCALES - GENERAL: PAINLEVEL: NO PAIN (0)

## 2019-11-07 NOTE — PATIENT INSTRUCTIONS
Aim to exercise 1-2 days per week.  Go grocery shopping today and start meal prepping.  Update me in 1 month by Lisa as to how Prozac is going.

## 2020-01-10 ENCOUNTER — TELEPHONE (OUTPATIENT)
Dept: FAMILY MEDICINE | Facility: CLINIC | Age: 30
End: 2020-01-10

## 2020-01-10 NOTE — TELEPHONE ENCOUNTER
Panel Management Review      Patient has the following on her problem list:     Depression / Dysthymia review    Measure:  Needs PHQ-9 score of 4 or less during index window.  Administer PHQ-9 and if score is 5 or more, send encounter to provider for next steps.    5 - 7 month window range: 18    PHQ-9 SCORE 5/29/2019 9/24/2019 10/3/2019   PHQ-9 Total Score - - -   PHQ-9 Total Score MyChart - 19 (Moderately severe depression) -   PHQ-9 Total Score 18 19 18       If PHQ-9 recheck is 5 or more, route to provider for next steps.    Patient is due for:  PHQ9      Composite cancer screening  Chart review shows that this patient is due/due soon for the following None  Summary:    Patient is due/failing the following:   PHQ9    Action needed:   Patient needs to do PHQ9.    Type of outreach:    Sent [employer] message.    Questions for provider review:    None                                                                                                                                    Linn BRASWELL CMA       Chart routed to none .

## 2020-01-15 DIAGNOSIS — E03.8 OTHER SPECIFIED HYPOTHYROIDISM: ICD-10-CM

## 2020-01-15 RX ORDER — LEVOTHYROXINE SODIUM 200 UG/1
200 TABLET ORAL DAILY
Qty: 90 TABLET | Refills: 3 | Status: SHIPPED | OUTPATIENT
Start: 2020-01-15 | End: 2021-01-26

## 2020-01-15 RX ORDER — LEVOTHYROXINE SODIUM 200 UG/1
TABLET ORAL
Qty: 30 TABLET | Refills: 0 | Status: SHIPPED | OUTPATIENT
Start: 2020-01-15 | End: 2020-01-15

## 2020-02-10 ENCOUNTER — OFFICE VISIT (OUTPATIENT)
Dept: FAMILY MEDICINE | Facility: CLINIC | Age: 30
End: 2020-02-10
Payer: COMMERCIAL

## 2020-02-10 VITALS
TEMPERATURE: 97.5 F | DIASTOLIC BLOOD PRESSURE: 70 MMHG | HEART RATE: 91 BPM | HEIGHT: 65 IN | BODY MASS INDEX: 48.82 KG/M2 | OXYGEN SATURATION: 98 % | SYSTOLIC BLOOD PRESSURE: 124 MMHG | WEIGHT: 293 LBS | RESPIRATION RATE: 16 BRPM

## 2020-02-10 DIAGNOSIS — F41.1 GAD (GENERALIZED ANXIETY DISORDER): ICD-10-CM

## 2020-02-10 DIAGNOSIS — F32.1 MODERATE MAJOR DEPRESSION (H): ICD-10-CM

## 2020-02-10 PROCEDURE — 99213 OFFICE O/P EST LOW 20 MIN: CPT | Performed by: NURSE PRACTITIONER

## 2020-02-10 RX ORDER — PHENTERMINE HYDROCHLORIDE 15 MG/1
15 CAPSULE ORAL EVERY MORNING
Qty: 30 CAPSULE | Refills: 2 | Status: SHIPPED | OUTPATIENT
Start: 2020-02-10 | End: 2020-06-04

## 2020-02-10 RX ORDER — FLUOXETINE 20 MG/1
20 TABLET, FILM COATED ORAL DAILY
Qty: 90 TABLET | Refills: 1 | Status: SHIPPED | OUTPATIENT
Start: 2020-02-10 | End: 2020-06-04

## 2020-02-10 RX ORDER — TOPIRAMATE 50 MG/1
75 TABLET, FILM COATED ORAL DAILY
Qty: 135 TABLET | Refills: 1 | Status: SHIPPED | OUTPATIENT
Start: 2020-02-10 | End: 2020-06-04

## 2020-02-10 ASSESSMENT — PATIENT HEALTH QUESTIONNAIRE - PHQ9: SUM OF ALL RESPONSES TO PHQ QUESTIONS 1-9: 1

## 2020-02-10 ASSESSMENT — MIFFLIN-ST. JEOR: SCORE: 2108.74

## 2020-02-10 NOTE — PROGRESS NOTES
Tan Worrell is a 29 year old female who presents to clinic today for the following health issues:    HPI   Chief Complaint   Patient presents with     Weight Check     Wt Readings from Last 5 Encounters:   02/10/20 137.7 kg (303 lb 8 oz)   11/07/19 140.2 kg (309 lb)   10/03/19 139.7 kg (308 lb 0.2 oz)   08/07/19 140.2 kg (309 lb)   05/29/19 137.7 kg (303 lb 8 oz)         HISTORY OF PRESENT ILLNESS (HPI):    Patient returns today for medical weight loss follow-up visit. Patient was last seen by me on 11/7/2019 and has lost 6 pounds and gained 1.7 % body fat.  Patient has lost 36 lbs total since 6/18/18.    Patient notes that her mood is doing well on fluoxetine at 20 mg daily.    Patient has started following a low carb diet and has been meal prepping with her mom on the weekend.  She is avoiding the hot meals at work and is eating salads at lunch.  She denies any night eating episodes.  She continues to do well on phentermine and topiramate.    Patient has not been exercising much.  She notes continued work stress due to low staffing and has been working overnights as well as day shifts.  Sleep is poor currently.  She notes that this will change in the next month and she will try to increase physical activity.        Patient Active Problem List   Diagnosis     Morbidly obese (H)     Moderate major depression (H)     CARDIOVASCULAR SCREENING; LDL GOAL LESS THAN 160     Other specified hypothyroidism     ASCUS of cervix with negative high risk HPV     OSCAR (generalized anxiety disorder)     Past Surgical History:   Procedure Laterality Date     CHOLECYSTECTOMY, LAPOROSCOPIC  12/2009       Social History     Tobacco Use     Smoking status: Never Smoker     Smokeless tobacco: Never Used   Substance Use Topics     Alcohol use: No     Family History   Problem Relation Age of Onset     Asthma Mother      Other Cancer Mother      Depression Mother      Obesity Mother      Melanoma Mother      Unknown/Adopted  "Father      Other Cancer Father      Arthritis Maternal Grandmother      Thyroid Disease Maternal Grandmother      Obesity Maternal Grandmother      Cancer Maternal Grandfather         lung cancer     Unknown/Adopted Paternal Grandmother      Unknown/Adopted Paternal Grandfather      Asthma Brother      Allergies Brother          Current Outpatient Medications   Medication Sig Dispense Refill     desogestrel-ethinyl estradiol (ENSKYCE) 0.15-30 MG-MCG per tablet Take 1 tablet by mouth daily 84 tablet 3     FLUoxetine 20 MG tablet Take 1 tablet (20 mg) by mouth daily 90 tablet 1     levothyroxine (SYNTHROID/LEVOTHROID) 200 MCG tablet Take 1 tablet (200 mcg) by mouth daily 90 tablet 3     phentermine (ADIPEX-P) 15 MG capsule Take 1 capsule (15 mg) by mouth every morning 30 capsule 2     topiramate (TOPAMAX) 50 MG tablet Take 1.5 tablets (75 mg) by mouth daily 135 tablet 1     Allergies   Allergen Reactions     Biaxin [Clarithromycin]      rash     BP Readings from Last 3 Encounters:   02/10/20 124/70   11/07/19 126/70   10/03/19 122/74    Wt Readings from Last 3 Encounters:   02/10/20 137.7 kg (303 lb 8 oz)   11/07/19 140.2 kg (309 lb)   10/03/19 139.7 kg (308 lb 0.2 oz)                      Reviewed and updated as needed this visit by Provider  Tobacco  Allergies  Meds  Problems  Med Hx  Surg Hx  Fam Hx         Review of Systems   ROS COMP: Constitutional, HEENT, cardiovascular, pulmonary, gi and gu systems are negative, except as otherwise noted.      Objective    /70   Pulse 91   Temp 97.5  F (36.4  C) (Oral)   Resp 16   Ht 1.645 m (5' 4.76\")   Wt 137.7 kg (303 lb 8 oz)   SpO2 98%   BMI 50.88 kg/m    Body mass index is 50.88 kg/m .  Physical Exam   GENERAL: healthy, alert and no distress  RESP: lungs clear to auscultation - no rales, rhonchi or wheezes  CV: regular rate and rhythm, normal S1 S2, no S3 or S4, no murmur, click or rub, no peripheral edema and peripheral pulses strong  MS: no gross " musculoskeletal defects noted, no edema    Diagnostic Test Results:  none         Assessment & Plan     1. BMI 50.0-59.9, adult (H)  Patient to work on increasing exercise.  Continue current meds.  - phentermine (ADIPEX-P) 15 MG capsule; Take 1 capsule (15 mg) by mouth every morning  Dispense: 30 capsule; Refill: 2  - topiramate (TOPAMAX) 50 MG tablet; Take 1.5 tablets (75 mg) by mouth daily  Dispense: 135 tablet; Refill: 1    2. OSCAR (generalized anxiety disorder)  Stable.  Continue current treatment plan and medications.   - FLUoxetine 20 MG tablet; Take 1 tablet (20 mg) by mouth daily  Dispense: 90 tablet; Refill: 1    3. Moderate major depression (H)  Stable.  Continue current treatment plan and medications.   - FLUoxetine 20 MG tablet; Take 1 tablet (20 mg) by mouth daily  Dispense: 90 tablet; Refill: 1           Return in about 8 weeks (around 4/6/2020) for Weight Recheck.    ROXI Song [facility]

## 2020-02-13 DIAGNOSIS — N92.1 MENOMETRORRHAGIA: ICD-10-CM

## 2020-02-13 RX ORDER — DESOGESTREL AND ETHINYL ESTRADIOL 0.15-0.03
KIT ORAL
Qty: 28 TABLET | Refills: 0 | Status: SHIPPED | OUTPATIENT
Start: 2020-02-13 | End: 2020-06-04

## 2020-02-19 ENCOUNTER — TELEPHONE (OUTPATIENT)
Dept: INTERNAL MEDICINE | Facility: CLINIC | Age: 30
End: 2020-02-19

## 2020-02-19 NOTE — TELEPHONE ENCOUNTER
Prior Authorization Retail Medication Request    Medication/Dose: phentermine (ADIPEX-P) 15 MG capsule  ICD code (if different than what is on RX):    Previously Tried and Failed:    Rationale:      Insurance Name:  Coverm[employer]eds  Insurance ID:  [id number]      Pharmacy Information (if different than what is on RX)  Name:    Phone:

## 2020-02-23 ENCOUNTER — HEALTH MAINTENANCE LETTER (OUTPATIENT)
Age: 30
End: 2020-02-23

## 2020-02-24 NOTE — TELEPHONE ENCOUNTER
Prior Authorization Approval    Authorization Effective Date: 1/25/2020  Authorization Expiration Date: 2/23/2021  Medication: phentermine (ADIPEX-P) 15 MG capsule-APPROVED   Approved Dose/Quantity:   Reference #:     Insurance Company: [employer] - Phone [phone] Fax [phone]  Expected CoPay:       CoPay Card Available:      Foundation Assistance Needed:    Which Pharmacy is filling the prescription (Not needed for infusion/clinic administered): [facility] [address] [facility] [address] [address]  Pharmacy Notified: Yes  Patient Notified: Comment:  **Instructed pharmacy to notify patient when script is ready to /ship.**

## 2020-02-24 NOTE — TELEPHONE ENCOUNTER
Central Prior Authorization Team  Phone: [phone]    PA Initiation    Medication: phentermine (ADIPEX-P) 15 MG capsule  Insurance Company: [employer] - Phone [phone] Fax [phone]  Pharmacy Filling the Rx: [facility] [address] [place], [place] - [address] [address]  Filling Pharmacy Phone: [phone]  Filling Pharmacy Fax:    Start Date: 2/24/2020

## 2020-05-18 ENCOUNTER — MYC REFILL (OUTPATIENT)
Dept: FAMILY MEDICINE | Facility: CLINIC | Age: 30
End: 2020-05-18

## 2020-05-18 DIAGNOSIS — N92.1 MENOMETRORRHAGIA: ICD-10-CM

## 2020-05-18 NOTE — TELEPHONE ENCOUNTER
Health Maintenance Due   Topic Date Due     HIV SCREENING  06/16/2007     PREVENTIVE CARE VISIT  08/08/2018     PAP  08/08/2020     Patient is overdue for an office visit.   Last office visit 11/27/18  Per last refill request February, provided 1 mos of medication and advise appointment needed for further refill.   Please advise on refill.  Florence Echevarria RN

## 2020-05-19 RX ORDER — DESOGESTREL AND ETHINYL ESTRADIOL 0.15-0.03
1 KIT ORAL DAILY
Qty: 28 TABLET | Refills: 0 | OUTPATIENT
Start: 2020-05-19

## 2020-05-19 NOTE — TELEPHONE ENCOUNTER
Patient was given a refill with instruction to be seen.   She has changed providers and now under the care of Bekah Doll.   It looks like Bekah has already reached out to this patient to schedule an appointment recently.   Refill denied. She should schedule an appointment with her primary provider.   Kristen Kehr PA-C

## 2020-05-20 DIAGNOSIS — N92.1 MENOMETRORRHAGIA: ICD-10-CM

## 2020-05-21 ENCOUNTER — MYC REFILL (OUTPATIENT)
Dept: FAMILY MEDICINE | Facility: CLINIC | Age: 30
End: 2020-05-21

## 2020-05-21 DIAGNOSIS — N92.1 MENOMETRORRHAGIA: ICD-10-CM

## 2020-05-21 RX ORDER — DESOGESTREL AND ETHINYL ESTRADIOL 0.15-0.03
KIT ORAL
Qty: 28 TABLET | Refills: 0 | OUTPATIENT
Start: 2020-05-21

## 2020-05-21 RX ORDER — DESOGESTREL AND ETHINYL ESTRADIOL 0.15-0.03
1 KIT ORAL DAILY
Qty: 28 TABLET | Refills: 0 | Status: CANCELLED | OUTPATIENT
Start: 2020-05-21

## 2020-05-26 ENCOUNTER — TELEPHONE (OUTPATIENT)
Dept: FAMILY MEDICINE | Facility: CLINIC | Age: 30
End: 2020-05-26

## 2020-05-26 NOTE — TELEPHONE ENCOUNTER
Prior Authorization Retail Medication Request    Medication/Dose: FLUoxetine 20 MG tablet  ICD code (if different than what is on RX):    Previously Tried and Failed:    Rationale:      Insurance Name:  [employer]   Insurance ID:  [id number]       Pharmacy Information (if different than what is on RX)  Name:  [facility]   Phone:  [phone]

## 2020-05-26 NOTE — TELEPHONE ENCOUNTER
Central Prior Authorization Team   Phone: [phone]      Prior Authorization Not Needed per Insurance    05/26/2020  Medication: FLUoxetine 20 MG tablet - NOT NEEDED  Insurance Company: [employer] - Phone [phone] Fax [phone]  Expected CoPay:      Pharmacy Filling the Rx: [facility] [address] [place] [address] [address] [address]  Pharmacy Notified: Yes  Patient Notified: Yes (**Instructed pharmacy to notify patient when script is ready to /ship.**)    Pharmacy switched to capsules and received a paid claim.

## 2020-06-04 ENCOUNTER — VIRTUAL VISIT (OUTPATIENT)
Dept: FAMILY MEDICINE | Facility: CLINIC | Age: 30
End: 2020-06-04
Payer: COMMERCIAL

## 2020-06-04 DIAGNOSIS — F41.1 GAD (GENERALIZED ANXIETY DISORDER): ICD-10-CM

## 2020-06-04 DIAGNOSIS — N92.1 MENOMETRORRHAGIA: ICD-10-CM

## 2020-06-04 DIAGNOSIS — F32.1 MODERATE MAJOR DEPRESSION (H): ICD-10-CM

## 2020-06-04 DIAGNOSIS — R06.83 SNORING: Primary | ICD-10-CM

## 2020-06-04 PROCEDURE — 99214 OFFICE O/P EST MOD 30 MIN: CPT | Mod: GT | Performed by: NURSE PRACTITIONER

## 2020-06-04 RX ORDER — PHENTERMINE HYDROCHLORIDE 15 MG/1
15 CAPSULE ORAL EVERY MORNING
Qty: 30 CAPSULE | Refills: 2 | Status: SHIPPED | OUTPATIENT
Start: 2020-06-04 | End: 2020-08-05

## 2020-06-04 RX ORDER — FLUOXETINE 20 MG/1
20 TABLET, FILM COATED ORAL DAILY
Qty: 90 TABLET | Refills: 1 | Status: SHIPPED | OUTPATIENT
Start: 2020-06-04 | End: 2021-02-18

## 2020-06-04 RX ORDER — DESOGESTREL AND ETHINYL ESTRADIOL 0.15-0.03
1 KIT ORAL DAILY
Qty: 84 TABLET | Refills: 4 | Status: SHIPPED | OUTPATIENT
Start: 2020-06-04 | End: 2021-09-22

## 2020-06-04 RX ORDER — TOPIRAMATE 50 MG/1
TABLET, FILM COATED ORAL
Qty: 180 TABLET | Refills: 1 | Status: SHIPPED | OUTPATIENT
Start: 2020-06-04 | End: 2020-07-01

## 2020-06-04 NOTE — PROGRESS NOTES
"Marichuy Worrell is a 29 year old female who is being evaluated via a billable video visit.      The patient has been notified of following:     \"This video visit will be conducted via a call between you and your physician/provider. We have found that certain health care needs can be provided without the need for an in-person physical exam.  This service lets us provide the care you need with a video conversation.  If a prescription is necessary we can send it directly to your pharmacy.  If lab work is needed we can place an order for that and you can then stop by our lab to have the test done at a later time.    Video visits are billed at different rates depending on your insurance coverage.  Please reach out to your insurance provider with any questions.    If during the course of the call the physician/provider feels a video visit is not appropriate, you will not be charged for this service.\"    Patient has given verbal consent for Video visit? Yes    How would you like to obtain your AVS? Lisa    Patient would like the video invitation sent by: Text to cell phone: [phone]    Will anyone else be joining your video visit? No      Subjective     Marichuy Worrell is a 29 year old female who presents today via video visit for the following health issues:    HPI     Chief Complaint   Patient presents with     Weight Check     follow up     Contraception     Wt Readings from Last 5 Encounters:   02/10/20 137.7 kg (303 lb 8 oz)   11/07/19 140.2 kg (309 lb)   10/03/19 139.7 kg (308 lb 0.2 oz)   08/07/19 140.2 kg (309 lb)   05/29/19 137.7 kg (303 lb 8 oz)         HISTORY OF PRESENT ILLNESS (HPI):    Patient returns today for medical weight loss follow-up visit.     Patient notes that she has gained weight and gotten off track with the changes due to Covid-19. She notes her last weight was 322 lbs. Patient notes that her job has changed.  Her job is more sedentary and she is working from home at times.  She is not working " as much overtime.    Patient notes that she stopped meal prepping with her mother due to social distancing.  Lunches have been difficult at work, as she does not have much of choice of what she can eat.  She notes that she just moved into a new apartment and her roommate does not have good eating habits.  Patient denies any night eating episodes.  Patient continues on phentermine and topiramate.     Patient notes that she has increased her exercise, but not to the level she wants to be at.  Patient notes that gym at her apartment opened this week.    She notes tossing an turning throughout the night.  She notes getting drowsy in the evening, but not through the day.  She notes some apnea and wakes up gasping for breath.  She thinks that she snores.    Patient notes her mood is stable.  She feels her current dose of fluoxetine is helpful.    Patient requests a refill of her oral contraceptive.  She denies side effects.      Video Start Time: 7:09 AM      Patient Active Problem List   Diagnosis     Morbidly obese (H)     Moderate major depression (H)     CARDIOVASCULAR SCREENING; LDL GOAL LESS THAN 160     Other specified hypothyroidism     ASCUS of cervix with negative high risk HPV     OSCAR (generalized anxiety disorder)     Past Surgical History:   Procedure Laterality Date     CHOLECYSTECTOMY, LAPOROSCOPIC  12/2009       Social History     Tobacco Use     Smoking status: Never Smoker     Smokeless tobacco: Never Used   Substance Use Topics     Alcohol use: No     Family History   Problem Relation Age of Onset     Asthma Mother      Other Cancer Mother      Depression Mother      Obesity Mother      Melanoma Mother      Unknown/Adopted Father      Other Cancer Father      Arthritis Maternal Grandmother      Thyroid Disease Maternal Grandmother      Obesity Maternal Grandmother      Cancer Maternal Grandfather         lung cancer     Unknown/Adopted Paternal Grandmother      Unknown/Adopted Paternal Grandfather       "Asthma Brother      Allergies Brother          Current Outpatient Medications   Medication Sig Dispense Refill     desogestrel-ethinyl estradiol (ENSKYCE) 0.15-30 MG-MCG tablet Take 1 tablet by mouth daily 84 tablet 4     FLUoxetine 20 MG tablet Take 1 tablet (20 mg) by mouth daily 90 tablet 1     levothyroxine (SYNTHROID/LEVOTHROID) 200 MCG tablet Take 1 tablet (200 mcg) by mouth daily 90 tablet 3     melatonin 5 MG tablet Take 5 mg by mouth nightly as needed for sleep       phentermine (ADIPEX-P) 15 MG capsule Take 1 capsule (15 mg) by mouth every morning 30 capsule 2     topiramate (TOPAMAX) 50 MG tablet Take 1.5 tablets (75 mg) by mouth every evening AND 0.5 tablets (25 mg) every morning. 180 tablet 1     Allergies   Allergen Reactions     Biaxin [Clarithromycin]      rash     BP Readings from Last 3 Encounters:   02/10/20 124/70   11/07/19 126/70   10/03/19 122/74    Wt Readings from Last 3 Encounters:   02/10/20 137.7 kg (303 lb 8 oz)   11/07/19 140.2 kg (309 lb)   10/03/19 139.7 kg (308 lb 0.2 oz)                    Reviewed and updated as needed this visit by Provider  Tobacco  Allergies  Meds  Problems  Med Hx  Surg Hx  Fam Hx         Review of Systems   Constitutional, HEENT, cardiovascular, pulmonary, gi and gu systems are negative, except as otherwise noted.      Objective    There were no vitals taken for this visit.  Estimated body mass index is 50.88 kg/m  as calculated from the following:    Height as of 2/10/20: 1.645 m (5' 4.76\").    Weight as of 2/10/20: 137.7 kg (303 lb 8 oz).  Physical Exam     GENERAL: Healthy, alert and no distress  EYES: Eyes grossly normal to inspection.  No discharge or erythema, or obvious scleral/conjunctival abnormalities.  RESP: No audible wheeze, cough, or visible cyanosis.  No visible retractions or increased work of breathing.    SKIN: Visible skin clear. No significant rash, abnormal pigmentation or lesions.  NEURO: Cranial nerves grossly intact.  Mentation " "and speech appropriate for age.  PSYCH: Mentation appears normal, affect normal/bright, judgement and insight intact, normal speech and appearance well-groomed.      Diagnostic Test Results:  none         Assessment & Plan     1. BMI 50.0-59.9, adult (H)  Increase topiramate as below to help with cravings.  See patient instructions.  - phentermine (ADIPEX-P) 15 MG capsule; Take 1 capsule (15 mg) by mouth every morning  Dispense: 30 capsule; Refill: 2  - topiramate (TOPAMAX) 50 MG tablet; Take 1.5 tablets (75 mg) by mouth every evening AND 0.5 tablets (25 mg) every morning.  Dispense: 180 tablet; Refill: 1    2. Menometrorrhagia  Stable.  Continue current treatment plan and medications.   - desogestrel-ethinyl estradiol (ENSKYCE) 0.15-30 MG-MCG tablet; Take 1 tablet by mouth daily  Dispense: 84 tablet; Refill: 4    3. OSCAR (generalized anxiety disorder)  Stable.  Continue current treatment plan and medications.   - FLUoxetine 20 MG tablet; Take 1 tablet (20 mg) by mouth daily  Dispense: 90 tablet; Refill: 1    4. Moderate major depression (H)  Stable.  Continue current treatment plan and medications.    - FLUoxetine 20 MG tablet; Take 1 tablet (20 mg) by mouth daily  Dispense: 90 tablet; Refill: 1    5. Snoring    - SLEEP EVALUATION & MANAGEMENT REFERRAL - ADULT -[facility] [place]  [phone] (Age 15 and up); Future     BMI:   Estimated body mass index is 50.88 kg/m  as calculated from the following:    Height as of 2/10/20: 1.645 m (5' 4.76\").    Weight as of 2/10/20: 137.7 kg (303 lb 8 oz).   Weight management plan: Discussed healthy diet and exercise guidelines            Return in about 4 weeks (around 7/2/2020) for Weight Recheck.    ROXI Song CNP  [facility]      Video-Visit Details    Type of service:  Video Visit    Video End Time:7:28 AM    Originating Location (pt. Location): Home    [facility]     Platform used for Video Visit: AmWell    Return in about 4 weeks (around 7/2/2020) for Weight Recheck.       ROXI Song CNP

## 2020-06-04 NOTE — PATIENT INSTRUCTIONS
Aim to meal prep at least half of your meals.  Keep up the exercise and increase if able.  Consider sleep medicine follow-up.

## 2020-06-12 ENCOUNTER — VIRTUAL VISIT (OUTPATIENT)
Dept: SLEEP MEDICINE | Facility: CLINIC | Age: 30
End: 2020-06-12
Attending: NURSE PRACTITIONER
Payer: COMMERCIAL

## 2020-06-12 DIAGNOSIS — R06.83 SNORING: Primary | ICD-10-CM

## 2020-06-12 PROCEDURE — 99213 OFFICE O/P EST LOW 20 MIN: CPT | Mod: 95 | Performed by: PHYSICIAN ASSISTANT

## 2020-06-12 NOTE — PATIENT INSTRUCTIONS
"MY TREATMENT INFORMATION FOR SLEEP APNEA-  Marichuy Worrell    DOCTOR : Pedro Singh PA-C  SLEEP CENTER :      MY CONTACT NUMBER:     Am I having a sleep study at a sleep center?  Make sure you have an appointment for the study before you leave!    Am I having a home sleep study?  Watch this video:  /drop off device-   Https://www.nlyte Softwareube.com/watch?v=yGGFBdELGhk  Disposable device sent out require phone/computer application-   https://www.[employer]te Softwareube.com/watch?v=BCce_vbiwxE  Please verify your insurance coverage with your insurance carrier    Frequently asked questions:  1. What is Obstructive Sleep Apnea (MASON)? MASON is the most common type of sleep apnea. Apnea means, \"without breath.\"  Apnea is most often caused by narrowing or collapse of the upper airway as muscles relax during sleep.   Almost everyone has occasional apneas. Most people with sleep apnea have had brief interruptions at night frequently for many years.  The severity of sleep apnea is related to how frequent and severe the events are.   2. What are the consequences of MASON? Symptoms include: feeling sleepy during the day, snoring loudly, gasping or stopping of breathing, trouble sleeping, and occasionally morning headaches or heartburn at night.  Sleepiness can be serious and even increase the risk of falling asleep while driving. Other health consequences may include development of high blood pressure and other cardiovascular disease in persons who are susceptible. Untreated MASON  can contribute to heart disease, stroke and diabetes.   3. What are the treatment options? In most situations, sleep apnea is a lifelong disease that must be managed with daily therapy. Medications are not effective for sleep apnea and surgery is generally not considered until other therapies have been tried. Your treatment is your choice . Continuous Positive Airway (CPAP) works right away and is the therapy that is effective in nearly everyone. An oral device to hold " your jaw forward is usually the next most reliable option. Other options include postioning devices (to keep you off your back), weight loss, and surgery including a tongue pacing device. There is more detail about some of these options below.    Important tips for using CPAP and similar devices   Know your equipment:  CPAP is continuous positive airway pressure that prevents obstructive sleep apnea by keeping the throat from collapsing while you are sleeping. In most cases, the device is  smart  and can slowly self-adjusts if your throat collapses and keeps a record every day of how well you are treated-this information is available to you and your care team.  BPAP is bilevel positive airway pressure that keeps your throat open and also assists each breath with a pressure boost to maintain adequate breathing.  Special kinds of BPAP are used in patients who have inadequate breathing from lung or heart disease. In most cases, the device is  smart  and can slowly self-adjusts to assist breathing. Like CPAP, the device keeps a record of how well you are treated.  Your mask is your connection to the device. You get to choose what feels most comfortable and the staff will help to make sure if fits. Here: are some examples of the different masks that are available:       Key points to remember on your journey with sleep apnea:  1. Sleep study.  PAP devices often need to be adjusted during a sleep study to show that they are effective and adjusted right.  2. Good tips to remember: Try wearing just the mask during a quiet time during the day so your body adapts to wearing it. A humidifier is recommended for comfort in most cases to prevent drying of your nose and throat. Allergy medication from your provider may help you if you are having nasal congestion.  3. Getting settled-in. It takes more than one night for most of us to get used to wearing a mask. Try wearing just the mask during a quiet time during the day so your  body adapts to wearing it. A humidifier is recommended for comfort in most cases. Our team will work with you carefully on the first day and will be in contact within 4 days and again at 2 and 4 weeks for advice and remote device adjustments. Your therapy is evaluated by the device each day.   4. Use it every night. The more you are able to sleep naturally for 7-8 hours, the more likely you will have good sleep and to prevent health risks or symptoms from sleep apnea. Even if you use it 4 hours it helps. Occasionally all of us are unable to use a medical therapy, in sleep apnea, it is not dangerous to miss one night.   5. Communicate. Call our skilled team on the number provided on the first day if your visit for problems that make it difficult to wear the device. Over 2 out of 3 patients can learn to wear the device long-term with help from our team. Remember to call our team or your sleep providers if you are unable to wear the device as we may have other solutions for those who cannot adapt to mask CPAP therapy. It is recommended that you sleep your sleep provider within the first 3 months and yearly after that if you are not having problems.   6. Use it for your health. We encourage use of CPAP masks during daytime quiet periods to allow your face and brain to adapt to the sensation of CPAP so that it will be a more natural sensation to awaken to at night or during naps. This can be very useful during the first few weeks or months of adapting to CPAP though it does not help medically to wear CPAP during wakefulness and  should not be used as a strategy just to meet guidelines.  7. Take care of your equipment. Make sure you clean your mask and tubing using directions every day and that your filter and mask are replaced as recommended or if they are not working.     BESIDES CPAP, WHAT OTHER THERAPIES ARE THERE?    Positioning Device  Positioning devices are generally used when sleep apnea is mild and only occurs  on your back.This example shows a pillow that straps around the waist. It may be appropriate for those whose sleep study shows milder sleep apnea that occurs primarily when lying flat on one's back. Preliminary studies have shown benefit but effectiveness at home may need to be verified by a home sleep test. These devices are generally not covered by medical insurance.  Examples of devices that maintain sleeping on the back to prevent snoring and mild sleep apnea.    Belt type body positioner  Http://[employer]/    Electronic reminder  Http://nightshifttherapy.com/  Http://www.[employer].au/      Oral Appliance  What is oral appliance therapy?  An oral appliance device fits on your teeth at night like a retainer used after having braces. The device is made by a specialized dentist and requires several visits over 1-2 months before a manufactured device is made to fit your teeth and is adjusted to prevent your sleep apnea. Once an oral device is working properly, snoring should be improved. A home sleep test may be recommended at that time if to determine whether the sleep apnea is adequately treated.       Some things to remember:  -Oral devices are often, but not always, covered by your medical insurance. Be sure to check with your insurance provider.   -If you are referred for oral therapy, you will be given a list of specialized dentists to consider or you may choose to visit the Web site of the American Academy of Dental Sleep Medicine  -Oral devices are less likely to work if you have severe sleep apnea or are extremely overweight.     More detailed information  An oral appliance is a small acrylic device that fits over the upper and lower teeth  (similar to a retainer or a mouth guard). This device slightly moves jaw forward, which moves the base of the tongue forward, opens the airway, improves breathing for effective treat snoring and obstructive sleep apnea in perhaps 7 out of 10 people .  The best  working devices are custom-made by a dental device  after a mold is made of the teeth 1, 2, 3.  When is an oral appliance indicated?  Oral appliance therapy is recommended as a first-line treatment for patients with primary snoring, mild sleep apnea, and for patients with moderate sleep apnea who prefer appliance therapy to use of CPAP4, 5. Severity of sleep apnea is determined by sleep testing and is based on the number of respiratory events per hour of sleep.   How successful is oral appliance therapy?  The success rate of oral appliance therapy in patients with mild sleep apnea is 75-80% while in patients with moderate sleep apnea it is 50-70%. The chance of success in patients with severe sleep apnea is 40-50%. The research also shows that oral appliances have a beneficial effect on the cardiovascular health of MASON patients at the same magnitude as CPAP therapy7.  Oral appliances should be a second-line treatment in cases of severe sleep apnea, but if not completely successful then a combination therapy utilizing CPAP plus oral appliance therapy may be effective. Oral appliances tend to be effective in a broad range of patients although studies show that the patients who have the highest success are females, younger patients, those with milder disease, and less severe obesity. 3, 6.   Finding a dentist that practices dental sleep medicine  Specific training is available through the American Academy of Dental Sleep Medicine for dentists interested in working in the field of sleep. To find a dentist who is educated in the field of sleep and the use of oral appliances, near you, visit the Web site of the American Academy of Dental Sleep Medicine.    References  1. Faustino et al. Objectively measured vs self-reported compliance during oral appliance therapy for sleep-disordered breathing. Chest 2013; 144(5): 0959-6398.  2. Selvin et al. Objective measurement of compliance during oral appliance  therapy for sleep-disordered breathing. Thorax 2013; 68(1): 91-96.  3. Maia et al. Mandibular advancement devices in 620 men and women with MASON and snoring: tolerability and predictors of treatment success. Chest 2004; 125: 7772-0107.  4. Jeaneth et al. Oral appliances for snoring and MASNO: a review. Sleep 2006; 29: 244-262.  5. Marycruz et al. Oral appliance treatment for MASON: an update. J Clin Sleep Med 2014; 10(2): 215-227.  6. Sylvester et al. Predictors of OSAH treatment outcome. J Dent Res 2007; 86: 5853-0859.      Weight Loss:    Weight loss is a long-term strategy that may improve sleep apnea in some patients.    Weight management is a personal decision and the decision should be based on your interest and the potential benefits.  If you are interested in exploring weight loss strategies, the following discussion covers the impact on weight loss on sleep apnea and the approaches that may be successful.    Being overweight does not necessarily mean you will have health consequences.  Those who have BMI over 35 or over 27 with existing medical conditions carries greater risk.   Weight loss decreases severity of sleep apnea in most people with obesity. For those with mild obesity who have developed snoring with weight gain, even 15-30 pound weight loss can improve and occasionally eliminate sleep apnea.  Structured and life-long dietary and health habits are necessary to lose weight and keep healthier weight levels.     Though there may be significant health benefits from weight loss, long-term weight loss is very difficult to achieve- studies show success with dietary management in less than 10% of people. In addition, substantial weight loss may require years of dietary control and may be difficult if patients have severe obesity. In these cases, surgical management may be considered.  Finally, older individuals who have tolerated obesity without health complications may be less likely to benefit from  weight loss strategies.        Your BMI is There is no height or weight on file to calculate BMI.  Weight management is a personal decision.  If you are interested in exploring weight loss strategies, the following discussion covers the approaches that may be successful. Body mass index (BMI) is one way to tell whether you are at a healthy weight, overweight, or obese. It measures your weight in relation to your height.  A BMI of 18.5 to 24.9 is in the healthy range. A person with a BMI of 25 to 29.9 is considered overweight, and someone with a BMI of 30 or greater is considered obese. More than two-thirds of American adults are considered overweight or obese.  Being overweight or obese increases the risk for further weight gain. Excess weight may lead to heart disease and diabetes.  Creating and following plans for healthy eating and physical activity may help you improve your health.  Weight control is part of healthy lifestyle and includes exercise, emotional health, and healthy eating habits. Careful eating habits lifelong are the mainstay of weight control. Though there are significant health benefits from weight loss, long-term weight loss with diet alone may be very difficult to achieve- studies show long-term success with dietary management in less than 10% of people. Attaining a healthy weight may be especially difficult to achieve in those with severe obesity. In some cases, medications, devices and surgical management might be considered.  What can you do?  If you are overweight or obese and are interested in methods for weight loss, you should discuss this with your provider.     Consider reducing daily calorie intake by 500 calories.     Keep a food journal.     Avoiding skipping meals, consider cutting portions instead.    Diet combined with exercise helps maintain muscle while optimizing fat loss. Strength training is particularly important for building and maintaining muscle mass. Exercise helps  reduce stress, increase energy, and improves fitness. Increasing exercise without diet control, however, may not burn enough calories to loose weight.       Start walking three days a week 10-20 minutes at a time    Work towards walking thirty minutes five days a week     Eventually, increase the speed of your walking for 1-2 minutes at time    In addition, we recommend that you review healthy lifestyles and methods for weight loss available through the National Institutes of Health patient information sites:  http://win.niddk.nih.gov/publications/index.htm    And look into health and wellness programs that may be available through your health insurance provider, employer, local community center, or young club.          Surgery:    Surgery for obstructive sleep apnea is considered generally only when other therapies fail to work. Surgery may be discussed with you if you are having a difficult time tolerating CPAP and or when there is an abnormal structure that requires surgical correction.  Nose and throat surgeries often enlarge the airway to prevent collapse.  Most of these surgeries create pain for 1-2 weeks and up to half of the most common surgeries are not effective throughout life.  You should carefully discuss the benefits and drawbacks to surgery with your sleep provider and surgeon to determine if it is the best solution for you.   More information  Surgery for MASON is directed at areas that are responsible for narrowing or complete obstruction of the airway during sleep.  There are a wide range of procedures available to enlarge and/or stabilize the airway to prevent blockage of breathing in the three major areas where it can occur: the palate, tongue, and nasal regions.  Successful surgical treatment depends on the accurate identification of the factors responsible for obstructive sleep apnea in each person.  A personalized approach is required because there is no single treatment that works well for  everyone.  Because of anatomic variation, consultation with an examination by a sleep surgeon is a critical first step in determining what surgical options are best for each patient.  In some cases, examination during sedation may be recommended in order to guide the selection of procedures.  Patients will be counseled about risks and benefits as well as the typical recovery course after surgery. Surgery is typically not a cure for a person s MASON.  However, surgery will often significantly improve one s MASON severity (termed  success rate ).  Even in the absence of a cure, surgery will decrease the cardiovascular risk associated with OSA7; improve overall quality of life8 (sleepiness, functionality, sleep quality, etc).      Palate Procedures:  Patients with MASON often have narrowing of their airway in the region of their tonsils and uvula.  The goals of palate procedures are to widen the airway in this region as well as to help the tissues resist collapse.  Modern palate procedure techniques focus on tissue conservation and soft tissue rearrangement, rather than tissue removal.  Often the uvula is preserved in this procedure. Residual sleep apnea is common in patient after pharyngoplasty with an average reduction in sleep apnea events of 33%2.      Tongue Procedures:  ExamWhile patients are awake, the muscles that surround the throat are active and keep this region open for breathing. These muscles relax during sleep, allowing the tongue and other structures to collapse and block breathing.  There are several different tongue procedures available.  Selection of a tongue base procedure depends on characteristics seen on physical exam.  Generally, procedures are aimed at removing bulky tissues in this area or preventing the back of the tongue from falling back during sleep.  Success rates for tongue surgery range from 50-62%3.    Hypoglossal Nerve Stimulation:  Hypoglossal nerve stimulation has recently received  approval from the [place] Food and Drug Administration for the treatment of obstructive sleep apnea.  This is based on research showing that the system was safe and effective in treating sleep apnea6.  Results showed that the median AHI score decreased 68%, from 29.3 to 9.0. This therapy uses an implant system that senses breathing patterns and delivers mild stimulation to airway muscles, which keeps the airway open during sleep.  The system consists of three fully implanted components: a small generator (similar in size to a pacemaker), a breathing sensor, and a stimulation lead.  Using a small handheld remote, a patient turns the therapy on before bed and off upon awakening.    Candidates for this device must be greater than 22 years of age, have moderate to severe MASON (AHI between 20-65), BMI less than 32, have tried CPAP/oral appliance without success, and have appropriate upper airway anatomy (determined by a sleep endoscopy performed by Dr. Hawkins).    Hypoglossal Nerve Stimulation Pathway:    The sleep surgeon s office will work with the patient through the insurance prior-authorization process (including communications and appeals).    Nasal Procedures:  Nasal obstruction can interfere with nasal breathing during the day and night.  Studies have shown that relief of nasal obstruction can improve the ability of some patients to tolerate positive airway pressure therapy for obstructive sleep apnea1.  Treatment options include medications such as nasal saline, topical corticosteroid and antihistamine sprays, and oral medications such as antihistamines or decongestants. Non-surgical treatments can include external nasal dilators for selected patients. If these are not successful by themselves, surgery can improve the nasal airway either alone or in combination with these other options.      Combination Procedures:  Combination of surgical procedures and other treatments may be recommended, particularly if  patients have more than one area of narrowing or persistent positional disease.  The success rate of combination surgery ranges from 66-80%2,3.    References  1. Keyon MEZA. The Role of the Nose in Snoring and Obstructive Sleep Apnoea: An Update.  Eur Arch Otorhinolaryngol. 2011; 268: 1365-73.  2.  Ana SM; Noel JA; Rangel JR; Pallanch JF; Lorie MB; Heidi SG; Mehdi CARABALLO. Surgical modifications of the upper airway for obstructive sleep apnea in adults: a systematic review and meta-analysis. SLEEP 2010;33(10):8937-6177. Rommel HERRERA. Hypopharyngeal surgery in obstructive sleep apnea: an evidence-based medicine review.  Arch Otolaryngol Head Neck Surg. 2006 Feb;132(2):206-13.  3. Vincenzo YH1, Mil Y, Jose ALEXSANDRA. The efficacy of anatomically based multilevel surgery for obstructive sleep apnea. Otolaryngol Head Neck Surg. 2003 Oct;129(4):327-35.  4. Rommel HERRERA, Goldberg A. Hypopharyngeal Surgery in Obstructive Sleep Apnea: An Evidence-Based Medicine Review. Arch Otolaryngol Head Neck Surg. 2006 Feb;132(2):206-13.  5. Juan PJ et al. Upper-Airway Stimulation for Obstructive Sleep Apnea.  N Engl J Med. 2014 Jan 9;370(2):139-49.  6. Zeb Y et al. Increased Incidence of Cardiovascular Disease in Middle-aged Men with Obstructive Sleep Apnea. Am J Respir Crit Care Med; 2002 166: 159-165  7. Yair EM et al. Studying Life Effects and Effectiveness of Palatopharyngoplasty (SLEEP) study: Subjective Outcomes of Isolated Uvulopalatopharyngoplasty. Otolaryngol Head Neck Surg. 2011; 144: 623-631.          Changing Sleep Habits    Sometimes insomnia can be made worse by our own habits or situation.  You should consider making some of the following changes to help improve your sleep:     If there is excessive noise in your house / neighborhood use a fan or similar device to make a quiet background noise that can drown out other noises    If your room is too bright early in the morning, hang a blanket or extra curtain over the  windows to keep the light out or use a sleeping mask to cover your eyes    If your room is too warm during the night, set the temperature in the house down a few degrees for the night    Spend a little time before bedtime trying to relax.  Don t work right up until bedtime.  Take 30-60 minutes to relax and unwind before bedtime with a book or watching TV    Do not drink or eat anything with caffeine in it at least 6 hours before bed.    Avoid alcohol at least three hours before bedtime    Do not smoke right before bedtime or during the night    No strenuous exercise for 2-3 hours before bedtime

## 2020-06-12 NOTE — PROGRESS NOTES
"Marichuy Worrell is a 29 year old female who is being evaluated via a billable video visit.      The patient has been notified of following:     \"This video visit will be conducted via a call between you and your physician/provider. We have found that certain health care needs can be provided without the need for an in-person physical exam.  This service lets us provide the care you need with a video conversation.  If a prescription is necessary we can send it directly to your pharmacy.  If lab work is needed we can place an order for that and you can then stop by our lab to have the test done at a later time.    Video visits are billed at different rates depending on your insurance coverage.  Please reach out to your insurance provider with any questions.    If during the course of the call the physician/provider feels a video visit is not appropriate, you will not be charged for this service.\"    Patient has given verbal consent for Video visit? Yes    Will anyone else be joining your video visit? No        Video-Visit Details    Type of service:  Video Visit    Video Start Time: 0103  Video End Time: 1:42 PM    Originating Location (pt. Location): Home    Distant Location (provider location):  [facility]     Platform used for Video Visit: Enzo Singh PA-C     Does Marichuy have a CPAP/Bipap?  No     [place] Sleep Scale: 10      Sleep Consultation:    Date on this visit: 6/12/2020    Marichuy Worrell is sent by Bekah Doll for a sleep consultation regarding interrupted sleep, snoring, witnessed apneas. Waking up gasping. .    Primary Physician: Bekah Doll     Marichuy Worrell reports nightly snoring and poor quality of sleep and occasional gasping and choking for 1 1/2-2 years..     Marichuy goes to sleep at 11:30 PM during the week. She wakes up at 6:30 AM with an alarm. She falls asleep in 10 minutes.  Marichuy denies difficulty falling asleep.  She wakes up >8 " times a night for 5 minutes before falling back to sleep.  Marichuy wakes up to pain and irritation.  On weekends, Marichuy goes to sleep at 11:30 PM.  She wakes up at 8:00 AM without an alarm. She falls asleep in 10 minutes.  Patient gets an average of 5-6 hours of sleep per night.     Patient does use electronics in bed.     Marichuy does not do shift work.  She works day shifts.      Marichuy does snore every night. Patient does not have a regular bed partner. There is report of snoring and poor quality of sleep.  She does not have witnessed apneas. Patient sleeps on her back, side and stomach. She has occasional snort arousals, morning dry mouth and restless legs,. Marichuy has occasional sleep walking, sleep talking and sleep eating..    She confirms sleep walking, sleep talking and enuresis as a child.  Marichuy has difficulty breathing through her nose and depression.      Marichuy has gained 0-5 pounds in the last year.  Patient describes themself as a night person.  She would prefer to go to sleep at 11:30 PM and wake up at 8:00 AM.  Patient's [place] Sleepiness score 10/24 consistent with excessive  daytime sleepiness.      Marichuy naps 1-2 times per month for  minutes, feels unrefreshed after naps. She takes some inadvertant naps.  She denies closing eyes, dozing and falling asleep while driving. Patient was counseled on the importance of driving while alert, to pull over if drowsy, or nap before getting into the vehicle if sleepy.  She uses no caffeine.      Allergies:    Allergies   Allergen Reactions     Biaxin [Clarithromycin]      rash       Medications:    Current Outpatient Medications   Medication Sig Dispense Refill     desogestrel-ethinyl estradiol (ENSKYCE) 0.15-30 MG-MCG tablet Take 1 tablet by mouth daily 84 tablet 4     FLUoxetine 20 MG tablet Take 1 tablet (20 mg) by mouth daily 90 tablet 1     levothyroxine (SYNTHROID/LEVOTHROID) 200 MCG tablet Take 1 tablet (200 mcg) by mouth daily 90  tablet 3     melatonin 5 MG tablet Take 5 mg by mouth nightly as needed for sleep       phentermine (ADIPEX-P) 15 MG capsule Take 1 capsule (15 mg) by mouth every morning 30 capsule 2     topiramate (TOPAMAX) 50 MG tablet Take 1.5 tablets (75 mg) by mouth every evening AND 0.5 tablets (25 mg) every morning. 180 tablet 1       Problem List:  Patient Active Problem List    Diagnosis Date Noted     OSCAR (generalized anxiety disorder) 05/15/2018     Priority: Medium     ASCUS of cervix with negative high risk HPV 08/08/2017     Priority: Medium     8/8/17 ASCUS Pap, Neg HPV. Plan cotest in 3 years.        Other specified hypothyroidism 09/28/2015     Priority: Medium     CARDIOVASCULAR SCREENING; LDL GOAL LESS THAN 160 12/12/2012     Priority: Medium     Moderate major depression (H) 05/02/2011     Priority: Medium     Morbidly obese (H)      Priority: Medium        Past Medical/Surgical History:  Past Medical History:   Diagnosis Date     ASCUS of cervix with negative high risk HPV 08/08/2017     Depressive disorder      Hypothyroidism 2009     Moderate major depression (H)      Morbidly obese (H)      Past Surgical History:   Procedure Laterality Date     CHOLECYSTECTOMY, LAPOROSCOPIC  12/2009       Social History:  Social History     Socioeconomic History     Marital status: Single     Spouse name: Not on file     Number of children: Not on file     Years of education: Not on file     Highest education level: Not on file   Occupational History     Not on file   Social Needs     Financial resource strain: Not on file     Food insecurity     Worry: Not on file     Inability: Not on file     Transportation needs     Medical: Not on file     Non-medical: Not on file   Tobacco Use     Smoking status: Never Smoker     Smokeless tobacco: Never Used   Substance and Sexual Activity     Alcohol use: No     Drug use: No     Sexual activity: Never   Lifestyle     Physical activity     Days per week: Not on file     Minutes per  session: Not on file     Stress: Not on file   Relationships     Social connections     Talks on phone: Not on file     Gets together: Not on file     Attends Zoroastrian service: Not on file     Active member of club or organization: Not on file     Attends meetings of clubs or organizations: Not on file     Relationship status: Not on file     Intimate partner violence     Fear of current or ex partner: Not on file     Emotionally abused: Not on file     Physically abused: Not on file     Forced sexual activity: Not on file   Other Topics Concern     Parent/sibling w/ CABG, MI or angioplasty before 65F 55M? No   Social History Narrative     Not on file       Family History:  Family History   Problem Relation Age of Onset     Asthma Mother      Other Cancer Mother      Depression Mother      Obesity Mother      Melanoma Mother      Unknown/Adopted Father      Other Cancer Father      Arthritis Maternal Grandmother      Thyroid Disease Maternal Grandmother      Obesity Maternal Grandmother      Cancer Maternal Grandfather         lung cancer     Unknown/Adopted Paternal Grandmother      Unknown/Adopted Paternal Grandfather      Asthma Brother      Allergies Brother        Impression/Plan:    Snoring, sleep snorts, in patient with obesity, possible restless legs. Will request a HST to be picked up at the [place] location.   Literature provided regarding sleep apnea.      She will follow up with me in approximately two weeks after her sleep study has been competed to review the results and discuss plan of care.       Polysomnography reviewed.  Obstructive sleep apnea reviewed.  Complications of untreated sleep apnea were reviewed.        CC: Bekah Doll

## 2020-06-30 ASSESSMENT — PATIENT HEALTH QUESTIONNAIRE - PHQ9: SUM OF ALL RESPONSES TO PHQ QUESTIONS 1-9: 5

## 2020-06-30 NOTE — PROGRESS NOTES
"Marichuy Worrell is a 30 year old female who is being evaluated via a billable telephone visit.      The patient has been notified of following:     \"This telephone visit will be conducted via a call between you and your physician/provider. We have found that certain health care needs can be provided without the need for a physical exam.  This service lets us provide the care you need with a short phone conversation.  If a prescription is necessary we can send it directly to your pharmacy.  If lab work is needed we can place an order for that and you can then stop by our lab to have the test done at a later time.    Telephone visits are billed at different rates depending on your insurance coverage. During this emergency period, for some insurers they may be billed the same as an in-person visit.  Please reach out to your insurance provider with any questions.    If during the course of the call the physician/provider feels a telephone visit is not appropriate, you will not be charged for this service.\"    Patient has given verbal consent for Telephone visit?  Yes    What phone number would you like to be contacted at? [phone]    How would you like to obtain your AVS? Lisa Maddox     Marichuy Worrell is a 30 year old female who presents via phone visit today for the following health issues:    HPI   Chief Complaint   Patient presents with     Weight Check       Patient saw sleep medicine since her last appointment.  She notes that her sleep has actually improved.  She is not waking up as much at night.  She has to perform sleep study at home.    Patient denies any side effects from increased dose of topiramate.  She notes continued cravings.  She has been delaying her morning dose of topiramate until she gets to work to help with cravings.  She feels she has been meal prepping more and is eating less fast food.  She continues to struggle with her new roommate who wants to eat more processed food.  Patient " notes that lunches at work have changed to salads and she feels her lunches have improved overall.    Patient bought a FitBit to motivate her to be more active.  She has been outside and more active in general.  She has not been to the gym.    Patient has not weighed herself since last visit.      Patient Active Problem List   Diagnosis     Morbidly obese (H)     Moderate major depression (H)     CARDIOVASCULAR SCREENING; LDL GOAL LESS THAN 160     Other specified hypothyroidism     ASCUS of cervix with negative high risk HPV     OSCAR (generalized anxiety disorder)     Past Surgical History:   Procedure Laterality Date     CHOLECYSTECTOMY, LAPOROSCOPIC  12/2009       Social History     Tobacco Use     Smoking status: Never Smoker     Smokeless tobacco: Never Used   Substance Use Topics     Alcohol use: No     Family History   Problem Relation Age of Onset     Asthma Mother      Other Cancer Mother      Depression Mother      Obesity Mother      Melanoma Mother      Unknown/Adopted Father      Other Cancer Father      Arthritis Maternal Grandmother      Thyroid Disease Maternal Grandmother      Obesity Maternal Grandmother      Cancer Maternal Grandfather         lung cancer     Unknown/Adopted Paternal Grandmother      Unknown/Adopted Paternal Grandfather      Asthma Brother      Allergies Brother          Current Outpatient Medications   Medication Sig Dispense Refill     desogestrel-ethinyl estradiol (ENSKYCE) 0.15-30 MG-MCG tablet Take 1 tablet by mouth daily 84 tablet 4     FLUoxetine 20 MG tablet Take 1 tablet (20 mg) by mouth daily 90 tablet 1     levothyroxine (SYNTHROID/LEVOTHROID) 200 MCG tablet Take 1 tablet (200 mcg) by mouth daily 90 tablet 3     melatonin 5 MG tablet Take 5 mg by mouth nightly as needed for sleep       phentermine (ADIPEX-P) 15 MG capsule Take 1 capsule (15 mg) by mouth every morning 30 capsule 2     topiramate (TOPAMAX) 50 MG tablet Take 1.5 tablets (75 mg) by mouth every evening AND 1  tablet (50 mg) every morning. 225 tablet 1     Allergies   Allergen Reactions     Biaxin [Clarithromycin]      rash     BP Readings from Last 3 Encounters:   02/10/20 124/70   11/07/19 126/70   10/03/19 122/74    Wt Readings from Last 3 Encounters:   02/10/20 137.7 kg (303 lb 8 oz)   11/07/19 140.2 kg (309 lb)   10/03/19 139.7 kg (308 lb 0.2 oz)                    Reviewed and updated as needed this visit by Provider  Tobacco  Allergies  Meds  Problems  Med Hx  Surg Hx  Fam Hx         Review of Systems   Constitutional, HEENT, cardiovascular, pulmonary, gi and gu systems are negative, except as otherwise noted.       Objective   Reported vitals:  There were no vitals taken for this visit.   healthy, alert and no distress  PSYCH: Alert and oriented times 3; coherent speech, normal   rate and volume, able to articulate logical thoughts, able   to abstract reason, no tangential thoughts, no hallucinations   or delusions  Her affect is normal  RESP: No cough, no audible wheezing, able to talk in full sentences  Remainder of exam unable to be completed due to telephone visits    Diagnostic Test Results:  none         Assessment/Plan:  1. BMI 50.0-59.9, adult (H)  Patient to continue to work on meal prepping and increasing exercise.  Increase topiramate as below to help with cravings.  - topiramate (TOPAMAX) 50 MG tablet; Take 1.5 tablets (75 mg) by mouth every evening AND 1 tablet (50 mg) every morning.  Dispense: 225 tablet; Refill: 1    Return in about 4 weeks (around 7/29/2020) for Weight Recheck.      Phone call duration:  11 minutes    ROXI Song CNP

## 2020-07-01 ENCOUNTER — VIRTUAL VISIT (OUTPATIENT)
Dept: FAMILY MEDICINE | Facility: CLINIC | Age: 30
End: 2020-07-01
Payer: COMMERCIAL

## 2020-07-01 PROCEDURE — 99213 OFFICE O/P EST LOW 20 MIN: CPT | Mod: TEL | Performed by: NURSE PRACTITIONER

## 2020-07-01 RX ORDER — TOPIRAMATE 50 MG/1
TABLET, FILM COATED ORAL
Qty: 225 TABLET | Refills: 1 | Status: SHIPPED | OUTPATIENT
Start: 2020-07-01 | End: 2021-02-18

## 2020-07-08 ENCOUNTER — VIRTUAL VISIT (OUTPATIENT)
Dept: FAMILY MEDICINE | Facility: OTHER | Age: 30
End: 2020-07-08

## 2020-07-08 NOTE — PROGRESS NOTES
"Date: 2020 11:22:51  Clinician: Andre Mendoza  Clinician NPI: [id number]  Patient: Marichuy Worrell  Patient : 1990  Patient Address: [address] [place] [address] [address], [place] [address]  Patient Phone: [phone]  Visit Protocol: URI  Patient Summary:  Marichuy is a 30 year old ( : 1990 ) female who initiated a Visit for COVID-19 (Coronavirus) evaluation and screening. When asked the question \"Please sign me up to receive news, health information and promotions from [facility].\", Marichuy responded \"No\".    Marichuy states her symptoms started 1-2 days ago.   Her symptoms consist of nausea, vomiting, malaise, a headache, chills, a sore throat, myalgia, and a cough. She is experiencing mild difficulty breathing with activities but can speak normally in full sentences.   Symptom details     Cough: Marichuy coughs a few times an hour and her cough is more bothersome at night. Phlegm does not come into her throat when she coughs. She does not believe her cough is caused by post-nasal drip.     Sore throat: Marichuy reports having mild throat pain (1-3 on a 10 point pain scale), does not have exudate on her tonsils, and can swallow liquids. She is not sure if the lymph nodes in her neck are enlarged. A rash has not appeared on the skin since the sore throat started.     Headache: She states the headache is moderate (4-6 on a 10 point pain scale).      Marichuy denies having wheezing, teeth pain, ageusia, diarrhea, rhinitis, ear pain, anosmia, facial pain or pressure, fever, and nasal congestion. She also denies having recent facial or sinus surgery in the past 60 days and taking antibiotic medication in the past month.   Precipitating events  Within the past week, Marichuy has not been exposed to someone with strep throat. She has not recently been exposed to someone with influenza. Marichuy has been in close contact with the following high risk individuals: people with asthma, heart disease or diabetes and " immunocompromised people.   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   She either works or volunteers as a healthcare worker or a , or works or volunteers in a healthcare facility. She provides direct patient care. 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 This is at [employer].   Marichuy has not lived in a congregate living setting in the past 14 days. She lives with a healthcare worker.   Marichuy has not had a close contact with a laboratory-confirmed COVID-19 patient within 14 days of symptom onset.   Pertinent medical history  Marichuy does not get yeast infections when she takes antibiotics.   Marichuy needs a return to work/school note.   Weight: 325 lbs   Marichuy does not smoke or use smokeless tobacco.   She denies pregnancy and denies breastfeeding. She has menstruated in the past month.   Weight: 325 lbs    MEDICATIONS: Enskyce oral, phentermine oral, topiramate oral, levonorgestrel-ethinyl estradiol oral, Prozac oral, ALLERGIES: NKDA  Clinician Response:  Dear Marichuy,    Your symptoms show that you may have coronavirus (COVID-19). This illness can cause fever, cough and trouble breathing. Many people get a mild case and get better on their own. Some people can get very sick.  What should I do?  We would like to test you for this virus.   1. Please call [phone] to schedule your visit. Explain that you were referred by [facility] to have a COVID-19 test. Be ready to share your [facility] visit ID number.  The following will serve as your written order for this COVID Test, ordered by me, for the indication of suspected  "COVID [Z20.828]: The test will be ordered in [employer], our electronic health record, after you are scheduled. It will show as ordered and authorized by Robles Garcia MD.  Order: COVID-19 (Coronavirus) [facility].      2. When it's time for your COVID test:  Stay at least 6 feet away from others. (If someone will drive you to your test, stay in the backseat, as far away from the  as you can.)   Cover your mouth and nose with a mask, tissue or washcloth.  Go straight to the testing site. Don't make any stops on the way there or back.      3.Starting now: Stay home and away from others (self-isolate) until:   You've had no fever---and no medicine that reduces fever---for 3 full days (72 hours). And...   Your other symptoms have gotten better. For example, your cough or breathing has improved. And...   At least 10 days have passed since your symptoms started.       During this time, don't leave the house except for testing or medical care.   Stay in your own room, even for meals. Use your own bathroom if you can.   Stay away from others in your home. No hugging, kissing or shaking hands. No visitors.  Don't go to work, school or anywhere else.    Clean \"high touch\" surfaces often (doorknobs, counters, handles, etc.). Use a household cleaning spray or wipes. You'll find a full list of  on the EPA website: www.epa.gov/pesticide-registration/list-n-disinfectants-use-against-sars-cov-2.   Cover your mouth and nose with a mask, tissue or washcloth to avoid spreading germs.  Wash your hands and face often. Use soap and water.  Caregivers in these groups are at risk for severe illness due to COVID-19:  o People 65 years and older  o People who live in a nursing home or long-term care facility  o People with chronic disease (lung, heart, cancer, diabetes, kidney, liver, immunologic)  o People who have a weakened immune system, including those who:   Are in cancer treatment  Take medicine " that weakens the immune system, such as corticosteroids  Had a bone marrow or organ transplant  Have an immune deficiency  Have poorly controlled HIV or AIDS  Are obese (body mass index of 40 or higher)  Smoke regularly   o Caregivers should wear gloves while washing dishes, handling laundry and cleaning bedrooms and bathrooms.  o Use caution when washing and drying laundry: Don't shake dirty laundry, and use the warmest water setting that you can.  o For more tips, go to www.cdc.gov/coronavirus/2019-ncov/downloads/10Things.pdf.    4.Sign up for [employer]. We know it's scary to hear that you might have COVID-19. We want to track your symptoms to make sure you're okay over the next 2 weeks. Please look for an email from [employer]---this is a free, online program that we'll use to keep in touch. To sign up, follow the link in the email. Learn more at [email or web address]  How can I take care of myself?   Get lots of rest. Drink extra fluids (unless a doctor has told you not to).   Take Tylenol (acetaminophen) for fever or pain. If you have liver or kidney problems, ask your family doctor if it's okay to take Tylenol.   Adults can take either:    650 mg (two 325 mg pills) every 4 to 6 hours, or...   1,000 mg (two 500 mg pills) every 8 hours as needed.    Note: Don't take more than 3,000 mg in one day. Acetaminophen is found in many medicines (both prescribed and over-the-counter medicines). Read all labels to be sure you don't take too much.   For children, check the Tylenol bottle for the right dose. The dose is based on the child's age or weight.    If you have other health problems (like cancer, heart failure, an organ transplant or severe kidney disease): Call your specialty clinic if you don't feel better in the next 2 days.       Know when to call 911. Emergency warning signs include:    Trouble breathing or shortness of breath Pain or pressure in the chest that doesn't go away Feeling confused  like you haven't felt before, or not being able to wake up Bluish-colored lips or face.  Where can I get more information?   [facility] -- About COVID-19: [email or web address]   CDC -- What to Do If You're Sick: www.cdc.gov/coronavirus/2019-ncov/about/steps-when-sick.html   CDC -- Ending Home Isolation: www.cdc.gov/coronavirus/2019-ncov/hcp/disposition-in-home-patients.html   [facility] -- Caring for Someone: www.cdc.gov/coronavirus/2019-ncov/if-you-are-sick/care-for-someone.html   [facility] -- Interim Guidance for Hospital Discharge to Home: www.[facility].[place].us/diseases/coronavirus/hcp/hospdischarge.pdf   [facility] clinical trials (COVID-19 research studies): clinicalaffairs.[facility]-clinical-trials    Below are the COVID-19 hotlines at the [place] Department of Health ([facility]). Interpreters are available.    For health questions: Call 504-689-5152 or 1-640.645.9563 (7 a.m. to 7 p.m.) For questions about schools and childcare: Call 452-867-9825 or 1-409.574.1211 (7 a.m. to 7 p.m.)      Diagnosis: Myalgia  Diagnosis ICD: M79.1

## 2020-08-04 NOTE — PROGRESS NOTES
Tan Worrell is a 30 year old female who presents to clinic today for the following health issues:    HPI       Chief Complaint   Patient presents with     Weight Check     Wt Readings from Last 5 Encounters:   08/05/20 (!) 150.6 kg (332 lb)   02/10/20 137.7 kg (303 lb 8 oz)   11/07/19 140.2 kg (309 lb)   10/03/19 139.7 kg (308 lb 0.2 oz)   08/07/19 140.2 kg (309 lb)         HISTORY OF PRESENT ILLNESS (HPI):    Patient returns today for medical weight loss follow-up visit. Patient was last seen by me on 2/10/20 and has gained 29 pounds and 2.1 % body fat.      Patient notes that she has recently resumed meal planning and more regular exercise.  She notes that she had an episode of binge eating this past weekend when she got upset.  She denies any night eating episodes.  She realizes that she tends to shop when she is upset and will usually binge at that time.  She feels that she's been in denial about her binging.  She continues with counseling, but notes that it does not address here binge eating.      Patient denies side effects from increased dose of topiramate.  She does feels that it helps with cravings and appetite.        Patient Active Problem List   Diagnosis     Morbidly obese (H)     Moderate major depression (H)     CARDIOVASCULAR SCREENING; LDL GOAL LESS THAN 160     Other specified hypothyroidism     ASCUS of cervix with negative high risk HPV     OSCAR (generalized anxiety disorder)     Past Surgical History:   Procedure Laterality Date     CHOLECYSTECTOMY, LAPOROSCOPIC  12/2009       Social History     Tobacco Use     Smoking status: Never Smoker     Smokeless tobacco: Never Used   Substance Use Topics     Alcohol use: No     Family History   Problem Relation Age of Onset     Asthma Mother      Other Cancer Mother      Depression Mother      Obesity Mother      Melanoma Mother      Unknown/Adopted Father      Other Cancer Father      Arthritis Maternal Grandmother      Thyroid Disease  "Maternal Grandmother      Obesity Maternal Grandmother      Cancer Maternal Grandfather         lung cancer     Unknown/Adopted Paternal Grandmother      Unknown/Adopted Paternal Grandfather      Asthma Brother      Allergies Brother          Current Outpatient Medications   Medication Sig Dispense Refill     desogestrel-ethinyl estradiol (ENSKYCE) 0.15-30 MG-MCG tablet Take 1 tablet by mouth daily 84 tablet 4     FLUoxetine 20 MG tablet Take 1 tablet (20 mg) by mouth daily 90 tablet 1     levothyroxine (SYNTHROID/LEVOTHROID) 200 MCG tablet Take 1 tablet (200 mcg) by mouth daily 90 tablet 3     lisdexamfetamine (VYVANSE) 10 MG capsule Take 1 capsule (10 mg) by mouth every morning 30 capsule 0     melatonin 5 MG tablet Take 5 mg by mouth nightly as needed for sleep       topiramate (TOPAMAX) 50 MG tablet Take 1.5 tablets (75 mg) by mouth every evening AND 1 tablet (50 mg) every morning. 225 tablet 1     Allergies   Allergen Reactions     Biaxin [Clarithromycin]      rash     BP Readings from Last 3 Encounters:   08/05/20 120/68   02/10/20 124/70   11/07/19 126/70    Wt Readings from Last 3 Encounters:   08/05/20 (!) 150.6 kg (332 lb)   02/10/20 137.7 kg (303 lb 8 oz)   11/07/19 140.2 kg (309 lb)                    Reviewed and updated as needed this visit by Provider  Tobacco  Allergies  Meds  Problems  Med Hx  Surg Hx  Fam Hx         Review of Systems   Constitutional, HEENT, cardiovascular, pulmonary, gi and gu systems are negative, except as otherwise noted.      Objective    /68   Pulse 92   Temp 98  F (36.7  C) (Oral)   Ht 1.643 m (5' 4.69\")   Wt (!) 150.6 kg (332 lb)   SpO2 98%   BMI 55.79 kg/m    Body mass index is 55.79 kg/m .  Physical Exam   GENERAL: healthy, alert and no distress  RESP: lungs clear to auscultation - no rales, rhonchi or wheezes  CV: regular rate and rhythm, normal S1 S2, no S3 or S4, no murmur, click or rub, no peripheral edema and peripheral pulses strong  MS: no gross " musculoskeletal defects noted, no edema    Diagnostic Test Results:  none         Assessment & Plan     1. BMI 50.0-59.9, adult (H)  Patient to continue with exercise and diet changes.  Patient will switch from phentermine to Vyvanse to help with binging.  Patient to consider referral to therapy for eating disorder or attending Binge Eating anonymous classes.  - lisdexamfetamine (VYVANSE) 10 MG capsule; Take 1 capsule (10 mg) by mouth every morning  Dispense: 30 capsule; Refill: 0    2. Binge eating disorder  As above.   - lisdexamfetamine (VYVANSE) 10 MG capsule; Take 1 capsule (10 mg) by mouth every morning  Dispense: 30 capsule; Refill: 0    3. Other specified hypothyroidism    - TSH with free T4 reflex    4. High risk medication use    - Basic metabolic panel  (Ca, Cl, CO2, Creat, Gluc, K, Na, BUN)           Return in about 4 weeks (around 9/2/2020) for Weight Recheck.    ROXI Song CNP  [facility]

## 2020-08-05 ENCOUNTER — OFFICE VISIT (OUTPATIENT)
Dept: FAMILY MEDICINE | Facility: CLINIC | Age: 30
End: 2020-08-05
Payer: COMMERCIAL

## 2020-08-05 VITALS
DIASTOLIC BLOOD PRESSURE: 68 MMHG | TEMPERATURE: 98 F | BODY MASS INDEX: 48.82 KG/M2 | OXYGEN SATURATION: 98 % | HEIGHT: 65 IN | HEART RATE: 92 BPM | SYSTOLIC BLOOD PRESSURE: 120 MMHG | WEIGHT: 293 LBS

## 2020-08-05 DIAGNOSIS — F50.819 BINGE EATING DISORDER: ICD-10-CM

## 2020-08-05 DIAGNOSIS — Z79.899 HIGH RISK MEDICATION USE: ICD-10-CM

## 2020-08-05 DIAGNOSIS — E03.8 OTHER SPECIFIED HYPOTHYROIDISM: ICD-10-CM

## 2020-08-05 LAB
ANION GAP SERPL CALCULATED.3IONS-SCNC: 5 MMOL/L (ref 3–14)
BUN SERPL-MCNC: 16 MG/DL (ref 7–30)
CALCIUM SERPL-MCNC: 8.4 MG/DL (ref 8.5–10.1)
CHLORIDE SERPL-SCNC: 113 MMOL/L (ref 94–109)
CO2 SERPL-SCNC: 21 MMOL/L (ref 20–32)
CREAT SERPL-MCNC: 0.74 MG/DL (ref 0.52–1.04)
GFR SERPL CREATININE-BSD FRML MDRD: >90 ML/MIN/{1.73_M2}
GLUCOSE SERPL-MCNC: 122 MG/DL (ref 70–99)
POTASSIUM SERPL-SCNC: 4 MMOL/L (ref 3.4–5.3)
SODIUM SERPL-SCNC: 139 MMOL/L (ref 133–144)
T4 FREE SERPL-MCNC: 1.29 NG/DL (ref 0.76–1.46)
TSH SERPL DL<=0.005 MIU/L-ACNC: 0.33 MU/L (ref 0.4–4)

## 2020-08-05 PROCEDURE — 36415 COLL VENOUS BLD VENIPUNCTURE: CPT | Performed by: NURSE PRACTITIONER

## 2020-08-05 PROCEDURE — 84439 ASSAY OF FREE THYROXINE: CPT | Performed by: NURSE PRACTITIONER

## 2020-08-05 PROCEDURE — 84443 ASSAY THYROID STIM HORMONE: CPT | Performed by: NURSE PRACTITIONER

## 2020-08-05 PROCEDURE — 80048 BASIC METABOLIC PNL TOTAL CA: CPT | Performed by: NURSE PRACTITIONER

## 2020-08-05 PROCEDURE — 99213 OFFICE O/P EST LOW 20 MIN: CPT | Performed by: NURSE PRACTITIONER

## 2020-08-05 RX ORDER — LISDEXAMFETAMINE DIMESYLATE 10 MG/1
10 CAPSULE ORAL EVERY MORNING
Qty: 30 CAPSULE | Refills: 0 | Status: SHIPPED | OUTPATIENT
Start: 2020-08-05 | End: 2020-09-03

## 2020-08-05 ASSESSMENT — PAIN SCALES - GENERAL: PAINLEVEL: NO PAIN (0)

## 2020-08-05 ASSESSMENT — MIFFLIN-ST. JEOR: SCORE: 2231.81

## 2020-08-05 NOTE — RESULT ENCOUNTER NOTE
Dear Marichuy,    Your recent test results are attached.      Okay thyroid.  Normal kidney function and electrolytes.      If you have any questions please feel free to contact [phone] or myself via [employer].    Sincerely,  Bekah Doll, CNP

## 2020-08-12 ENCOUNTER — TELEPHONE (OUTPATIENT)
Dept: FAMILY MEDICINE | Facility: CLINIC | Age: 30
End: 2020-08-12

## 2020-08-12 NOTE — TELEPHONE ENCOUNTER
Prior Authorization Retail Medication Request    Medication/Dose: lisdexamfetamine (VYVANSE) 10 MG capsule  ICD code (if different than what is on RX):    Previously Tried and Failed:    Rationale:      Insurance Name:  [employer]  Insurance ID:  [id number]      Pharmacy Information (if different than what is on RX)  Name:    Phone:

## 2020-08-12 NOTE — TELEPHONE ENCOUNTER
Prior Authorization Approval    Authorization Effective Date: 7/13/2020  Authorization Expiration Date: 8/12/2021  Medication: lisdexamfetamine (VYVANSE) 10 MG capsule  Approved Dose/Quantity:    Reference #: [id number]   Insurance Company: [employer] - Phone [phone] Fax [phone]  Expected CoPay:       CoPay Card Available:      Foundation Assistance Needed:    Which Pharmacy is filling the prescription (Not needed for infusion/clinic administered): [facility] [address] [facility] [address] [address]  Pharmacy Notified: Yes  Patient Notified: Yes **Instructed pharmacy to notify patient when script is ready to /ship.**

## 2020-08-12 NOTE — TELEPHONE ENCOUNTER
Central Prior Authorization Team   Phone: [phone]      PA Initiation    Medication: lisdexamfetamine (VYVANSE) 10 MG capsule  Insurance Company: [employer] - Phone [phone] Fax [phone]  Pharmacy Filling the Rx: [facility] [address] [place], [place] - [address] [address]  Filling Pharmacy Phone: [phone]  Filling Pharmacy Fax:    Start Date: 8/12/2020

## 2020-08-28 NOTE — TELEPHONE ENCOUNTER
Dr Andrews has reviewed labs and Will d/w pt at upcoming appt   Medication is being filled for 1 time refill only due to:  pt due for physical        - please send letter    Yoanna AQUINON, RN, CPN

## 2020-09-03 ENCOUNTER — OFFICE VISIT (OUTPATIENT)
Dept: FAMILY MEDICINE | Facility: CLINIC | Age: 30
End: 2020-09-03
Payer: COMMERCIAL

## 2020-09-03 VITALS
SYSTOLIC BLOOD PRESSURE: 116 MMHG | WEIGHT: 293 LBS | HEIGHT: 65 IN | BODY MASS INDEX: 48.82 KG/M2 | OXYGEN SATURATION: 97 % | TEMPERATURE: 97.9 F | HEART RATE: 78 BPM | DIASTOLIC BLOOD PRESSURE: 80 MMHG | RESPIRATION RATE: 20 BRPM

## 2020-09-03 DIAGNOSIS — F50.819 BINGE EATING DISORDER: ICD-10-CM

## 2020-09-03 PROCEDURE — 99213 OFFICE O/P EST LOW 20 MIN: CPT | Performed by: NURSE PRACTITIONER

## 2020-09-03 RX ORDER — TOPIRAMATE 50 MG/1
TABLET, FILM COATED ORAL
Qty: 225 TABLET | Refills: 1 | Status: CANCELLED | OUTPATIENT
Start: 2020-09-03

## 2020-09-03 RX ORDER — LISDEXAMFETAMINE DIMESYLATE 20 MG/1
20 CAPSULE ORAL EVERY MORNING
Qty: 30 CAPSULE | Refills: 0 | Status: SHIPPED | OUTPATIENT
Start: 2020-09-03 | End: 2020-10-01

## 2020-09-03 ASSESSMENT — PAIN SCALES - GENERAL: PAINLEVEL: NO PAIN (0)

## 2020-09-03 ASSESSMENT — MIFFLIN-ST. JEOR: SCORE: 2245.55

## 2020-09-03 NOTE — PROGRESS NOTES
"Tan Worrell is a 30 year old female who presents to clinic today for the following health issues:    HPI     Chief Complaint   Patient presents with     Weight Check     Wt Readings from Last 5 Encounters:   09/03/20 (!) 152 kg (335 lb 0.2 oz)   08/05/20 (!) 150.6 kg (332 lb)   02/10/20 137.7 kg (303 lb 8 oz)   11/07/19 140.2 kg (309 lb)   10/03/19 139.7 kg (308 lb 0.2 oz)         HISTORY OF PRESENT ILLNESS (HPI):    Patient returns today for medical weight loss follow-up visit. Patient was last seen by me on 8/5/20 and has gained 3 pounds and lost 0.6 % body fat.      Patient has been focusing on trying to not to snack all the time and choosing healthier options for eating.  She continues to have some bingeing.  She notes that Vyvannse initially made her tired, but that fatigue has resolved.  She denies side effects, but does not feel that current dose helps with bingeing.    Patient recently got a dog.  She is walking regularly now.  She has not resumed a formal exercise routine, but notes that she is definitely more active.      Review of Systems   Constitutional, HEENT, cardiovascular, pulmonary, gi and gu systems are negative, except as otherwise noted.      Objective    /80   Pulse 78   Temp 97.9  F (36.6  C) (Oral)   Resp 20   Ht 1.643 m (5' 4.69\")   Wt (!) 152 kg (335 lb 0.2 oz)   LMP 08/27/2020   SpO2 97%   BMI 56.28 kg/m    Body mass index is 56.28 kg/m .  Physical Exam   GENERAL: healthy, alert and no distress  RESP: lungs clear to auscultation - no rales, rhonchi or wheezes  CV: regular rate and rhythm, normal S1 S2, no S3 or S4, no murmur, click or rub, no peripheral edema and peripheral pulses strong  MS: no gross musculoskeletal defects noted, no edema            Assessment & Plan     BMI 50.0-59.9, adult (H)  Increase Vyvanse as below.  Patient to consider a binge eating support group.  - lisdexamfetamine (VYVANSE) 20 MG capsule; Take 1 capsule (20 mg) by mouth every " morning    Binge eating disorder  As above.   - lisdexamfetamine (VYVANSE) 20 MG capsule; Take 1 capsule (20 mg) by mouth every morning           Return in about 4 weeks (around 10/1/2020) for Weight Recheck.    ROXI Song [facility]

## 2020-10-01 ENCOUNTER — OFFICE VISIT (OUTPATIENT)
Dept: FAMILY MEDICINE | Facility: CLINIC | Age: 30
End: 2020-10-01
Payer: COMMERCIAL

## 2020-10-01 VITALS
RESPIRATION RATE: 18 BRPM | WEIGHT: 293 LBS | OXYGEN SATURATION: 100 % | DIASTOLIC BLOOD PRESSURE: 74 MMHG | HEIGHT: 65 IN | HEART RATE: 70 BPM | TEMPERATURE: 98.2 F | BODY MASS INDEX: 48.82 KG/M2 | SYSTOLIC BLOOD PRESSURE: 126 MMHG

## 2020-10-01 DIAGNOSIS — F50.819 BINGE EATING DISORDER: ICD-10-CM

## 2020-10-01 PROCEDURE — 99213 OFFICE O/P EST LOW 20 MIN: CPT | Performed by: NURSE PRACTITIONER

## 2020-10-01 RX ORDER — LISDEXAMFETAMINE DIMESYLATE 30 MG/1
30 CAPSULE ORAL EVERY MORNING
Qty: 30 CAPSULE | Refills: 0 | Status: SHIPPED | OUTPATIENT
Start: 2020-10-01 | End: 2020-10-29

## 2020-10-01 ASSESSMENT — PAIN SCALES - GENERAL: PAINLEVEL: NO PAIN (0)

## 2020-10-01 ASSESSMENT — MIFFLIN-ST. JEOR: SCORE: 2240.96

## 2020-10-01 NOTE — PROGRESS NOTES
"Tan Worrell is a 30 year old female who presents to clinic today for the following health issues:    HPI       Chief Complaint   Patient presents with     Weight Check     Wt Readings from Last 5 Encounters:   10/01/20 (!) 151.5 kg (334 lb)   09/03/20 (!) 152 kg (335 lb 0.2 oz)   08/05/20 (!) 150.6 kg (332 lb)   02/10/20 137.7 kg (303 lb 8 oz)   11/07/19 140.2 kg (309 lb)         HISTORY OF PRESENT ILLNESS (HPI):    Patient returns today for medical weight loss follow-up visit. Patient was last seen by me on 9/3/20 and has lost 1 pounds and gained 0.7 % body fat.    Patient is unsure if Vyvanse is helping cravings.  She notes that she will skip her dose on the weekend when out of her routine.  She will feel tired after she resumes med.    She notes that she recently started intermittent fasting and has increased exercise.  She feels that she can control things better if she has a strict set up rules around food.  She is eating healthy meals and is working on eating smaller portions of sweets.  She is tracking her diet and is staying around 2000 calories per day.  She is working on increasing her protein intake.               Review of Systems   Constitutional, HEENT, cardiovascular, pulmonary, gi and gu systems are negative, except as otherwise noted.      Objective    /74   Pulse 70   Temp 98.2  F (36.8  C) (Oral)   Resp 18   Ht 1.643 m (5' 4.69\")   Wt (!) 151.5 kg (334 lb)   SpO2 100%   BMI 56.11 kg/m    Body mass index is 56.11 kg/m .  Physical Exam   GENERAL: healthy, alert and no distress  RESP: lungs clear to auscultation - no rales, rhonchi or wheezes  CV: regular rate and rhythm, normal S1 S2, no S3 or S4, no murmur, click or rub, no peripheral edema and peripheral pulses strong  MS: no gross musculoskeletal defects noted, no edema            Assessment & Plan     BMI 50.0-59.9, adult (H)  Patient to increase Vyvanse as below and continue to work on diet and exercise changes.  " Patient encouraged to take Vyvanse on the weekend.  - lisdexamfetamine (VYVANSE) 30 MG capsule; Take 1 capsule (30 mg) by mouth every morning    Binge eating disorder    - lisdexamfetamine (VYVANSE) 30 MG capsule; Take 1 capsule (30 mg) by mouth every morning            Return in about 4 weeks (around 10/29/2020) for Weight Recheck.    ROXI Song CNP  [facility]

## 2020-10-29 ENCOUNTER — TELEPHONE (OUTPATIENT)
Dept: INTERNAL MEDICINE | Facility: CLINIC | Age: 30
End: 2020-10-29

## 2020-10-29 ENCOUNTER — OFFICE VISIT (OUTPATIENT)
Dept: FAMILY MEDICINE | Facility: CLINIC | Age: 30
End: 2020-10-29
Payer: COMMERCIAL

## 2020-10-29 VITALS
SYSTOLIC BLOOD PRESSURE: 124 MMHG | OXYGEN SATURATION: 99 % | DIASTOLIC BLOOD PRESSURE: 70 MMHG | HEART RATE: 80 BPM | TEMPERATURE: 96.8 F | BODY MASS INDEX: 48.82 KG/M2 | HEIGHT: 65 IN | WEIGHT: 293 LBS

## 2020-10-29 DIAGNOSIS — E66.01 MORBIDLY OBESE (H): Primary | ICD-10-CM

## 2020-10-29 PROCEDURE — 99213 OFFICE O/P EST LOW 20 MIN: CPT | Performed by: NURSE PRACTITIONER

## 2020-10-29 RX ORDER — PHENTERMINE HYDROCHLORIDE 37.5 MG/1
18.75 TABLET ORAL
Qty: 15 TABLET | Refills: 1 | Status: SHIPPED | OUTPATIENT
Start: 2020-10-29 | End: 2020-12-10

## 2020-10-29 ASSESSMENT — MIFFLIN-ST. JEOR: SCORE: 2254.57

## 2020-10-29 NOTE — PROGRESS NOTES
".Subjective     Marichuy Worrell is a 30 year old female who presents to clinic today for the following health issues:    HPI          Chief Complaint   Patient presents with     Weight Check     Health Maintenance     PHQ-9       Medication Followup of lisdexamfetamine (VYVANSE) 30 MG capsule    Taking Medication as prescribed: yes    Side Effects:  None    Medication Helping Symptoms:  Does not feel like it is working     Medication Followup of topiramate (TOPAMAX) 50 MG tablet    Taking Medication as prescribed: yes    Side Effects:  None    Medication Helping Symptoms:  yes     Wt Readings from Last 5 Encounters:   10/29/20 (!) 152.9 kg (337 lb)   10/01/20 (!) 151.5 kg (334 lb)   09/03/20 (!) 152 kg (335 lb 0.2 oz)   08/05/20 (!) 150.6 kg (332 lb)   02/10/20 137.7 kg (303 lb 8 oz)         HISTORY OF PRESENT ILLNESS (HPI):    Patient returns today for medical weight loss follow-up visit. Patient was last seen by me on 10/1/20 and has gained 3 pounds and 0.5 % body fat.      Patient does not feel that Vyvanse is helping with cravings.  She notes intermittent binging and fast food intake over the past couple of weeks.  She is intermittently following an intermittent fasting diet, but finds it difficult due to cravings.  She also has found it difficult to continue outdoor walks due to the weather.  She is finding it hard to motivate to exercise, as she doesn't have an accountability partner at this time.    Review of Systems   Constitutional, HEENT, cardiovascular, pulmonary, gi and gu systems are negative, except as otherwise noted.      Objective    /70   Pulse 80   Temp 96.8  F (36  C) (Oral)   Ht 1.643 m (5' 4.69\")   Wt (!) 152.9 kg (337 lb)   SpO2 99%   BMI 56.62 kg/m    Body mass index is 56.62 kg/m .  Physical Exam   GENERAL: healthy, alert and no distress  RESP: lungs clear to auscultation - no rales, rhonchi or wheezes  CV: regular rate and rhythm, normal S1 S2, no S3 or S4, no murmur, click or rub, " no peripheral edema and peripheral pulses strong  MS: no gross musculoskeletal defects noted, no edema            Assessment & Plan     Morbidly obese (H)  Patient to resume phentermine and stop Vyvanse.  Will increase dose of phentermine to help with cravings and bingeing.  Patient to work on finding a new accountability plan for exercise and bundling activities she enjoys with exercise.  - phentermine (ADIPEX-P) 37.5 MG tablet; Take 0.5 tablets (18.75 mg) by mouth every morning (before breakfast)            Return in about 6 weeks (around 12/10/2020) for Weight Recheck.    ROXI Song CNP  [facility]

## 2020-10-29 NOTE — TELEPHONE ENCOUNTER
PA Initiation    Medication: phentermine (ADIPEX-P) 37.5 MG tablet  Insurance Company: [employer] - Phone [phone] Fax [phone]  Pharmacy Filling the Rx: [facility] [address] [facility], [place] - [address] [address]  Filling Pharmacy Phone: [phone]  Filling Pharmacy Fax: [phone]  Start Date: 10/29/2020

## 2020-10-29 NOTE — TELEPHONE ENCOUNTER
Prior Authorization Approval    Authorization Effective Date: 9/29/2020  Authorization Expiration Date: 10/29/2021  Medication: phentermine (ADIPEX-P) 37.5 MG tablet  Approved Dose/Quantity:   Reference #: [id number]   Insurance Company: [employer] - Phone [phone] Fax [phone]  Expected CoPay:       CoPay Card Available:      Foundation Assistance Needed:    Which Pharmacy is filling the prescription (Not needed for infusion/clinic administered): [facility] [address] [place], [place] - [address] [address]  Pharmacy Notified: Yes  Patient Notified: Yes, **Instructed pharmacy to notify patient when script is ready to /ship.**

## 2020-10-29 NOTE — TELEPHONE ENCOUNTER
Prior Authorization Retail Medication Request    Medication/Dose: phentermine (ADIPEX-P) 37.5 MG tablet  ICD code (if different than what is on RX):    Previously Tried and Failed:    Rationale:      Insurance Name:  [employer]  Insurance ID:  [id number]      Pharmacy Information (if different than what is on RX)  Name:    Phone:

## 2020-12-06 ENCOUNTER — HEALTH MAINTENANCE LETTER (OUTPATIENT)
Age: 30
End: 2020-12-06

## 2020-12-10 ENCOUNTER — OFFICE VISIT (OUTPATIENT)
Dept: FAMILY MEDICINE | Facility: CLINIC | Age: 30
End: 2020-12-10
Payer: COMMERCIAL

## 2020-12-10 VITALS
SYSTOLIC BLOOD PRESSURE: 120 MMHG | WEIGHT: 293 LBS | DIASTOLIC BLOOD PRESSURE: 68 MMHG | OXYGEN SATURATION: 97 % | BODY MASS INDEX: 56.62 KG/M2 | HEART RATE: 90 BPM | TEMPERATURE: 97.8 F

## 2020-12-10 DIAGNOSIS — E66.01 MORBIDLY OBESE (H): Primary | ICD-10-CM

## 2020-12-10 DIAGNOSIS — L30.9 DERMATITIS: ICD-10-CM

## 2020-12-10 PROCEDURE — 99214 OFFICE O/P EST MOD 30 MIN: CPT | Performed by: NURSE PRACTITIONER

## 2020-12-10 RX ORDER — TRIAMCINOLONE ACETONIDE 1 MG/G
CREAM TOPICAL 2 TIMES DAILY
Qty: 30 G | Refills: 1 | Status: SHIPPED | OUTPATIENT
Start: 2020-12-10 | End: 2020-12-24

## 2020-12-10 RX ORDER — PHENTERMINE HYDROCHLORIDE 37.5 MG/1
18.75 TABLET ORAL
Qty: 15 TABLET | Refills: 1 | Status: SHIPPED | OUTPATIENT
Start: 2020-12-10 | End: 2021-02-18

## 2020-12-10 ASSESSMENT — PAIN SCALES - GENERAL: PAINLEVEL: NO PAIN (0)

## 2020-12-10 NOTE — PATIENT INSTRUCTIONS
Aim to eat something with protein in the am and add more protein to dinner.  Increase fruits and vegetables.    Start taking the stairs.

## 2020-12-10 NOTE — PROGRESS NOTES
Subjective     Marichuy Worrell is a 30 year old female who presents to clinic today for the following health issues:    HPI       Chief Complaint   Patient presents with     Weight Check     Derm Problem     rash on neck, no new foods, lotions, medication.          Medication Followup of phentermine (ADIPEX-P) 37.5 MG tablet    Taking Medication as prescribed: yes    Side Effects:  None    Medication Helping Symptoms:  yes     Medication Followup of topiramate (TOPAMAX) 50 MG tablet    Taking Medication as prescribed: yes    Side Effects:  None    Medication Helping Symptoms:  yes     Wt Readings from Last 5 Encounters:   12/10/20 (!) 152.9 kg (337 lb)   10/29/20 (!) 152.9 kg (337 lb)   10/01/20 (!) 151.5 kg (334 lb)   09/03/20 (!) 152 kg (335 lb 0.2 oz)   08/05/20 (!) 150.6 kg (332 lb)         HISTORY OF PRESENT ILLNESS (HPI):    Patient returns today for medical weight loss follow-up visit. Patient was last seen by me on 10/29/20 and has lost 0 pounds and lost 0.5 % body fat.    Patient denies side effects from phentermine.  She notes cravings and binging have improved.    Diet has stayed the same.  She has not eaten fast food.  Patient is not eating breakfast regularly.  She eats lunch at 11:30.  Lunch is salad or meal served at work.  Dinner is usually granola and yogurt, cereal.  Patient notes snacks are not healthy.    Patient had started to go to the gym and then it closed.  She working with weight resistant bands at home daily.  She continues to walk the dog.    Patient notes a rash to her bilateral neck for the past month.   She notes pruritis.  Hydrocortisone has improved it.      Review of Systems   Constitutional, HEENT, cardiovascular, pulmonary, gi and gu systems are negative, except as otherwise noted.      Objective    /68   Pulse 90   Temp 97.8  F (36.6  C) (Oral)   Wt (!) 152.9 kg (337 lb)   SpO2 97%   BMI 56.62 kg/m    Body mass index is 56.62 kg/m .  Physical Exam   GENERAL: healthy,  "alert and no distress  RESP: lungs clear to auscultation - no rales, rhonchi or wheezes  CV: regular rate and rhythm, normal S1 S2, no S3 or S4, no murmur, click or rub, no peripheral edema and peripheral pulses strong  MS: no gross musculoskeletal defects noted, no edema  SKIN: papule - bilateral neck, some scaling overlying lesions to left neck            Assessment & Plan     Morbidly obese (H)  See patient instructions.  - phentermine (ADIPEX-P) 37.5 MG tablet; Take 0.5 tablets (18.75 mg) by mouth every morning (before breakfast)    Dermatitis    - triamcinolone (KENALOG) 0.1 % external cream; Apply topically 2 times daily for 14 days To neck rash     BMI:   Estimated body mass index is 56.62 kg/m  as calculated from the following:    Height as of 10/29/20: 1.643 m (5' 4.69\").    Weight as of this encounter: 152.9 kg (337 lb).   Weight management plan: Discussed healthy diet and exercise guidelines         Patient Instructions   Aim to eat something with protein in the am and add more protein to dinner.  Increase fruits and vegetables.    Start taking the stairs.      Return in about 8 weeks (around 2/4/2021) for Weight Recheck.    ROXI Song CNP  [facility]      "

## 2021-01-26 DIAGNOSIS — E03.8 OTHER SPECIFIED HYPOTHYROIDISM: ICD-10-CM

## 2021-01-26 RX ORDER — LEVOTHYROXINE SODIUM 200 UG/1
TABLET ORAL
Qty: 90 TABLET | Refills: 0 | Status: SHIPPED | OUTPATIENT
Start: 2021-01-26 | End: 2021-05-03

## 2021-02-18 ENCOUNTER — OFFICE VISIT (OUTPATIENT)
Dept: FAMILY MEDICINE | Facility: CLINIC | Age: 31
End: 2021-02-18
Payer: COMMERCIAL

## 2021-02-18 VITALS — WEIGHT: 293 LBS | HEART RATE: 71 BPM | BODY MASS INDEX: 56.79 KG/M2 | TEMPERATURE: 97.9 F | OXYGEN SATURATION: 97 %

## 2021-02-18 DIAGNOSIS — E66.01 MORBIDLY OBESE (H): ICD-10-CM

## 2021-02-18 DIAGNOSIS — F41.1 GAD (GENERALIZED ANXIETY DISORDER): ICD-10-CM

## 2021-02-18 DIAGNOSIS — F32.1 MODERATE MAJOR DEPRESSION (H): ICD-10-CM

## 2021-02-18 PROCEDURE — 99213 OFFICE O/P EST LOW 20 MIN: CPT | Performed by: NURSE PRACTITIONER

## 2021-02-18 RX ORDER — FLUOXETINE 20 MG/1
40 TABLET, FILM COATED ORAL DAILY
Qty: 180 TABLET | Refills: 1 | Status: SHIPPED | OUTPATIENT
Start: 2021-02-18 | End: 2021-09-22

## 2021-02-18 RX ORDER — TOPIRAMATE 50 MG/1
TABLET, FILM COATED ORAL
Qty: 225 TABLET | Refills: 1 | Status: SHIPPED | OUTPATIENT
Start: 2021-02-18 | End: 2021-06-09

## 2021-02-18 RX ORDER — PHENTERMINE HYDROCHLORIDE 37.5 MG/1
18.75 TABLET ORAL
Qty: 15 TABLET | Refills: 1 | Status: SHIPPED | OUTPATIENT
Start: 2021-02-18 | End: 2021-04-15

## 2021-02-18 RX ORDER — TRIAMCINOLONE ACETONIDE 1 MG/G
CREAM TOPICAL PRN
COMMUNITY
Start: 2020-12-10 | End: 2023-03-23

## 2021-02-18 ASSESSMENT — PATIENT HEALTH QUESTIONNAIRE - PHQ9: SUM OF ALL RESPONSES TO PHQ QUESTIONS 1-9: 19

## 2021-02-18 NOTE — PROGRESS NOTES
Assessment & Plan     OSCAR (generalized anxiety disorder)  Patient to increase fluoxetine as below.    - FLUoxetine 20 MG tablet; Take 2 tablets (40 mg) by mouth daily    Moderate major depression (H)  As above.   - FLUoxetine 20 MG tablet; Take 2 tablets (40 mg) by mouth daily    Morbidly obese (H)  Patient to continue current meds.  Will focus on diet and exercise when mood is improved.  - topiramate (TOPAMAX) 50 MG tablet; Take 1.5 tablets (75 mg) by mouth every evening AND 1 tablet (50 mg) every morning.  - phentermine (ADIPEX-P) 37.5 MG tablet; Take 0.5 tablets (18.75 mg) by mouth every morning (before breakfast)                 Return in about 8 weeks (around 4/15/2021) for Weight Recheck.    ROXI Song [facility] SHAHBAZ Benedict is a 30 year old who presents for the following health issues     HPI       Medication Followup of phentermine (ADIPEX-P) 37.5 MG tablet    Taking Medication as prescribed: yes    Side Effects:  None    Medication Helping Symptoms:  yes     Medication Followup of topiramate (TOPAMAX) 50 MG tablet    Taking Medication as prescribed: yes    Side Effects:  None    Medication Helping Symptoms:  yes     Wt Readings from Last 5 Encounters:   02/18/21 (!) 153.3 kg (338 lb)   12/10/20 (!) 152.9 kg (337 lb)   10/29/20 (!) 152.9 kg (337 lb)   10/01/20 (!) 151.5 kg (334 lb)   09/03/20 (!) 152 kg (335 lb 0.2 oz)         HISTORY OF PRESENT ILLNESS (HPI):    Patient returns today for medical weight loss follow-up visit. Patient was last seen by me on 12/1/20 and has gained 1 pounds and gained 0.5 % body fat.      Patient notes that she has been able to eat 3 separate meals and is not snacking.  She is not as hungry as she was.  She is not eating fast food.    Breakfast: sometimes skips, hard boiled eggs, bell pepper  Lunch: salads  Dinner:  Struggles to cook dinner    Patient notes feeling a decreased motivation and notes that she will plan to  exercise and then not do it.  Patient feels down and notes anhedonia.    Patient notes break through bleeding on her menstrual cycle.  She had a full period in the middle of her cycle.  She denies cramping.  She also notes that she is breaking out as well.      Review of Systems   Constitutional, HEENT, cardiovascular, pulmonary, gi and gu systems are negative, except as otherwise noted.      Objective    Pulse 71   Temp 97.9  F (36.6  C)   Wt (!) 153.3 kg (338 lb)   SpO2 97%   BMI 56.79 kg/m    Body mass index is 56.79 kg/m .  Physical Exam   GENERAL: healthy, alert and no distress  RESP: lungs clear to auscultation - no rales, rhonchi or wheezes  CV: regular rate and rhythm, normal S1 S2, no S3 or S4, no murmur, click or rub, no peripheral edema and peripheral pulses strong  MS: no gross musculoskeletal defects noted, no edema  PSYCH: mentation appears normal and affect flat

## 2021-04-11 ENCOUNTER — HEALTH MAINTENANCE LETTER (OUTPATIENT)
Age: 31
End: 2021-04-11

## 2021-04-15 ENCOUNTER — OFFICE VISIT (OUTPATIENT)
Dept: FAMILY MEDICINE | Facility: CLINIC | Age: 31
End: 2021-04-15
Payer: COMMERCIAL

## 2021-04-15 VITALS
HEART RATE: 77 BPM | HEIGHT: 65 IN | OXYGEN SATURATION: 98 % | WEIGHT: 293 LBS | DIASTOLIC BLOOD PRESSURE: 60 MMHG | BODY MASS INDEX: 48.82 KG/M2 | TEMPERATURE: 97.5 F | RESPIRATION RATE: 16 BRPM | SYSTOLIC BLOOD PRESSURE: 122 MMHG

## 2021-04-15 DIAGNOSIS — E66.01 MORBIDLY OBESE (H): ICD-10-CM

## 2021-04-15 PROCEDURE — 99213 OFFICE O/P EST LOW 20 MIN: CPT | Performed by: NURSE PRACTITIONER

## 2021-04-15 RX ORDER — PHENTERMINE HYDROCHLORIDE 30 MG/1
30 CAPSULE ORAL EVERY MORNING
Qty: 30 CAPSULE | Refills: 1 | Status: SHIPPED | OUTPATIENT
Start: 2021-04-15 | End: 2021-06-09

## 2021-04-15 ASSESSMENT — MIFFLIN-ST. JEOR: SCORE: 2271.11

## 2021-04-15 NOTE — PROGRESS NOTES
"    Assessment & Plan     Morbidly obese (H)  Patient to continue topiramate.  Will increase phentermine.  Patient to work on eating breakfast.  - phentermine (ADIPEX-P) 30 MG capsule; Take 1 capsule (30 mg) by mouth every morning    Prescription drug management             Return in about 8 weeks (around 6/10/2021) for Weight Recheck.    ROXI Song CNP  M [facility] SHAHBAZ Benedict is a 30 year old who presents for the following health issues     HPI     Wt Readings from Last 5 Encounters:   04/15/21 (!) 153.8 kg (339 lb 0.2 oz)   02/18/21 (!) 153.3 kg (338 lb)   12/10/20 (!) 152.9 kg (337 lb)   10/29/20 (!) 152.9 kg (337 lb)   10/01/20 (!) 151.5 kg (334 lb)         HISTORY OF PRESENT ILLNESS (HPI):    Patient returns today for medical weight loss follow-up visit. Patient was last seen by me on 2/18/21 and has gained 1 pounds and 0 % body fat.      Patient notes anxiety is improved on higher dose of fluoxetine.    Patient is not overeating.  She is not eating fast food.  She is trying to replace cravings for sweets with healthier options.    Breakfast is a bell pepper with cream cheese, Varun bar, sometimes skips  Lunch is a salad or meat, vegetable, and grain.  Snack- fruit snack  Dinner is chicken, frozen veggies or pasta dish or quiche  Snack- occasional rice cakes, chickpea popcorn    Patient has increased water.      Patient has been doing yoga and stretching.  She has been increasing walks and is aiming for 30 minutes per day.    She only has a couple more weeks of school left.  She has been doing better with managing her school load.        Review of Systems   Constitutional, HEENT, cardiovascular, pulmonary, gi and gu systems are negative, except as otherwise noted.      Objective    /60 (BP Location: Right arm)   Pulse 77   Temp 97.5  F (36.4  C) (Oral)   Resp 16   Ht 1.655 m (5' 5.16\")   Wt (!) 153.8 kg (339 lb 0.2 oz)   SpO2 98%   BMI 56.14 kg/m  "   Body mass index is 56.14 kg/m .  Physical Exam   GENERAL: healthy, alert and no distress  RESP: lungs clear to auscultation - no rales, rhonchi or wheezes  CV: regular rate and rhythm, normal S1 S2, no S3 or S4, no murmur, click or rub, no peripheral edema and peripheral pulses strong  MS: no gross musculoskeletal defects noted, no edema

## 2021-05-01 DIAGNOSIS — E03.8 OTHER SPECIFIED HYPOTHYROIDISM: ICD-10-CM

## 2021-05-03 RX ORDER — LEVOTHYROXINE SODIUM 200 UG/1
TABLET ORAL
Qty: 90 TABLET | Refills: 1 | Status: SHIPPED | OUTPATIENT
Start: 2021-05-03 | End: 2021-11-11

## 2021-05-03 NOTE — TELEPHONE ENCOUNTER
Routing refill request to provider for review/approval because:  TSH    TSH   Date Value Ref Range Status   08/05/2020 0.33 (L) 0.40 - 4.00 mU/L Final             Pending Prescriptions:                       Disp   Refills    levothyroxine (SYNTHROID/LEVOTHROID) 200 M*90 tab*0        Sig: Take 1 tablet by mouth once daily        Alec Alexandra RN

## 2021-05-27 ENCOUNTER — IMMUNIZATION (OUTPATIENT)
Dept: NURSING | Facility: CLINIC | Age: 31
End: 2021-05-27
Payer: COMMERCIAL

## 2021-05-27 PROCEDURE — 91300 PR COVID VAC PFIZER DIL RECON 30 MCG/0.3 ML IM: CPT

## 2021-05-27 PROCEDURE — 0001A PR COVID VAC PFIZER DIL RECON 30 MCG/0.3 ML IM: CPT

## 2021-06-09 ENCOUNTER — OFFICE VISIT (OUTPATIENT)
Dept: FAMILY MEDICINE | Facility: CLINIC | Age: 31
End: 2021-06-09
Payer: COMMERCIAL

## 2021-06-09 VITALS
TEMPERATURE: 98.3 F | HEART RATE: 77 BPM | OXYGEN SATURATION: 97 % | DIASTOLIC BLOOD PRESSURE: 72 MMHG | SYSTOLIC BLOOD PRESSURE: 117 MMHG

## 2021-06-09 DIAGNOSIS — E66.01 MORBIDLY OBESE (H): ICD-10-CM

## 2021-06-09 PROCEDURE — 99214 OFFICE O/P EST MOD 30 MIN: CPT | Performed by: NURSE PRACTITIONER

## 2021-06-09 RX ORDER — NALTREXONE HYDROCHLORIDE 50 MG/1
TABLET, FILM COATED ORAL
Qty: 30 TABLET | Refills: 1 | Status: SHIPPED | OUTPATIENT
Start: 2021-06-09 | End: 2021-07-08

## 2021-06-09 RX ORDER — TOPIRAMATE 50 MG/1
TABLET, FILM COATED ORAL
Qty: 225 TABLET | Refills: 1 | Status: SHIPPED | OUTPATIENT
Start: 2021-06-09 | End: 2021-11-11

## 2021-06-09 NOTE — PROGRESS NOTES
"    Assessment & Plan     Morbidly obese (H)  Patient to stop phentermine, as it has not helped with weight loss.  Start naltrexone as below.  Patient to look into insurance coverage of liraglutide.  - topiramate (TOPAMAX) 50 MG tablet; Take 1.5 tablets (75 mg) by mouth every evening AND 1 tablet (50 mg) every morning.  - naltrexone (DEPADE/REVIA) 50 MG tablet; Take 1/4 tablet daily for 7 days, then increase to 1/2 tablet daily.    Prescription drug management         BMI:   Estimated body mass index is 56.14 kg/m  as calculated from the following:    Height as of 4/15/21: 1.655 m (5' 5.16\").    Weight as of 4/15/21: 153.8 kg (339 lb 0.2 oz).   Weight management plan: Discussed healthy diet and exercise guidelines        Return in about 4 weeks (around 7/7/2021) for Weight Recheck.    ROXI Song CNP  [facility] SHAHBAZ Benedict is a 30 year old who presents for the following health issues     HPI     Weight check     Wt Readings from Last 5 Encounters:   04/15/21 (!) 153.8 kg (339 lb 0.2 oz)   02/18/21 (!) 153.3 kg (338 lb)   12/10/20 (!) 152.9 kg (337 lb)   10/29/20 (!) 152.9 kg (337 lb)   10/01/20 (!) 151.5 kg (334 lb)         HISTORY OF PRESENT ILLNESS (HPI):    Patient returns today for medical weight loss follow-up visit. Patient was last seen by me on 2/18/21 and has gained 1 pounds and 0.2 % body fat.      Patient feels like she's stuck weight wise.    She is being more active and has been going to the gym more.  She is exercising 30 minutes per day with a mix of cardio and weight lifting.  She is also dancing regularly.    She is making better eating choices.  She is eating regularly throughout the day.  She is eating a protein shake from breakfast.  She is eating lunch at work or a salad with protein, oil with seasoning.  Dinner is difficult for patient.  She will make vegetables and protein, protein waffles, yogurt and granola.    Patient is not binge eating or " night eating.    Stress is improved.  Sleep is okay.      Review of Systems   Constitutional, HEENT, cardiovascular, pulmonary, gi and gu systems are negative, except as otherwise noted.      Objective    /72 (BP Location: Other (Comment), Patient Position: Sitting, Cuff Size: Adult Regular)   Pulse 77   Temp 98.3  F (36.8  C) (Oral)   SpO2 97%   There is no height or weight on file to calculate BMI.  Physical Exam   GENERAL: healthy, alert and no distress  MS: no gross musculoskeletal defects noted, no edema

## 2021-06-09 NOTE — PATIENT INSTRUCTIONS
Check with insurance for coverage of liraglutide for weight loss. (Victoza or Saxenda).  Check for coupons online.    Stop phentermine.

## 2021-06-17 ENCOUNTER — IMMUNIZATION (OUTPATIENT)
Dept: NURSING | Facility: CLINIC | Age: 31
End: 2021-06-17
Attending: INTERNAL MEDICINE
Payer: COMMERCIAL

## 2021-06-17 PROCEDURE — 91300 PR COVID VAC PFIZER DIL RECON 30 MCG/0.3 ML IM: CPT

## 2021-06-17 PROCEDURE — 0002A PR COVID VAC PFIZER DIL RECON 30 MCG/0.3 ML IM: CPT

## 2021-07-08 ENCOUNTER — OFFICE VISIT (OUTPATIENT)
Dept: FAMILY MEDICINE | Facility: CLINIC | Age: 31
End: 2021-07-08
Payer: COMMERCIAL

## 2021-07-08 VITALS
TEMPERATURE: 97.6 F | WEIGHT: 293 LBS | HEART RATE: 66 BPM | HEIGHT: 65 IN | DIASTOLIC BLOOD PRESSURE: 77 MMHG | OXYGEN SATURATION: 97 % | BODY MASS INDEX: 48.82 KG/M2 | SYSTOLIC BLOOD PRESSURE: 118 MMHG

## 2021-07-08 DIAGNOSIS — Z11.4 SCREENING FOR HIV (HUMAN IMMUNODEFICIENCY VIRUS): ICD-10-CM

## 2021-07-08 DIAGNOSIS — E66.01 MORBIDLY OBESE (H): Primary | ICD-10-CM

## 2021-07-08 DIAGNOSIS — E03.8 OTHER SPECIFIED HYPOTHYROIDISM: ICD-10-CM

## 2021-07-08 DIAGNOSIS — Z11.59 NEED FOR HEPATITIS C SCREENING TEST: ICD-10-CM

## 2021-07-08 LAB
HCV AB SERPL QL IA: NONREACTIVE
HIV 1+2 AB+HIV1 P24 AG SERPL QL IA: NONREACTIVE
TSH SERPL DL<=0.005 MIU/L-ACNC: 0.41 MU/L (ref 0.4–4)

## 2021-07-08 PROCEDURE — 87389 HIV-1 AG W/HIV-1&-2 AB AG IA: CPT | Performed by: NURSE PRACTITIONER

## 2021-07-08 PROCEDURE — 86803 HEPATITIS C AB TEST: CPT | Performed by: NURSE PRACTITIONER

## 2021-07-08 PROCEDURE — 84443 ASSAY THYROID STIM HORMONE: CPT | Performed by: NURSE PRACTITIONER

## 2021-07-08 PROCEDURE — 36415 COLL VENOUS BLD VENIPUNCTURE: CPT | Performed by: NURSE PRACTITIONER

## 2021-07-08 PROCEDURE — 99214 OFFICE O/P EST MOD 30 MIN: CPT | Performed by: NURSE PRACTITIONER

## 2021-07-08 ASSESSMENT — MIFFLIN-ST. JEOR: SCORE: 2297.59

## 2021-07-08 NOTE — PROGRESS NOTES
"    Assessment & Plan     Screening for HIV (human immunodeficiency virus)    - HIV Antigen Antibody Combo    Need for hepatitis C screening test    - Hepatitis C Screen Reflex to HCV RNA Quant and Genotype    Morbidly obese (H)  Patient to stop naltrexone and start saxenda as below.  She denies family history of thyroid cancer, pancreatic cancer.  - liraglutide - Weight Management (SAXENDA) 18 MG/3ML pen; Inject 0.6 mg Subcutaneous daily for 7 days, THEN 1.2 mg daily for 7 days, THEN 1.8 mg daily for 14 days.  - insulin pen needle (31G X 5 MM) 31G X 5 MM miscellaneous; Use one pen needles daily or as directed.    Other specified hypothyroidism    - TSH WITH FREE T4 REFLEX    Ordering of each unique test  Prescription drug management             Return in about 4 weeks (around 8/5/2021) for Weight Recheck.    ROXI Song [facility] SHAHBAZ Benedict is a 31 year old who presents for the following health issues     HPI     Weight check     Wt Readings from Last 5 Encounters:   07/08/21 (!) 157.2 kg (346 lb 8 oz)   04/15/21 (!) 153.8 kg (339 lb 0.2 oz)   02/18/21 (!) 153.3 kg (338 lb)   12/10/20 (!) 152.9 kg (337 lb)   10/29/20 (!) 152.9 kg (337 lb)         HISTORY OF PRESENT ILLNESS (HPI):    Patient returns today for medical weight loss follow-up visit. Patient was last seen by me on 6/9/21 and has gained 7 pounds.    Patient notes that her appetite increased with starting naltrexone and stopping phentermine.  Patient denies side effects.    Patient notes that she stopped following a diet and exercising as much once appetite increased on naltrexone.    Review of Systems   Constitutional, HEENT, cardiovascular, pulmonary, gi and gu systems are negative, except as otherwise noted.      Objective    /77 (BP Location: Other (Comment), Patient Position: Sitting, Cuff Size: Adult Regular)   Pulse 66   Temp 97.6  F (36.4  C) (Oral)   Ht 1.651 m (5' 5\")   Wt (!) 157.2 " kg (346 lb 8 oz)   SpO2 97%   BMI 57.66 kg/m    Body mass index is 57.66 kg/m .  Physical Exam   GENERAL: healthy, alert and no distress  RESP: lungs clear to auscultation - no rales, rhonchi or wheezes  CV: regular rate and rhythm, normal S1 S2, no S3 or S4, no murmur, click or rub, no peripheral edema and peripheral pulses strong  MS: no gross musculoskeletal defects noted, no edema

## 2021-07-08 NOTE — NURSING NOTE
Pt was instructed on how to use Saxenda pen. Advised on storage of medication, check expiration date, make sure medication is clear. Prime medication before use. Dial to dose and administer in subcutaneous tissue. Hold medication in place for 10 seconds. Most common location is the abdomen 2 inches from navel and alternate sites. Can also use subcutaneous tissue in arm. Use a new pen needle each time and dispose in sharps container or can use a laundry detergent bottle labeled sharps and taped up.  Pt demonstrated proper use of injection and verbalized understanding.     Ines Laureano RN  [facility]

## 2021-07-09 NOTE — RESULT ENCOUNTER NOTE
Dear Marichuy,    Your recent test results are attached.      Normal thyroid.  No Hepatitis C.  No HIV.    If you have any questions please feel free to contact [phone] or myself via shopat.    Sincerely,  Bekah Doll, CNP

## 2021-07-13 ENCOUNTER — TELEPHONE (OUTPATIENT)
Dept: FAMILY MEDICINE | Facility: CLINIC | Age: 31
End: 2021-07-13

## 2021-07-13 NOTE — TELEPHONE ENCOUNTER
Prior Authorization Retail Medication Request    Medication/Dose: liraglutide - Weight Management (SAXENDA) 18 MG/3ML pen  ICD code (if different than what is on RX):  Morbidly obese (H) [E66.01]  - Primary   Previously Tried and Failed:    Rationale:      Insurance Name:  [employer]  Insurance ID:  [id number]       Pharmacy Information (if different than what is on RX)  Name:  [facility]  Phone:  [phone]

## 2021-07-13 NOTE — TELEPHONE ENCOUNTER
PA Initiation    Medication: liraglutide - Weight Management (SAXENDA) 18 MG/3ML pen   Insurance Company: [employer] - Phone [phone] Fax [phone]  Pharmacy Filling the Rx: [facility] [address] [facility] [place] [facility] [address] [address]  Filling Pharmacy Phone: [phone]  Filling Pharmacy Fax: [phone]  Start Date: 7/13/2021

## 2021-07-13 NOTE — TELEPHONE ENCOUNTER
Prior Authorization Approval    Authorization Effective Date: 6/13/2021  Authorization Expiration Date: 11/10/2021  Medication: liraglutide - Weight Management (SAXENDA) 18 MG/3ML pen   Approved Dose/Quantity:   Reference #:     Insurance Company: [employer] - Phone [phone] Fax [phone]  Expected CoPay:       CoPay Card Available:      Foundation Assistance Needed:    Which Pharmacy is filling the prescription (Not needed for infusion/clinic administered): [facility] [address] [facility] [address] [address]  Pharmacy Notified: Yes  Patient Notified: Yes **Instructed pharmacy to notify patient when script is ready to /ship.**

## 2021-08-26 ENCOUNTER — OFFICE VISIT (OUTPATIENT)
Dept: FAMILY MEDICINE | Facility: CLINIC | Age: 31
End: 2021-08-26
Payer: COMMERCIAL

## 2021-08-26 VITALS
TEMPERATURE: 97.6 F | HEIGHT: 65 IN | WEIGHT: 293 LBS | OXYGEN SATURATION: 97 % | DIASTOLIC BLOOD PRESSURE: 80 MMHG | BODY MASS INDEX: 48.82 KG/M2 | SYSTOLIC BLOOD PRESSURE: 129 MMHG | HEART RATE: 80 BPM

## 2021-08-26 DIAGNOSIS — E66.01 MORBIDLY OBESE (H): Primary | ICD-10-CM

## 2021-08-26 PROCEDURE — 99213 OFFICE O/P EST LOW 20 MIN: CPT | Performed by: NURSE PRACTITIONER

## 2021-08-26 ASSESSMENT — MIFFLIN-ST. JEOR: SCORE: 2293.06

## 2021-08-26 ASSESSMENT — PAIN SCALES - GENERAL: PAINLEVEL: NO PAIN (0)

## 2021-08-26 ASSESSMENT — PATIENT HEALTH QUESTIONNAIRE - PHQ9: SUM OF ALL RESPONSES TO PHQ QUESTIONS 1-9: 1

## 2021-08-26 NOTE — PROGRESS NOTES
"    Assessment & Plan     Morbidly obese (H)  Patient to titrate up liraglutide and work on establishing her healthy routine of exercise, meal prepping again.  - liraglutide - Weight Management (SAXENDA) 18 MG/3ML pen; Inject 2.4 mg Subcutaneous daily for 7 days, THEN 3 mg daily for 23 days.    Prescription drug management             Return in about 8 weeks (around 10/21/2021) for Weight Recheck.    Bekah ROXI Doll CNP  M [facility] SHAHBAZ Benedict is a 31 year old who presents for the following health issues     HPI     Medication Followup of topiratmate, liraglutide    Taking Medication as prescribed: yes    Side Effects:  None    Medication Helping Symptoms:  yes     Wt Readings from Last 5 Encounters:   08/26/21 (!) 156.7 kg (345 lb 8 oz)   07/08/21 (!) 157.2 kg (346 lb 8 oz)   04/15/21 (!) 153.8 kg (339 lb 0.2 oz)   02/18/21 (!) 153.3 kg (338 lb)   12/10/20 (!) 152.9 kg (337 lb)         HISTORY OF PRESENT ILLNESS (HPI):    Patient returns today for medical weight loss follow-up visit. Patient was last seen by me on 7/8/21 and has lost 1 pounds.     Patient notes that liraglutide has decreased evening appetite and cravings.      Patient recently moved and has not been formal exercise, but has been active.    Patient notes that meals of have been cereal and has been getting lunch at work.  She has not been able to meal prep due to move.  She is not eating out out.  She has been skipping a meal occasionally.    Patient notes sleep has been good.  Mood is good right now.    Review of Systems   Constitutional, HEENT, cardiovascular, pulmonary, gi and gu systems are negative, except as otherwise noted.      Objective    /80   Pulse 80   Temp 97.6  F (36.4  C) (Oral)   Ht 1.651 m (5' 5\")   Wt (!) 156.7 kg (345 lb 8 oz)   SpO2 97%   BMI 57.49 kg/m    Body mass index is 57.49 kg/m .  Physical Exam   GENERAL: healthy, alert and no distress  RESP: lungs clear to " auscultation - no rales, rhonchi or wheezes  CV: regular rate and rhythm, normal S1 S2, no S3 or S4, no murmur, click or rub, no peripheral edema and peripheral pulses strong  MS: no gross musculoskeletal defects noted, no edema

## 2021-09-20 DIAGNOSIS — F41.1 GAD (GENERALIZED ANXIETY DISORDER): ICD-10-CM

## 2021-09-20 DIAGNOSIS — F32.1 MODERATE MAJOR DEPRESSION (H): ICD-10-CM

## 2021-09-22 DIAGNOSIS — N92.1 MENOMETRORRHAGIA: ICD-10-CM

## 2021-09-22 RX ORDER — DESOGESTREL AND ETHINYL ESTRADIOL 0.15-0.03
KIT ORAL
Qty: 84 TABLET | Refills: 3 | Status: SHIPPED | OUTPATIENT
Start: 2021-09-22 | End: 2022-09-14

## 2021-09-26 ENCOUNTER — HEALTH MAINTENANCE LETTER (OUTPATIENT)
Age: 31
End: 2021-09-26

## 2021-11-11 ENCOUNTER — OFFICE VISIT (OUTPATIENT)
Dept: FAMILY MEDICINE | Facility: CLINIC | Age: 31
End: 2021-11-11
Payer: COMMERCIAL

## 2021-11-11 VITALS
TEMPERATURE: 98.1 F | DIASTOLIC BLOOD PRESSURE: 76 MMHG | HEART RATE: 72 BPM | SYSTOLIC BLOOD PRESSURE: 118 MMHG | OXYGEN SATURATION: 97 % | HEIGHT: 65 IN | WEIGHT: 293 LBS | BODY MASS INDEX: 48.82 KG/M2

## 2021-11-11 DIAGNOSIS — E03.8 OTHER SPECIFIED HYPOTHYROIDISM: ICD-10-CM

## 2021-11-11 DIAGNOSIS — E66.01 MORBIDLY OBESE (H): ICD-10-CM

## 2021-11-11 DIAGNOSIS — F32.1 MODERATE MAJOR DEPRESSION (H): ICD-10-CM

## 2021-11-11 DIAGNOSIS — F41.1 GAD (GENERALIZED ANXIETY DISORDER): ICD-10-CM

## 2021-11-11 PROCEDURE — 99213 OFFICE O/P EST LOW 20 MIN: CPT | Performed by: NURSE PRACTITIONER

## 2021-11-11 RX ORDER — LIRAGLUTIDE 6 MG/ML
3 INJECTION, SOLUTION SUBCUTANEOUS DAILY
Qty: 45 ML | Refills: 0 | Status: SHIPPED | OUTPATIENT
Start: 2021-11-11 | End: 2021-12-03

## 2021-11-11 RX ORDER — TOPIRAMATE 50 MG/1
TABLET, FILM COATED ORAL
Qty: 225 TABLET | Refills: 1 | Status: SHIPPED | OUTPATIENT
Start: 2021-11-11 | End: 2022-04-07

## 2021-11-11 RX ORDER — LIRAGLUTIDE 6 MG/ML
3 INJECTION, SOLUTION SUBCUTANEOUS DAILY
COMMUNITY
Start: 2021-10-11 | End: 2021-11-11

## 2021-11-11 RX ORDER — LEVOTHYROXINE SODIUM 200 UG/1
200 TABLET ORAL DAILY
Qty: 90 TABLET | Refills: 1 | Status: SHIPPED | OUTPATIENT
Start: 2021-11-11 | End: 2022-04-07

## 2021-11-11 ASSESSMENT — MIFFLIN-ST. JEOR: SCORE: 2315.74

## 2021-11-11 ASSESSMENT — PAIN SCALES - GENERAL: PAINLEVEL: NO PAIN (0)

## 2021-11-11 NOTE — PROGRESS NOTES
Assessment & Plan     Morbidly obese (H)  Patient to work on taking meds consistently, ensuring enough sleep.  - topiramate (TOPAMAX) 50 MG tablet; Take 1.5 tablets (75 mg) by mouth every evening AND 1 tablet (50 mg) every morning.  - liraglutide - Weight Management (SAXENDA) 18 MG/3ML pen; Inject 3 mg Subcutaneous daily    Other specified hypothyroidism  Stable.  Continue current treatment plan and medications.   - levothyroxine (SYNTHROID/LEVOTHROID) 200 MCG tablet; Take 1 tablet (200 mcg) by mouth daily    OSCAR (generalized anxiety disorder)  Stable.  Continue current treatment plan and medications.   - FLUoxetine (PROZAC) 20 MG capsule; Take 2 capsules by mouth once daily    Moderate major depression (H)  Stable.  Continue current treatment plan and medications.   - FLUoxetine (PROZAC) 20 MG capsule; Take 2 capsules by mouth once daily    Prescription drug management             Return in about 8 weeks (around 1/6/2022) for Weight Recheck.    ROXI Song [facility] SHAHBAZ Benedict is a 31 year old who presents for the following health issues     HPI       Medication Followup of topiramate    Taking Medication as prescribed: yes    Side Effects:  Possible nausea    Medication Helping Symptoms:  yes       Medication Followup of Saxenda    Taking Medication as prescribed: yes    Side Effects:  Possible nausea    Medication Helping Symptoms:  yes     Wt Readings from Last 5 Encounters:   11/11/21 (!) 159 kg (350 lb 8 oz)   08/26/21 (!) 156.7 kg (345 lb 8 oz)   07/08/21 (!) 157.2 kg (346 lb 8 oz)   04/15/21 (!) 153.8 kg (339 lb 0.2 oz)   02/18/21 (!) 153.3 kg (338 lb)         HISTORY OF PRESENT ILLNESS (HPI):    Patient returns today for medical weight loss follow-up visit. Patient was last seen by me on 8/26/21 and has gained 5 pounds and lost 0.2 % body fat.      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  She is fatigued and is working 50-60 hours per week.  She feels that saxenda a topiramate help with cravings when she is taking them.  She has found it difficult to exercise due to changing shifts.  She did join an online accountability group and finds it helpful.  She notes diet has not been good with working overnight. She notes that she has been snacking at night to stay awake.  She denies sleep eating.        Review of Systems   Constitutional, HEENT, cardiovascular, pulmonary, gi and gu systems are negative, except as otherwise noted.      Objective    /76   Pulse 72   Temp 98.1  F (36.7  C) (Temporal)   Ht 1.651 m (5' 5\")   Wt (!) 159 kg (350 lb 8 oz)   SpO2 97%   BMI 58.33 kg/m    Body mass index is 58.33 kg/m .  Physical Exam   GENERAL: healthy, alert and no distress  RESP: lungs clear to auscultation - no rales, rhonchi or wheezes  CV: regular rate and rhythm, normal S1 S2, no S3 or S4, no murmur, click or rub, no peripheral edema and peripheral pulses strong  MS: no gross musculoskeletal defects noted, no edema  PSYCH: mentation appears normal, affect normal/bright                  "

## 2021-11-30 ENCOUNTER — TELEPHONE (OUTPATIENT)
Dept: INTERNAL MEDICINE | Facility: CLINIC | Age: 31
End: 2021-11-30
Payer: COMMERCIAL

## 2021-11-30 DIAGNOSIS — E66.01 MORBIDLY OBESE (H): ICD-10-CM

## 2021-11-30 NOTE — TELEPHONE ENCOUNTER
Prior Authorization Retail Medication Request    Medication/Dose: liraglutide - Weight Management (SAXENDA) 18 MG/3ML pen  ICD code (if different than what is on RX):    Previously Tried and Failed:    Rationale:      Insurance Name:  Cover my meds  Insurance ID:  [id number]      Pharmacy Information (if different than what is on RX)  Name:    Phone:

## 2021-11-30 NOTE — TELEPHONE ENCOUNTER
Requested Prescriptions   Pending Prescriptions Disp Refills     liraglutide - Weight Management (SAXENDA) 18 MG/3ML pen 45 mL 0     Sig: Inject 3 mg Subcutaneous daily       There is no refill protocol information for this order

## 2021-12-01 NOTE — TELEPHONE ENCOUNTER
Prior Authorization Approval    Authorization Effective Date: 11/1/2021  Authorization Expiration Date: 12/1/2022  Medication: liraglutide - Weight Management (SAXENDA) 18 MG/3ML pen  Approved Dose/Quantity:    Reference #:     Insurance Company: [employer] - Phone [phone] Fax [phone]  Expected CoPay:       CoPay Card Available:      Foundation Assistance Needed:    Which Pharmacy is filling the prescription (Not needed for infusion/clinic administered): [facility] [address] [facility] [address] [address]  Pharmacy Notified: Yes  Patient Notified: Yes  **Instructed pharmacy to notify patient when script is ready to /ship.**

## 2021-12-01 NOTE — TELEPHONE ENCOUNTER
Central Prior Authorization Team   Phone: [phone]    PA Initiation    Medication: liraglutide - Weight Management (SAXENDA) 18 MG/3ML pen  Insurance Company: [employer] - Phone [phone] Fax [phone]  Pharmacy Filling the Rx: [facility] [address] [place], [place] - [address] [address]  Filling Pharmacy Phone: [phone]  Filling Pharmacy Fax:    Start Date: 12/1/2021

## 2021-12-03 RX ORDER — LIRAGLUTIDE 6 MG/ML
3 INJECTION, SOLUTION SUBCUTANEOUS DAILY
Qty: 45 ML | Refills: 0 | Status: SHIPPED | OUTPATIENT
Start: 2021-12-03 | End: 2022-02-02

## 2021-12-03 NOTE — TELEPHONE ENCOUNTER
"Routing refill request to provider for review/approval because:  No order for diabetes and no A1C      Requested Prescriptions   Pending Prescriptions Disp Refills     liraglutide - Weight Management (SAXENDA) 18 MG/3ML pen 45 mL 0     Sig: Inject 3 mg Subcutaneous daily       GLP-1 Agonists Protocol Failed - 11/30/2021  3:09 PM        Failed - Order for Saxenda with diagnosis of diabetes        Failed - HgbA1C in past 3 or 6 months     If HgbA1C is 8 or greater, it needs to be on file within the past 3 months.  If less than 8, must be on file within the past 6 months.     No lab results found.          Failed - Normal serum creatinine on file in past 12 months     Recent Labs   Lab Test 08/05/20  1013   CR 0.74       Ok to refill medication if creatinine is low          Passed - Medication is active on med list        Passed - Patient is age 18 or older        Passed - No active pregnancy on record        Passed - No positive pregnancy test in past 12 months        Passed - Recent (6 mo) or future (30 days) visit within the authorizing provider's specialty     Patient had office visit in the last 6 months or has a visit in the next 30 days with authorizing provider.  See \"Patient Info\" tab in inbasket, or \"Choose Columns\" in Meds & Orders section of the refill encounter.               Salina Pruitt RN on 12/3/2021 at 1:28 PM    "

## 2021-12-09 ENCOUNTER — OFFICE VISIT (OUTPATIENT)
Dept: FAMILY MEDICINE | Facility: CLINIC | Age: 31
End: 2021-12-09
Payer: COMMERCIAL

## 2021-12-09 ENCOUNTER — NURSE TRIAGE (OUTPATIENT)
Dept: FAMILY MEDICINE | Facility: CLINIC | Age: 31
End: 2021-12-09
Payer: COMMERCIAL

## 2021-12-09 VITALS
DIASTOLIC BLOOD PRESSURE: 72 MMHG | OXYGEN SATURATION: 98 % | TEMPERATURE: 98 F | WEIGHT: 293 LBS | BODY MASS INDEX: 60.57 KG/M2 | HEART RATE: 83 BPM | SYSTOLIC BLOOD PRESSURE: 118 MMHG

## 2021-12-09 DIAGNOSIS — N91.1 SECONDARY AMENORRHEA: Primary | ICD-10-CM

## 2021-12-09 DIAGNOSIS — Z11.3 SCREEN FOR STD (SEXUALLY TRANSMITTED DISEASE): ICD-10-CM

## 2021-12-09 DIAGNOSIS — Z12.4 SCREENING FOR CERVICAL CANCER: ICD-10-CM

## 2021-12-09 DIAGNOSIS — E03.8 OTHER SPECIFIED HYPOTHYROIDISM: ICD-10-CM

## 2021-12-09 LAB
B-HCG SERPL-ACNC: <1 IU/L (ref 0–5)
HCG UR QL: NEGATIVE
T4 FREE SERPL-MCNC: 1.3 NG/DL (ref 0.76–1.46)
TSH SERPL DL<=0.005 MIU/L-ACNC: 4.61 MU/L (ref 0.4–4)

## 2021-12-09 PROCEDURE — 88175 CYTOPATH C/V AUTO FLUID REDO: CPT | Performed by: NURSE PRACTITIONER

## 2021-12-09 PROCEDURE — 84702 CHORIONIC GONADOTROPIN TEST: CPT | Performed by: NURSE PRACTITIONER

## 2021-12-09 PROCEDURE — 84443 ASSAY THYROID STIM HORMONE: CPT | Performed by: NURSE PRACTITIONER

## 2021-12-09 PROCEDURE — 99214 OFFICE O/P EST MOD 30 MIN: CPT | Performed by: NURSE PRACTITIONER

## 2021-12-09 PROCEDURE — 87591 N.GONORRHOEAE DNA AMP PROB: CPT | Performed by: NURSE PRACTITIONER

## 2021-12-09 PROCEDURE — 84439 ASSAY OF FREE THYROXINE: CPT | Performed by: NURSE PRACTITIONER

## 2021-12-09 PROCEDURE — 81025 URINE PREGNANCY TEST: CPT | Performed by: NURSE PRACTITIONER

## 2021-12-09 PROCEDURE — 36415 COLL VENOUS BLD VENIPUNCTURE: CPT | Performed by: NURSE PRACTITIONER

## 2021-12-09 PROCEDURE — 87491 CHLMYD TRACH DNA AMP PROBE: CPT | Performed by: NURSE PRACTITIONER

## 2021-12-09 RX ORDER — LEVOTHYROXINE SODIUM 25 UG/1
12.5 TABLET ORAL DAILY
Qty: 30 TABLET | Refills: 0 | Status: SHIPPED | OUTPATIENT
Start: 2021-12-09 | End: 2022-02-14

## 2021-12-09 NOTE — TELEPHONE ENCOUNTER
"Patient calling with pregnancy concerns. She did take a home test but is not sure of results. She thinks it may be positive but it is very faint. She is on oral birth control, her cycle is very regular. She recently switched to overnights at her job and may have gotten off schedule with her pills. Her period is 2 weeks late. She would like to speak with a provider. Appointment scheduled.     Su Garza RN    Additional Information    Patient wants to be seen    Answer Assessment - Initial Assessment Questions  1. LMP:  \"When did your last menstrual period begin?\"      October 29  2. DAYS LATE: \"How many days late is your menstrual period?\"      2 weeks   3. REGULARITY: \"How regular are your periods?\"      On oral, regular  4. PREGNANCY: \"Is there any chance you are pregnant?\" (e.g., unprotected intercourse, missed birth control pill, broken condom) \"Have you used a home pregnancy test?\"      Missed birth control, unsure of home test  5. BREASTFEEDING: \"Are you breastfeeding?\"        6. BIRTH CONTROL PILLS: \"Are you taking birth control pills, or have you stopped recently?\"      Taking, switched schedule  7. DEPOPROVERA: \"Has your doctor given you a shot to prevent pregnancy?\" (e.g., Depoprovera injection)      no  8. CAUSE: \"What do you think caused the missed period?\" (e.g., stress, rapid weight loss, excessive exercise)      pregnancy  9. OTHER SYMPTOMS: \"Do you have any other symptoms?\" (e.g., abdominal pain)      Fatigue, nauseous    Protocols used: MENSTRUAL PERIOD - MISSED OR LATE-A-OH      "

## 2021-12-09 NOTE — PROGRESS NOTES
"  Assessment & Plan     Secondary amenorrhea  Will confirm with quant due to report of faint home positive. Check thyroid function. If both normal, recommend observation. Possibly due to recent physiologic stress of night shift or side effect of OCP.  - HCG Qual, Urine ([id number])  - TSH with free T4 reflex; Future  - HCG Quantitative Pregnancy, Blood ([id number]); Future  - HCG Quantitative Pregnancy, Blood ([id number])  - TSH with free T4 reflex    Screening for cervical cancer    - Pap imaged thin layer screen reflex to HPV if ASCUS - recommend age 25 - 29    Screen for STD (sexually transmitted disease)    - CHLAMYDIA TRACHOMATIS PCR  - NEISSERIA GONORRHOEA PCR    Ordering of each unique test  Prescription drug management         BMI:   Estimated body mass index is 60.57 kg/m  as calculated from the following:    Height as of 21: 1.651 m (5' 5\").    Weight as of this encounter: 165.1 kg (364 lb).       See Patient Instructions    Return in about 1 month (around 2022) for Routine visit with PCP.    Salina Whitehead, ROXI [facility] SHAHBAZ Benedict is a 31 year old  who presents for the following health issues  accompanied by her self.    HPI     Chief Complaint   Patient presents with     Amenorrhea     Patient presents for skipped period. Patient's last menstrual period was 10/29/2021. concerned about pregnancy. Home pregnancy test may have been faint positive. 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    Review of Systems   Constitutional, HEENT, cardiovascular, pulmonary, gi and gu systems are negative, except as otherwise noted.      Objective    Pulse 83   Temp 98  F (36.7  C) (Oral)   Wt (!) 165.1 kg (364 lb)   SpO2 98%   BMI 60.57 kg/m    Body mass index is 60.57 kg/m .  Physical Exam   GENERAL: healthy, alert and no distress   (female): normal female external genitalia, normal urethral meatus, vaginal mucosa, normal " cervix/adnexa/uterus without masses or discharge    Results for orders placed or performed in visit on 12/09/21 (from the past 24 hour(s))   HCG Qual, Urine ([id number])   Result Value Ref Range    hCG Urine Qualitative Negative Negative

## 2021-12-10 LAB
C TRACH DNA SPEC QL NAA+PROBE: NEGATIVE
N GONORRHOEA DNA SPEC QL NAA+PROBE: NEGATIVE

## 2021-12-14 LAB
BKR LAB AP GYN ADEQUACY: NORMAL
BKR LAB AP GYN INTERPRETATION: NORMAL
BKR LAB AP HPV REFLEX: NORMAL
BKR LAB AP LMP: NORMAL
BKR LAB AP PREVIOUS ABNL DX: NORMAL
BKR LAB AP PREVIOUS ABNORMAL: NORMAL
PATH REPORT.COMMENTS IMP SPEC: NORMAL
PATH REPORT.COMMENTS IMP SPEC: NORMAL
PATH REPORT.RELEVANT HX SPEC: NORMAL

## 2021-12-15 PROBLEM — R87.610 ASCUS OF CERVIX WITH NEGATIVE HIGH RISK HPV: Status: RESOLVED | Noted: 2017-08-08 | Resolved: 2021-12-15

## 2021-12-23 ENCOUNTER — TELEPHONE (OUTPATIENT)
Dept: FAMILY MEDICINE | Facility: CLINIC | Age: 31
End: 2021-12-23
Payer: COMMERCIAL

## 2021-12-23 NOTE — TELEPHONE ENCOUNTER
Patient Quality Outreach      Summary:    Patient has the following on her problem list/HM:     Depression / Dysthymia review    6 Month Remission: 4-8 month window range:   12 Month Remission: 10-14 month window range:        PHQ-9 SCORE 6/30/2020 2/18/2021 8/26/2021   PHQ-9 Total Score - - -   PHQ-9 Total Score MyChart - - -   PHQ-9 Total Score 5 19 1       If PHQ-9 recheck is 5 or more, route to provider for next steps.    Patient is due/failing the following:   Depression  -  PHQ-9 Needed    Type of outreach:    Sent [employer] message.    Questions for provider review:    None                                                                                                                                     Annette Zamora MA       Chart routed to self  .

## 2022-02-02 ENCOUNTER — OFFICE VISIT (OUTPATIENT)
Dept: FAMILY MEDICINE | Facility: CLINIC | Age: 32
End: 2022-02-02
Payer: COMMERCIAL

## 2022-02-02 VITALS
SYSTOLIC BLOOD PRESSURE: 124 MMHG | OXYGEN SATURATION: 98 % | DIASTOLIC BLOOD PRESSURE: 76 MMHG | HEART RATE: 74 BPM | TEMPERATURE: 97.6 F | WEIGHT: 293 LBS | HEIGHT: 65 IN | BODY MASS INDEX: 48.82 KG/M2

## 2022-02-02 DIAGNOSIS — F41.1 GAD (GENERALIZED ANXIETY DISORDER): ICD-10-CM

## 2022-02-02 DIAGNOSIS — F32.1 MODERATE MAJOR DEPRESSION (H): ICD-10-CM

## 2022-02-02 DIAGNOSIS — E03.8 OTHER SPECIFIED HYPOTHYROIDISM: Primary | ICD-10-CM

## 2022-02-02 DIAGNOSIS — E66.01 MORBIDLY OBESE (H): ICD-10-CM

## 2022-02-02 LAB — TSH SERPL DL<=0.005 MIU/L-ACNC: 1.1 MU/L (ref 0.4–4)

## 2022-02-02 PROCEDURE — 36415 COLL VENOUS BLD VENIPUNCTURE: CPT | Performed by: NURSE PRACTITIONER

## 2022-02-02 PROCEDURE — 99214 OFFICE O/P EST MOD 30 MIN: CPT | Performed by: NURSE PRACTITIONER

## 2022-02-02 PROCEDURE — 84443 ASSAY THYROID STIM HORMONE: CPT | Performed by: NURSE PRACTITIONER

## 2022-02-02 RX ORDER — ESCITALOPRAM OXALATE 10 MG/1
10 TABLET ORAL DAILY
Qty: 30 TABLET | Refills: 0 | Status: SHIPPED | OUTPATIENT
Start: 2022-02-02 | End: 2022-03-07

## 2022-02-02 ASSESSMENT — MIFFLIN-ST. JEOR: SCORE: 2363.51

## 2022-02-02 ASSESSMENT — PATIENT HEALTH QUESTIONNAIRE - PHQ9: SUM OF ALL RESPONSES TO PHQ QUESTIONS 1-9: 7

## 2022-02-02 ASSESSMENT — PAIN SCALES - GENERAL: PAINLEVEL: NO PAIN (0)

## 2022-02-02 NOTE — PROGRESS NOTES
"  Assessment & Plan     Morbidly obese (H)  Patient to switch to Wegovy due to insurance coverage.  - Semaglutide-Weight Management 0.25 MG/0.5ML SOAJ; Inject 0.25 mg Subcutaneous every 7 days    Other specified hypothyroidism  Recheck today.  - TSH with free T4 reflex; Future    OSCAR (generalized anxiety disorder)  Patient to stop fluoxetine and start lexapro.  - escitalopram (LEXAPRO) 10 MG tablet; Take 1 tablet (10 mg) by mouth daily    Moderate major depression (H)  As above.   - escitalopram (LEXAPRO) 10 MG tablet; Take 1 tablet (10 mg) by mouth daily    Ordering of each unique test  Prescription drug management         BMI:   Estimated body mass index is 60.75 kg/m  as calculated from the following:    Height as of this encounter: 1.644 m (5' 4.72\").    Weight as of this encounter: 164.2 kg (362 lb).   Weight management plan: Discussed healthy diet and exercise guidelines        Return in about 4 weeks (around 3/2/2022) for Medication Recheck- Wegovy, lexapro.    ROXI Song CNP  [facility] SHAHBAZ Benedict is a 31 year old who presents for the following health issues     HPI       Medication Followup of topiramate    Taking Medication as prescribed: yes    Side Effects:  None    Medication Helping Symptoms:  yes       Medication Followup of Saxenda    Taking Medication as prescribed: yes    Side Effects:  None    Medication Helping Symptoms:  yes     Wt Readings from Last 5 Encounters:   02/02/22 (!) 164.2 kg (362 lb)   12/09/21 (!) 165.1 kg (364 lb)   11/11/21 (!) 159 kg (350 lb 8 oz)   08/26/21 (!) 156.7 kg (345 lb 8 oz)   07/08/21 (!) 157.2 kg (346 lb 8 oz)         HISTORY OF PRESENT ILLNESS (HPI):    Patient returns today for medical weight loss follow-up visit. Patient was last seen by me on 11/11/21 and has gained 12 pounds and 1.9 % body fat.      Thyroid meds were adjusted 8 weeks ago.    Notes personal stress.  Aunt recently diagnosed with terminal cancer and " "uncle recently .  She notes that she has not been consistent with prozac.  She notes that it causes stomach upset.  She notes reflux.  She is scared to take it.  She notes sleep is off and she feels poorly.  She does not have symptoms when she does not take prozac.  She notes that these symptoms cause her to not take her other meds.    Patient has an exercise plan, but has not been following it.        Review of Systems   Constitutional, HEENT, cardiovascular, pulmonary, gi and gu systems are negative, except as otherwise noted.      Objective    /76   Pulse 74   Temp 97.6  F (36.4  C) (Oral)   Ht 1.644 m (5' 4.72\")   Wt (!) 164.2 kg (362 lb)   SpO2 98%   BMI 60.75 kg/m    Body mass index is 60.75 kg/m .  Physical Exam   GENERAL: healthy, alert and no distress  PSYCH: mentation appears normal, affect flat, judgement and insight intact and appearance well groomed                  "

## 2022-02-02 NOTE — RESULT ENCOUNTER NOTE
Dear Marichuy,    Your recent test results are attached.      Normal thyroid.    If you have any questions please feel free to contact [phone] or myself via [employer].    Sincerely,  Bekah Doll, CNP

## 2022-02-12 DIAGNOSIS — E03.8 OTHER SPECIFIED HYPOTHYROIDISM: ICD-10-CM

## 2022-02-14 RX ORDER — LEVOTHYROXINE SODIUM 25 UG/1
TABLET ORAL
Qty: 30 TABLET | Refills: 0 | Status: SHIPPED | OUTPATIENT
Start: 2022-02-14 | End: 2022-04-07

## 2022-02-14 NOTE — TELEPHONE ENCOUNTER
"Prescription approved per [facility] Refill Protocol.  Requested Prescriptions   Pending Prescriptions Disp Refills     EUTHYROX 25 MCG tablet [Pharmacy Med Name: Euthyrox 25 MCG Oral Tablet] 30 tablet 0     Sig: TAKE 1/2 (ONE-HALF) TABLET BY MOUTH ONCE DAILY TAKE  ALONG  WITH  200MCG       Thyroid Protocol Passed - 2/12/2022  7:54 AM        Passed - Patient is 12 years or older        Passed - Recent (12 mo) or future (30 days) visit within the authorizing provider's specialty     Patient has had an office visit with the authorizing provider or a provider within the authorizing providers department within the previous 12 mos or has a future within next 30 days. See \"Patient Info\" tab in inbasket, or \"Choose Columns\" in Meds & Orders section of the refill encounter.              Passed - Medication is active on med list        Passed - Normal TSH on file in past 12 months     Recent Labs   Lab Test 02/02/22  0924   TSH 1.10              Passed - No active pregnancy on record     If patient is pregnant or has had a positive pregnancy test, please check TSH.          Passed - No positive pregnancy test in past 12 months     If patient is pregnant or has had a positive pregnancy test, please check TSH.               "

## 2022-03-07 ENCOUNTER — OFFICE VISIT (OUTPATIENT)
Dept: FAMILY MEDICINE | Facility: CLINIC | Age: 32
End: 2022-03-07
Payer: COMMERCIAL

## 2022-03-07 VITALS
WEIGHT: 293 LBS | HEART RATE: 78 BPM | DIASTOLIC BLOOD PRESSURE: 79 MMHG | BODY MASS INDEX: 48.82 KG/M2 | TEMPERATURE: 98.2 F | HEIGHT: 65 IN | OXYGEN SATURATION: 97 % | SYSTOLIC BLOOD PRESSURE: 127 MMHG

## 2022-03-07 DIAGNOSIS — F32.1 MODERATE MAJOR DEPRESSION (H): ICD-10-CM

## 2022-03-07 DIAGNOSIS — E66.01 MORBIDLY OBESE (H): ICD-10-CM

## 2022-03-07 DIAGNOSIS — F41.1 GAD (GENERALIZED ANXIETY DISORDER): ICD-10-CM

## 2022-03-07 PROCEDURE — 99213 OFFICE O/P EST LOW 20 MIN: CPT | Performed by: NURSE PRACTITIONER

## 2022-03-07 RX ORDER — ESCITALOPRAM OXALATE 20 MG/1
20 TABLET ORAL DAILY
Qty: 30 TABLET | Refills: 0 | Status: SHIPPED | OUTPATIENT
Start: 2022-03-07 | End: 2022-04-07

## 2022-03-07 ASSESSMENT — PATIENT HEALTH QUESTIONNAIRE - PHQ9
SUM OF ALL RESPONSES TO PHQ QUESTIONS 1-9: 15
SUM OF ALL RESPONSES TO PHQ QUESTIONS 1-9: 15
10. IF YOU CHECKED OFF ANY PROBLEMS, HOW DIFFICULT HAVE THESE PROBLEMS MADE IT FOR YOU TO DO YOUR WORK, TAKE CARE OF THINGS AT HOME, OR GET ALONG WITH OTHER PEOPLE: SOMEWHAT DIFFICULT

## 2022-03-07 ASSESSMENT — PAIN SCALES - GENERAL: PAINLEVEL: MODERATE PAIN (4)

## 2022-03-07 NOTE — PROGRESS NOTES
Assessment & Plan     OSCAR (generalized anxiety disorder)  Patient to increase lexapro.  - escitalopram (LEXAPRO) 20 MG tablet; Take 1 tablet (20 mg) by mouth daily    Moderate major depression (H)    - escitalopram (LEXAPRO) 20 MG tablet; Take 1 tablet (20 mg) by mouth daily    Morbidly obese (H)  Patient to increase semaglutide.  - Semaglutide-Weight Management 0.5 MG/0.5ML SOAJ; Inject 0.5 mg Subcutaneous every 7 days    Prescription drug management             Return in about 4 weeks (around 4/4/2022) for Weight Recheck.    ROXI Song CNP  [facility] SHAHBAZ Benedict is a 31 year old who presents for the following health issues     History of Present Illness       Mental Health Follow-up:  Patient presents to follow-up on Depression.Patient's depression since last visit has been:  Bad  The patient is having other symptoms associated with depression.      Any significant life events: grief or loss and health concerns  Patient is not feeling anxious or having panic attacks.  Patient has no concerns about alcohol or drug use.       Today's PHQ-9         PHQ-9 Total Score: 15  PHQ-9 Q9 Thoughts of better off dead/self-harm past 2 weeks :   (P) Not at all    How difficult have these problems made it for you to do your work, take care of things at home, or get along with other people: Somewhat difficult        Reason for visit:  Weightloss    She eats 0-1 servings of fruits and vegetables daily.She consumes 1 sweetened beverage(s) daily.She exercises with enough effort to increase her heart rate 9 or less minutes per day.  She exercises with enough effort to increase her heart rate 3 or less days per week. She is missing 1 dose(s) of medications per week.     Wt Readings from Last 5 Encounters:   03/07/22 (!) 168.1 kg (370 lb 8 oz)   02/02/22 (!) 164.2 kg (362 lb)   12/09/21 (!) 165.1 kg (364 lb)   11/11/21 (!) 159 kg (350 lb 8 oz)   08/26/21 (!) 156.7 kg (345 lb 8 oz)  "        HISTORY OF PRESENT ILLNESS (HPI):    Patient returns today for medical weight loss follow-up visit. Patient was last seen by me on 2/2/22 and has gained 8 pounds and lost .7 % body fat.      Patient notes that mood may be worse.  She denies stomach upset from lexapro, but is unsure if it's helping.  She notes that her cousin recently passed away and it has added to her stress level.    She is exercising regularly.  She is going to the gym twice weekly and is doing cardio/strength training at home 3 days per week.  She has also been trying to eat the vegetables and protein she likes and focus on that.  She is motivated to lose weight to go to [place] in 4 months.  She doesn't want to buy new clothes.  She also wants exercise more so that she can be in shape to [place] in the future.    She did have an episode of binge eating last week, but has otherwise been doing okay.  She denies sleep eating.  She has started seeing her therapist again.          Review of Systems   Constitutional, HEENT, cardiovascular, pulmonary, gi and gu systems are negative, except as otherwise noted.      Objective    /79   Pulse 78   Temp 98.2  F (36.8  C) (Oral)   Ht 1.644 m (5' 4.72\")   Wt (!) 168.1 kg (370 lb 8 oz)   SpO2 97%   BMI 62.18 kg/m    Body mass index is 62.18 kg/m .  Physical Exam   GENERAL: healthy, alert and no distress  RESP: lungs clear to auscultation - no rales, rhonchi or wheezes  CV: regular rate and rhythm, normal S1 S2, no S3 or S4, no murmur, click or rub, no peripheral edema and peripheral pulses strong  MS: no gross musculoskeletal defects noted, no edema  PSYCH: mentation appears normal, affect flat, judgement and insight intact and appearance well groomed                "

## 2022-03-08 ASSESSMENT — PATIENT HEALTH QUESTIONNAIRE - PHQ9: SUM OF ALL RESPONSES TO PHQ QUESTIONS 1-9: 15

## 2022-04-07 ENCOUNTER — OFFICE VISIT (OUTPATIENT)
Dept: FAMILY MEDICINE | Facility: CLINIC | Age: 32
End: 2022-04-07
Payer: COMMERCIAL

## 2022-04-07 VITALS
TEMPERATURE: 97.3 F | WEIGHT: 293 LBS | OXYGEN SATURATION: 97 % | DIASTOLIC BLOOD PRESSURE: 78 MMHG | HEIGHT: 65 IN | SYSTOLIC BLOOD PRESSURE: 120 MMHG | BODY MASS INDEX: 48.82 KG/M2 | HEART RATE: 72 BPM

## 2022-04-07 DIAGNOSIS — E66.01 MORBIDLY OBESE (H): ICD-10-CM

## 2022-04-07 DIAGNOSIS — E03.8 OTHER SPECIFIED HYPOTHYROIDISM: ICD-10-CM

## 2022-04-07 DIAGNOSIS — F32.1 MODERATE MAJOR DEPRESSION (H): ICD-10-CM

## 2022-04-07 DIAGNOSIS — F41.1 GAD (GENERALIZED ANXIETY DISORDER): ICD-10-CM

## 2022-04-07 PROCEDURE — 99214 OFFICE O/P EST MOD 30 MIN: CPT | Performed by: NURSE PRACTITIONER

## 2022-04-07 RX ORDER — TOPIRAMATE 50 MG/1
TABLET, FILM COATED ORAL
Qty: 225 TABLET | Refills: 1 | Status: SHIPPED | OUTPATIENT
Start: 2022-04-07 | End: 2022-09-14

## 2022-04-07 RX ORDER — LEVOTHYROXINE SODIUM 25 UG/1
25 TABLET ORAL DAILY
Qty: 90 TABLET | Refills: 3 | Status: SHIPPED | OUTPATIENT
Start: 2022-04-07 | End: 2022-05-06

## 2022-04-07 RX ORDER — ESCITALOPRAM OXALATE 20 MG/1
20 TABLET ORAL DAILY
Qty: 90 TABLET | Refills: 1 | Status: SHIPPED | OUTPATIENT
Start: 2022-04-07 | End: 2022-09-14

## 2022-04-07 RX ORDER — LEVOTHYROXINE SODIUM 200 UG/1
200 TABLET ORAL DAILY
Qty: 90 TABLET | Refills: 3 | Status: SHIPPED | OUTPATIENT
Start: 2022-04-07 | End: 2022-07-07

## 2022-04-07 ASSESSMENT — ANXIETY QUESTIONNAIRES
6. BECOMING EASILY ANNOYED OR IRRITABLE: NEARLY EVERY DAY
GAD7 TOTAL SCORE: 10
2. NOT BEING ABLE TO STOP OR CONTROL WORRYING: SEVERAL DAYS
GAD7 TOTAL SCORE: 10
GAD7 TOTAL SCORE: 10
7. FEELING AFRAID AS IF SOMETHING AWFUL MIGHT HAPPEN: SEVERAL DAYS
7. FEELING AFRAID AS IF SOMETHING AWFUL MIGHT HAPPEN: SEVERAL DAYS
1. FEELING NERVOUS, ANXIOUS, OR ON EDGE: SEVERAL DAYS
5. BEING SO RESTLESS THAT IT IS HARD TO SIT STILL: SEVERAL DAYS
4. TROUBLE RELAXING: SEVERAL DAYS
3. WORRYING TOO MUCH ABOUT DIFFERENT THINGS: MORE THAN HALF THE DAYS

## 2022-04-07 ASSESSMENT — PAIN SCALES - GENERAL: PAINLEVEL: NO PAIN (0)

## 2022-04-07 ASSESSMENT — PATIENT HEALTH QUESTIONNAIRE - PHQ9
10. IF YOU CHECKED OFF ANY PROBLEMS, HOW DIFFICULT HAVE THESE PROBLEMS MADE IT FOR YOU TO DO YOUR WORK, TAKE CARE OF THINGS AT HOME, OR GET ALONG WITH OTHER PEOPLE: EXTREMELY DIFFICULT
SUM OF ALL RESPONSES TO PHQ QUESTIONS 1-9: 18
SUM OF ALL RESPONSES TO PHQ QUESTIONS 1-9: 18

## 2022-04-07 NOTE — PROGRESS NOTES
Assessment & Plan     Morbidly obese (H)  Patient to increase semaglutide.  I would recommend maximizing dose of semaglutide before stopping completely.  - Semaglutide-Weight Management 1 MG/0.5ML SOAJ; Inject 1 mg Subcutaneous every 7 days  - topiramate (TOPAMAX) 50 MG tablet; Take 1.5 tablets (75 mg) by mouth every evening AND 1 tablet (50 mg) every morning.    OSCAR (generalized anxiety disorder)  Consider adding wellbutrin in the future if mood remains poor.  - escitalopram (LEXAPRO) 20 MG tablet; Take 1 tablet (20 mg) by mouth daily    Moderate major depression (H)  As above.   - escitalopram (LEXAPRO) 20 MG tablet; Take 1 tablet (20 mg) by mouth daily    Other specified hypothyroidism  Stable.  Continue current treatment plan and medications.   - levothyroxine (EUTHYROX) 25 MCG tablet; Take 1 tablet (25 mcg) by mouth daily Take with 200 mcg daily.  - levothyroxine (SYNTHROID/LEVOTHROID) 200 MCG tablet; Take 1 tablet (200 mcg) by mouth daily    Ordering of each unique test  Prescription drug management             Return in about 4 weeks (around 5/5/2022) for Weight Recheck, Medication Recheck.    ROXI Song [facility] SHAHBAZ Benedict is a 31 year old who presents for the following health issues     History of Present Illness       Mental Health Follow-up:  Patient presents to follow-up on Depression & Anxiety.Patient's depression since last visit has been:  Medium  The patient is having other symptoms associated with depression.  Patient's anxiety since last visit has been:  Better  The patient is having other symptoms associated with anxiety.  Any significant life events: job concerns, grief or loss and health concerns  Patient is not feeling anxious or having panic attacks.  Patient has no concerns about alcohol or drug use.       Today's PHQ-9         PHQ-9 Total Score: 18  PHQ-9 Q9 Thoughts of better off dead/self-harm past 2 weeks :   (P) Not at all    How  "difficult have these problems made it for you to do your work, take care of things at home, or get along with other people: Extremely difficult    Today's OSCAR-7 Score: 10    She eats 0-1 servings of fruits and vegetables daily.She consumes 1 sweetened beverage(s) daily.She exercises with enough effort to increase her heart rate 10 to 19 minutes per day.  She exercises with enough effort to increase her heart rate 3 or less days per week. She is missing 1 dose(s) of medications per week.     Patient notes that lexapro has helped.  She notes that her mood is down right now due to recent stress and outside circumstances.  She would like to continue on just lexapro for now.  She also continues with counseling.    Patient notes that she is exercising regularly at the gym.  She is sleeping well and has really been working on controlling her portion sizes and eating healthy.  She has lost 0 lbs since last visit.  She denies side effects from semaglutide, but doesn't feel it really helps.  She is sleeping well.          Review of Systems   Constitutional, HEENT, cardiovascular, pulmonary, gi and gu systems are negative, except as otherwise noted.      Objective    /78   Pulse 72   Temp 97.3  F (36.3  C) (Temporal)   Ht 1.644 m (5' 4.72\")   Wt (!) 167.8 kg (370 lb)   SpO2 97%   BMI 62.10 kg/m    Body mass index is 62.1 kg/m .  Physical Exam   GENERAL: healthy, alert and no distress  RESP: lungs clear to auscultation - no rales, rhonchi or wheezes  CV: regular rate and rhythm, normal S1 S2, no S3 or S4, no murmur, click or rub, no peripheral edema and peripheral pulses strong  MS: no gross musculoskeletal defects noted, no edema  PSYCH: mentation appears normal, affect normal/bright                "

## 2022-04-08 ASSESSMENT — ANXIETY QUESTIONNAIRES: GAD7 TOTAL SCORE: 10

## 2022-04-08 ASSESSMENT — PATIENT HEALTH QUESTIONNAIRE - PHQ9: SUM OF ALL RESPONSES TO PHQ QUESTIONS 1-9: 18

## 2022-05-05 ENCOUNTER — OFFICE VISIT (OUTPATIENT)
Dept: FAMILY MEDICINE | Facility: CLINIC | Age: 32
End: 2022-05-05
Payer: COMMERCIAL

## 2022-05-05 VITALS
HEART RATE: 72 BPM | WEIGHT: 293 LBS | SYSTOLIC BLOOD PRESSURE: 114 MMHG | DIASTOLIC BLOOD PRESSURE: 70 MMHG | HEIGHT: 64 IN | BODY MASS INDEX: 50.02 KG/M2 | TEMPERATURE: 97.5 F | OXYGEN SATURATION: 98 %

## 2022-05-05 DIAGNOSIS — E66.01 MORBIDLY OBESE (H): Primary | ICD-10-CM

## 2022-05-05 DIAGNOSIS — Z23 HIGH PRIORITY FOR 2019-NCOV VACCINE: ICD-10-CM

## 2022-05-05 DIAGNOSIS — E03.8 OTHER SPECIFIED HYPOTHYROIDISM: ICD-10-CM

## 2022-05-05 LAB
ANION GAP SERPL CALCULATED.3IONS-SCNC: 8 MMOL/L (ref 3–14)
BUN SERPL-MCNC: 16 MG/DL (ref 7–30)
CALCIUM SERPL-MCNC: 8.6 MG/DL (ref 8.5–10.1)
CHLORIDE BLD-SCNC: 110 MMOL/L (ref 94–109)
CO2 SERPL-SCNC: 20 MMOL/L (ref 20–32)
CREAT SERPL-MCNC: 0.81 MG/DL (ref 0.52–1.04)
GFR SERPL CREATININE-BSD FRML MDRD: >90 ML/MIN/1.73M2
GLUCOSE BLD-MCNC: 85 MG/DL (ref 70–99)
POTASSIUM BLD-SCNC: 3.9 MMOL/L (ref 3.4–5.3)
SODIUM SERPL-SCNC: 138 MMOL/L (ref 133–144)
T4 FREE SERPL-MCNC: 1.38 NG/DL (ref 0.76–1.46)
TSH SERPL DL<=0.005 MIU/L-ACNC: 0.22 MU/L (ref 0.4–4)

## 2022-05-05 PROCEDURE — 99214 OFFICE O/P EST MOD 30 MIN: CPT | Mod: 25 | Performed by: NURSE PRACTITIONER

## 2022-05-05 PROCEDURE — 36415 COLL VENOUS BLD VENIPUNCTURE: CPT | Performed by: NURSE PRACTITIONER

## 2022-05-05 PROCEDURE — 91305 COVID-19,PF,PFIZER (12+ YRS): CPT | Performed by: NURSE PRACTITIONER

## 2022-05-05 PROCEDURE — 84443 ASSAY THYROID STIM HORMONE: CPT | Performed by: NURSE PRACTITIONER

## 2022-05-05 PROCEDURE — 84439 ASSAY OF FREE THYROXINE: CPT | Performed by: NURSE PRACTITIONER

## 2022-05-05 PROCEDURE — 80048 BASIC METABOLIC PNL TOTAL CA: CPT | Performed by: NURSE PRACTITIONER

## 2022-05-05 PROCEDURE — 0054A COVID-19,PF,PFIZER (12+ YRS): CPT | Performed by: NURSE PRACTITIONER

## 2022-05-05 ASSESSMENT — ANXIETY QUESTIONNAIRES
2. NOT BEING ABLE TO STOP OR CONTROL WORRYING: MORE THAN HALF THE DAYS
GAD7 TOTAL SCORE: 14
8. IF YOU CHECKED OFF ANY PROBLEMS, HOW DIFFICULT HAVE THESE MADE IT FOR YOU TO DO YOUR WORK, TAKE CARE OF THINGS AT HOME, OR GET ALONG WITH OTHER PEOPLE?: NOT DIFFICULT AT ALL
GAD7 TOTAL SCORE: 14
6. BECOMING EASILY ANNOYED OR IRRITABLE: NEARLY EVERY DAY
7. FEELING AFRAID AS IF SOMETHING AWFUL MIGHT HAPPEN: SEVERAL DAYS
5. BEING SO RESTLESS THAT IT IS HARD TO SIT STILL: SEVERAL DAYS
3. WORRYING TOO MUCH ABOUT DIFFERENT THINGS: MORE THAN HALF THE DAYS
1. FEELING NERVOUS, ANXIOUS, OR ON EDGE: NEARLY EVERY DAY
GAD7 TOTAL SCORE: 14
4. TROUBLE RELAXING: MORE THAN HALF THE DAYS
7. FEELING AFRAID AS IF SOMETHING AWFUL MIGHT HAPPEN: SEVERAL DAYS

## 2022-05-05 ASSESSMENT — PATIENT HEALTH QUESTIONNAIRE - PHQ9
10. IF YOU CHECKED OFF ANY PROBLEMS, HOW DIFFICULT HAVE THESE PROBLEMS MADE IT FOR YOU TO DO YOUR WORK, TAKE CARE OF THINGS AT HOME, OR GET ALONG WITH OTHER PEOPLE: SOMEWHAT DIFFICULT
SUM OF ALL RESPONSES TO PHQ QUESTIONS 1-9: 6
SUM OF ALL RESPONSES TO PHQ QUESTIONS 1-9: 6

## 2022-05-05 NOTE — PROGRESS NOTES
Assessment & Plan     Morbidly obese (H)  Has lost 5 lbs over the last month, will continue to titrate up Semaglutide to max dose. Regular exercise and diet modifications encouraged.  - Semaglutide-Weight Management 1.7 MG/0.75ML SOAJ; Inject 1.7 mg Subcutaneous once a week  - Basic metabolic panel  (Ca, Cl, CO2, Creat, Gluc, K, Na, BUN); Future  - Basic metabolic panel  (Ca, Cl, CO2, Creat, Gluc, K, Na, BUN)    Other specified hypothyroidism  Repeat labs for stability, had dose change just prior to last labs 3 months ago.  - TSH with free T4 reflex; Future  - TSH with free T4 reflex    High priority for 2019-nCoV vaccine  - COVID-19,PF,PFIZER (12+ Yrs GRAY LABEL)    Review of the result(s) of each unique test - TSH  Ordering of each unique test  Prescription drug management         See Patient Instructions    Return in about 1 month (around 6/5/2022) for Weight management.    ROXI Henao [facility] SHAHBAZ Benedict is a 31 year old who presents for the following health issues  accompanied by her self.    History of Present Illness       Mental Health Follow-up:  Patient presents to follow-up on Depression & Anxiety.Patient's depression since last visit has been:  Better  The patient is not having other symptoms associated with depression.  Patient's anxiety since last visit has been:  Better  The patient is not having other symptoms associated with anxiety.  Any significant life events: job concerns, grief or loss and health concerns  Patient is not feeling anxious or having panic attacks.  Patient has no concerns about alcohol or drug use.       Today's PHQ-9         PHQ-9 Total Score: 6  PHQ-9 Q9 Thoughts of better off dead/self-harm past 2 weeks :   (P) Not at all    How difficult have these problems made it for you to do your work, take care of things at home, or get along with other people: Somewhat difficult    Today's OSCAR-7 Score: 14    Hypothyroidism:      "Since last visit, patient describes the following symptoms::  None    She eats 0-1 servings of fruits and vegetables daily.She consumes 0 sweetened beverage(s) daily.She exercises with enough effort to increase her heart rate 20 to 29 minutes per day.  She exercises with enough effort to increase her heart rate 3 or less days per week. She is missing 3 dose(s) of medications per week.     Working 12 hour nights right now, which has caused trouble with exercising and diet habits.    Typically when working dayshift. goes to the gym with coworker and does 15-20 min cardio and then weight lifting x15 min.    Eating low carb breakfast sandwich, handful of nuts for breakfast.  Lunch- salad or low carb wrap  Dinner- riced cauliflower and cheese or salmon    Notes she has not been eating this way lately due to stress- I.e. tacos for lunch yesterday.    Review of Systems   Constitutional, HEENT, cardiovascular, pulmonary, gi and gu systems are negative, except as otherwise noted.      Objective    /70 (BP Location: Other (Comment), Patient Position: Sitting, Cuff Size: Adult Large)   Pulse 72   Temp 97.5  F (36.4  C) (Oral)   Ht 1.626 m (5' 4\")   Wt (!) 165.6 kg (365 lb)   SpO2 98%   BMI 62.65 kg/m    Body mass index is 62.65 kg/m .  Physical Exam   GENERAL: healthy, alert and no distress  RESP: lungs clear to auscultation - no rales, rhonchi or wheezes  CV: regular rate and rhythm, normal S1 S2, no S3 or S4, no murmur, click or rub, no peripheral edema and peripheral pulses strong    No results found for this or any previous visit (from the past 24 hour(s)).            Answers for HPI/ROS submitted by the patient on 5/5/2022  If you checked off any problems, how difficult have these problems made it for you to do your work, take care of things at home, or get along with other people?: Somewhat difficult  PHQ9 TOTAL SCORE: 6  OSCAR 7 TOTAL SCORE: 14      "

## 2022-05-06 RX ORDER — LEVOTHYROXINE SODIUM 25 UG/1
12.5 TABLET ORAL DAILY
Qty: 45 TABLET | Refills: 0 | Status: SHIPPED | OUTPATIENT
Start: 2022-05-06 | End: 2022-05-06

## 2022-05-06 ASSESSMENT — PATIENT HEALTH QUESTIONNAIRE - PHQ9: SUM OF ALL RESPONSES TO PHQ QUESTIONS 1-9: 6

## 2022-05-06 ASSESSMENT — ANXIETY QUESTIONNAIRES: GAD7 TOTAL SCORE: 14

## 2022-05-07 ENCOUNTER — HEALTH MAINTENANCE LETTER (OUTPATIENT)
Age: 32
End: 2022-05-07

## 2022-06-02 ENCOUNTER — TELEPHONE (OUTPATIENT)
Dept: FAMILY MEDICINE | Facility: CLINIC | Age: 32
End: 2022-06-02

## 2022-06-02 ENCOUNTER — OFFICE VISIT (OUTPATIENT)
Dept: FAMILY MEDICINE | Facility: CLINIC | Age: 32
End: 2022-06-02
Payer: COMMERCIAL

## 2022-06-02 VITALS
TEMPERATURE: 97.8 F | DIASTOLIC BLOOD PRESSURE: 74 MMHG | HEART RATE: 70 BPM | OXYGEN SATURATION: 97 % | HEIGHT: 64 IN | SYSTOLIC BLOOD PRESSURE: 122 MMHG | WEIGHT: 293 LBS | BODY MASS INDEX: 50.02 KG/M2

## 2022-06-02 DIAGNOSIS — E66.01 MORBIDLY OBESE (H): Primary | ICD-10-CM

## 2022-06-02 DIAGNOSIS — E03.8 OTHER SPECIFIED HYPOTHYROIDISM: ICD-10-CM

## 2022-06-02 PROCEDURE — 99213 OFFICE O/P EST LOW 20 MIN: CPT | Performed by: NURSE PRACTITIONER

## 2022-06-02 ASSESSMENT — ANXIETY QUESTIONNAIRES
3. WORRYING TOO MUCH ABOUT DIFFERENT THINGS: NOT AT ALL
4. TROUBLE RELAXING: NOT AT ALL
7. FEELING AFRAID AS IF SOMETHING AWFUL MIGHT HAPPEN: NOT AT ALL
1. FEELING NERVOUS, ANXIOUS, OR ON EDGE: SEVERAL DAYS
GAD7 TOTAL SCORE: 5
6. BECOMING EASILY ANNOYED OR IRRITABLE: MORE THAN HALF THE DAYS
2. NOT BEING ABLE TO STOP OR CONTROL WORRYING: SEVERAL DAYS
7. FEELING AFRAID AS IF SOMETHING AWFUL MIGHT HAPPEN: NOT AT ALL
GAD7 TOTAL SCORE: 5
5. BEING SO RESTLESS THAT IT IS HARD TO SIT STILL: SEVERAL DAYS
8. IF YOU CHECKED OFF ANY PROBLEMS, HOW DIFFICULT HAVE THESE MADE IT FOR YOU TO DO YOUR WORK, TAKE CARE OF THINGS AT HOME, OR GET ALONG WITH OTHER PEOPLE?: SOMEWHAT DIFFICULT
GAD7 TOTAL SCORE: 5

## 2022-06-02 ASSESSMENT — PATIENT HEALTH QUESTIONNAIRE - PHQ9
SUM OF ALL RESPONSES TO PHQ QUESTIONS 1-9: 5
10. IF YOU CHECKED OFF ANY PROBLEMS, HOW DIFFICULT HAVE THESE PROBLEMS MADE IT FOR YOU TO DO YOUR WORK, TAKE CARE OF THINGS AT HOME, OR GET ALONG WITH OTHER PEOPLE: SOMEWHAT DIFFICULT
SUM OF ALL RESPONSES TO PHQ QUESTIONS 1-9: 5

## 2022-06-02 NOTE — TELEPHONE ENCOUNTER
Prior Authorization Retail Medication Request    Medication/Dose: Semaglutide-Weight Management 1.7 MG/0.75ML SOAJ  ICD code (if different than what is on RX):    Previously Tried and Failed:    Rationale:      Insurance Name:  [employer]   Insurance ID:  [id number]      Pharmacy Information (if different than what is on RX)  Name:  [facility]  Phone:  [phone]

## 2022-06-02 NOTE — PROGRESS NOTES
Assessment & Plan     Morbidly obese (H)  Continue max dose Semaglutide. Encouraged implementing diet/lifestyle changes now that she is on a more consistent work schedule. Discussed increasing protein intake and water intake. Could consider referral to dietician in the future; she did not wish to do it at this time.    - Semaglutide-Weight Management 1.7 MG/0.75ML SOAJ  Dispense: 3 mL; Refill: 0    Other specified hypothyroidism  Dose decreased at last visit. Will recheck labs at next month's visit.      Prescription drug management        Return in about 1 month (around 7/2/2022) for Recheck of today's visit.    ROXI Henao [facility] SHAHBAZ Benedict is a 31 year old who presents for the following health issues  accompanied by her self.    History of Present Illness       Mental Health Follow-up:  Patient presents to follow-up on Depression & Anxiety.Patient's depression since last visit has been:  Better  The patient is not having other symptoms associated with depression.  Patient's anxiety since last visit has been:  Better  The patient is not having other symptoms associated with anxiety.  Any significant life events: relationship concerns, job concerns, financial concerns, grief or loss and health concerns  Patient is not feeling anxious or having panic attacks.  Patient has no concerns about alcohol or drug use.    Hypothyroidism:     Since last visit, patient describes the following symptoms::  None    She eats 0-1 servings of fruits and vegetables daily.She consumes 1 sweetened beverage(s) daily.She exercises with enough effort to increase her heart rate 20 to 29 minutes per day.  She exercises with enough effort to increase her heart rate 3 or less days per week. She is missing 2 dose(s) of medications per week.    Today's PHQ-9         PHQ-9 Total Score: 5    PHQ-9 Q9 Thoughts of better off dead/self-harm past 2 weeks :   Not at all    How difficult  "have these problems made it for you to do your work, take care of things at home, or get along with other people: Somewhat difficult  Today's OSCAR-7 Score: 5       Chief Complaint   Patient presents with     Weight Management     Patient reports she feels more full and doesn't eat as big of portions while on Semaglutide. Denies any side effects.  Just stopped working overnights and now will be working day shifts with consistent schedule. Plans to start working out more. Usually does cardio and weights about three times per week; plans to increase. Hoping for more consistency working day shift.     Breakfast: cereal, or egg sandwich (sausage, cheese and egg; no bread)  Lunch: Keto bread sandwich with vegetables  Dinner: vegetable frozen meal with butter and cheese  Beverages: Water and zevia   Usually doesn't snack much during the day; if she does have a sweet tooth she eats ice cream.     Lives in a basement and has to go upstairs to cook. She usually tries to do easy things to avoid having to carry everything up. She does have a crockpot and air fryer that she uses.     Feels her mood is doing well. No thyroid symptoms that she is noticing at this time.     Review of Systems   Constitutional, HEENT, cardiovascular, pulmonary, gi and gu systems are negative, except as otherwise noted.      Objective    /74 (BP Location: Other (Comment), Patient Position: Sitting, Cuff Size: Adult Large)   Pulse 70   Temp 97.8  F (36.6  C) (Oral)   Ht 1.626 m (5' 4\")   Wt (!) 164.9 kg (363 lb 8 oz)   SpO2 97%   BMI 62.39 kg/m    Body mass index is 62.39 kg/m .  Physical Exam   GENERAL: healthy, alert and no distress  NECK: no adenopathy, no asymmetry, masses, or scars and thyroid normal to palpation  RESP: lungs clear to auscultation - no rales, rhonchi or wheezes  CV: regular rate and rhythm, normal S1 S2, no S3 or S4, no murmur, click or rub, no peripheral edema and peripheral pulses strong  MS: no gross musculoskeletal " defects noted, no edema      Little Steinberg, RN, APRN student        Resident/Fellow Attestation   I, Salina Whitehead, APRN CNP, was present with the medical/NEWTON student who participated in the service and in the documentation of the note.  I have verified the history and personally performed the physical exam and medical decision making.  I agree with the assessment and plan of care as documented in the note.      Salina Whitehead, ROXI CNP

## 2022-06-07 NOTE — TELEPHONE ENCOUNTER
Central Prior Authorization Team   Phone: [phone]    PA Initiation    Medication: Semaglutide-Weight Management 1.7 MG/0.75ML SOAJ  Insurance Company:    Pharmacy Filling the Rx[facility] [address] [facility] [place] [place] [facility] [address]  Filling Pharmacy Phone: [phone]  Filling Pharmacy Fax: [phone]  Start Date: 6/7/2022

## 2022-06-08 ENCOUNTER — OFFICE VISIT (OUTPATIENT)
Dept: PODIATRY | Facility: CLINIC | Age: 32
End: 2022-06-08
Payer: COMMERCIAL

## 2022-06-08 VITALS — BODY MASS INDEX: 48.82 KG/M2 | WEIGHT: 293 LBS | HEART RATE: 59 BPM | OXYGEN SATURATION: 98 % | HEIGHT: 65 IN

## 2022-06-08 DIAGNOSIS — B35.1 ONYCHOMYCOSIS: ICD-10-CM

## 2022-06-08 DIAGNOSIS — L60.3 NAIL DYSTROPHY: Primary | ICD-10-CM

## 2022-06-08 PROCEDURE — 99203 OFFICE O/P NEW LOW 30 MIN: CPT | Performed by: PODIATRIST

## 2022-06-08 ASSESSMENT — PAIN SCALES - GENERAL: PAINLEVEL: NO PAIN (0)

## 2022-06-08 NOTE — PROGRESS NOTES
Assessment:      ICD-10-CM    1. Onychomycosis  B35.1    2. Nail dystrophy  L60.3 Adult Dermatology Referral          Plan:  Orders Placed This Encounter   Procedures     Adult Dermatology Referral       Discussed the etiology and treatment of the condition with the patient.  Discussed treatment options.    Recommend referral to dermatology for definitive biopsy & treatment.    Offered permanent removal if she desires in the future    Return:  Dermatology Referral    Ann Bethea DPM                Chief Complaint:     Patient presents with:  Right Great Toe - Pain: Nail check     Fungus    HPI:  Marichuy Worrell is a 31 year old year old female who presents for evaluation of fungus of the right great toe. This has been present for the past 3 months, no pain or irritation of the nail.     Past Medical & Surgical History:  Past Medical History:   Diagnosis Date     ASCUS of cervix with negative high risk HPV 08/08/2017     Depressive disorder      Hypothyroidism 2009     Moderate major depression (H)      Morbidly obese (H)       Past Surgical History:   Procedure Laterality Date     CHOLECYSTECTOMY, LAPOROSCOPIC  12/2009      Family History   Problem Relation Age of Onset     Asthma Mother      Other Cancer Mother      Depression Mother      Obesity Mother      Melanoma Mother      Unknown/Adopted Father      Other Cancer Father      Arthritis Maternal Grandmother      Thyroid Disease Maternal Grandmother      Obesity Maternal Grandmother      Cancer Maternal Grandfather         lung cancer     Unknown/Adopted Paternal Grandmother      Unknown/Adopted Paternal Grandfather      Asthma Brother      Allergies Brother         Social History:  ?  History   Smoking Status     Never Smoker   Smokeless Tobacco     Never Used     History   Drug Use No     Social History    Substance and Sexual Activity      Alcohol use: No      Allergies:  ?   Allergies   Allergen Reactions     Biaxin [Clarithromycin]      rash     "    Medications:    Current Outpatient Medications   Medication     ENSKYCE 0.15-30 MG-MCG tablet     escitalopram (LEXAPRO) 20 MG tablet     insulin pen needle (31G X 5 MM) 31G X 5 MM miscellaneous     levothyroxine (SYNTHROID/LEVOTHROID) 200 MCG tablet     Semaglutide-Weight Management 1.7 MG/0.75ML SOAJ     topiramate (TOPAMAX) 50 MG tablet     traZODone (DESYREL) 50 MG tablet     triamcinolone (KENALOG) 0.1 % external cream     No current facility-administered medications for this visit.       Physical Exam:  ?  Vitals:  Pulse 59   Ht 1.651 m (5' 5\")   Wt (!) 164.7 kg (363 lb)   SpO2 98%   BMI 60.41 kg/m     General:  WD/WN, in NAD.  A&O x3.  Dermatologic:    Skin is intact, open lesions absent.  Fungus present  right.  Vascular:  Pulses palpable bilateral.  Digital capillary refill time normal bilateral.  Generalized edema- trace bilateral.  Neurologic:    Gross sensation normal.  Gait and balance normal.  Musculoskeletal:  No pain to palpation of foot & ankle.  aROM intact to foot & ankle.  Muscle strength 5/5 foot & ankle.              "

## 2022-06-08 NOTE — LETTER
6/8/2022         RE: Marichuy Worrell  [address] [facility] [address]        Dear Colleague,    Thank you for referring your patient, Marichuy Worrell, to the [facility]. Please see a copy of my visit note below.    Assessment:      ICD-10-CM    1. Onychomycosis  B35.1    2. Nail dystrophy  L60.3 Adult Dermatology Referral          Plan:  Orders Placed This Encounter   Procedures     Adult Dermatology Referral       Discussed the etiology and treatment of the condition with the patient.  Discussed treatment options.    Recommend referral to dermatology for definitive biopsy & treatment.    Offered permanent removal if she desires in the future    Return:  Dermatology Referral    Ann Bethea DPM                Chief Complaint:     Patient presents with:  Right Great Toe - Pain: Nail check     Fungus    HPI:  Marichuy Worrell is a 31 year old year old female who presents for evaluation of fungus of the right great toe. This has been present for the past 3 months, no pain or irritation of the nail.     Past Medical & Surgical History:  Past Medical History:   Diagnosis Date     ASCUS of cervix with negative high risk HPV 08/08/2017     Depressive disorder      Hypothyroidism 2009     Moderate major depression (H)      Morbidly obese (H)       Past Surgical History:   Procedure Laterality Date     CHOLECYSTECTOMY, LAPOROSCOPIC  12/2009      Family History   Problem Relation Age of Onset     Asthma Mother      Other Cancer Mother      Depression Mother      Obesity Mother      Melanoma Mother      Unknown/Adopted Father      Other Cancer Father      Arthritis Maternal Grandmother      Thyroid Disease Maternal Grandmother      Obesity Maternal Grandmother      Cancer Maternal Grandfather         lung cancer     Unknown/Adopted Paternal Grandmother      Unknown/Adopted Paternal Grandfather      Asthma Brother      Allergies Brother         Social History:  ?  History   Smoking Status  "    Never Smoker   Smokeless Tobacco     Never Used     History   Drug Use No     Social History    Substance and Sexual Activity      Alcohol use: No      Allergies:  ?   Allergies   Allergen Reactions     Biaxin [Clarithromycin]      rash        Medications:    Current Outpatient Medications   Medication     ENSKYCE 0.15-30 MG-MCG tablet     escitalopram (LEXAPRO) 20 MG tablet     insulin pen needle (31G X 5 MM) 31G X 5 MM miscellaneous     levothyroxine (SYNTHROID/LEVOTHROID) 200 MCG tablet     Semaglutide-Weight Management 1.7 MG/0.75ML SOAJ     topiramate (TOPAMAX) 50 MG tablet     traZODone (DESYREL) 50 MG tablet     triamcinolone (KENALOG) 0.1 % external cream     No current facility-administered medications for this visit.       Physical Exam:  ?  Vitals:  Pulse 59   Ht 1.651 m (5' 5\")   Wt (!) 164.7 kg (363 lb)   SpO2 98%   BMI 60.41 kg/m     General:  WD/WN, in NAD.  A&O x3.  Dermatologic:    Skin is intact, open lesions absent.  Fungus present  right.  Vascular:  Pulses palpable bilateral.  Digital capillary refill time normal bilateral.  Generalized edema- trace bilateral.  Neurologic:    Gross sensation normal.  Gait and balance normal.  Musculoskeletal:  No pain to palpation of foot & ankle.  aROM intact to foot & ankle.  Muscle strength 5/5 foot & ankle.                  Again, thank you for allowing me to participate in the care of your patient.        Sincerely,        Ann Bethea DPM    "

## 2022-06-08 NOTE — PATIENT INSTRUCTIONS
PATIENT INSTRUCTIONS - Podiatry / Foot & Ankle Surgery    Dermatology referral - they will call you    I can permanently remove the nail if you desire

## 2022-06-09 ENCOUNTER — OFFICE VISIT (OUTPATIENT)
Dept: DERMATOLOGY | Facility: CLINIC | Age: 32
End: 2022-06-09
Payer: COMMERCIAL

## 2022-06-09 DIAGNOSIS — B35.1 ONYCHOMYCOSIS: Primary | ICD-10-CM

## 2022-06-09 DIAGNOSIS — L60.3 NAIL DYSTROPHY: ICD-10-CM

## 2022-06-09 PROCEDURE — 99204 OFFICE O/P NEW MOD 45 MIN: CPT | Performed by: PHYSICIAN ASSISTANT

## 2022-06-09 RX ORDER — CICLOPIROX 80 MG/ML
SOLUTION TOPICAL
Qty: 6.6 ML | Refills: 5 | Status: SHIPPED | OUTPATIENT
Start: 2022-06-09

## 2022-06-09 ASSESSMENT — PAIN SCALES - GENERAL: PAINLEVEL: NO PAIN (0)

## 2022-06-09 NOTE — PROGRESS NOTES
HPI:   Chief complaints: Marichuy Worrell is a pleasant 31 year old female who presents for evaluation of a discolored toenail on the right great toe. She has had this for awhile, and first noticed it after removing nail polish. She has not tried any treatments.       PHYSICAL EXAM:    There were no vitals taken for this visit.  Skin exam performed as follows: Type 2 skin. Mood appropriate  Alert and Oriented X 3. Well developed, well nourished in no distress.  General appearance: Normal  Head including face: Normal  Eyes: conjunctiva and lids: Normal  Mouth: Lips, teeth, gums: Normal  Neck: Normal  Cardiovascular: Exam of peripheral vascular system by observation for swelling, varicosities, edema: Normal  Right upper extremity: Normal  Left upper extremity: Normal  Right lower extremity: Normal  Left lower extremity: Normal  Skin: Scalp and body hair: See below    White discoloration of the right great toenail     ASSESSMENT/PLAN:     1. Onychomycosis - discussed trial of topical antifungal vs lamisil. She prefers to start with a topical for now; she will try this for 3-6 months and if not seeing any improvement will call for lamisil. Discussed nail culture as well, however her nail is too short to complete this today.     --Start penlac every day - give this a 3-6 month trial      Follow-up: PRN  CC:   Scribed By: Sherrie Barbosa, MS, PAJose RC

## 2022-06-09 NOTE — LETTER
6/9/2022         RE: Marichuy Worrell  [address] [facility] [address]        Dear Colleague,    Thank you for referring your patient, Marichuy Worrell, to the [facility]. Please see a copy of my visit note below.    HPI:   Chief complaints: Marichuy Worrell is a pleasant 31 year old female who presents for evaluation of a discolored toenail on the right great toe. She has had this for awhile, and first noticed it after removing nail polish. She has not tried any treatments.       PHYSICAL EXAM:    There were no vitals taken for this visit.  Skin exam performed as follows: Type 2 skin. Mood appropriate  Alert and Oriented X 3. Well developed, well nourished in no distress.  General appearance: Normal  Head including face: Normal  Eyes: conjunctiva and lids: Normal  Mouth: Lips, teeth, gums: Normal  Neck: Normal  Cardiovascular: Exam of peripheral vascular system by observation for swelling, varicosities, edema: Normal  Right upper extremity: Normal  Left upper extremity: Normal  Right lower extremity: Normal  Left lower extremity: Normal  Skin: Scalp and body hair: See below    White discoloration of the right great toenail     ASSESSMENT/PLAN:     1. Onychomycosis - discussed trial of topical antifungal vs lamisil. She prefers to start with a topical for now; she will try this for 3-6 months and if not seeing any improvement will call for lamisil. Discussed nail culture as well, however her nail is too short to complete this today.     --Start penlac every day - give this a 3-6 month trial      Follow-up: PRN  CC:   Scribed By: Sherrie Barbosa, MS, PALENA          Again, thank you for allowing me to participate in the care of your patient.        Sincerely,        Sherrie Barbosa PA-C

## 2022-06-10 NOTE — TELEPHONE ENCOUNTER
PA approval on file.  Pharmacy is still unable to process.  I will need to contact insurance company.  Unable to reach insurance company.  I will try again on Monday.

## 2022-06-11 ENCOUNTER — MYC REFILL (OUTPATIENT)
Dept: FAMILY MEDICINE | Facility: CLINIC | Age: 32
End: 2022-06-11
Payer: COMMERCIAL

## 2022-06-11 DIAGNOSIS — E66.01 MORBIDLY OBESE (H): ICD-10-CM

## 2022-06-13 NOTE — TELEPHONE ENCOUNTER
Controlled Substance Refill Request for  Semaglutide-Weight Management 1.7 MG/0.75ML SOAJ     Problem List Complete:  Yes   checked in past 3 months?  No, route to RN

## 2022-06-14 DIAGNOSIS — E66.01 MORBIDLY OBESE (H): ICD-10-CM

## 2022-06-14 RX ORDER — SEMAGLUTIDE 1.7 MG/.75ML
INJECTION, SOLUTION SUBCUTANEOUS
Qty: 4 ML | Refills: 0 | OUTPATIENT
Start: 2022-06-14

## 2022-06-14 NOTE — TELEPHONE ENCOUNTER
The insurance covers for the titration - (see below).   Insurance covers each strength for one month, 4 pens per 30 days.  The issue is the patient received the 1.7mg/0.75ml on 5/5/2022.  Insurance states patient should now be at month 5(2.4mg/.075ml).  I did a PA for quantity limit on the 1.7mg/0.75mg.  It is approved until 7/14/2022. Pharmacy processed and will notify patient when ready.  If patient is tolerating, insurance states, patient should be titrated to the 2.4mg/0.75ml for the maintenance dose.  If patient is not tolerating current dose, I can try for another quantity limit of the 1.7mg/0.75ml.

## 2022-06-14 NOTE — TELEPHONE ENCOUNTER
Routing refill request to provider for review/approval because:  Drug not on the [facility] refill protocol       Requested Prescriptions   Pending Prescriptions Disp Refills     WEGOVY 1.7 MG/0.75ML SOAJ [Pharmacy Med Name: Wegovy 1.7 MG/0.75ML Subcutaneous Solution Auto-injector] 4 mL 0     Sig: INJECT 1.7 MG SUBCUTANEOUSLY ONCE A WEEK       There is no refill protocol information for this order        Ines Laureano RN  [facility]

## 2022-06-14 NOTE — TELEPHONE ENCOUNTER
[facility] has faxed a request to do a PA for Semaglutide-Weight Management 1.7 MG/0.75ML SOAJ

## 2022-06-30 ENCOUNTER — OFFICE VISIT (OUTPATIENT)
Dept: FAMILY MEDICINE | Facility: CLINIC | Age: 32
End: 2022-06-30
Payer: COMMERCIAL

## 2022-06-30 VITALS
HEART RATE: 78 BPM | TEMPERATURE: 97.7 F | SYSTOLIC BLOOD PRESSURE: 108 MMHG | OXYGEN SATURATION: 98 % | DIASTOLIC BLOOD PRESSURE: 60 MMHG | HEIGHT: 65 IN | WEIGHT: 293 LBS | BODY MASS INDEX: 48.82 KG/M2

## 2022-06-30 DIAGNOSIS — E66.01 MORBIDLY OBESE (H): Primary | ICD-10-CM

## 2022-06-30 DIAGNOSIS — E03.8 OTHER SPECIFIED HYPOTHYROIDISM: ICD-10-CM

## 2022-06-30 DIAGNOSIS — N91.1 SECONDARY AMENORRHEA: ICD-10-CM

## 2022-06-30 LAB
HCG UR QL: NEGATIVE
T3 SERPL-MCNC: 121 NG/DL (ref 85–202)
T4 FREE SERPL-MCNC: 1.18 NG/DL (ref 0.76–1.46)
TSH SERPL DL<=0.005 MIU/L-ACNC: 0.04 MU/L (ref 0.4–4)

## 2022-06-30 PROCEDURE — 81025 URINE PREGNANCY TEST: CPT | Performed by: NURSE PRACTITIONER

## 2022-06-30 PROCEDURE — 84443 ASSAY THYROID STIM HORMONE: CPT | Performed by: NURSE PRACTITIONER

## 2022-06-30 PROCEDURE — 84480 ASSAY TRIIODOTHYRONINE (T3): CPT | Performed by: NURSE PRACTITIONER

## 2022-06-30 PROCEDURE — 84439 ASSAY OF FREE THYROXINE: CPT | Performed by: NURSE PRACTITIONER

## 2022-06-30 PROCEDURE — 99214 OFFICE O/P EST MOD 30 MIN: CPT | Performed by: NURSE PRACTITIONER

## 2022-06-30 PROCEDURE — 36415 COLL VENOUS BLD VENIPUNCTURE: CPT | Performed by: NURSE PRACTITIONER

## 2022-06-30 NOTE — PROGRESS NOTES
Assessment & Plan     Morbidly obese (H)  Will increase Semaglutide to max dose. Encouraged her to increase protein in diet. Question if hypothyroidism is uncontrolled causing difficulty with weight loss. Her body fat has come down significantly in the last month and applauded her exercise habits and efforts.   - Semaglutide-Weight Management 2.4 MG/0.75ML SOAJ; Inject 2.4 mg Subcutaneous every 7 days    Other specified hypothyroidism  Labs today. Has had a couple recent dose adjustments.  - TSH; Future  - T4, free; Future  - T3, total; Future  - TSH  - T4, free  - T3, total    Secondary amenorrhea  Suspect hypothyroidism vs side effect of OCP  - HCG Qual, Urine ([id number]); Future  - HCG Qual, Urine ([id number])    Review of the result(s) of each unique test - HCG  Ordering of each unique test  Prescription drug management         See Patient Instructions    Return in about 6 weeks (around 8/11/2022) for Weight management.    ROXI Henao [facility] SHAHBAZ Benedict is a 32 year old accompanied by her self., presenting for the following health issues:    Weight Management    History of Present Illness       Reason for visit:  Weight check up    She eats 0-1 servings of fruits and vegetables daily.She consumes 0 sweetened beverage(s) daily.She exercises with enough effort to increase her heart rate 30 to 60 minutes per day.  She exercises with enough effort to increase her heart rate 3 or less days per week.   She is taking medications regularly.     Menses two weeks late, very fatigued. Trying to go to the gym, eating well and not losing weight. Has missed some doses with OCP. Did take two home pregnancy tests, which were negative. Concerned about thyroid.     Has been going to the gym most days and exercising up to an hour each time. Has been working on incorporating more fruits and vegetables into her diet. Trying keto recipes, including keto dessert recipes as she  "has a sweet tooth. Frustrated that she is working so hard yet not losing weight. Wondering if she is getting enough calories- eating less and found doesn't sound as good with Wegovy.    Weight up 2 lbs but body fat down almost 4% in the last month.      Review of Systems   Constitutional, HEENT, cardiovascular, pulmonary, gi and gu systems are negative, except as otherwise noted.      Objective    /60 (BP Location: Other (Comment), Patient Position: Sitting, Cuff Size: Adult Regular)   Pulse 78   Temp 97.7  F (36.5  C) (Oral)   Ht 1.651 m (5' 5\")   Wt (!) 165.6 kg (365 lb)   SpO2 98%   BMI 60.74 kg/m    Body mass index is 60.74 kg/m .  Physical Exam   GENERAL: healthy, alert and no distress  RESP: lungs clear to auscultation - no rales, rhonchi or wheezes  CV: regular rate and rhythm, normal S1 S2, no S3 or S4, no murmur, click or rub, no peripheral edema and peripheral pulses strong  PSYCH: mentation appears normal, affect normal/bright    Results for orders placed or performed in visit on 06/30/22   HCG Qual, Urine ([id number])     Status: Normal   Result Value Ref Range    hCG Urine Qualitative Negative Negative                   .  ..  "

## 2022-07-01 ENCOUNTER — MYC MEDICAL ADVICE (OUTPATIENT)
Dept: FAMILY MEDICINE | Facility: CLINIC | Age: 32
End: 2022-07-01

## 2022-07-07 RX ORDER — LEVOTHYROXINE SODIUM 175 UG/1
175 TABLET ORAL DAILY
Qty: 30 TABLET | Refills: 1 | Status: SHIPPED | OUTPATIENT
Start: 2022-07-07 | End: 2022-09-14

## 2022-08-04 ENCOUNTER — E-VISIT (OUTPATIENT)
Dept: URGENT CARE | Facility: CLINIC | Age: 32
End: 2022-08-04
Payer: COMMERCIAL

## 2022-08-04 DIAGNOSIS — R19.7 DIARRHEA, UNSPECIFIED TYPE: Primary | ICD-10-CM

## 2022-08-04 PROCEDURE — 99207 PR NON-BILLABLE SERV PER CHARTING: CPT | Performed by: NURSE PRACTITIONER

## 2022-08-05 NOTE — PATIENT INSTRUCTIONS
Dear Marichuy Worrell,    We are sorry you are not feeling well. Based on the responses you provided, it is recommended that you be seen in-person in urgent care so we can better evaluate your symptoms. Please click here to find the nearest urgent care location to you.   You will not be charged for this Visit. Thank you for trusting us with your care.    Alta Serrano, CNP      Traveler's Diarrhea (Adult)    Traveler's diarrhea is an infection in the digestive tract that's usually caused by bacteria called E coli. This bacteria is commonly found in water supplies of developing countries. The local people of those countries are used to E coli in the water and don't get sick. Tourists who drink contaminated water or eat foods that were washed or prepared with this water may become very ill.   The illness begins 1 to 3 days after exposure and lasts up to 5 days. Symptoms are usually mild. But they can be bothersome and inconvenient, with watery diarrhea and stomach cramping. Sometimes you may have fever or vomiting. You may also have blood or mucus in the stool. Mild cases will get better without treatment. Antibiotics are used for more severe cases.   Home care    If you were prescribed antibiotics, take them until they are finished.    You may use acetaminophen or ibuprofen to control fever, unless another medicine was prescribed. If you have chronic liver or kidney disease or have ever had a stomach ulcer or gastrointestinal bleeding, talk with the healthcare provider before using these medicines. Aspirin should never be used in anyone under 18 years of age who is ill with a fever. It may cause severe illness or death.    Don't take over-the-counter medicines for diarrhea, unless advised by the healthcare provider. Never take these kinds of medicine if your diarrhea is bloody.  Once vomiting stops, follow these guidelines:  During the first 12 to 24 hours, follow the diet below:     Fruit juices. Apple, grape  and cranberry juice, clear fruit drinks, electrolyte replacements, and sports drinks.    Beverages. Soft drinks without caffeine, mineral water (plain or flavored), and decaffeinated tea and coffee.    Soups. Clear broth, consommé, and bouillon.    Desserts. Plain gelatin, popsicles, and fruit juice bars. As you feel better, you may add 6 to 8 ounces of yogurt per day.  During the next 24 hours, you may add the following to the above:     Hot cereal, plain toast, bread, rolls, or crackers    Plain noodles, rice, mashed potatoes, or chicken noodle or rice soup    Unsweetened canned fruit (not pineapple) and bananas    Limit how much fat you eat to less than 15 grams per day. Don't have margarine, butter, oils, mayonnaise, sauces, gravies, fried foods, peanut butter, meat, poultry, or fish.    Limit fiber. Don't eat raw or cooked vegetables, fresh fruits (except bananas), or bran cereals.    Limit caffeine and chocolate. Don't have any spices or seasonings except salt.  During the next 24 hours, you can gradually resume a normal diet as the symptoms lessen.   Follow-up care  Follow up with your healthcare provider, or as advised. Call if you are not getting better within 24 hours or if the diarrhea lasts more than 1 week on antibiotics. If a stool (diarrhea) sample was taken, you may call in 1 to 2 days or as directed for the results.   When to seek medical advice  Call your healthcare provider if any of these happen:     Severe constant pain in the lower part of the belly, on the right side    Blood in diarrhea or vomit, dark coffee ground appearing vomit, or dark tarry stools    You aren't able to keep liquids down (continued vomiting)    Diarrhea that happens more than 5 times a day, or red or black blood or mucus in diarrhea    You don't have good appetite    You aren't urinating as much as normal or you are very thirsty    Fever of 100.4 F (38 C) or higher, or as directed by your healthcare provider  Call  911  Call 911 if any of the following occur:       Weakness, dizziness, or fainting    Drowsiness, confusion, stiff neck, or seizure    [employer] last reviewed this educational content on 8/1/2020 2000-2021 [employer]. All rights reserved. This information is not intended as a substitute for professional medical care. Always follow your healthcare professional's instructions.

## 2022-08-11 ENCOUNTER — OFFICE VISIT (OUTPATIENT)
Dept: FAMILY MEDICINE | Facility: CLINIC | Age: 32
End: 2022-08-11
Payer: COMMERCIAL

## 2022-08-11 VITALS
OXYGEN SATURATION: 97 % | DIASTOLIC BLOOD PRESSURE: 60 MMHG | SYSTOLIC BLOOD PRESSURE: 104 MMHG | WEIGHT: 293 LBS | BODY MASS INDEX: 48.82 KG/M2 | HEIGHT: 65 IN | TEMPERATURE: 98.1 F | HEART RATE: 61 BPM

## 2022-08-11 DIAGNOSIS — E66.01 MORBIDLY OBESE (H): Primary | ICD-10-CM

## 2022-08-11 DIAGNOSIS — E03.8 OTHER SPECIFIED HYPOTHYROIDISM: ICD-10-CM

## 2022-08-11 PROCEDURE — 99214 OFFICE O/P EST MOD 30 MIN: CPT | Performed by: NURSE PRACTITIONER

## 2022-08-11 RX ORDER — CIPROFLOXACIN 500 MG/1
TABLET, FILM COATED ORAL
COMMUNITY
Start: 2022-08-08 | End: 2022-11-10

## 2022-08-11 NOTE — PROGRESS NOTES
"  Assessment & Plan     Morbidly obese (H)  Weight down 5 lbs in the last 6 weeks on max dose Semaglutide, although unclear if her recent GI illness is the cause of the weight loss. Discussed continuing her on max dose Semaglutide for 1 additional month- if no weight loss, plan to discontinue. If losing weight, will give full 3 months at max dose.  Has previously tried Phentermine, Naltrexone, and Saxenda. Contrave is the only drug she has not yet tried- could consider this or referral to bariatric surgeon if needed.  - Semaglutide-Weight Management 2.4 MG/0.75ML SOAJ; Inject 2.4 mg Subcutaneous every 7 days    Other specified hypothyroidism  Due to recent illness, will postpone labs 1 month. Asymptomatic.      Ordering of each unique test  Prescription drug management         BMI:   Estimated body mass index is 59.99 kg/m  as calculated from the following:    Height as of this encounter: 1.651 m (5' 5\").    Weight as of this encounter: 163.5 kg (360 lb 8 oz).   Weight management plan: Discussed healthy diet and exercise guidelines    See Patient Instructions    Return in about 1 month (around 9/11/2022) for Weight management.    Salina Whitehead, ROXI CNP  [facility] SHAHBAZ Benedict is a 32 year old accompanied by her self, presenting for the following health issues:  Weight Management      History of Present Illness       Hypothyroidism:     Since last visit, patient describes the following symptoms::  None    Reason for visit:  Weight    She eats 2-3 servings of fruits and vegetables daily.She consumes 1 sweetened beverage(s) daily.She exercises with enough effort to increase her heart rate 20 to 29 minutes per day.  She exercises with enough effort to increase her heart rate 4 days per week. She is missing 1 dose(s) of medications per week.     Had profuse diarrhea and diagnosed with Salmonella last week, started antibiotics on Monday and is improving. Wasn't eating much for a " "while when sick- bland diet currently.    Was on vacation 7/26-8/2 and not following strict diet.    Prior to this, was working on adding protein to dinner (I.e. salmon with veggies at dinner). Had been exercising 3 times per week.    No hypo/hyperthryoid symptoms on current dose.    Review of Systems   Constitutional, HEENT, cardiovascular, pulmonary, gi and gu systems are negative, except as otherwise noted.      Objective    Pulse 61   Temp 98.1  F (36.7  C) (Oral)   Ht 1.651 m (5' 5\")   Wt (!) 163.5 kg (360 lb 8 oz)   SpO2 97%   BMI 59.99 kg/m    Body mass index is 59.99 kg/m .  Physical Exam   GENERAL: healthy, alert and no distress  PSYCH: mentation appears normal, affect normal/bright    No results found for any visits on 08/11/22.                .  ..  "

## 2022-09-14 ENCOUNTER — OFFICE VISIT (OUTPATIENT)
Dept: FAMILY MEDICINE | Facility: CLINIC | Age: 32
End: 2022-09-14
Payer: COMMERCIAL

## 2022-09-14 ENCOUNTER — TELEPHONE (OUTPATIENT)
Dept: FAMILY MEDICINE | Facility: CLINIC | Age: 32
End: 2022-09-14

## 2022-09-14 VITALS
HEART RATE: 65 BPM | WEIGHT: 293 LBS | SYSTOLIC BLOOD PRESSURE: 100 MMHG | HEIGHT: 65 IN | BODY MASS INDEX: 48.82 KG/M2 | OXYGEN SATURATION: 97 % | DIASTOLIC BLOOD PRESSURE: 62 MMHG | TEMPERATURE: 98.9 F

## 2022-09-14 DIAGNOSIS — E66.01 MORBIDLY OBESE (H): Primary | ICD-10-CM

## 2022-09-14 DIAGNOSIS — F41.1 GAD (GENERALIZED ANXIETY DISORDER): ICD-10-CM

## 2022-09-14 DIAGNOSIS — F32.1 MODERATE MAJOR DEPRESSION (H): ICD-10-CM

## 2022-09-14 DIAGNOSIS — Z13.220 SCREENING FOR HYPERLIPIDEMIA: ICD-10-CM

## 2022-09-14 DIAGNOSIS — N92.1 MENOMETRORRHAGIA: ICD-10-CM

## 2022-09-14 DIAGNOSIS — E03.8 OTHER SPECIFIED HYPOTHYROIDISM: ICD-10-CM

## 2022-09-14 LAB
CHOLEST SERPL-MCNC: 205 MG/DL
FASTING STATUS PATIENT QL REPORTED: YES
HDLC SERPL-MCNC: 57 MG/DL
LDLC SERPL CALC-MCNC: 126 MG/DL
NONHDLC SERPL-MCNC: 148 MG/DL
T4 FREE SERPL-MCNC: 1.19 NG/DL (ref 0.76–1.46)
TRIGL SERPL-MCNC: 110 MG/DL
TSH SERPL DL<=0.005 MIU/L-ACNC: 5.39 MU/L (ref 0.4–4)

## 2022-09-14 PROCEDURE — 84439 ASSAY OF FREE THYROXINE: CPT | Performed by: NURSE PRACTITIONER

## 2022-09-14 PROCEDURE — 36415 COLL VENOUS BLD VENIPUNCTURE: CPT | Performed by: NURSE PRACTITIONER

## 2022-09-14 PROCEDURE — 99214 OFFICE O/P EST MOD 30 MIN: CPT | Performed by: NURSE PRACTITIONER

## 2022-09-14 PROCEDURE — 84443 ASSAY THYROID STIM HORMONE: CPT | Performed by: NURSE PRACTITIONER

## 2022-09-14 PROCEDURE — 80061 LIPID PANEL: CPT | Performed by: NURSE PRACTITIONER

## 2022-09-14 RX ORDER — ESCITALOPRAM OXALATE 20 MG/1
20 TABLET ORAL DAILY
Qty: 90 TABLET | Refills: 1 | Status: SHIPPED | OUTPATIENT
Start: 2022-09-14 | End: 2023-05-09

## 2022-09-14 RX ORDER — LEVOTHYROXINE SODIUM 200 UG/1
TABLET ORAL
Qty: 45 TABLET | Refills: 0 | Status: SHIPPED | OUTPATIENT
Start: 2022-09-14 | End: 2023-01-03

## 2022-09-14 RX ORDER — LEVOTHYROXINE SODIUM 175 UG/1
175 TABLET ORAL DAILY
Qty: 30 TABLET | Refills: 1 | Status: SHIPPED | OUTPATIENT
Start: 2022-09-14 | End: 2022-09-14

## 2022-09-14 RX ORDER — DESOGESTREL AND ETHINYL ESTRADIOL 0.15-0.03
1 KIT ORAL DAILY
Qty: 84 TABLET | Refills: 3 | Status: SHIPPED | OUTPATIENT
Start: 2022-09-14 | End: 2023-09-11

## 2022-09-14 RX ORDER — LEVOTHYROXINE SODIUM 175 UG/1
TABLET ORAL
Qty: 45 TABLET | Refills: 0 | Status: SHIPPED | OUTPATIENT
Start: 2022-09-14 | End: 2023-01-03

## 2022-09-14 RX ORDER — TOPIRAMATE 50 MG/1
TABLET, FILM COATED ORAL
Qty: 225 TABLET | Refills: 1 | Status: SHIPPED | OUTPATIENT
Start: 2022-09-14 | End: 2022-11-10

## 2022-09-14 NOTE — TELEPHONE ENCOUNTER
Plan does not cover Semaglutide-Weight Management 2.4 MG/0.75ML SOAJ. Please log onto [email or web address] to initiate PA or switch to alternative medication.    KEY: [id number]

## 2022-09-14 NOTE — TELEPHONE ENCOUNTER
Prior Authorization Approval    Authorization Effective Date: 8/15/2022  Authorization Expiration Date: 4/12/2023  Medication: Semaglutide-Weight Management 2.4 MG/0.75ML SOAJ-APPROVED  Approved Dose/Quantity:   Reference #:     Insurance Company: [employer] - Phone [phone] Fax [phone]  Expected CoPay:       CoPay Card Available:      Foundation Assistance Needed:    Which Pharmacy is filling the prescription (Not needed for infusion/clinic administered): [facility] [address] [facility], [place] - [address]  Pharmacy Notified: Yes  Patient Notified: No    **Instructed pharmacy to notify patient when script is ready to /ship.**

## 2022-09-14 NOTE — PROGRESS NOTES
Assessment & Plan     Morbidly obese (H)  Patient has been on Max dose Semaglutide for 2.5 months. Has not yet achieved 5% weight loss, but has been steadily losing weight. Will continue med for 1 additional month to allow for full 3 month trial at max dose. Plan to discontinue if she does not meet this goal. Patient is interested in bariatric surgery consult- referral placed.  As I am leaving the clinic, patient would like to see Endocrinology for ongoing medical weight management and hypothyroid management.  - Comprehensive Weight Management; Future  - Adult Endocrinology  Referral; Future  - topiramate (TOPAMAX) 50 MG tablet; Take 1.5 tablets (75 mg) by mouth every evening AND 1 tablet (50 mg) every morning.  - Semaglutide-Weight Management 2.4 MG/0.75ML SOAJ; Inject 2.4 mg Subcutaneous every 7 days    Other specified hypothyroidism  Uncontrolled- reviewed recent labs. Repeat labs today.  - TSH with free T4 reflex; Future  - Adult Endocrinology  Referral; Future  - levothyroxine (SYNTHROID/LEVOTHROID) 175 MCG tablet; Take 1 tablet (175 mcg) by mouth daily    Screening for hyperlipidemia    - Lipid panel reflex to direct LDL Fasting; Future    OSCAR (generalized anxiety disorder)  Well controlled on current medications  - escitalopram (LEXAPRO) 20 MG tablet; Take 1 tablet (20 mg) by mouth daily    Moderate major depression (H)  Well controlled on current medications  - escitalopram (LEXAPRO) 20 MG tablet; Take 1 tablet (20 mg) by mouth daily    Menometrorrhagia  Medications refiled  - desogestrel-ethinyl estradiol (ENSKYCE) 0.15-30 MG-MCG tablet; Take 1 tablet by mouth daily    Review of the result(s) of each unique test - TSH  Ordering of each unique test  Prescription drug management         See Patient Instructions    Return in about 6 months (around 3/14/2023) for Medication check.    ROXI Henao [facility] SHAHBAZ Benedict is a 32 year old  "accompanied by her self, presenting for the following health issues:  Weight Management      History of Present Illness       Reason for visit:  Weight    She eats 2-3 servings of fruits and vegetables daily.She consumes 1 sweetened beverage(s) daily.She exercises with enough effort to increase her heart rate 20 to 29 minutes per day.  She exercises with enough effort to increase her heart rate 3 or less days per week.   She is taking medications regularly.     Since last visit, weight is down 3.5 lbs, body fat is up 0.2%    Down 13 lbs since starting Semaglutide.    Started a low carb protein shake in the mornings (typically doesn't eat breakfast).  Going to the gym 3 days/week (met goal last week)- on the treadmill for 1 mile. Focusing on showing up.    Previously lost to 309 lbs doing Keto diet. Working on low carb  Meal prepping, snack prepping    No hypothyroid symptoms- wonders about seeing Endocrinology    Anxiety, depression, and menses well controlled on current medications.    Review of Systems   Constitutional, HEENT, cardiovascular, pulmonary, gi and gu systems are negative, except as otherwise noted.      Objective    /62 (BP Location: Other (Comment), Patient Position: Sitting, Cuff Size: Adult Large)   Pulse 65   Temp 98.9  F (37.2  C) (Oral)   Ht 1.651 m (5' 5\")   Wt (!) 161.9 kg (357 lb)   SpO2 97%   BMI 59.41 kg/m    Body mass index is 59.41 kg/m .  Physical Exam   GENERAL: healthy, alert and no distress  RESP: lungs clear to auscultation - no rales, rhonchi or wheezes  CV: regular rate and rhythm, normal S1 S2, no S3 or S4, no murmur, click or rub, no peripheral edema and peripheral pulses strong  PSYCH: mentation appears normal, affect normal/bright    No results found for any visits on 09/14/22.                "

## 2022-09-14 NOTE — TELEPHONE ENCOUNTER
Central Prior Authorization Team   Phone: [phone]      PA Initiation    Medication: Semaglutide-Weight Management 2.4 MG/0.75ML SOAJ  Insurance Company: [employer] - Phone [phone] Fax [phone]  Pharmacy Filling the Rx: [facility] [address] [place], [place] - [address] [address]  Filling Pharmacy Phone: [phone]  Filling Pharmacy Fax:    Start Date: 9/14/2022

## 2022-09-15 NOTE — TELEPHONE ENCOUNTER
REFERRAL INFORMATION:    Referring Provider:  ROXI Church CNP     Referring Clinic:  [facility]    Reason for Visit/Diagnosis: New Surg, BMI 59       FUTURE VISIT INFORMATION:    Appointment Date: 11/10/47922    Appointment Time: 7:30 AM      NOTES RECORD STATUS  DETAILS   OFFICE NOTE from Referring Provider Internal 9/14/2022, 8/11/2022, 6/30/2022, 6/2/2022 Office visit with ROXI Church CNP      OFFICE NOTE from Other Specialists Internal 4/7/2022, 8/26/2021, 7/8/2021 Office visit with ROXI Song CNP ([facility]) - more office visits in [facility]        HOSPITAL DISCHARGE SUMMARY/ ED VISITS  N/A    OPERATIVE REPORT N/A    ENDOSCOPY (EGD)  N/A    PERTINENT LABS Internal/ Care Everywhere    PATHOLOGY REPORTS (RELATED) N/A    IMAGING (CT, MRI, US, XR)  N/A

## 2022-10-18 NOTE — TELEPHONE ENCOUNTER
Phone Encounter   Left message for patient; BEHAVIORAL HEALTH CLINICIAN introduced and explained integrated health model, brief therapy interventions and referral and support services for ongoing therapy interventions.   Encouraged patient to contact scheduling phone number to make appointment or contact me regarding any questions she has about starting therapy/cusneling services.      [de-identified] : Melonie Harris is a 49 yo female who presents for evaluation of sinusitis. She states that she had septoplasty and left maxillary balloon sinuplasty in 2017. She was doing well after this. However, over the past couple of years when wearing a N95 mask, she has had worsening issues with her sinuses. She notes intermittent left maxillary pressure. She notes nasal congestion and postnasal drainage. She denies fevers or chills. She denies vision changes or pain/restriction of extraocular movements. She has had 2-3 sinus infections in the past year, treated with steroids and antibiotics. She has never been allergy testing. She uses flonase and saline.

## 2022-10-27 DIAGNOSIS — F32.1 MODERATE MAJOR DEPRESSION (H): ICD-10-CM

## 2022-10-27 DIAGNOSIS — G47.00 INSOMNIA, UNSPECIFIED TYPE: ICD-10-CM

## 2022-10-27 DIAGNOSIS — F41.1 GAD (GENERALIZED ANXIETY DISORDER): ICD-10-CM

## 2022-10-28 RX ORDER — TRAZODONE HYDROCHLORIDE 50 MG/1
TABLET, FILM COATED ORAL
Qty: 30 TABLET | Refills: 0 | Status: SHIPPED | OUTPATIENT
Start: 2022-10-28 | End: 2022-11-16

## 2022-11-09 VITALS — BODY MASS INDEX: 48.82 KG/M2 | HEIGHT: 65 IN | WEIGHT: 293 LBS

## 2022-11-09 NOTE — PROGRESS NOTES
"70 minutes spent on the date of the encounter doing chart review, history and exam, documentation and further activities per the note    New Bariatric Surgery Consultation Note    November 10, 2022    RE: Marichuy Worrell  MR#: [id number]  : 1990      Referring provider:       2022   Who referred you? Salina Whitehead       Chief Complaint/Reason for visit: evaluation for possible weight loss surgery    Dear [place], Salina Mercado, ROXI CNP (General),    I had the pleasure of seeing your patient, Marichuy Worrell, to evaluate her obesity and consider her for possible weight loss surgery. As you know, Marichuy Worrell is 32 year old.  She has a height of 5' 5\", a weight of 362 lbs 0 oz, and calculated Body mass index is 60.24 kg/m .     Wt Readings from Last 5 Encounters:   22 (!) 164.2 kg (362 lb)   22 (!) 161.9 kg (357 lb)   22 (!) 163.5 kg (360 lb 8 oz)   22 (!) 165.6 kg (365 lb)   22 (!) 164.7 kg (363 lb)         Assessment & Plan   Problem List Items Addressed This Visit        Digestive    Class 3 severe obesity with serious comorbidity and body mass index (BMI) of 60.0 to 69.9 in adult (H) - Primary     Difficulty managing weight since childhood. Was doing calorie restriction diets before age 8. High weight in life is 400lb, in 2015. She has been working with medical weight management since 2018 through a number of medical providers. Most recently, she started 2022 at a weight of 364lb. She has decided to pursue bariatric surgery.     Has taken phentermine in the past. Currently on topiramate, was beneficial initially, no recent adjustments, no adverse side effects. Would like to try to increase this. She is already taking wegovy 2.4mg with earlier satiety but not seeing a lot of weight loss.     Plan:  Increase topiramate to 100mg bid- start with 75mg bid x 1 week, 75mgAM and 100mg PM x 1 week then 100mg bid   Schedule sleep consult  Schedule psych eval  Letter of support " from therapist  Establish with new primary care   Start assessing hunger before snacking   Follow up 2 months          Relevant Medications    topiramate (TOPAMAX) 25 MG tablet    Other Relevant Orders    CBC with platelets    Comprehensive metabolic panel    Hemoglobin A1c    Vitamin D Deficiency    Parathyroid Hormone Intact    Adult Sleep Eval & Management  Referral            HISTORY OF PRESENT ILLNESS:  Weight Loss History Reviewed with Patient 11/9/2022   How long have you been overweight? Since early childhood   What is the most that you have ever weighed? 400   What is the most weight you have lost? 60   I have tried the following methods to lose weight Watching portions or calories, Exercise, Atkins type diet (low carb/high protein), Slimfast, OTC Medications, Prescription Medications, Physician directed program   I have tried the following weight loss medications? (Check all that apply) Topamax/Topiramate, Qysmia (phentermine + topiramate), Naltrexone, Victoza/liraglutide, Saxenda   Have you ever had weight loss surgery? No     Very low calorie diet when she was little- weight gain - family was all losing weight on 1200 calorie diet. She would cry because she was hungry maybe 9yo     High weight in life 400lb - 2015    Has been doing several drastic diets since high school. Difficult to sustain these plans.   Life events like vacation and moving would interrupt these plans and then difficult to get back on track     Has been working on weight loss medical weight management since 2018 12/2022 1364lb (BMI 60)    Lost to 300lb in 2019- meal prepping - low carb     Lost to 330lb in 2021. Rapid regain to 360s with thyroid being off in 12/2021     PCP- leaving clinic   wegovy 2.4mg - helpful to feel [place] sooner - less snacking between meals and after meals     topiramate 50mg AM and 75mg PM - Weight gain with reducing topiramate - was initially helpful with thoughts about food     Phentermine in the  past     CO-MORBIDITIES OF OBESITY INCLUDE:     11/9/2022   I have the following health issues associated with obesity: Hypothyroidism     Sx of PCOS- irregular menstrual cycles (missing periods,  - put on birth control to regulate about 8 years ago. No hirsutism. No acne.     Sleep- restless, waking often to go to the bathroom, excessive fatigue in the evening not able to make it to bed time, wakes tired, no headaches, + snoring     No reflux     Is patient on biologics or immunomodulators? NO     PAST MEDICAL HISTORY:  Past Medical History:   Diagnosis Date     ASCUS of cervix with negative high risk HPV 08/08/2017     Depressive disorder      Hypothyroidism 2009     Moderate major depression (H)      Morbidly obese (H)        PAST SURGICAL HISTORY:  Past Surgical History:   Procedure Laterality Date     CHOLECYSTECTOMY, LAPOROSCOPIC  12/2009       FAMILY HISTORY:   Family History   Problem Relation Age of Onset     Asthma Mother      Other Cancer Mother      Depression Mother      Obesity Mother      Melanoma Mother      Unknown/Adopted Father      Other Cancer Father      Arthritis Maternal Grandmother      Thyroid Disease Maternal Grandmother      Obesity Maternal Grandmother      Cancer Maternal Grandfather         lung cancer     Unknown/Adopted Paternal Grandmother      Unknown/Adopted Paternal Grandfather      Asthma Brother      Allergies Brother        SOCIAL HISTORY:   Social History Questions Reviewed With Patient 11/9/2022   Which best describes your employment status (select all that apply) I work full-time, I work days   If you work, what is your occupation? 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   Which best describes your marital status: single   Do you have children? No   Who do you have in your support network that can be available to help you for the first 2 weeks after surgery? My mom and my sister-in-law   Who can you count on for support throughout your  weight loss surgery journey? My mom, sister-in-law and my friend. My friend is getting weight loss surgery in January and has been a huge encouragement.   Can you afford 3 meals a day?  Yes   Can you afford 50-60 dollars a month for vitamins? 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 Occasionally has to do physical holds at work. Lots of moving      HABITS:     11/9/2022   How often do you drink alcohol? Monthly or less   If you do drink alcohol, how many drinks might you have in a day? (one drink = 5 oz. wine, 1 can/bottle of beer, 1 shot liquor) 1 or 2   Have you ever used any of the following nicotine products? No   Have you or are you currently using street drugs or prescription strength medication for which you do not have a prescription for? No   Do you have a history of chemical dependency (alcohol or drug abuse)? No     Alcohol just socially- 1 every couple months     Currently taking narcotic/opioids No    PSYCHOLOGICAL HISTORY:   Psychological History Reviewed With Patient 11/9/2022   Have you ever attempted suicide? Never.   Have you had thoughts of suicide in the past year? No   Have you ever been hospitalized for mental illness or a suicide attempt? Never.   Do you have a history of chronic pain? No   Have you ever been diagnosed with fibromyalgia? No   Are you currently being treated for any of the following? (select all that apply) Depression, I take medication or see a therapist for another mental illness   Are you currently seeing a therapist or counselor?  Yes   Are you currently seeing a psychiatrist? No     Works with therapist regularly   Feels stable   Has done counseling around trauma, shame, and body work   Has discussed surgery with therapist     ROS:     11/9/2022   Skin:  Varicose veins   HEENT: None of these   Musculoskeletal: None of the above   Cardiovascular: Shortness of breath with activity   Pulmonary: Shortness of breath with activity, Snoring   Gastrointestinal: None of the above    Genitourinary: None of the above   Hematological: None of the above   Neurological: None of the above   Female only: Loss of menstrual cycles, Birth control, Regular menstrual cycles       EATING BEHAVIORS:     11/9/2022   Have you or anyone else thought that you had an eating disorder? No   Do you currently binge eat (eat a large amount of food in a short time)? No   Are you an emotional eater? Yes   Do you get up to eat after falling asleep? No     Breakfast- protein shake or cereal but often misses  Lunch- usually at work- hot meals provided   Dinner- something quick at home     Difficult to cook in current living situation     Feels like bowl has to be full   Important to finish what she takes. Doesn't want to waste food.     Some habit eating and boredom eating     Extreme hunger in the morning   Hx of stress eating at night in teens     EXERCISE:     11/9/2022   How often do you exercise? 1 to 2 times per week   What is the duration of your exercise (in minutes)? 20 Minutes   What types of exercise do you do? walking, gym membership, home gym, weightlifting   What keeps you from being more active?  I should be more active but I just have not gotten around to it, Too tired       MEDICATIONS:  Current Outpatient Medications   Medication Sig Dispense Refill     topiramate (TOPAMAX) 25 MG tablet Take 4 tablets (100 mg) by mouth 2 times daily 240 tablet 3     ciclopirox (PENLAC) 8 % external solution Apply to adjacent skin and affected nails daily.  Remove with alcohol every 7 days, then repeat. 6.6 mL 5     desogestrel-ethinyl estradiol (ENSKYCE) 0.15-30 MG-MCG tablet Take 1 tablet by mouth daily 84 tablet 3     escitalopram (LEXAPRO) 20 MG tablet Take 1 tablet (20 mg) by mouth daily 90 tablet 1     insulin pen needle (31G X 5 MM) 31G X 5 MM miscellaneous Use one pen needles daily or as directed. 100 each 1     levothyroxine (SYNTHROID/LEVOTHROID) 175 MCG tablet Take 1 tablet by mouth every other day alternating  with 200 mcg dose 45 tablet 0     levothyroxine (SYNTHROID/LEVOTHROID) 200 MCG tablet Take 1 tablet by mouth every other day alternating with 175 mcg dose 45 tablet 0     Semaglutide-Weight Management 2.4 MG/0.75ML SOAJ Inject 2.4 mg Subcutaneous every 7 days 3 mL 1     traZODone (DESYREL) 50 MG tablet TAKE 1 TABLET BY MOUTH AT BEDTIME 30 tablet 0     triamcinolone (KENALOG) 0.1 % external cream as needed         ALLERGIES:  Allergies   Allergen Reactions     Biaxin [Clarithromycin]      rash       Office Visit on 09/14/2022   Component Date Value Ref Range Status     Cholesterol 09/14/2022 205 (H)  <200 mg/dL Final     Triglycerides 09/14/2022 110  <150 mg/dL Final     Direct Measure HDL 09/14/2022 57  >=50 mg/dL Final     LDL Cholesterol Calculated 09/14/2022 126 (H)  <=100 mg/dL Final     Non HDL Cholesterol 09/14/2022 148 (H)  <130 mg/dL Final     Patient Fasting > 8hrs? 09/14/2022 Yes   Final     TSH 09/14/2022 5.39 (H)  0.40 - 4.00 mU/L Final     Free T4 09/14/2022 1.19  0.76 - 1.46 ng/dL Final         Anti-obesity medication ROS:    HEENT  Hx of glaucoma: No    Cardiovascular  CAD:No  HTN:No    Gastrointestinal  GERD:No  Constipation:No  Liver Dz:No  H/O Pancreatitis:No    Psychiatric  Bipolar: No  Anxiety:Yes  Depression:Yes  History of alcohol/drug abuse: No  Hx of eating disorder:No    Endocrine  Personal or family hx of MTC or MEN2:No  Diabetes/prediabetes: No    Neurologic:  Hx of seizures: No  Hx of migraines: No  Memory Impairment: No      History of kidney stones: No  Kidney disease: No  Current birth control: Yes      PHYSICAL EXAM:  Objective    Physical Exam   GENERAL: Healthy, alert and no distress  EYES: Eyes grossly normal to inspection.  No discharge or erythema, or obvious scleral/conjunctival abnormalities.  RESP: No audible wheeze, cough, or visible cyanosis.  No visible retractions or increased work of breathing.    SKIN: Visible skin clear. No significant rash, abnormal pigmentation or  lesions.  NEURO: Cranial nerves grossly intact.  Mentation and speech appropriate for age.  PSYCH: Mentation appears normal, affect normal/bright, judgement and insight intact, normal speech and appearance well-groomed.      In summary, Marichuy Worrell has Class III obesity with a body mass index of Body mass index is 60.24 kg/m . kg/m2 and the comorbidities stated above. She completed an informational seminar and is a possible candidate for the laparoscopic gastric sleeve.  She will have to complete the following pre-requisites:  Dietitian x 3   Have preoperative laboratory tests drawn.  Psychological Evaluation with MMPI and clearance for weight loss surgery.    Today in the office we discussed gastric sleeve surgery. Preoperative, perioperative, and postoperative processes, management, and follow up were addressed.  Risks and benefits were outlined including the risk of death, staple line leak (1-2%), PE, DVT, ulcer, worsening GERD, N/V, stricture, hernia, wound infection, weight regain, and vitamin deficiencies. I emphasized exercise and activity along with appropriate food choice as the main foundation for weight loss with surgery providing surgical reinforcement of this.  All questions were answered.  A goal sheet and support group handout were given to the patient.      If you have not already watched our online seminar please go to [email or web address]    Weight loss requirement: 36 prior to surgery. Will have final weight check 2-3 weeks prior to surgery at anesthesia or nurse pre-op teaching visit.    -Need current weight confirmation at primary clinic or weight management clinic No    Bariatric labs ordered, call for a lab only appointment at any [facility] lab. To find a lab location near you, please call [phone]. Please let us know if orders need to be faxed to a non [facility] lab.    Schedule bariatric psych eval as soon as possible.  List of psychologists will be sent  to you via [employer] or given to you in clinic.     Call Guillermo Mauricio at [phone] to discuss insurance coverage for bariatric surgery.  Please check with your insurance regarding bariatric surgery coverage also. Guillermo can also help you with scheduling psych eval if you are having difficulties.    The following clearance letters are needed: Letters will be sent to you via [employer] or given to you in clinic  - Letter of support from primary care provider. Provider can submit through electronic medical record or fax to [phone]  - primary care, therapist, sleep medicine     Smoking cessation and nicotine test needed: No    Birth control after surgery discussed. Patient instructed that 2 forms of birth control required after surgery and to avoid pregnancy for at least 18 months after surgery: oral birth control     NEXT VISITS: A  should reach out to you to schedule the following appointments.  If they do not reach you please call [phone] to schedule the following appointments:        Once the patient has completed the requirements in their task list and there are no further recommendations, the pt will be allowed to see the surgeon of her choice for consultation on the laparoscopic gastric sleeve surgery. Patient verbalizes understanding of the process to surgery and expectations for the postoperative period including the need for lifelong lifestyle changes, vitamin supplementation, and laboratory monitoring.    Sincerely,     Sol Quick NP        Virtual Visit Check-In    During this virtual visit the patient is located in [place], patient verifies this as the location during the entirety of this visit.     Marichuy is a 32 year old who is being evaluated via a billable video visit.      How would you like to obtain your AVS? BusyEventharVoddler  If the video visit is dropped, the invitation should be resent by: Text to cell phone: [phone]  Will anyone else be joining your video visit? No        Video-Visit  Details    Video Start Time: 0733    Type of service:  Video Visit    Video End Time:0828    Originating Location (pt. Location): Home        Distant Location (provider location):  Off-site    Platform used for Video Visit: Enzo Rangel NREMT

## 2022-11-09 NOTE — NURSING NOTE
"Chief Complaint   Patient presents with     Consult     New bariatric surgery consultation.         Vitals:    11/09/22 1457   Weight: (!) 362 lb   Height: 5' 5\"       Body mass index is 60.24 kg/m .      Ivy Rangel, [facility]                      "

## 2022-11-10 ENCOUNTER — VIRTUAL VISIT (OUTPATIENT)
Dept: ENDOCRINOLOGY | Facility: CLINIC | Age: 32
End: 2022-11-10
Payer: COMMERCIAL

## 2022-11-10 ENCOUNTER — PRE VISIT (OUTPATIENT)
Dept: ENDOCRINOLOGY | Facility: CLINIC | Age: 32
End: 2022-11-10

## 2022-11-10 ENCOUNTER — CARE COORDINATION (OUTPATIENT)
Dept: ENDOCRINOLOGY | Facility: CLINIC | Age: 32
End: 2022-11-10

## 2022-11-10 DIAGNOSIS — E66.01 CLASS 3 SEVERE OBESITY WITH SERIOUS COMORBIDITY AND BODY MASS INDEX (BMI) OF 60.0 TO 69.9 IN ADULT, UNSPECIFIED OBESITY TYPE (H): Primary | ICD-10-CM

## 2022-11-10 DIAGNOSIS — E66.01 CLASS 3 SEVERE OBESITY WITH SERIOUS COMORBIDITY AND BODY MASS INDEX (BMI) OF 60.0 TO 69.9 IN ADULT, UNSPECIFIED OBESITY TYPE (H): ICD-10-CM

## 2022-11-10 DIAGNOSIS — Z71.3 NUTRITIONAL COUNSELING: Primary | ICD-10-CM

## 2022-11-10 DIAGNOSIS — E66.813 CLASS 3 SEVERE OBESITY WITH SERIOUS COMORBIDITY AND BODY MASS INDEX (BMI) OF 60.0 TO 69.9 IN ADULT, UNSPECIFIED OBESITY TYPE (H): Primary | ICD-10-CM

## 2022-11-10 DIAGNOSIS — E66.813 CLASS 3 SEVERE OBESITY WITH SERIOUS COMORBIDITY AND BODY MASS INDEX (BMI) OF 60.0 TO 69.9 IN ADULT, UNSPECIFIED OBESITY TYPE (H): ICD-10-CM

## 2022-11-10 PROCEDURE — 99204 OFFICE O/P NEW MOD 45 MIN: CPT | Mod: 95 | Performed by: NURSE PRACTITIONER

## 2022-11-10 PROCEDURE — 97802 MEDICAL NUTRITION INDIV IN: CPT | Mod: GT | Performed by: DIETITIAN, REGISTERED

## 2022-11-10 RX ORDER — TOPIRAMATE 25 MG/1
100 TABLET, FILM COATED ORAL 2 TIMES DAILY
Qty: 240 TABLET | Refills: 3 | Status: SHIPPED | OUTPATIENT
Start: 2022-11-10 | End: 2023-01-20

## 2022-11-10 NOTE — LETTER
"11/10/2022       RE: Marichuy Worrell  [address] [facility] [address]     Dear Colleague,    Thank you for referring your patient, Marichuy Worrell, to the [facility] [place] at [facility]. Please see a copy of my visit note below.    Marichuy Worrell is a 32 year old female who is being evaluated via a billable video visit.      The patient has been notified of following:     \"This video visit will be conducted via a call between you and your physician/provider. We have found that certain health care needs can be provided without the need for an in-person physical exam.  This service lets us provide the care you need with a video conversation.  If a prescription is necessary we can send it directly to your pharmacy.  If lab work is needed we can place an order for that and you can then stop by our lab to have the test done at a later time.    Video visits are billed at different rates depending on your insurance coverage.  Please reach out to your insurance provider with any questions.    If during the course of the call the physician/provider feels a video visit is not appropriate, you will not be charged for this service.\"    Patient has given verbal consent for Video visit? Yes  How would you like to obtain your AVS? MyChart  If you are dropped from the video visit, the video invite should be resent to: Text to cell phone: [phone]  Will anyone else be joining your video visit? No  {If patient encounters technical issues they should call [phone]      Video-Visit Details    Type of service:  Video Visit    Video Start Time: 8:31 am   Video End Time: 9:23 am    Originating Location (pt. Location): Home    Distant Location (provider location):  Offsite (providers home)     Platform used for Video Visit: Xirrus    During this virtual visit the patient is located in [place], patient verifies this as the location during the " "entirety of this visit.     New Bariatric Nutrition Consultation Note    Reason For Visit: Nutrition Assessment    Marichuy Worrell is a 32 year old presenting today for new bariatric nutrition consult.   Pt is interested in laparoscopic sleeve gastrectomy.     This is pt's first of 2 required nutrition visits prior to surgery.     Pt referred by Sol Quick NP on November 10, 2022.    Coordination note:   Needs baseline labs  Needs Psych eval  Needs PCP letter of support  36 lb weight loss from initial  Surgeon meet and greet  Needs letter of support from therapy   Needs sleep medicine clearance       CO-MORBIDITIES OF OBESITY INCLUDE:       11/9/2022   I have the following health issues associated with obesity: Hypothyroidism       SUPPORT:  Support System Reviewed With Patient 11/9/2022   Who do you have in your support network that can be available to help you for the first 2 weeks after surgery? My mom and my sister-in-law   Who can you count on for support throughout your weight loss surgery journey? My mom, sister-in-law and my friend. My friend is getting weight loss surgery in January and has been a huge encouragement.       ANTHROPOMETRICS:  Highest weight per pt: 400ish lbs in 2015  Weight 11/9/22: 363 lbs with BMI 60.24    Estimated body mass index is 60.24 kg/m  as calculated from the following:    Height as of 11/9/22: 1.651 m (5' 5\").    Weight as of 11/9/22: 164.2 kg (362 lb).    Required weight loss goal pre-op: 36 lbs from initial consult weight (goal weight 327 lbs or less before surgery)       11/9/2022   I have tried the following methods to lose weight Watching portions or calories, Exercise, Atkins type diet (low carb/high protein), Slimfast, OTC Medications, Prescription Medications, Physician directed program       Weight Loss Questions Reviewed With Patient 11/9/2022   How long have you been overweight? Since early childhood     Supplements:  Topiramate    SUPPLEMENT INFORMATION:  Vit D "     Lab orders in    NUTRITION HISTORY:  ESDRAS  Does eat fish, not a big fan of shrimp     Thinks she met with a RD 1x in  but doesn t recall details. Pt shared she has been overweight her whole life. Has tried weight loss on own through diet/exercise and medications. Wants to leave a healthier life and improve mobility, etc. Now considering surgery. Leaning towards sleeve.     One of her good friends is getting bariatric surgery in Jan. Started talking with her and learning about surgery on tik [place] as well.     Reviewed surgery guidelines and answered pt questions.    Currently uses carb manager to track occ. Struggles to get enough protein.    Typically eats 2-3 meals/day. Might miss breakfast. Snacks vary.   Tends to get into patterns and will eat same dinner for 2 weeks then change.   Example dinners: cauliflower rice or cereal. Friend recommended she add some nutritional yeast to cauliflower so she started doing that occ. Some days also added some salmon on the side.    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)     Recall Diet Questions Reviewed With Patient 11/9/2022   Describe what you typically consume for breakfast (typical or most recent): Cereal, protein shake, cheese stick, bagel w/cream cheese, (sometimes skip breakfast)   Describe what you typically consume for lunch (typical or most recent): Different hot meals at work, chicken and rice, enchilada, tacos, salads   Describe what you typically consume for supper (typical or most recent): cereal, soup, toast, cauliflower rice with cheese, potato and veggies   Describe what you typically consume as snacks (typical or most recent): Cheese stick, chips,   How many ounces of water, or other low calorie drinks, do you drink daily  (8 oz=1 glass)? 32 oz   How many ounces of caffeine (coffee, tea, pop) do you drink daily (8 oz=1 glass)? 0 oz   How many ounces of carbonated (pop, beer, sparkling water) drinks do you drinky daily (8 oz=1 glass)? 16 oz   How many ounces of juice, pop, sweet tea, sports drinks, protein drinks, other sweetened drinks, do you drink daily (8 oz=1 glass)? 24 oz   How many ounces of milk do you drink daily (8 oz=1 glass) 0 oz   Please indicate the type of milk: 2%   How often do you drink alcohol? Monthly or less   If you do drink alcohol, how many drinks might you have in a day? (one drink = 5 oz. wine, 1 can/bottle of beer, 1 shot liquor) 1 or 2       Eating Habits 11/9/2022   Do you have any dietary restrictions? No   Do you currently binge eat (eat a large amount of food in a short time)? No   Are you an emotional eater? Yes   Do you get up to eat after falling asleep? No   What foods do you crave? It goes in waves, Chips, ice cream, gummy bears.       Dining Out History Reviewed With Patient 11/9/2022   How often do you dine out? Around once a week.   Where do you dine out? (select all that apply) fast food chains   What types of food do you order when you dine out? Chicken and [place]         EXERCISE:       11/9/2022   How often do you exercise? 1 to 2 times per week   What is the duration of your exercise (in minutes)? 20 Minutes   What types of exercise do you do? walking, gym membership, home gym, weightlifting   What keeps you from being more active?  I should be more active but I just have not gotten around to it, Too tired     NUTRITION DIAGNOSIS:  Obesity r/t long history of positive energy balance aeb BMI >30 kg/m2.    INTERVENTION:  Intervention Provided/Education Provided on post-op diet guidelines, vitamins/minerals essential post-operatively, GI anatomy of bariatric surgeries, ways to help prepare for post-op diet guidelines pre-operatively, portion/calorie-control, mindful eating and sources of  protein.  Patient demonstrates understanding. Provided pt with list of goals RD contact information.      Questions Reviewed With Patient 11/9/2022   How ready are you to make changes regarding your weight? Number 1 = Not ready at all to make changes up to 10 = very ready. 10   How confident are you that you can change? 1 = Not confident that you will be successful making changes up to 10 = very confident. 9       Expected Engagement: good    GOALS:  Relating To Eating:  - Eat slowly (20-30 minutes per meal), chewing foods well (25 chews per bite/applesauce consistency)    Relating to beverages:  - Reduce caffeine/carbonation/calorie containing beverages  - Separate fluids from meals (none during or for 30 min after)  - Limiting alcohol    Surgery resources:  Diet Guidelines after Weight Loss Surgery  [email or web address]       Nutritious Eating Resources:   The Plate Method:  https://www.cdc.gov/diabetes/images/managing/Diabetes-Manage-Eat-Well-Plate-Graphic_600px.jpg    Protein Sources?[email or web address]      Carbohydrates  [email or web address]      Mindful Eating  [email or web address]      Summary of Volumetrics Eating Plan  [email or web address]      Seated Exercises for Arms and Legs (can be done before or after surgery)  [email or web address]    Follow up: Wednesday, December 14th at 8:00 am    Time spent with patient: 52 minutes.  SHUKRI Luong, RD, LD

## 2022-11-10 NOTE — PATIENT INSTRUCTIONS
GOALS:  Relating To Eating:  - Eat slowly (20-30 minutes per meal), chewing foods well (25 chews per bite/applesauce consistency)    Relating to beverages:  - Reduce caffeine/carbonation/calorie containing beverages  - Separate fluids from meals (none during or for 30 min after)  - Limiting alcohol    Surgery resources:  Diet Guidelines after Weight Loss Surgery  [email or web address]       Nutritious Eating Resources:   The Plate Method:  https://www.cdc.gov/diabetes/images/managing/Diabetes-Manage-Eat-Well-Plate-Graphic_600px.jpg    Protein Sources?[email or web address]      Carbohydrates  [email or web address]      Mindful Eating  [email or web address]      Summary of Volumetrics Eating Plan  [email or web address]      Seated Exercises for Arms and Legs (can be done before or after surgery)  [email or web address]    Follow up: Wednesday, December 14th at 8:00 am    SHUKRI Chou, RD, LD  Clinic #: [phone]

## 2022-11-10 NOTE — PATIENT INSTRUCTIONS
"Thank you for allowing us the privilege of caring for you. We hope we provided you with the excellent service you deserve.   Please let us know if there is anything else we can do for you so that we can be sure you are completely satisfied with your care experience.    To ensure the quality of our services you may be receiving a patient satisfaction survey from an independent patient satisfaction monitoring company.    The greatest compliment you can give is a \"Likely to Recommend\"    Your visit was with Sol Quick NP today.    Instructions per today's visit:     Talib Worrell, it was great to visit with you today.  Here is a review of our visit.  If our clinic scheduler is not able to reach you please call [phone] to schedule your next appointments.    Plan:  Increase topiramate to 100mg bid- start with 75mg bid x 1 week, 75mgAM and 100mg PM x 1 week then 100mg bid   Schedule sleep consult  Schedule psych eval  Letter of support from therapist  Establish with new primary care   Start assessing hunger before snacking   Follow up 2 months       Information about Video Visits with [facility]: video visit information  _________________________________________________________________________________________________________________________________________________________  If you are asked by your clinic team to have your blood pressure checked:  [facility] do offer several locations for blood pressure checks. Please follow the below link to schedule an appointment. Scheduling an appointment at the pharmacy for a blood pressure check is now preferred.    Appointment Plus (appointment-plus.[employer])  _________________________________________________________________________________________________________________________________________________________  Important contact and scheduling information:  Please call our contact center at [phone] to schedule your next appointments.  To find a lab location " near you, please call [phone].  For any nursing questions or concerns call Martha Carolina LPN at [phone] or Jennifer Nolan RN at [phone]  Please call during clinic hours Monday through Friday 8:00a - 4:00p if you have questions or you can contact us via BA Systemshart at anytime and we will reply during clinic hours.    Lab results will be communicated through My Chart or letter (if My Chart not used). Please call the clinic if you have not received communication after 1 week or if you have any questions.?  Clinic Fax: [phone]    _________________________________________________________________________________________________________________________________________________________  Meal Replacement Products:    Here is the link to our new e-store where you can purchase our meal replacement products    [facility] E-Store  [email or web address]    The one week starter kit is a great way to sample a variety of products and see what works for you.    If you want more information about the product go to: Fresh Steps Meals  [employer]Real[employer]    If you are an employee or [facility] or [facility] please contact your care team for a 10% estore discount    Free Shipping for orders over $75     Benefits of meal replacements products:    Portion and calorie control  Improved nutrition  Structured eating  Simplified food choices  Avoid contact with trigger foods  _________________________________________________________________________________________________________________________________________________________  Interested in working with a health ?  Health coaches work with you to improve your overall health and wellbeing.  They look at the whole person, and may involve discussion of different areas of life, including, but not limited to the four pillars of health (sleep, exercise, nutrition, and stress management). Discuss with your care team if you would like to  start working a health .  Health Coaching-3 Pack: Schedule by calling [phone]    $99 for three health coaching visits    Visits may be done in person or via phone    Coaching is a partnership between the  and the client; Coaches do not prescribe or diagnose    Coaching helps inspire the client to reach his/her personal goals   _________________________________________________________________________________________________________________________________________________________  24 Week Healthy Lifestyle Plan:    Our mission in the 24-week Healthy Lifestyle Plan is to provide you with individualized care by giving you the tools, education and support you need to lose weight and maintain a healthy lifestyle. In your 24-week journey, you ll be supported by a dedicated weight loss team that includes registered dietitians, medical weight management providers, health coaches, and nurses -- all with special expertise in weight loss -- to help you every step of the way.     Monthly meetings with your registered dietician or medical weight management provider help to review your progress, update your care plan, and make any adjustments needed to ensure success. Between these visits, weekly and bi-weekly health  visits will help you focus on the four pillars of weight loss -- stress, sleep, nutrition, and exercise -- and how you can best adapt each to achieve sustainable weight loss results.    In addition, you will be given exclusive access to online wellbeing classes through [employer].  Your initial visit will be with a medical weight management provider who will help to understand your weight loss goals and ensure this program is the right fit for you. Please let our team know if you are interested in the 24 week plan by sending a message to your care team or calling [phone] to  "schedule.  _________________________________________________________________________________________________________________________________________________________    COMPREHENSIVE WEIGHT MANAGEMENT PROGRAM  VIRTUAL SUPPORT GROUPS    For Support Group Information:      We offer support groups for patients who are working on weight loss and considering, preparing for or have had weight loss surgery.   There is no cost for this opportunity.  You are invited to attend the?Virtual Support Groups?provided by any of the following locations:    [facility] via Microsoft Teams with Xiomy Nunez RN  2.   [place] via [employer] with Saulo Trinh, PhD, LP  3.   [place] via [employer] with Patricia Capoen RN  4.   [facility] via [employer] Teams with Patricia Cain [facility]    The following Support Group information can also be found on our website:  [email or web address]    [facility] Weight Loss Surgery Support Group    [facility] Weight Loss Surgery Support Group  The support group is a patient-lead forum that meets monthly to share experiences, encouragement and education. It is open to those who have had weight loss surgery, are scheduled for surgery, and those who are considering surgery.   WHEN: This group meets on the 3rd Wednesday of each month from 5:00PM - 6:00PM virtually using Microsoft Teams.   FACILITATOR: Led by Xiomy Nunez, DONN, LD, RN, the program's Clinical Coordinator.   TO REGISTER: Please contact the clinic via R&L or call the nurse line directly at [phone] to inform our staff that you would like an invite sent to you and to let us know the email you would like the invite sent to. Prior to the meeting, a link with directions on how to join the meeting will be sent to you.    2022 Meetings  Maria Luisa 15: \"Let's Talk\" a time for the group to share.  July 20: \"Let's Talk\" a time for the group to " "share.  August 17: \"Let's Talk\" a time for the group to share.  September 21: \"Let's Talk\" a time for the group to share.  October 19: Guest Speaker: Dr Kevin Kinsey MD Pulmonologist and Sleep Medicine Physician, \"Getting a Good Night's Sleep\".  November 16: \"Let's Talk\" a time for the group to share.  December 21: \"Let's Talk\" a time for the group to share.    [facility] Support Groups    Connections: Bariatric Care Support Group?  This is open to all [facility] (and those external to this program) pre- and post- operative bariatric surgery patients as well as their support system.   WHEN: This group meets the 2nd Tuesday of each month from 6:30 PM - 8:00 PM virtually using Microsoft Teams.   FACILITATOR: Led by Saulo Trinh, Ph.D who is a Licensed Psychologist with the [facility] Comprehensive Weight Management Program.   TO REGISTER: Please send an email to Saulo Trinh, Ph.D., LP at?[email or web address]?if you would like an invitation to the group and to learn about using Microsoft Teams.    2022 Meetings  June 14: Gina Helton RD, LD at [facility], \"Nutritional Labeling\"  July 12 August 2 (Please Note Date Change)  September 13 October 11 November 8 December 13    Connections: Post-Operative Bariatric Surgery Support Group  This is a support group for [facility] bariatric patients (and those external to [facility]) who have had bariatric surgery and are at least 3 months post-surgery.  WHEN: This support group meets the 4th Wednesday of the month from 11:00 AM - 12:00 PM virtually using Microsoft Teams.   FACILITATOR: Led by Certified Bariatric Nurse, Patricia Capone RN.   TO REGISTER: Please send an email to Patricia at [email or web address] if you would like an invitation to the group and to learn about using Microsoft Teams.    2022 Meetings June 22 July 27 August 24 September 28 October 26 November 23 December " "28      [facility] Healthy Lifestyle Virtual Support Group    Healthy Lifestyle Virtual Support Group?  This is 60 minutes of small group guided discussion, support and resources. All are welcome who want a healthy lifestyle.  WHEN: This group meets monthly on a Friday from 12:30 PM - 1:30 PM virtually using Microsoft Teams.   FACILITATOR: Led by National Board Certified Health and , Patricia Cain [facility].   TO REGISTER: Please send an email to Patricia [email or web address] to receive monthly invites to the group or if you have any questions about having a health .  Prior to the meeting, a link with directions on how to join the meeting will be sent to you.    2022 Meetings  June 24: Patricia Cain [facility], \"Setting Limits and Boundaries\".  Jul 29: Open Forum  August 26: Guest Speaker: Mica Jenkins Registered Dietitian  September 30: Open Forum  October 28th: Guest Speaker: Lady Schwartz [facility], Health , \"Gratitude Practices\".  November 18: Guest Speaker: Sonam Vizcarra RD Registered Dietitian, \"Navigating How to Eat around the Holidays\".  December 16: Guest Speaker: Марина Hudson [facility], \"Changing Your Relationship with Movement\".    ____________________________________________________________________________________________________________________________________________________________________________  [facility] Get Moving Program  Our team of physical therapists is trained to help you understand and take control of your condition. They will perform a thorough evaluation to determine your ability for activity and develop a customized plan to fit your goals and physical ability.  Scheduling: Unsure if the Get Moving program is right for you? Discuss the program with your medical provider or diabetes educator. You can also call us at [phone] to ask questions or schedule an appointment.   [facility] Get Moving " Program  ____________________________________________________________________________________________________________________________________________________________________________  [facility] Diabetes Prevention Program (DPP)  If you have prediabetes and Medicare please contact us via CloudAptitudehart to learn more about the Diabetes Prevention Program (DPP)  Program Details:  [facility] offers the year-long Diabetes Prevention Program (DPP). The program helps you to make lifestyle changes that prevent or delay type 2 diabetes by supporting healthy eating, increased physical activity, stress reduction and use of coping skills.   On average, previous [facility] DPP cohorts have lost and maintained at least 5% of their starting weight throughout the program and averaged more than 150 minutes of physical activity per week.  Participants meet weekly for one-hour group sessions over sixteen weeks, every other week for the next 8 weeks, and monthly for the last six months.   A year-long maintenance program is also available for participants who complete the first year.   Location & Cost:   During the COVID-19 Public Health Emergency, the program is offered virtually. When in-person classes can resume, they will be held at [facility].  For people with Medicare, the program is covered in full. A self-pay option will also be available for those with non-Medicare insurance plans.   _________________________________________________________________________________________________________________________________________________________  Bluetooth Scale:    We hope to provide you with high quality virtual healthcare visits while social distancing for COVID-19 is necessary, as well as in the future when virtual visits may be more convenient for you.     Our technology team made it possible for Bluetooth scales to send weight measurements to our electronic medical record. This allows  weights from you weighing at home to securely flow into the medical record, which will improve telephone and virtual visits.   Additionally, studies have shown that adults actually lose more weight when their weights are automatically sent to someone else, and also that this process is not stressful for those adults.    Below is a link for purchasing the scale, with a discount code for our patients. You may call your insurance company to see if they will reimburse you for the cost of the scale, as a piece of durable medical equipment. The scales only go up to a weight of 400 pounds. This is an issue and we are working with the developer on increasing this. We found no scales that go over 400lb that have blue-tooth for connecting to [employer].    Scale to purchase: the Niko Niko  Body  Scale: [email or web address]    Discount Code: We have a discount code for our patients to bring the cost down to $50, Discount code is: [place]_Scale_20%off  _______________________________________________________________________________________________________________________________________________________________________________    To work with a Behavioral Health Psychologist:    Call to schedule:    Bert Bryant - [phone]  Wendy Mario - [phone]  Sherrie Cazares - [phone]  Shaniqua Lipscomb - [phone]   Lucy Grady PhD (cannot accept Medicare) [phone]        Thank you,   [facility] Comprehensive Weight Management Team      Bariatric Task List    Fax:  Please fax all paperwork to:   -     Status:  Is patient a candidate for bariatric surgery?:    -     Cleared to schedule surgeon consult?:    -     Status:    -     Surgeon: Brenda -     Siaative surgery month/year:   -        Insurance: Insurance:  medica -      Contact insurance to discuss coverage:   -       Cigna: PCP Recommendation and Medical  Clearance:    -     HP Referral:    -      Advanced beneficiary notification (ABN) for Medicare patients for RD visits   and surgery:   -      Weight history:   -     Other:    -        Patient Info: Initial Weight:  363 -     Date of Initial Weight/Height:  11/10/2022 -     Goal Weight (lbs):  327 -     Required Weight Loss:  36 -     Surgery Type:  sleeve gastrectomy -     Multidisciplinary Meeting:    -        Dietician Visits: Structured weight loss required by insurance?:    -     Dietician Visit 1:  Needed -     Dietician Visit 2:  Needed -     Dietician Visit 3:  Needed -     Dietician Visit 4:    -     Dietician Visit 5:    -     Dietician Visit 6:    -     Dietician Visit additional:    -     Clearance from dietician to see surgeon?:    -     Dietician Notes:    -        Psychological Evaluation: Psych eval:  Needed -     Therapist letter of support:  Needed -     Psychiatrist letter of support:    -     Establish care with therapist:    -     Complete eating disorder evaluation:    -     Letter of clearance from therapist/eating disorder program:    -     Other:    -        Lab Work: Complete Blood Count:  Needed -     Comprehensive Metabolic Panel:  Needed -     Vitamin D:  Needed -     PTH:  Needed -     Hgb A1c:  Needed -      Lipids: Needed -      TSH ([facility], [facility], [place] MA): Completed -       Ferritin:   -       Folate:   -       Testosterone, Total and Free:   -     Thiamine:   -     Vitamin A:   -     Vitamin B12:   -     Zinc:   -     C-peptide:   -     H. pylori:    -     MRSA (2 swabs, minimum 48 hours apart):   -     Nicotine Testing:    -     Recheck Vitamin D:   -     Other:    -        Consults/ Clearance Sleep Medicine:  Needed -     Cardiac:    -     Pain:   -     Dental:    -     Endocrine:    -     Gastroenterology:    -     Vascular Medicine:    -     Hematology:    -     Medical Weight Management:   -     Physical Therapy/Exercise:    -     Nephrology:    -     Neurology:    -    "  Pulmonology:    -     Rheumatology:    -     Other:    -     Other:    -     Other:    -        Testing: UGI:    -     EGD:    -     Sleep Study:   -     Other:   -     Other:    -        PCP: Establish care with PCP:  Needed -     Follow up with PCP:    -     PCP letter of support:  Needed -        Stopping Smoking/ Alcohol Use: Quit tobacco use (3 months smoke free)?:    -     Quit date:    -     Quit alcohol use:   -     Quit date:   -     Other:   -     Quit date:   -        Patient Education:  Information Session:    -     Given \"Making your decision\" handout?:    -     Given \"A Roadmap to you Weight Loss Surgery\" handout?:   -     Given \"Get Well Loop\" information?:   -     Given support group information?:    -     Attended support group?:    -     Support plan in place?:    -     Research consents signed?:    -     Avoid NSAIDS/ Alternate Plan for Pain:   -        Additional Surgery Requirements: Review Coag plan:  Needed -     HgA1c <8:  Needed -     Inpatient pain consult:  Needed -     Final nicotine screen:  Needed -     Dental work complete:  Needed -     Birth control plan:  Needed -     Gallstone prevention plan (Actigall for 6 months postop):   -     Other:   -     Other:   -        Final Tasks:  Before surgery online class:    -     Before surgery online class website link:  [email or web address]   After surgery online class:    -     After surgery online class website link:  [email or web address]   Nurse visit per clinic:    -     History and Physical per clinic:   -     Final labs per clinic:   -     Chest xray per clinic:   -     Electrocardiogram (ECG) per clinic:   -     Other:   -        Notes:   -           "

## 2022-11-10 NOTE — PROGRESS NOTES
"Marichuy Worrell is a 32 year old female who is being evaluated via a billable video visit.      The patient has been notified of following:     \"This video visit will be conducted via a call between you and your physician/provider. We have found that certain health care needs can be provided without the need for an in-person physical exam.  This service lets us provide the care you need with a video conversation.  If a prescription is necessary we can send it directly to your pharmacy.  If lab work is needed we can place an order for that and you can then stop by our lab to have the test done at a later time.    Video visits are billed at different rates depending on your insurance coverage.  Please reach out to your insurance provider with any questions.    If during the course of the call the physician/provider feels a video visit is not appropriate, you will not be charged for this service.\"    Patient has given verbal consent for Video visit? Yes  How would you like to obtain your AVS? MyChart  If you are dropped from the video visit, the video invite should be resent to: Text to cell phone: [phone]  Will anyone else be joining your video visit? No  {If patient encounters technical issues they should call [phone]      Video-Visit Details    Type of service:  Video Visit    Video Start Time: 8:31 am   Video End Time: 9:23 am    Originating Location (pt. Location): Home    Distant Location (provider location):  Offsite (providers home)     Platform used for Video Visit: Songvice    During this virtual visit the patient is located in [place], patient verifies this as the location during the entirety of this visit.     New Bariatric Nutrition Consultation Note    Reason For Visit: Nutrition Assessment    Marichuy Worrell is a 32 year old presenting today for new bariatric nutrition consult.   Pt is interested in laparoscopic sleeve gastrectomy.     This is pt's first of 2 required nutrition visits prior to surgery. " "    Pt referred by Sol Quick NP on November 10, 2022.    Coordination note:   Needs baseline labs  Needs Psych eval  Needs PCP letter of support  36 lb weight loss from initial  Surgeon meet and greet  Needs letter of support from therapy   Needs sleep medicine clearance       CO-MORBIDITIES OF OBESITY INCLUDE:       11/9/2022   I have the following health issues associated with obesity: Hypothyroidism       SUPPORT:  Support System Reviewed With Patient 11/9/2022   Who do you have in your support network that can be available to help you for the first 2 weeks after surgery? My mom and my sister-in-law   Who can you count on for support throughout your weight loss surgery journey? My mom, sister-in-law and my friend. My friend is getting weight loss surgery in January and has been a huge encouragement.       ANTHROPOMETRICS:  Highest weight per pt: 400ish lbs in 2015  Weight 11/9/22: 363 lbs with BMI 60.24    Estimated body mass index is 60.24 kg/m  as calculated from the following:    Height as of 11/9/22: 1.651 m (5' 5\").    Weight as of 11/9/22: 164.2 kg (362 lb).    Required weight loss goal pre-op: 36 lbs from initial consult weight (goal weight 327 lbs or less before surgery)       11/9/2022   I have tried the following methods to lose weight Watching portions or calories, Exercise, Atkins type diet (low carb/high protein), Slimfast, OTC Medications, Prescription Medications, Physician directed program       Weight Loss Questions Reviewed With Patient 11/9/2022   How long have you been overweight? Since early childhood     Supplements:  Topiramate    SUPPLEMENT INFORMATION:  Vit D     Lab orders in    NUTRITION HISTORY:  ESDRAS  Does eat fish, not a big fan of shrimp     Thinks she met with a RD 1x in HS but doesn t recall details. Pt shared she has been overweight her whole life. Has tried weight loss on own through diet/exercise and medications. Wants to leave a healthier life and improve mobility, etc. Now " considering surgery. Leaning towards sleeve.     One of her good friends is getting bariatric surgery in Jan. Started talking with her and learning about surgery on tik Tok as well.     Reviewed surgery guidelines and answered pt questions.    Currently uses carb manager to track occ. Struggles to get enough protein.    Typically eats 2-3 meals/day. Might miss breakfast. Snacks vary.   Tends to get into patterns and will eat same dinner for 2 weeks then change.   Example dinners: cauliflower rice or cereal. Friend recommended she add some nutritional yeast to cauliflower so she started doing that occ. Some days also added some salmon on the side.    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)     Recall Diet Questions Reviewed With Patient 11/9/2022   Describe what you typically consume for breakfast (typical or most recent): Cereal, protein shake, cheese stick, bagel w/cream cheese, (sometimes skip breakfast)   Describe what you typically consume for lunch (typical or most recent): Different hot meals at work, chicken and rice, enchilada, tacos, salads   Describe what you typically consume for supper (typical or most recent): cereal, soup, toast, cauliflower rice with cheese, potato and veggies   Describe what you typically consume as snacks (typical or most recent): Cheese stick, chips,   How many ounces of water, or other low calorie drinks, do you drink daily (8 oz=1 glass)? 32 oz   How many ounces of caffeine (coffee, tea, pop) do you drink daily (8 oz=1 glass)? 0 oz   How many ounces of carbonated (pop, beer, sparkling water) drinks do you drinky daily (8 oz=1 glass)? 16 oz   How many ounces of juice, pop, sweet tea, sports drinks, protein drinks, other sweetened drinks, do you drink  daily (8 oz=1 glass)? 24 oz   How many ounces of milk do you drink daily (8 oz=1 glass) 0 oz   Please indicate the type of milk: 2%   How often do you drink alcohol? Monthly or less   If you do drink alcohol, how many drinks might you have in a day? (one drink = 5 oz. wine, 1 can/bottle of beer, 1 shot liquor) 1 or 2       Eating Habits 11/9/2022   Do you have any dietary restrictions? No   Do you currently binge eat (eat a large amount of food in a short time)? No   Are you an emotional eater? Yes   Do you get up to eat after falling asleep? No   What foods do you crave? It goes in waves, Chips, ice cream, gummy bears.       Dining Out History Reviewed With Patient 11/9/2022   How often do you dine out? Around once a week.   Where do you dine out? (select all that apply) fast food chains   What types of food do you order when you dine out? Chicken and [place]         EXERCISE:       11/9/2022   How often do you exercise? 1 to 2 times per week   What is the duration of your exercise (in minutes)? 20 Minutes   What types of exercise do you do? walking, gym membership, home gym, weightlifting   What keeps you from being more active?  I should be more active but I just have not gotten around to it, Too tired     NUTRITION DIAGNOSIS:  Obesity r/t long history of positive energy balance aeb BMI >30 kg/m2.    INTERVENTION:  Intervention Provided/Education Provided on post-op diet guidelines, vitamins/minerals essential post-operatively, GI anatomy of bariatric surgeries, ways to help prepare for post-op diet guidelines pre-operatively, portion/calorie-control, mindful eating and sources of protein.  Patient demonstrates understanding. Provided pt with list of goals RD contact information.      Questions Reviewed With Patient 11/9/2022   How ready are you to make changes regarding your weight? Number 1 = Not ready at all to make changes up to 10 = very ready. 10   How confident are you that you can change? 1 = Not confident  that you will be successful making changes up to 10 = very confident. 9       Expected Engagement: good    GOALS:  Relating To Eating:  - Eat slowly (20-30 minutes per meal), chewing foods well (25 chews per bite/applesauce consistency)    Relating to beverages:  - Reduce caffeine/carbonation/calorie containing beverages  - Separate fluids from meals (none during or for 30 min after)  - Limiting alcohol    Surgery resources:  Diet Guidelines after Weight Loss Surgery  [email or web address]       Nutritious Eating Resources:   The Plate Method:  https://www.cdc.gov/diabetes/images/managing/Diabetes-Manage-Eat-Well-Plate-Graphic_600px.jpg    Protein Sources?[email or web address]      Carbohydrates  [email or web address]      Mindful Eating  [email or web address]      Summary of Volumetrics Eating Plan  [email or web address]      Seated Exercises for Arms and Legs (can be done before or after surgery)  [email or web address]    Follow up: Wednesday, December 14th at 8:00 am    Time spent with patient: 52 minutes.  SHUKRI Luong, RD, LD

## 2022-11-10 NOTE — PROGRESS NOTES
Bariatric Task List updated.  Bariatric information/clearance letters sent to patient via [employer].    Bariatric Task List    Fax:  Please fax all paperwork to: [phone] -     Status:  Is patient a candidate for bariatric surgery?:    -     Cleared to schedule surgeon consult?:    -     Status:    -     Surgeon: Brenda -     Tentative surgery month/year:   -        Insurance: Insurance:  medica -      Contact insurance to discuss coverage:   -       Cign[employer]: PCP Recommendation and Medical Clearance:    -     HP Referral:    -      Advanced beneficiary notification (ABN) for Medicare patients for RD visits   and surgery:   -      Weight history:   -     Other:    -        Patient Info: Initial Weight:  363 -     Date of Initial Weight/Height:  11/10/2022 -     Goal Weight (lbs):  327 -     Required Weight Loss:  36 -     Surgery Type:  sleeve gastrectomy -     Multidisciplinary Meeting:    -        Dietician Visits: Structured weight loss required by insurance?:    -     Dietician Visit 1:  Needed - 11/10/22, Sonam Nick [place]   Dietician Visit 2:  Needed - 12/14/22, Sonam Nick [place]   Dietician Visit 3:  Needed -     Dietician Visit 4:    -     Dietician Visit 5:    -     Dietician Visit 6:    -     Dietician Visit additional:    - Monthly until surgery - [place]   Clearance from dietician to see surgeon?:    -     Dietician Notes:    -        Psychological Evaluation: Psych eval:  Needed - List and letter sent to patient, 11/10/22, [place]   Therapist letter of support:  Needed - Letter sent to patient, 11/10/22, [place]   Psychiatrist letter of support:    -     Establish care with therapist:    -     Complete eating disorder evaluation:    -     Letter of clearance from therapist/eating disorder program:    -     Other:    -        Lab Work: Complete Blood Count:  Needed -     Comprehensive Metabolic Panel:  Needed -     Vitamin D:  Needed -     PTH:  Needed -     Hgb A1c:  Needed -      Lipids: Needed -       "TSH ([facility], [facility], [place]): Completed -       Ferritin:   -       Folate:   -       Testosterone, Total and Free:   -     Thiamine:   -     Vitamin A:   -     Vitamin B12:   -     Zinc:   -     C-peptide:   -     H. pylori:    -     MRSA (2 swabs, minimum 48 hours apart):   -     Nicotine Testing:    -     Recheck Vitamin D:   -     Other:    -        Consults/ Clearance Sleep Medicine:  Needed - Letter sent to patient, 11/10/22, [place]   Cardiac:    -     Pain:   -     Dental:    -     Endocrine:    -     Gastroenterology:    -     Vascular Medicine:    -     Hematology:    -     Medical Weight Management:   -     Physical Therapy/Exercise:    -     Nephrology:    -     Neurology:    -     Pulmonology:    -     Rheumatology:    -     Other:    -     Other:    -     Other:    -        Testing: UGI:    -     EGD:    -     Sleep Study:   -     Other:   -     Other:    -        PCP: Establish care with PCP:  Needed -     Follow up with PCP:    -     PCP letter of support:  Needed - Letter sent to patient, 11/10/22, [place]      Stopping Smoking/ Alcohol Use: Quit tobacco use (3 months smoke free)?:    -     Quit date:    -     Quit alcohol use:   -     Quit date:   -     Other:   -     Quit date:   -        Patient Education:  Information Session:    -     Given \"Making your decision\" handout?:    -     Given \"A Roadmap to you Weight Loss Surgery\" handout?:   -     Given \"Get Well Loop\" information?:   -     Given support group information?:    -     Attended support group?:    -     Support plan in place?:    -     Research consents signed?:    -     Avoid NSAIDS/ Alternate Plan for Pain:   -        Additional Surgery Requirements: Review Coag plan:  Needed -     HgA1c <8:  Needed -     Inpatient pain consult:  Needed -     Final nicotine screen:  Needed -     Dental work complete:  Needed -     Birth control plan:  Needed -     Gallstone prevention plan (Actigall for 6 months postop):   -     Other:   -     Other:   -      "   Final Tasks:  Before surgery online class:    -     Before surgery online class website link:  [email or web address]   After surgery online class:    -     After surgery online class website link:  [email or web address]   Nurse visit per clinic:    -     History and Physical per clinic:   -     Final labs per clinic:   -     Chest xray per clinic:   -     Electrocardiogram (ECG) per clinic:   -     Other:   -        Notes:   -

## 2022-11-10 NOTE — LETTER
"11/10/2022       RE: Marichuy Worrell  [address] [address] [facility] [address]     Dear Colleague,    Thank you for referring your patient, Marichuy Worrell, to the [facility] [place] at [facility]. Please see a copy of my visit note below.    70 minutes spent on the date of the encounter doing chart review, history and exam, documentation and further activities per the note    New Bariatric Surgery Consultation Note    November 10, 2022    RE: Marichuy Worrell  MR#: [id number]  : 1990      Referring provider:       2022   Who referred you? Salina Whitehead       Chief Complaint/Reason for visit: evaluation for possible weight loss surgery    Dear Salina Whitehead, APRN CNP (General),    I had the pleasure of seeing your patient, Marichuy Worrell, to evaluate her obesity and consider her for possible weight loss surgery. As you know, Marichuy Worrell is 32 year old.  She has a height of 5' 5\", a weight of 362 lbs 0 oz, and calculated Body mass index is 60.24 kg/m .     Wt Readings from Last 5 Encounters:   22 (!) 164.2 kg (362 lb)   22 (!) 161.9 kg (357 lb)   22 (!) 163.5 kg (360 lb 8 oz)   22 (!) 165.6 kg (365 lb)   22 (!) 164.7 kg (363 lb)         Assessment & Plan   Problem List Items Addressed This Visit        Digestive    Class 3 severe obesity with serious comorbidity and body mass index (BMI) of 60.0 to 69.9 in adult (H) - Primary     Difficulty managing weight since childhood. Was doing calorie restriction diets before age 8. High weight in life is 400lb, in 2015. She has been working with medical weight management since 2018 through a number of medical providers. Most recently, she started 2022 at a weight of 364lb. She has decided to pursue bariatric surgery.     Has taken phentermine in the past. Currently on topiramate, was beneficial initially, no recent adjustments, no adverse side " effects. Would like to try to increase this. She is already taking wegovy 2.4mg with earlier satiety but not seeing a lot of weight loss.     Plan:  Increase topiramate to 100mg bid- start with 75mg bid x 1 week, 75mgAM and 100mg PM x 1 week then 100mg bid   Schedule sleep consult  Schedule psych eval  Letter of support from therapist  Establish with new primary care   Start assessing hunger before snacking   Follow up 2 months          Relevant Medications    topiramate (TOPAMAX) 25 MG tablet    Other Relevant Orders    CBC with platelets    Comprehensive metabolic panel    Hemoglobin A1c    Vitamin D Deficiency    Parathyroid Hormone Intact    Adult Sleep Eval & Management  Referral            HISTORY OF PRESENT ILLNESS:  Weight Loss History Reviewed with Patient 11/9/2022   How long have you been overweight? Since early childhood   What is the most that you have ever weighed? 400   What is the most weight you have lost? 60   I have tried the following methods to lose weight Watching portions or calories, Exercise, Atkins type diet (low carb/high protein), Slimfast, OTC Medications, Prescription Medications, Physician directed program   I have tried the following weight loss medications? (Check all that apply) Topamax/Topiramate, Qysmia (phentermine + topiramate), Naltrexone, Victoza/liraglutide, Saxenda   Have you ever had weight loss surgery? No     Very low calorie diet when she was little- weight gain - family was all losing weight on 1200 calorie diet. She would cry because she was hungry maybe 7yo     High weight in life 400lb - 2015    Has been doing several drastic diets since high school. Difficult to sustain these plans.   Life events like vacation and moving would interrupt these plans and then difficult to get back on track     Has been working on weight loss medical weight management since 2018 12/2022 1364lb (BMI 60)    Lost to 300lb in 2019- meal prepping - low carb     Lost to 330lb in  2021. Rapid regain to 360s with thyroid being off in 12/2021     PCP- leaving clinic   wegovy 2.4mg - helpful to feel newton sooner - less snacking between meals and after meals     topiramate 50mg AM and 75mg PM - Weight gain with reducing topiramate - was initially helpful with thoughts about food     Phentermine in the past     CO-MORBIDITIES OF OBESITY INCLUDE:     11/9/2022   I have the following health issues associated with obesity: Hypothyroidism     Sx of PCOS- irregular menstrual cycles (missing periods,  - put on birth control to regulate about 8 years ago. No hirsutism. No acne.     Sleep- restless, waking often to go to the bathroom, excessive fatigue in the evening not able to make it to bed time, wakes tired, no headaches, + snoring     No reflux     Is patient on biologics or immunomodulators? NO     PAST MEDICAL HISTORY:  Past Medical History:   Diagnosis Date     ASCUS of cervix with negative high risk HPV 08/08/2017     Depressive disorder      Hypothyroidism 2009     Moderate major depression (H)      Morbidly obese (H)        PAST SURGICAL HISTORY:  Past Surgical History:   Procedure Laterality Date     CHOLECYSTECTOMY, LAPOROSCOPIC  12/2009       FAMILY HISTORY:   Family History   Problem Relation Age of Onset     Asthma Mother      Other Cancer Mother      Depression Mother      Obesity Mother      Melanoma Mother      Unknown/Adopted Father      Other Cancer Father      Arthritis Maternal Grandmother      Thyroid Disease Maternal Grandmother      Obesity Maternal Grandmother      Cancer Maternal Grandfather         lung cancer     Unknown/Adopted Paternal Grandmother      Unknown/Adopted Paternal Grandfather      Asthma Brother      Allergies Brother        SOCIAL HISTORY:   Social History Questions Reviewed With Patient 11/9/2022   Which best describes your employment status (select all that apply) I work full-time, I work days   If you work, what is your occupation? 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   Which best describes your marital status: single   Do you have children? No   Who do you have in your support network that can be available to help you for the first 2 weeks after surgery? My mom and my sister-in-law   Who can you count on for support throughout your weight loss surgery journey? My mom, sister-in-law and my friend. My friend is getting weight loss surgery in January and has been a huge encouragement.   Can you afford 3 meals a day?  Yes   Can you afford 50-60 dollars a month for vitamins? 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 Occasionally has to do physical holds at work. Lots of moving      HABITS:     11/9/2022   How often do you drink alcohol? Monthly or less   If you do drink alcohol, how many drinks might you have in a day? (one drink = 5 oz. wine, 1 can/bottle of beer, 1 shot liquor) 1 or 2   Have you ever used any of the following nicotine products? No   Have you or are you currently using street drugs or prescription strength medication for which you do not have a prescription for? No   Do you have a history of chemical dependency (alcohol or drug abuse)? No     Alcohol just socially- 1 every couple months     Currently taking narcotic/opioids No    PSYCHOLOGICAL HISTORY:   Psychological History Reviewed With Patient 11/9/2022   Have you ever attempted suicide? Never.   Have you had thoughts of suicide in the past year? No   Have you ever been hospitalized for mental illness or a suicide attempt? Never.   Do you have a history of chronic pain? No   Have you ever been diagnosed with fibromyalgia? No   Are you currently being treated for any of the following? (select all that apply) Depression, I take medication or see a therapist for another mental illness   Are you currently seeing a therapist or counselor?  Yes   Are you currently seeing a psychiatrist? No     Works with therapist regularly   Feels stable   Has done counseling around  trauma, shame, and body work   Has discussed surgery with therapist     ROS:     11/9/2022   Skin:  Varicose veins   HEENT: None of these   Musculoskeletal: None of the above   Cardiovascular: Shortness of breath with activity   Pulmonary: Shortness of breath with activity, Snoring   Gastrointestinal: None of the above   Genitourinary: None of the above   Hematological: None of the above   Neurological: None of the above   Female only: Loss of menstrual cycles, Birth control, Regular menstrual cycles       EATING BEHAVIORS:     11/9/2022   Have you or anyone else thought that you had an eating disorder? No   Do you currently binge eat (eat a large amount of food in a short time)? No   Are you an emotional eater? Yes   Do you get up to eat after falling asleep? No     Breakfast- protein shake or cereal but often misses  Lunch- usually at work- hot meals provided   Dinner- something quick at home     Difficult to cook in current living situation     Feels like bowl has to be full   Important to finish what she takes. Doesn't want to waste food.     Some habit eating and boredom eating     Extreme hunger in the morning   Hx of stress eating at night in teens     EXERCISE:     11/9/2022   How often do you exercise? 1 to 2 times per week   What is the duration of your exercise (in minutes)? 20 Minutes   What types of exercise do you do? walking, gym membership, home gym, weightlifting   What keeps you from being more active?  I should be more active but I just have not gotten around to it, Too tired       MEDICATIONS:  Current Outpatient Medications   Medication Sig Dispense Refill     topiramate (TOPAMAX) 25 MG tablet Take 4 tablets (100 mg) by mouth 2 times daily 240 tablet 3     ciclopirox (PENLAC) 8 % external solution Apply to adjacent skin and affected nails daily.  Remove with alcohol every 7 days, then repeat. 6.6 mL 5     desogestrel-ethinyl estradiol (ENSKYCE) 0.15-30 MG-MCG tablet Take 1 tablet by mouth daily  84 tablet 3     escitalopram (LEXAPRO) 20 MG tablet Take 1 tablet (20 mg) by mouth daily 90 tablet 1     insulin pen needle (31G X 5 MM) 31G X 5 MM miscellaneous Use one pen needles daily or as directed. 100 each 1     levothyroxine (SYNTHROID/LEVOTHROID) 175 MCG tablet Take 1 tablet by mouth every other day alternating with 200 mcg dose 45 tablet 0     levothyroxine (SYNTHROID/LEVOTHROID) 200 MCG tablet Take 1 tablet by mouth every other day alternating with 175 mcg dose 45 tablet 0     Semaglutide-Weight Management 2.4 MG/0.75ML SOAJ Inject 2.4 mg Subcutaneous every 7 days 3 mL 1     traZODone (DESYREL) 50 MG tablet TAKE 1 TABLET BY MOUTH AT BEDTIME 30 tablet 0     triamcinolone (KENALOG) 0.1 % external cream as needed         ALLERGIES:  Allergies   Allergen Reactions     Biaxin [Clarithromycin]      rash       Office Visit on 09/14/2022   Component Date Value Ref Range Status     Cholesterol 09/14/2022 205 (H)  <200 mg/dL Final     Triglycerides 09/14/2022 110  <150 mg/dL Final     Direct Measure HDL 09/14/2022 57  >=50 mg/dL Final     LDL Cholesterol Calculated 09/14/2022 126 (H)  <=100 mg/dL Final     Non HDL Cholesterol 09/14/2022 148 (H)  <130 mg/dL Final     Patient Fasting > 8hrs? 09/14/2022 Yes   Final     TSH 09/14/2022 5.39 (H)  0.40 - 4.00 mU/L Final     Free T4 09/14/2022 1.19  0.76 - 1.46 ng/dL Final         Anti-obesity medication ROS:    HEENT  Hx of glaucoma: No    Cardiovascular  CAD:No  HTN:No    Gastrointestinal  GERD:No  Constipation:No  Liver Dz:No  H/O Pancreatitis:No    Psychiatric  Bipolar: No  Anxiety:Yes  Depression:Yes  History of alcohol/drug abuse: No  Hx of eating disorder:No    Endocrine  Personal or family hx of MTC or MEN2:No  Diabetes/prediabetes: No    Neurologic:  Hx of seizures: No  Hx of migraines: No  Memory Impairment: No      History of kidney stones: No  Kidney disease: No  Current birth control: Yes      PHYSICAL EXAM:  Objective     Physical Exam   GENERAL: Healthy,  alert and no distress  EYES: Eyes grossly normal to inspection.  No discharge or erythema, or obvious scleral/conjunctival abnormalities.  RESP: No audible wheeze, cough, or visible cyanosis.  No visible retractions or increased work of breathing.    SKIN: Visible skin clear. No significant rash, abnormal pigmentation or lesions.  NEURO: Cranial nerves grossly intact.  Mentation and speech appropriate for age.  PSYCH: Mentation appears normal, affect normal/bright, judgement and insight intact, normal speech and appearance well-groomed.      In summary, Marichuy Worrell has Class III obesity with a body mass index of Body mass index is 60.24 kg/m . kg/m2 and the comorbidities stated above. She completed an informational seminar and is a possible candidate for the laparoscopic gastric sleeve.  She will have to complete the following pre-requisites:  Dietitian x 3   Have preoperative laboratory tests drawn.  Psychological Evaluation with MMPI and clearance for weight loss surgery.    Today in the office we discussed gastric sleeve surgery. Preoperative, perioperative, and postoperative processes, management, and follow up were addressed.  Risks and benefits were outlined including the risk of death, staple line leak (1-2%), PE, DVT, ulcer, worsening GERD, N/V, stricture, hernia, wound infection, weight regain, and vitamin deficiencies. I emphasized exercise and activity along with appropriate food choice as the main foundation for weight loss with surgery providing surgical reinforcement of this.  All questions were answered.  A goal sheet and support group handout were given to the patient.      If you have not already watched our online seminar please go to [email or web address]    Weight loss requirement: 36 prior to surgery. Will have final weight check 2-3 weeks prior to surgery at anesthesia or nurse pre-op teaching visit.    -Need current weight confirmation at primary clinic or weight management clinic  No    Bariatric labs ordered, call for a lab only appointment at any [facility] lab. To find a lab location near you, please call [phone]. Please let us know if orders need to be faxed to a non [facility] lab.    Schedule bariatric psych eval as soon as possible.  List of psychologists will be sent to you via [employer] or given to you in clinic.     Call Guillermo Mauricio at [phone] to discuss insurance coverage for bariatric surgery.  Please check with your insurance regarding bariatric surgery coverage also. Guillermo can also help you with scheduling psych eval if you are having difficulties.    The following clearance letters are needed: Letters will be sent to you via [employer] or given to you in clinic  - Letter of support from primary care provider. Provider can submit through electronic medical record or fax to [phone]  - primary care, therapist, sleep medicine     Smoking cessation and nicotine test needed: No    Birth control after surgery discussed. Patient instructed that 2 forms of birth control required after surgery and to avoid pregnancy for at least 18 months after surgery: oral birth control     NEXT VISITS: A  should reach out to you to schedule the following appointments.  If they do not reach you please call [phone] to schedule the following appointments:        Once the patient has completed the requirements in their task list and there are no further recommendations, the pt will be allowed to see the surgeon of her choice for consultation on the laparoscopic gastric sleeve surgery. Patient verbalizes understanding of the process to surgery and expectations for the postoperative period including the need for lifelong lifestyle changes, vitamin supplementation, and laboratory monitoring.    Sincerely,     Sol Quick NP        Virtual Visit Check-In    During this virtual visit the patient is located in [place], patient verifies this as the location during the entirety of  this visit.     Marichuy is a 32 year old who is being evaluated via a billable video visit.      How would you like to obtain your AVS? MyChart  If the video visit is dropped, the invitation should be resent by: Text to cell phone: [phone]  Will anyone else be joining your video visit? No        Video-Visit Details    Video Start Time: 0733    Type of service:  Video Visit    Video End Time:0828    Originating Location (pt. Location): Home        Distant Location (provider location):  Off-site    Platform used for Video Visit: Enzo Rangel NREMT

## 2022-11-15 ENCOUNTER — ALLIED HEALTH/NURSE VISIT (OUTPATIENT)
Dept: ENDOCRINOLOGY | Facility: CLINIC | Age: 32
End: 2022-11-15
Payer: COMMERCIAL

## 2022-11-15 DIAGNOSIS — E66.813 CLASS 3 SEVERE OBESITY WITH SERIOUS COMORBIDITY AND BODY MASS INDEX (BMI) OF 60.0 TO 69.9 IN ADULT, UNSPECIFIED OBESITY TYPE (H): Primary | ICD-10-CM

## 2022-11-15 DIAGNOSIS — E66.01 CLASS 3 SEVERE OBESITY WITH SERIOUS COMORBIDITY AND BODY MASS INDEX (BMI) OF 60.0 TO 69.9 IN ADULT, UNSPECIFIED OBESITY TYPE (H): Primary | ICD-10-CM

## 2022-11-15 PROCEDURE — 99207 PR NO CHARGE NURSE ONLY: CPT

## 2022-11-15 NOTE — PROGRESS NOTES
NEW BARIATRIC CLASS    Patient instructed on the bariatric surgery process.    1. NBS visit  2. Task list  3. Clearances  4. Goal weight and weigh ins  5. Surgeon visit  6. Postop diet  7. Lifelong diet, nutrition and behavior changes to be successful.    All questions answered.  Patient instructed to contact the office for any questions and to notify office of any updates/clearances completed.

## 2022-11-16 ENCOUNTER — VIRTUAL VISIT (OUTPATIENT)
Dept: FAMILY MEDICINE | Facility: CLINIC | Age: 32
End: 2022-11-16
Payer: COMMERCIAL

## 2022-11-16 DIAGNOSIS — F32.1 MODERATE MAJOR DEPRESSION (H): Primary | ICD-10-CM

## 2022-11-16 DIAGNOSIS — G47.00 INSOMNIA, UNSPECIFIED TYPE: ICD-10-CM

## 2022-11-16 DIAGNOSIS — E66.01 CLASS 3 SEVERE OBESITY WITH SERIOUS COMORBIDITY AND BODY MASS INDEX (BMI) OF 60.0 TO 69.9 IN ADULT, UNSPECIFIED OBESITY TYPE (H): ICD-10-CM

## 2022-11-16 DIAGNOSIS — F41.1 GAD (GENERALIZED ANXIETY DISORDER): ICD-10-CM

## 2022-11-16 DIAGNOSIS — E03.8 OTHER SPECIFIED HYPOTHYROIDISM: ICD-10-CM

## 2022-11-16 DIAGNOSIS — E66.813 CLASS 3 SEVERE OBESITY WITH SERIOUS COMORBIDITY AND BODY MASS INDEX (BMI) OF 60.0 TO 69.9 IN ADULT, UNSPECIFIED OBESITY TYPE (H): ICD-10-CM

## 2022-11-16 PROCEDURE — 99214 OFFICE O/P EST MOD 30 MIN: CPT | Mod: 95 | Performed by: PHYSICIAN ASSISTANT

## 2022-11-16 RX ORDER — TRAZODONE HYDROCHLORIDE 50 MG/1
50 TABLET, FILM COATED ORAL AT BEDTIME
Qty: 30 TABLET | Refills: 11 | Status: SHIPPED | OUTPATIENT
Start: 2022-11-16 | End: 2023-06-21

## 2022-11-16 ASSESSMENT — ANXIETY QUESTIONNAIRES
2. NOT BEING ABLE TO STOP OR CONTROL WORRYING: NOT AT ALL
3. WORRYING TOO MUCH ABOUT DIFFERENT THINGS: NOT AT ALL
GAD7 TOTAL SCORE: 1
GAD7 TOTAL SCORE: 1
5. BEING SO RESTLESS THAT IT IS HARD TO SIT STILL: NOT AT ALL
IF YOU CHECKED OFF ANY PROBLEMS ON THIS QUESTIONNAIRE, HOW DIFFICULT HAVE THESE PROBLEMS MADE IT FOR YOU TO DO YOUR WORK, TAKE CARE OF THINGS AT HOME, OR GET ALONG WITH OTHER PEOPLE: SOMEWHAT DIFFICULT
1. FEELING NERVOUS, ANXIOUS, OR ON EDGE: NOT AT ALL
7. FEELING AFRAID AS IF SOMETHING AWFUL MIGHT HAPPEN: NOT AT ALL
6. BECOMING EASILY ANNOYED OR IRRITABLE: SEVERAL DAYS

## 2022-11-16 ASSESSMENT — PATIENT HEALTH QUESTIONNAIRE - PHQ9
SUM OF ALL RESPONSES TO PHQ QUESTIONS 1-9: 5
5. POOR APPETITE OR OVEREATING: NOT AT ALL

## 2022-11-16 NOTE — PROGRESS NOTES
Marichuy is a 32 year old who is being evaluated via a billable video visit.      How would you like to obtain your AVS? MyChart  If the video visit is dropped, the invitation should be resent by: Text to cell phone: [phone]  Will anyone else be joining your video visit? No        Assessment & Plan     Moderate major depression (H)  Doing well on lexapro and trazodone (no am grogginess noted).  She does have a therapist she see's as well.  - traZODone (DESYREL) 50 MG tablet; Take 1 tablet (50 mg) by mouth At Bedtime    OSCAR (generalized anxiety disorder)  Stable.   - traZODone (DESYREL) 50 MG tablet; Take 1 tablet (50 mg) by mouth At Bedtime    Insomnia, unspecified type  Tolerating well.   - traZODone (DESYREL) 50 MG tablet; Take 1 tablet (50 mg) by mouth At Bedtime    Class 3 severe obesity with serious comorbidity and body mass index (BMI) of 60.0 to 69.9 in adult, unspecified obesity type (H)  Recently seen by endocrinologist and plans to undergo bariatric surgery.  She does need a letter from a PCP of support, which I am willing to fill out.  She is following the necessary pre-bariatric surgery steps (seeing a nutritionist, working on lifestyle changes now, seeing a therapist).          Other specified hypothyroidism  Due to recheck levels in a couple weeks, placed new orders as previous PCP is no longer with the system.   - TSH with free T4 reflex; Future      20 minutes spent on the date of the encounter doing chart review, history and exam, documentation and further activities per the note           Return in about 6 months (around 5/16/2023) for Physical Exam.    Vanessa Echeverria PA-C  [facility] Benedict is a 32 year old, presenting for the following health issues:  Establish Care      HPI     New pt with history of Depression, Anxiety and Hypothyroidism presents to Establish care.     Depression and Anxiety Follow-Up  escitalopram (LEXAPRO) 20 MG tablet    How are  you doing with your depression since your last visit? No change, mood feels controlled per pt    How are you doing with your anxiety since your last visit?  No change, same as above     Are you having other symptoms that might be associated with depression or anxiety? No    Have you had a significant life event? No     Do you have any concerns with your use of alcohol or other drugs? No    Social History     Tobacco Use     Smoking status: Never     Smokeless tobacco: Never   Substance Use Topics     Alcohol use: No     Drug use: No     PHQ 5/5/2022 6/2/2022 11/16/2022   PHQ-9 Total Score 6 5 5   Q9: Thoughts of better off dead/self-harm past 2 weeks Not at all Not at all Not at all     OSCAR-7 SCORE 5/5/2022 6/2/2022 11/16/2022   Total Score - - -   Total Score 14 (moderate anxiety) 5 (mild anxiety) -   Total Score 14 5 1     Uses trazodone at bedtime (typically just half a tab).  No am grogginess with the medication.     She is seeing an endocrinologist to help with thyroid and weight.    She is considering bariatric surgery.  Currently on Wegovy.      Needs a support care letter for upcoming bariatric surgery.  She is doing through the process    Last PHQ-9 11/16/2022   1.  Little interest or pleasure in doing things 0   2.  Feeling down, depressed, or hopeless 0   3.  Trouble falling or staying asleep, or sleeping too much 3   4.  Feeling tired or having little energy 1   5.  Poor appetite or overeating 1   6.  Feeling bad about yourself 0   7.  Trouble concentrating 0   8.  Moving slowly or restless 0   Q9: Thoughts of better off dead/self-harm past 2 weeks 0   PHQ-9 Total Score 5   Difficulty at work, home, or with people Somewhat difficult     OSCAR-7  11/16/2022   1. Feeling nervous, anxious, or on edge 0   2. Not being able to stop or control worrying 0   3. Worrying too much about different things 0   4. Trouble relaxing 0   5. Being so restless that it is hard to sit still 0   6. Becoming easily annoyed or  irritable 1   7. Feeling afraid, as if something awful might happen 0   OSCAR-7 Total Score 1   If you checked any problems, how difficult have they made it for you to do your work, take care of things at home, or get along with other people? Somewhat difficult       Suicide Assessment Five-step Evaluation and Treatment (SAFE-T)    Hypothyroidism Follow-up  levothyroxine (SYNTHROID/LEVOTHROID) 200 MCG tablet and 175 MCG tablet    Since last visit, patient describes the following symptoms: Weight stable, no hair loss, no skin changes, no constipation, no loose stools    Medication Followup of desogestrel-ethinyl estradiol (ENSKYCE) 0.15-30 MG-MCG tablet (for contraceptive use)    Taking Medication as prescribed: yes    Side Effects:  None    Medication Helping Symptoms:  yes      Review of Systems   Constitutional, HEENT, cardiovascular, pulmonary, gi and gu systems are negative, except as otherwise noted.      Objective           Vitals:  No vitals were obtained today due to virtual visit.    Physical Exam   GENERAL: Healthy, alert and no distress  EYES: Eyes grossly normal to inspection.  No discharge or erythema, or obvious scleral/conjunctival abnormalities.  RESP: No audible wheeze, cough, or visible cyanosis.  No visible retractions or increased work of breathing.    SKIN: Visible skin clear. No significant rash, abnormal pigmentation or lesions.  NEURO: Cranial nerves grossly intact.  Mentation and speech appropriate for age.  PSYCH: Mentation appears normal, affect normal/bright, judgement and insight intact, normal speech and appearance well-groomed.                Video-Visit Details    Video Start Time: 11:24 AM    Type of service:  Video Visit    Video End Time:11:38 AM    Originating Location (pt. Location): Home    Distant Location (provider location):  On-site    Platform used for Video Visit: [employer]

## 2022-11-17 ENCOUNTER — MYC MEDICAL ADVICE (OUTPATIENT)
Dept: FAMILY MEDICINE | Facility: CLINIC | Age: 32
End: 2022-11-17

## 2022-11-17 NOTE — LETTER
[facility]  [address]  [place] [address]  Phone: [phone]    November 18, 2022        Marichuy JUAREZ [address]  [address] [address]          Dear Weight [facility] Surgery Clinic,     I am the primary care provider for Marichuy and I support her having weight loss surgery.     Sincerely,     Vanessa Echeverria PA-C

## 2022-11-18 NOTE — TELEPHONE ENCOUNTER
I wrote a letter of support for bariatric surgery.  Please print letter from 11/18/22 and fax to [phone]  Thank you  Vanessa Echeverria PA-C

## 2022-11-20 DIAGNOSIS — E66.01 MORBIDLY OBESE (H): ICD-10-CM

## 2022-11-20 RX ORDER — SEMAGLUTIDE 2.4 MG/.75ML
INJECTION, SOLUTION SUBCUTANEOUS
Qty: 4 ML | Refills: 0 | Status: SHIPPED | OUTPATIENT
Start: 2022-11-20 | End: 2022-12-20

## 2022-11-23 ENCOUNTER — LAB (OUTPATIENT)
Dept: LAB | Facility: CLINIC | Age: 32
End: 2022-11-23
Payer: COMMERCIAL

## 2022-11-23 DIAGNOSIS — E66.01 CLASS 3 SEVERE OBESITY WITH SERIOUS COMORBIDITY AND BODY MASS INDEX (BMI) OF 60.0 TO 69.9 IN ADULT, UNSPECIFIED OBESITY TYPE (H): ICD-10-CM

## 2022-11-23 DIAGNOSIS — E03.8 OTHER SPECIFIED HYPOTHYROIDISM: ICD-10-CM

## 2022-11-23 DIAGNOSIS — E66.813 CLASS 3 SEVERE OBESITY WITH SERIOUS COMORBIDITY AND BODY MASS INDEX (BMI) OF 60.0 TO 69.9 IN ADULT, UNSPECIFIED OBESITY TYPE (H): ICD-10-CM

## 2022-11-23 LAB
ALBUMIN SERPL-MCNC: 3.6 G/DL (ref 3.4–5)
ALP SERPL-CCNC: 70 U/L (ref 40–150)
ALT SERPL W P-5'-P-CCNC: 28 U/L (ref 0–50)
ANION GAP SERPL CALCULATED.3IONS-SCNC: 9 MMOL/L (ref 3–14)
AST SERPL W P-5'-P-CCNC: 16 U/L (ref 0–45)
BILIRUB SERPL-MCNC: 0.4 MG/DL (ref 0.2–1.3)
BUN SERPL-MCNC: 18 MG/DL (ref 7–30)
CALCIUM SERPL-MCNC: 8.8 MG/DL (ref 8.5–10.1)
CHLORIDE BLD-SCNC: 114 MMOL/L (ref 94–109)
CO2 SERPL-SCNC: 17 MMOL/L (ref 20–32)
CREAT SERPL-MCNC: 0.95 MG/DL (ref 0.52–1.04)
DEPRECATED CALCIDIOL+CALCIFEROL SERPL-MC: 45 UG/L (ref 20–75)
ERYTHROCYTE [DISTWIDTH] IN BLOOD BY AUTOMATED COUNT: 13.9 % (ref 10–15)
GFR SERPL CREATININE-BSD FRML MDRD: 82 ML/MIN/1.73M2
GLUCOSE BLD-MCNC: 90 MG/DL (ref 70–99)
HBA1C MFR BLD: 5.1 % (ref 0–5.6)
HCT VFR BLD AUTO: 42.9 % (ref 35–47)
HGB BLD-MCNC: 14.1 G/DL (ref 11.7–15.7)
MCH RBC QN AUTO: 27.9 PG (ref 26.5–33)
MCHC RBC AUTO-ENTMCNC: 32.9 G/DL (ref 31.5–36.5)
MCV RBC AUTO: 85 FL (ref 78–100)
PLATELET # BLD AUTO: 294 10E3/UL (ref 150–450)
POTASSIUM BLD-SCNC: 3.8 MMOL/L (ref 3.4–5.3)
PROT SERPL-MCNC: 7.7 G/DL (ref 6.8–8.8)
PTH-INTACT SERPL-MCNC: 34 PG/ML (ref 15–65)
RBC # BLD AUTO: 5.05 10E6/UL (ref 3.8–5.2)
SODIUM SERPL-SCNC: 140 MMOL/L (ref 133–144)
TSH SERPL DL<=0.005 MIU/L-ACNC: 1.83 MU/L (ref 0.4–4)
WBC # BLD AUTO: 6.7 10E3/UL (ref 4–11)

## 2022-11-23 PROCEDURE — 80053 COMPREHEN METABOLIC PANEL: CPT

## 2022-11-23 PROCEDURE — 84443 ASSAY THYROID STIM HORMONE: CPT

## 2022-11-23 PROCEDURE — 85027 COMPLETE CBC AUTOMATED: CPT

## 2022-11-23 PROCEDURE — 83036 HEMOGLOBIN GLYCOSYLATED A1C: CPT

## 2022-11-23 PROCEDURE — 36415 COLL VENOUS BLD VENIPUNCTURE: CPT

## 2022-11-23 PROCEDURE — 83970 ASSAY OF PARATHORMONE: CPT

## 2022-11-23 PROCEDURE — 82306 VITAMIN D 25 HYDROXY: CPT

## 2022-12-14 ENCOUNTER — TRANSFERRED RECORDS (OUTPATIENT)
Dept: HEALTH INFORMATION MANAGEMENT | Facility: CLINIC | Age: 32
End: 2022-12-14

## 2022-12-14 ENCOUNTER — VIRTUAL VISIT (OUTPATIENT)
Dept: ENDOCRINOLOGY | Facility: CLINIC | Age: 32
End: 2022-12-14
Payer: COMMERCIAL

## 2022-12-14 ENCOUNTER — MEDICAL CORRESPONDENCE (OUTPATIENT)
Dept: HEALTH INFORMATION MANAGEMENT | Facility: CLINIC | Age: 32
End: 2022-12-14

## 2022-12-14 VITALS — WEIGHT: 293 LBS | BODY MASS INDEX: 58.41 KG/M2

## 2022-12-14 DIAGNOSIS — E66.813 CLASS 3 SEVERE OBESITY WITH SERIOUS COMORBIDITY AND BODY MASS INDEX (BMI) OF 50.0 TO 59.9 IN ADULT, UNSPECIFIED OBESITY TYPE (H): ICD-10-CM

## 2022-12-14 DIAGNOSIS — Z71.3 NUTRITIONAL COUNSELING: Primary | ICD-10-CM

## 2022-12-14 DIAGNOSIS — E66.01 CLASS 3 SEVERE OBESITY WITH SERIOUS COMORBIDITY AND BODY MASS INDEX (BMI) OF 50.0 TO 59.9 IN ADULT, UNSPECIFIED OBESITY TYPE (H): ICD-10-CM

## 2022-12-14 PROCEDURE — 97803 MED NUTRITION INDIV SUBSEQ: CPT | Mod: GT | Performed by: DIETITIAN, REGISTERED

## 2022-12-14 NOTE — PROGRESS NOTES
"Marichuy Worrell is a 32 year old female who is being evaluated via a billable video visit.      The patient has been notified of following:     \"This video visit will be conducted via a call between you and your physician/provider. We have found that certain health care needs can be provided without the need for an in-person physical exam.  This service lets us provide the care you need with a video conversation.  If a prescription is necessary we can send it directly to your pharmacy.  If lab work is needed we can place an order for that and you can then stop by our lab to have the test done at a later time.    Video visits are billed at different rates depending on your insurance coverage.  Please reach out to your insurance provider with any questions.    If during the course of the call the physician/provider feels a video visit is not appropriate, you will not be charged for this service.\"    Patient has given verbal consent for Video visit? Yes  How would you like to obtain your AVS? MyChart  If you are dropped from the video visit, the video invite should be resent to: Text to cell phone: [phone]  Will anyone else be joining your video visit? No  {If patient encounters technical issues they should call [phone]      Video-Visit Details    Type of service:  Video Visit    Video Start Time: 7:47 am  Video End Time: 8:16 am    Originating Location (pt. Location): Home    Distant Location (provider location):  Offsite (providers home)     Platform used for Video Visit: Algorithmics    During this virtual visit the patient is located in [place], patient verifies this as the location during the entirety of this visit.     New Bariatric Nutrition Consultation Note    Reason For Visit: Nutrition Assessment    Marichuy Worrell is a 32 year old presenting today for bariatric nutrition consult.   Pt is interested in laparoscopic sleeve gastrectomy.     This is pt's 2nd required nutrition visits prior to surgery.     Pt " "referred by Sol Quick NP on November 10, 2022.    Coordination note:   Needs baseline labs - Done  Needs Psych eval  Needs PCP letter of support - Done, on file 11/18/22  36 lb weight loss from initial  Surgeon meet and greet  Needs letter of support from therapist  Needs sleep medicine clearance - Seeing 1/18/23      CO-MORBIDITIES OF OBESITY INCLUDE:       11/9/2022   I have the following health issues associated with obesity: Hypothyroidism     PMH:  Sxs of PCOS    Issues with sleep - sleep clearance recommended    Reflux: None     SUPPORT:  Support System Reviewed With Patient 11/9/2022   Who do you have in your support network that can be available to help you for the first 2 weeks after surgery? My mom and my sister-in-law   Who can you count on for support throughout your weight loss surgery journey? My mom, sister-in-law and my friend. My friend is getting weight loss surgery in January and has been a huge encouragement.       ANTHROPOMETRICS:  Highest weight per pt: 400ish lbs in 2015  Weight 11/9/22: 363 lbs with BMI 60.24    Estimated body mass index is 58.41 kg/m  as calculated from the following:    Height as of 11/9/22: 1.651 m (5' 5\").    Weight as of this encounter: 159.2 kg (351 lb).     Current weight: 351 lbs, pt report  - Down 12 lbs since last month    Required weight loss goal pre-op: 36 lbs from initial consult weight (goal weight 327 lbs or less before surgery)       11/9/2022   I have tried the following methods to lose weight Watching portions or calories, Exercise, Atkins type diet (low carb/high protein), Slimfast, OTC Medications, Prescription Medications, Physician directed program       Weight Loss Questions Reviewed With Patient 11/9/2022   How long have you been overweight? Since early childhood     MEDICATIONS FOR WEIGHT LOSS:  Wegovy  Topiramate    SUPPLEMENT INFORMATION:  Vit D     Labs done 11/23/22 -  looked good     NUTRITION HISTORY:  NKFA  Does eat fish, not a big fan of " shrimp   Not huge on fruit, really likes vegetables. Only like certain fruits    Thinks she met with a RD 1x in  but doesn t recall details. Pt shared she has been overweight her whole life. Has tried weight loss on own through diet/exercise and medications. Wants to live a healthier life and improve mobility, etc. Now considering surgery. Leaning towards sleeve.     One of her good friends is getting bariatric surgery in Jan. Started talking with her and learning about surgery on tik Tok as well.     Reviewed surgery guidelines and answered pt questions.    Currently uses carb manager to track occ. Struggles to get enough protein.    Typically eats 2-3 meals/day. Might miss breakfast. Snacks vary.   Tends to get into patterns and will eat same dinner for 2 weeks then change.   Example dinners: cauliflower rice or cereal. Friend recommended she add some nutritional yeast to cauliflower so she started doing that occ. Some days also added some salmon on the side.    Fluids: water, yasmine, bubbler sparkling water, tea   - Tries to avoid pop, occ sprite   - Not a coffee person   - Rare lemonade or juice   - Rare alcohol, usually just one if ordering     Dec 2022:  Doing really well.   Now eating 3 meals/day.   Eating more protein and less carbs.  Aiming for 90 g protein now, 30 per meal- 42 g protein shakes, 30 g protein shakes, cottage cheese, yogurt, tuna, salmon, etc.    Moving end of the month - currently lives in basement and kitchen is upstairs so not very accessible. Doing well with resources she has - microwave, air fryer, etc in basement. Making fish in air fryer.    Working on fluid goals.  Decreasing carbonation - had some on Thanksgiving.   Had discussion with mom about reasons why not to have it post op which was helpful. Feels good about mindset shift.    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)     Recall Diet Questions Reviewed With Patient 11/9/2022    Describe what you typically consume for breakfast (typical or most recent): Cereal, protein shake, cheese stick, bagel w/cream cheese, (sometimes skip breakfast)   Describe what you typically consume for lunch (typical or most recent): Different hot meals at work, chicken and rice, enchilada, tacos, salads   Describe what you typically consume for supper (typical or most recent): cereal, soup, toast, cauliflower rice with cheese, potato and veggies   Describe what you typically consume as snacks (typical or most recent): Cheese stick, chips,   How many ounces of water, or other low calorie drinks, do you drink daily (8 oz=1 glass)? 32 oz   How many ounces of caffeine (coffee, tea, pop) do you drink daily (8 oz=1 glass)? 0 oz   How many ounces of carbonated (pop, beer, sparkling water) drinks do you drinky daily (8 oz=1 glass)? 16 oz   How many ounces of juice, pop, sweet tea, sports drinks, protein drinks, other sweetened drinks, do you drink daily (8 oz=1 glass)? 24 oz   How many ounces of milk do you drink daily (8 oz=1 glass) 0 oz   Please indicate the type of milk: 2%   How often do you drink alcohol? Monthly or less   If you do drink alcohol, how many drinks might you have in a day? (one drink = 5 oz. wine, 1 can/bottle of beer, 1 shot liquor) 1 or 2       Eating Habits 11/9/2022   Do you have any dietary restrictions? No   Do you currently binge eat (eat a large amount of food in a short time)? No   Are you an emotional eater? Yes   Do you get up to eat after falling asleep? No   What foods do you crave? It goes in waves, Chips, ice cream, gummy bears.       Dining Out History Reviewed With Patient 11/9/2022   How often do you dine out? Around once a week.   Where do you dine out? (select all that apply) fast food chains   What types of food do you order when you dine out? Chicken and [place]         EXERCISE:       11/9/2022   How often do you exercise? 1 to 2 times per week   What is the duration of your  exercise (in minutes)? 20 Minutes   What types of exercise do you do? walking, gym membership, home gym, weightlifting   What keeps you from being more active?  I should be more active but I just have not gotten around to it, Too tired     NUTRITION DIAGNOSIS:  Obesity r/t long history of positive energy balance aeb BMI >30 kg/m2.    INTERVENTION:  Intervention Provided/Education Provided on post-op diet guidelines, vitamins/minerals essential post-operatively, GI anatomy of bariatric surgeries, ways to help prepare for post-op diet guidelines pre-operatively, portion/calorie-control, mindful eating and sources of protein.  Patient demonstrates understanding. Provided pt with list of goals RD contact information.      Questions Reviewed With Patient 11/9/2022   How ready are you to make changes regarding your weight? Number 1 = Not ready at all to make changes up to 10 = very ready. 10   How confident are you that you can change? 1 = Not confident that you will be successful making changes up to 10 = very confident. 9       Expected Engagement: good    GOALS:    Keep up the hard work!    Relating To Eating:  - Eat slowly (20-30 minutes per meal), chewing foods well (25 chews per bite/applesauce consistency)  - Recommend getting at least 50 g of carbohydrates minimum/day  - Continue with 3 meals/day and aiming for 30 g protein/meal.    Relating to beverages:  - Reduce caffeine/carbonation/calorie containing beverages  - Separate fluids from meals (none during or for 30 min after)  - Limiting alcohol    Post-op Diet Handouts:  Diet Guidelines after Weight-loss Surgery  [email or web address]     Your Stage 1 Diet: Clear Liquids  [email or web address]     Your Stage 2 Diet: Low-fat Full Liquids  [email or web address]     Your Stage 3 Diet: Pureed Foods  [email or web address]     Pureed Recipes  [email or web address]    Your Stage 4 Diet: Soft  Foods  [email or web address]    Your Stage 5 Diet: Regular Foods  [email or web address]    Supplements after Sleeve Gastrectomy, Gastric Bypass or Single Anastomosis Duodenal Switch  [email or web address]    Nutritious Eating Resources:   The Plate Method:  https://www.cdc.gov/diabetes/images/managing/Diabetes-Manage-Eat-Well-Plate-Graphic_600px.jpg    Protein Sources?[email or web address]      Carbohydrates  [email or web address]      Mindful Eating  [email or web address]      Summary of Volumetrics Eating Plan  [email or web address]      Seated Exercises for Arms and Legs (can be done before or after surgery)  [email or web address]    Follow up: Wednesday, January 11th at 9:30 am     Time spent with patient: 29 minutes.  SHUKRI Luong, RD, LD

## 2022-12-14 NOTE — LETTER
"12/14/2022       RE: Marichuy Worrell  [address] [facility] [address]     Dear Colleague,    Thank you for referring your patient, Marichuy Worrell, to the [facility] [place] at [facility]. Please see a copy of my visit note below.    Marichuy Worrell is a 32 year old female who is being evaluated via a billable video visit.      The patient has been notified of following:     \"This video visit will be conducted via a call between you and your physician/provider. We have found that certain health care needs can be provided without the need for an in-person physical exam.  This service lets us provide the care you need with a video conversation.  If a prescription is necessary we can send it directly to your pharmacy.  If lab work is needed we can place an order for that and you can then stop by our lab to have the test done at a later time.    Video visits are billed at different rates depending on your insurance coverage.  Please reach out to your insurance provider with any questions.    If during the course of the call the physician/provider feels a video visit is not appropriate, you will not be charged for this service.\"    Patient has given verbal consent for Video visit? Yes  How would you like to obtain your AVS? MyChart  If you are dropped from the video visit, the video invite should be resent to: Text to cell phone: [phone]  Will anyone else be joining your video visit? No  {If patient encounters technical issues they should call [phone]      Video-Visit Details    Type of service:  Video Visit    Video Start Time: 7:47 am  Video End Time: 8:16 am    Originating Location (pt. Location): Home    Distant Location (provider location):  Offsite (providers home)     Platform used for Video Visit: [employer]    During this virtual visit the patient is located in [place], patient verifies this as the location during the " "entirety of this visit.     New Bariatric Nutrition Consultation Note    Reason For Visit: Nutrition Assessment    Marichuy Worrell is a 32 year old presenting today for bariatric nutrition consult.   Pt is interested in laparoscopic sleeve gastrectomy.     This is pt's 2nd required nutrition visits prior to surgery.     Pt referred by Sol Quick NP on November 10, 2022.    Coordination note:   Needs baseline labs - Done  Needs Psych eval  Needs PCP letter of support - Done, on file 11/18/22  36 lb weight loss from initial  Surgeon meet and greet  Needs letter of support from therapist  Needs sleep medicine clearance - Seeing 1/18/23      CO-MORBIDITIES OF OBESITY INCLUDE:       11/9/2022   I have the following health issues associated with obesity: Hypothyroidism     PMH:  Sxs of PCOS    Issues with sleep - sleep clearance recommended    Reflux: None     SUPPORT:  Support System Reviewed With Patient 11/9/2022   Who do you have in your support network that can be available to help you for the first 2 weeks after surgery? My mom and my sister-in-law   Who can you count on for support throughout your weight loss surgery journey? My mom, sister-in-law and my friend. My friend is getting weight loss surgery in January and has been a huge encouragement.       ANTHROPOMETRICS:  Highest weight per pt: 400ish lbs in 2015  Weight 11/9/22: 363 lbs with BMI 60.24    Estimated body mass index is 58.41 kg/m  as calculated from the following:    Height as of 11/9/22: 1.651 m (5' 5\").    Weight as of this encounter: 159.2 kg (351 lb).     Current weight: 351 lbs, pt report  - Down 12 lbs since last month    Required weight loss goal pre-op: 36 lbs from initial consult weight (goal weight 327 lbs or less before surgery)       11/9/2022   I have tried the following methods to lose weight Watching portions or calories, Exercise, Atkins type diet (low carb/high protein), Slimfast, OTC Medications, Prescription Medications, Physician " directed program       Weight Loss Questions Reviewed With Patient 11/9/2022   How long have you been overweight? Since early childhood     MEDICATIONS FOR WEIGHT LOSS:  Wegovy  Topiramate    SUPPLEMENT INFORMATION:  Vit D     Labs done 11/23/22 -  looked good     NUTRITION HISTORY:  ESDRAS  Does eat fish, not a big fan of shrimp   Not huge on fruit, really likes vegetables. Only like certain fruits    Thinks she met with a RD 1x in HS but doesn t recall details. Pt shared she has been overweight her whole life. Has tried weight loss on own through diet/exercise and medications. Wants to live a healthier life and improve mobility, etc. Now considering surgery. Leaning towards sleeve.     One of her good friends is getting bariatric surgery in Jan. Started talking with her and learning about surgery on tik Tok as well.     Reviewed surgery guidelines and answered pt questions.    Currently uses carb manager to track occ. Struggles to get enough protein.    Typically eats 2-3 meals/day. Might miss breakfast. Snacks vary.   Tends to get into patterns and will eat same dinner for 2 weeks then change.   Example dinners: cauliflower rice or cereal. Friend recommended she add some nutritional yeast to cauliflower so she started doing that occ. Some days also added some salmon on the side.    Fluids: water, yasmine, bubbler sparkling water, tea   - Tries to avoid pop, occ sprite   - Not a coffee person   - Rare lemonade or juice   - Rare alcohol, usually just one if ordering     Dec 2022:  Doing really well.   Now eating 3 meals/day.   Eating more protein and less carbs.  Aiming for 90 g protein now, 30 per meal- 42 g protein shakes, 30 g protein shakes, cottage cheese, yogurt, tuna, salmon, etc.    Moving end of the month - currently lives in basement and kitchen is upstairs so not very accessible. Doing well with resources she has - microwave, air fryer, etc in basement. Making fish in air fryer.    Working on fluid  goals.  Decreasing carbonation - had some on Thanksgiving.   Had discussion with mom about reasons why not to have it post op which was helpful. Feels good about mindset shift.    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)     Recall Diet Questions Reviewed With Patient 11/9/2022   Describe what you typically consume for breakfast (typical or most recent): Cereal, protein shake, cheese stick, bagel w/cream cheese, (sometimes skip breakfast)   Describe what you typically consume for lunch (typical or most recent): Different hot meals at work, chicken and rice, enchilada, tacos, salads   Describe what you typically consume for supper (typical or most recent): cereal, soup, toast, cauliflower rice with cheese, potato and veggies   Describe what you typically consume as snacks (typical or most recent): Cheese stick, chips,   How many ounces of water, or other low calorie drinks, do you drink daily (8 oz=1 glass)? 32 oz   How many ounces of caffeine (coffee, tea, pop) do you drink daily (8 oz=1 glass)? 0 oz   How many ounces of carbonated (pop, beer, sparkling water) drinks do you drinky daily (8 oz=1 glass)? 16 oz   How many ounces of juice, pop, sweet tea, sports drinks, protein drinks, other sweetened drinks, do you drink daily (8 oz=1 glass)? 24 oz   How many ounces of milk do you drink daily (8 oz=1 glass) 0 oz   Please indicate the type of milk: 2%   How often do you drink alcohol? Monthly or less   If you do drink alcohol, how many drinks might you have in a day? (one drink = 5 oz. wine, 1 can/bottle of beer, 1 shot liquor) 1 or 2       Eating Habits 11/9/2022   Do you have any dietary restrictions? No   Do you currently binge eat (eat a large amount of food in a short time)? No   Are you an emotional eater? Yes   Do you get up to eat after falling asleep? No   What foods do you crave? It goes in waves, Chips, ice cream, gummy bears.       Dining Out History Reviewed  With Patient 11/9/2022   How often do you dine out? Around once a week.   Where do you dine out? (select all that apply) fast food chains   What types of food do you order when you dine out? Chicken and [place]         EXERCISE:       11/9/2022   How often do you exercise? 1 to 2 times per week   What is the duration of your exercise (in minutes)? 20 Minutes   What types of exercise do you do? walking, gym membership, home gym, weightlifting   What keeps you from being more active?  I should be more active but I just have not gotten around to it, Too tired     NUTRITION DIAGNOSIS:  Obesity r/t long history of positive energy balance aeb BMI >30 kg/m2.    INTERVENTION:  Intervention Provided/Education Provided on post-op diet guidelines, vitamins/minerals essential post-operatively, GI anatomy of bariatric surgeries, ways to help prepare for post-op diet guidelines pre-operatively, portion/calorie-control, mindful eating and sources of protein.  Patient demonstrates understanding. Provided pt with list of goals RD contact information.      Questions Reviewed With Patient 11/9/2022   How ready are you to make changes regarding your weight? Number 1 = Not ready at all to make changes up to 10 = very ready. 10   How confident are you that you can change? 1 = Not confident that you will be successful making changes up to 10 = very confident. 9       Expected Engagement: good    GOALS:    Keep up the hard work!    Relating To Eating:  - Eat slowly (20-30 minutes per meal), chewing foods well (25 chews per bite/applesauce consistency)  - Recommend getting at least 50 g of carbohydrates minimum/day  - Continue with 3 meals/day and aiming for 30 g protein/meal.    Relating to beverages:  - Reduce caffeine/carbonation/calorie containing beverages  - Separate fluids from meals (none during or for 30 min after)  - Limiting alcohol    Post-op Diet Handouts:  Diet Guidelines after Weight-loss Surgery  [email or web address]      Your Stage 1 Diet: Clear Liquids  [email or web address]     Your Stage 2 Diet: Low-fat Full Liquids  [email or web address]     Your Stage 3 Diet: Pureed Foods  [email or web address]     Pureed Recipes  [email or web address]    Your Stage 4 Diet: Soft Foods  [email or web address]    Your Stage 5 Diet: Regular Foods  [email or web address]    Supplements after Sleeve Gastrectomy, Gastric Bypass or Single Anastomosis Duodenal Switch  [email or web address]    Nutritious Eating Resources:   The Plate Method:  https://www.cdc.gov/diabetes/images/managing/Diabetes-Manage-Eat-Well-Plate-Graphic_600px.jpg    Protein Sources?[email or web address]      Carbohydrates  [email or web address]      Mindful Eating  [email or web address]      Summary of Volumetrics Eating Plan  [email or web address]      Seated Exercises for Arms and Legs (can be done before or after surgery)  [email or web address]    Follow up: Wednesday, January 11th at 9:30 am     Time spent with patient: 29 minutes.  SHUKRI Luong, RD, LD

## 2022-12-15 ENCOUNTER — TELEPHONE (OUTPATIENT)
Dept: ENDOCRINOLOGY | Facility: CLINIC | Age: 32
End: 2022-12-15

## 2022-12-15 ENCOUNTER — CARE COORDINATION (OUTPATIENT)
Dept: ENDOCRINOLOGY | Facility: CLINIC | Age: 32
End: 2022-12-15

## 2022-12-15 NOTE — PROGRESS NOTES
Tasklist updated and sent to patient via [employer].    Bariatric Task List  Fax:  Please fax all paperwork to: [phone] -     Status:  Is patient a candidate for bariatric surgery?:  patient is a candidate for bariatric surgery -     Cleared to schedule surgeon consult?:  cleared to schedule surgeon consult - Call [phone] to schedule with Dr Gutierrez. bks   Status:    -  In process   Surgeon: Brenda -     Tentative surgery month/year: after sleep clearance and psych eval are done. -        Insurance: Insurance:  medica -        Patient Info: Initial Weight:  363 -     Date of Initial Weight/Height:  11/10/2022 -     Goal Weight (lbs):  327 -     Required Weight Loss:  36 -     Surgery Type:  sleeve gastrectomy -        Dietician Visits: Structured weight loss required by insurance?:    -     Dietician Visit 1:  Completed - 11/10/22, Sonam Nick [place]   Dietician Visit 2:  Completed - 12/14/22, Sonam Nick [place]   Dietician Visit 3:  Needed - 1/11/22 appt. [facility]   Dietician Visit 4:    -     Dietician Visit 5:    -     Dietician Visit 6:    -     Dietician Visit additional:  Needed - Monthly until surgery for weight loss and postop diet teaching. Call [phone] to schedule. [facility]      Psychological Evaluation: Psych eval:  Needed - List and letter sent to patient, 11/10/22, [place]   Therapist letter of support:  Completed - Letter sent to patient, 11/10/22, [place]; 12/14/22 Ines Cote MA, [facility] therapist letter received via fax on 12/15/22. bks   Other:    -        Lab Work: Complete Blood Count:  Completed -     Comprehensive Metabolic Panel:  Completed -     Vitamin D:  Completed -     PTH:  Completed -     Hgb A1c:  Completed -      Lipids: Completed -      TSH ([facility], [facility], [place] MA): Completed -        Consults/ Clearance Sleep Medicine:  Needed - Letter sent to patient, 11/10/22, [place]; 1/18/23 Diana Russell initial visit. bks      Testing: Sleep Study:   -  If ordered by sleep provider. bks     "  PCP: Establish care with PCP:  Completed -     PCP letter of support:  Completed - Letter sent to patient, 11/10/22, [place]; Vanessa Echeverria PA-C, 11/18/22 ltr in Epic. bks      Patient Education:  Information Session:  Needed -     Given \"Making your decision\" handout?:  Yes -     Given \"A Roadmap to you Weight Loss Surgery\" handout?: Yes -     Given \"Get Well Loop\" information?: Yes -     Given support group information?:    -  Yes   Attended support group?:    -     Support plan in place?:  Completed -        Additional Surgery Requirements: Other: Call Center to schedule the 1 week and 31+ days post-op appts. [facility] -        Final Tasks:  Before surgery online class:  Needed -     Before surgery online class website link:  [email or web address]   After surgery online class:  Needed -     After surgery online class website link:  [email or web address]   Nurse visit per clinic:  Needed -  Preop Nurse Class   History and Physical per clinic:   -  PreAssessment Clinic   Final labs per clinic: Needed -        Notes: Please register for the Sleeve Gastrectomy Get Well Loop when you get an email invitation after you get a surgery date.   The Get Well Loop will give you information via email or text messages that can help you be more successful before and after surgery for up to one year after surgery.  It can also help answer any questions you may have.   Get Well Loop Handout - [email or web address]  A patient can only be connected to one Loop at a time.  You can be disconnected from the Sleeve Gastrectomy Loop and added to another Loop because you were in the hospital/emergency department, had another surgery or had Covid. Please let us know if you need to be reconnected to the Sleeve Gastrectomy Loop.       -            "

## 2022-12-18 DIAGNOSIS — E66.01 MORBIDLY OBESE (H): ICD-10-CM

## 2022-12-20 RX ORDER — SEMAGLUTIDE 2.4 MG/.75ML
2.4 INJECTION, SOLUTION SUBCUTANEOUS
Qty: 12 ML | Refills: 0 | Status: ON HOLD | OUTPATIENT
Start: 2022-12-20 | End: 2023-03-15

## 2022-12-20 NOTE — TELEPHONE ENCOUNTER
Spoke with pharmacy per Vanessa Echeverria request; clarified patient is taking Wegovy 2.4mg every 7 days; was given month supply (4 ml) on 11/20/2022; due for refill 12/20/2022.    Rosana Copeland RN  mary ann'd below.

## 2022-12-20 NOTE — TELEPHONE ENCOUNTER
Please call patient to verify dosing regarding this refill request. See message below.    MILES FieldN CATE  [facility]

## 2022-12-22 ENCOUNTER — OFFICE VISIT (OUTPATIENT)
Dept: SURGERY | Facility: CLINIC | Age: 32
End: 2022-12-22
Payer: COMMERCIAL

## 2022-12-22 VITALS
HEART RATE: 75 BPM | OXYGEN SATURATION: 96 % | WEIGHT: 293 LBS | BODY MASS INDEX: 48.82 KG/M2 | SYSTOLIC BLOOD PRESSURE: 120 MMHG | DIASTOLIC BLOOD PRESSURE: 84 MMHG | HEIGHT: 65 IN

## 2022-12-22 DIAGNOSIS — E66.01 CLASS 3 SEVERE OBESITY DUE TO EXCESS CALORIES WITH SERIOUS COMORBIDITY AND BODY MASS INDEX (BMI) OF 60.0 TO 69.9 IN ADULT (H): Primary | ICD-10-CM

## 2022-12-22 DIAGNOSIS — E66.813 CLASS 3 SEVERE OBESITY DUE TO EXCESS CALORIES WITH SERIOUS COMORBIDITY AND BODY MASS INDEX (BMI) OF 60.0 TO 69.9 IN ADULT (H): Primary | ICD-10-CM

## 2022-12-22 PROCEDURE — 99214 OFFICE O/P EST MOD 30 MIN: CPT | Performed by: SURGERY

## 2022-12-22 ASSESSMENT — PAIN SCALES - GENERAL: PAINLEVEL: NO PAIN (0)

## 2022-12-22 NOTE — PROGRESS NOTES
"Answers for HPI/ROS submitted by the patient on 12/22/2022  General Symptoms: No  Skin Symptoms: No  HENT Symptoms: No  EYE SYMPTOMS: No  HEART SYMPTOMS: No  LUNG SYMPTOMS: No  INTESTINAL SYMPTOMS: No  URINARY SYMPTOMS: No  GYNECOLOGIC SYMPTOMS: No  BREAST SYMPTOMS: No  SKELETAL SYMPTOMS: No  BLOOD SYMPTOMS: No  NERVOUS SYSTEM SYMPTOMS: No  MENTAL HEALTH SYMPTOMS: No    Dear Dr. Echeverria,       Referring provider:       11/9/2022   Who referred you? Salina Whitehead     I was asked to see the patient regarding obesity by the referring provider above.    I had the pleasure of meeting with your patient Marichuy Worrell in our weight loss surgery office.  This patient is a 32 year old female who has been undergoing our thorough preoperative screening process in anticipation of potential bariatric surgery.    At initial evaluation we recorded Marichuy Worrell's Height: 165.1 cm (5' 5\"), Initial Weight (lbs): 362 lbs and Body mass index is 58.76 kg/m .     Highest lifetime weight was 400 lbs ~7 yrs ago.    The patient has been unsuccessful with other methods of permanent weight loss and suffers from multiple weight related medical conditions.  Due to lack of success in achieving weight loss through other methods, she is interested in undergoing bariatric surgery.    Notes from Sol last month:    \"Difficulty managing weight since childhood. Was doing calorie restriction diets before age 8. High weight in life is 400lb, in 2015. She has been working with medical weight management since 2018 through a number of medical providers. Most recently, she started 12/2022 at a weight of 364lb. She has decided to pursue bariatric surgery.      Has taken phentermine in the past. Currently on topiramate, was beneficial initially, no recent adjustments, no adverse side effects. Would like to try to increase this. She is already taking wegovy 2.4mg with earlier satiety but not seeing a lot of weight loss. \"    PREVIOUS WEIGHT LOSS ATTEMPTS:     " "11/9/2022   I have tried the following methods to lose weight Watching portions or calories, Exercise, Atkins type diet (low carb/high protein), Slimfast, OTC Medications, Prescription Medications, Physician directed program       CO-MORBIDITIES OF OBESITY INCLUDE:     11/9/2022   I have the following health issues associated with obesity: Hypothyroidism       VITALS:  /84 (BP Location: Left arm, Patient Position: Sitting, Cuff Size: Adult Regular)   Pulse 75   Ht 1.651 m (5' 5\")   Wt (!) 160.2 kg (353 lb 1.6 oz)   SpO2 96%   BMI 58.76 kg/m      PE:  GENERAL: Alert and oriented x3. NAD  HEENT exam: Sclerae not icteric. Hearing good. Head normocephalic and atraumatic.   CARDIOVASCULAR: No JVD  RESPIRATORY: Breathing unlabored  GASTROINTESTINAL: Obese  LOWER EXTREMITIES: no deformities  MUSCULOSKELETAL: Normal gait, Moves all 4 extremities equal and strong  NEUROLOGIC: no gross defect  SKIN: warm and dry to touch     In summary, she has undergone an appropriate medical evaluation, dietitian evaluation, as well as psychologic screening. The patient appears to be an appropriate candidate for bariatric surgery.    In the office today, I discussed the laparoscopic gastric sleeve surgery.  Risks, benefits and anticipated outcomes were outlined including the risk of death, staple line leak (1-2%), PE, DVT, ulcer, worsening GERD, N/V, stricture, hernia, wound infection, weight regain, and vitamin deficiencies. This patient has a good chance of sustaining permanent weight loss due to this procedure.  This should also allow improvement if not resolution of his/her weight related medical conditions.    At present we are going to present your patient's file for prior authorization to insurance.  Pending prior authorization, I anticipate a surgical date in the near future.  We will keep you updated on any progress.  If you have questions regarding care please feel free to contact me.  Total time spent in the clinic was " 30 minutes with greater than 50% in face-to-face consultation.        Sincerely,    Thang Gutierrez MD    Please route or send letter to:  Primary Care Provider (PCP) and Referring Provider

## 2022-12-22 NOTE — LETTER
"12/22/2022       RE: Marichuy Worrell  [id number] [facility] [address]     Dear Colleague,    Thank you for referring your patient, Marichuy Worrell, to the [facility] [place] at [facility]. Please see a copy of my visit note below.    Answers for HPI/ROS submitted by the patient on 12/22/2022  General Symptoms: No  Skin Symptoms: No  HENT Symptoms: No  EYE SYMPTOMS: No  HEART SYMPTOMS: No  LUNG SYMPTOMS: No  INTESTINAL SYMPTOMS: No  URINARY SYMPTOMS: No  GYNECOLOGIC SYMPTOMS: No  BREAST SYMPTOMS: No  SKELETAL SYMPTOMS: No  BLOOD SYMPTOMS: No  NERVOUS SYSTEM SYMPTOMS: No  MENTAL HEALTH SYMPTOMS: No    Dear Dr. Echeverria,       Referring provider:       11/9/2022   Who referred you? Salina Whitehead     I was asked to see the patient regarding obesity by the referring provider above.    I had the pleasure of meeting with your patient Marichuy Worrell in our weight loss surgery office.  This patient is a 32 year old female who has been undergoing our thorough preoperative screening process in anticipation of potential bariatric surgery.    At initial evaluation we recorded Marichuy Worrell's Height: 165.1 cm (5' 5\"), Initial Weight (lbs): 362 lbs and Body mass index is 58.76 kg/m .     Highest lifetime weight was 400 lbs ~7 yrs ago.    The patient has been unsuccessful with other methods of permanent weight loss and suffers from multiple weight related medical conditions.  Due to lack of success in achieving weight loss through other methods, she is interested in undergoing bariatric surgery.    Notes from Sol last month:    \"Difficulty managing weight since childhood. Was doing calorie restriction diets before age 8. High weight in life is 400lb, in 2015. She has been working with medical weight management since 2018 through a number of medical providers. Most recently, she started 12/2022 at a weight of 364lb. She has decided to pursue " "bariatric surgery.      Has taken phentermine in the past. Currently on topiramate, was beneficial initially, no recent adjustments, no adverse side effects. Would like to try to increase this. She is already taking wegovy 2.4mg with earlier satiety but not seeing a lot of weight loss. \"    PREVIOUS WEIGHT LOSS ATTEMPTS:     11/9/2022   I have tried the following methods to lose weight Watching portions or calories, Exercise, Atkins type diet (low carb/high protein), Slimfast, OTC Medications, Prescription Medications, Physician directed program       CO-MORBIDITIES OF OBESITY INCLUDE:     11/9/2022   I have the following health issues associated with obesity: Hypothyroidism       VITALS:  /84 (BP Location: Left arm, Patient Position: Sitting, Cuff Size: Adult Regular)   Pulse 75   Ht 1.651 m (5' 5\")   Wt (!) 160.2 kg (353 lb 1.6 oz)   SpO2 96%   BMI 58.76 kg/m      PE:  GENERAL: Alert and oriented x3. NAD  HEENT exam: Sclerae not icteric. Hearing good. Head normocephalic and atraumatic.   CARDIOVASCULAR: No JVD  RESPIRATORY: Breathing unlabored  GASTROINTESTINAL: Obese  LOWER EXTREMITIES: no deformities  MUSCULOSKELETAL: Normal gait, Moves all 4 extremities equal and strong  NEUROLOGIC: no gross defect  SKIN: warm and dry to touch     In summary, she has undergone an appropriate medical evaluation, dietitian evaluation, as well as psychologic screening. The patient appears to be an appropriate candidate for bariatric surgery.    In the office today, I discussed the laparoscopic gastric sleeve surgery.  Risks, benefits and anticipated outcomes were outlined including the risk of death, staple line leak (1-2%), PE, DVT, ulcer, worsening GERD, N/V, stricture, hernia, wound infection, weight regain, and vitamin deficiencies. This patient has a good chance of sustaining permanent weight loss due to this procedure.  This should also allow improvement if not resolution of his/her weight related medical " conditions.    At present we are going to present your patient's file for prior authorization to insurance.  Pending prior authorization, I anticipate a surgical date in the near future.  We will keep you updated on any progress.  If you have questions regarding care please feel free to contact me.  Total time spent in the clinic was 30 minutes with greater than 50% in face-to-face consultation.        Sincerely,    Thang Gutierrez MD    Please route or send letter to:  Primary Care Provider (PCP) and Referring Provider

## 2022-12-22 NOTE — NURSING NOTE
"(   Chief Complaint   Patient presents with     New Patient     Meet and greet    )    ( Weight: (!) 160.2 kg (353 lb 1.6 oz) )  ( Height: 165.1 cm (5' 5\") )  ( BMI (Calculated): 58.76 )  (   )  (   )  (   )  (   )  (   )  (   )    ( BP: 120/84 )  (   )  (   )  (   )  ( Pulse: 75 )  (   )  ( SpO2: 96 % )    (   Patient Active Problem List   Diagnosis     Class 3 severe obesity with serious comorbidity and body mass index (BMI) of 60.0 to 69.9 in adult (H)     Moderate major depression (H)     CARDIOVASCULAR SCREENING; LDL GOAL LESS THAN 160     Other specified hypothyroidism     OSCAR (generalized anxiety disorder)    )  (   Current Outpatient Medications   Medication Sig Dispense Refill     ciclopirox (PENLAC) 8 % external solution Apply to adjacent skin and affected nails daily.  Remove with alcohol every 7 days, then repeat. 6.6 mL 5     desogestrel-ethinyl estradiol (ENSKYCE) 0.15-30 MG-MCG tablet Take 1 tablet by mouth daily 84 tablet 3     escitalopram (LEXAPRO) 20 MG tablet Take 1 tablet (20 mg) by mouth daily 90 tablet 1     insulin pen needle (31G X 5 MM) 31G X 5 MM miscellaneous Use one pen needles daily or as directed. 100 each 1     levothyroxine (SYNTHROID/LEVOTHROID) 175 MCG tablet Take 1 tablet by mouth every other day alternating with 200 mcg dose 45 tablet 0     levothyroxine (SYNTHROID/LEVOTHROID) 200 MCG tablet Take 1 tablet by mouth every other day alternating with 175 mcg dose 45 tablet 0     Semaglutide-Weight Management (WEGOVY) 2.4 MG/0.75ML SOAJ Inject 2.4 mg Subcutaneous every 7 days 12 mL 0     topiramate (TOPAMAX) 25 MG tablet Take 4 tablets (100 mg) by mouth 2 times daily 240 tablet 3     traZODone (DESYREL) 50 MG tablet Take 1 tablet (50 mg) by mouth At Bedtime 30 tablet 11     triamcinolone (KENALOG) 0.1 % external cream as needed      )  ( Diabetes Eval:    )    ( Pain Eval:  No Pain (0) )    ( Wound Eval:       )    (   History   Smoking Status     Never   Smokeless Tobacco     Never "    )    ( Signed By:  Almas Ontiveros, EMT; December 22, 2022; 7:02 AM )

## 2023-01-03 ENCOUNTER — VIRTUAL VISIT (OUTPATIENT)
Dept: ENDOCRINOLOGY | Facility: CLINIC | Age: 33
End: 2023-01-03
Payer: COMMERCIAL

## 2023-01-03 DIAGNOSIS — E03.8 OTHER SPECIFIED HYPOTHYROIDISM: ICD-10-CM

## 2023-01-03 DIAGNOSIS — E66.01 MORBIDLY OBESE (H): ICD-10-CM

## 2023-01-03 PROCEDURE — 99203 OFFICE O/P NEW LOW 30 MIN: CPT | Mod: 95 | Performed by: INTERNAL MEDICINE

## 2023-01-03 RX ORDER — LEVOTHYROXINE SODIUM 175 UG/1
TABLET ORAL
Qty: 45 TABLET | Refills: 3 | Status: SHIPPED | OUTPATIENT
Start: 2023-01-03 | End: 2023-07-27

## 2023-01-03 RX ORDER — LEVOTHYROXINE SODIUM 200 UG/1
TABLET ORAL
Qty: 45 TABLET | Refills: 3 | Status: SHIPPED | OUTPATIENT
Start: 2023-01-03 | End: 2024-03-14

## 2023-01-03 NOTE — NURSING NOTE
Chief Complaint   Patient presents with     Thyroid Problem     Hypothyroidism Ref by Salina Whitehead       There were no vitals filed for this visit.  Wt Readings from Last 1 Encounters:   12/22/22 (!) 160.2 kg (353 lb 1.6 oz)       Jazmin Espinal MA

## 2023-01-03 NOTE — PROGRESS NOTES
Endocrine Consult Video visit note    Attending Assessment/Plan :        Morbidly obese (H)  Other specified hypothyroidism    Assessment:  -TFTs wnl on most recent recheck  -dose will likely trend down as weight loss happens following bariatric surgery.  Will plan to monitor labs more closely during first year after surgery then can space them out (will order as q3 months for first year with first check now)  -Discussed  thyroid hormone by 4 hours from calcium and iron supplements     Plan:  -recheck TFTs now to verify current dose is appropriate  -continue alternating 175 and 200mcg daily as she is currently taking - will reorder supply  -come in for repeat TFTs and followup appt to reassess sx and review labs 2-3 months post op  -check TPO    I have independently reviewed and interpreted labs, imaging as indicated.     RTC 2-3 months post bariatric surgery (pt will schedule after scheduling surgery, which is tentatively end of feb or sometime in March so followup will be ~April-May)     Chief complaint:  Marichuy is a 32 year old female seen for hypothyroidism    HISTORY OF PRESENT ILLNESS    Currently taking alternating doses of 175 and 200mcg levothyroxine.      Will be going for bariatric surgery sometime in early spring of this year.     On OCP.     Has had weight gain and reports menstrual period changes over the last year.     Not taking calcium or iron supplements currently.  Takes levothyroxine on empty stomach first thing in the morning.   Endocrine relevant labs and imaging are as follows:  Component      Latest Ref Rng & Units 2/2/2022 5/5/2022 6/30/2022 9/14/2022   TSH      0.40 - 4.00 mU/L 1.10 0.22 (L)  5.39 (H)   T4 Free      0.76 - 1.46 ng/dL  1.38 1.18 1.19     Component      Latest Ref Rng & Units 11/23/2022   TSH      0.40 - 4.00 mU/L 1.83   T4 Free      0.76 - 1.46 ng/dL      Relevant imaging is as follows:     REVIEW OF SYSTEMS  Answers for HPI/ROS submitted by the patient on  12/22/2022  General Symptoms: No  Skin Symptoms: No  HENT Symptoms: No  EYE SYMPTOMS: No  HEART SYMPTOMS: No  LUNG SYMPTOMS: No  INTESTINAL SYMPTOMS: No  URINARY SYMPTOMS: No  GYNECOLOGIC SYMPTOMS: No  BREAST SYMPTOMS: No  SKELETAL SYMPTOMS: No  BLOOD SYMPTOMS: No  NERVOUS SYSTEM SYMPTOMS: No  MENTAL HEALTH SYMPTOMS: No      10 system ROS otherwise as per the HPI or negative    Past Medical History  Past Medical History:   Diagnosis Date     ASCUS of cervix with negative high risk HPV 08/08/2017     Depressive disorder      Hypothyroidism 2009     Moderate major depression (H)      Morbidly obese (H)        Medications  Current Outpatient Medications   Medication Sig Dispense Refill     ciclopirox (PENLAC) 8 % external solution Apply to adjacent skin and affected nails daily.  Remove with alcohol every 7 days, then repeat. 6.6 mL 5     desogestrel-ethinyl estradiol (ENSKYCE) 0.15-30 MG-MCG tablet Take 1 tablet by mouth daily 84 tablet 3     escitalopram (LEXAPRO) 20 MG tablet Take 1 tablet (20 mg) by mouth daily 90 tablet 1     insulin pen needle (31G X 5 MM) 31G X 5 MM miscellaneous Use one pen needles daily or as directed. 100 each 1     levothyroxine (SYNTHROID/LEVOTHROID) 175 MCG tablet Take 1 tablet by mouth every other day alternating with 200 mcg dose 45 tablet 0     levothyroxine (SYNTHROID/LEVOTHROID) 200 MCG tablet Take 1 tablet by mouth every other day alternating with 175 mcg dose 45 tablet 0     Semaglutide-Weight Management (WEGOVY) 2.4 MG/0.75ML SOAJ Inject 2.4 mg Subcutaneous every 7 days 12 mL 0     topiramate (TOPAMAX) 25 MG tablet Take 4 tablets (100 mg) by mouth 2 times daily 240 tablet 3     traZODone (DESYREL) 50 MG tablet Take 1 tablet (50 mg) by mouth At Bedtime 30 tablet 11     triamcinolone (KENALOG) 0.1 % external cream as needed         Allergies  Allergies   Allergen Reactions     Biaxin [Clarithromycin]      rash       Family History  family history includes Allergies in her brother;  Arthritis in her maternal grandmother; Asthma in her brother and mother; Cancer in her maternal grandfather; Depression in her mother; Melanoma in her mother; Obesity in her maternal grandmother and mother; Other Cancer in her father and mother; Thyroid Disease in her maternal grandmother; Unknown/Adopted in her father, paternal grandfather, and paternal grandmother.    Social History  Social History     Tobacco Use     Smoking status: Never     Smokeless tobacco: Never   Substance Use Topics     Alcohol use: No     Drug use: No       Physical Exam  There is no height or weight on file to calculate BMI.  GENERAL: no distress  SKIN: Visible skin clear. No significant rash, abnormal pigmentation or lesions.  EYES: Eyes grossly normal to inspection.  No discharge or erythema, or obvious scleral/conjunctival abnormalities.  NECK: visible goiter is not present; no visible neck masses  RESP: No audible wheeze, cough, or visible cyanosis.  No visible retractions or increased work of breathing.    NEURO: Awake, alert, responds appropriately to questions.  Mentation and speech fluent.  PSYCH:affect normal and appearance well-groomed.    Video-Visit Details    Type of service:  Video Visit    Video Start Time (time video started): 1300    Video End Time (time video stopped): 1328    Originating Location (pt. Location): Home    Distant Location (provider location):  Off-site    Mode of Communication:  Video Conference via [facility]    Physician has received verbal consent for a Video Visit from the patient? Yes    I spent a total of 37 minutes on the day of this new patient visit on chart review, lab review, coordinating care, exam and counseling of patient    Savage Hernandez MD

## 2023-01-03 NOTE — LETTER
1/3/2023         RE: Marichuy Worrell  [address]  [place] [address]        Dear Colleague,    Thank you for referring your patient, Marichuy Worrell, to the [facility] ENDOCRINOLOGY. Please see a copy of my visit note below.    Endocrine Consult Video visit note    Attending Assessment/Plan :        Morbidly obese (H)  Other specified hypothyroidism    Assessment:  -TFTs wnl on most recent recheck  -dose will likely trend down as weight loss happens following bariatric surgery.  Will plan to monitor labs more closely during first year after surgery then can space them out (will order as q3 months for first year with first check now)  -Discussed  thyroid hormone by 4 hours from calcium and iron supplements     Plan:  -recheck TFTs now to verify current dose is appropriate  -continue alternating 175 and 200mcg daily as she is currently taking - will reorder supply  -come in for repeat TFTs and followup appt to reassess sx and review labs 2-3 months post op  -check TPO    I have independently reviewed and interpreted labs, imaging as indicated.     RTC 2-3 months post bariatric surgery (pt will schedule after scheduling surgery, which is tentatively end of feb or sometime in March so followup will be ~April-May)     Chief complaint:  Marichuy is a 32 year old female seen for hypothyroidism    HISTORY OF PRESENT ILLNESS    Currently taking alternating doses of 175 and 200mcg levothyroxine.      Will be going for bariatric surgery sometime in early spring of this year.     On OCP.     Has had weight gain and reports menstrual period changes over the last year.     Not taking calcium or iron supplements currently.  Takes levothyroxine on empty stomach first thing in the morning.   Endocrine relevant labs and imaging are as follows:  Component      Latest Ref Rng & Units 2/2/2022 5/5/2022 6/30/2022 9/14/2022   TSH      0.40 - 4.00 mU/L 1.10 0.22 (L)  5.39 (H)   T4 Free      0.76 - 1.46 ng/dL     Problem: Safety  Goal: Will remain free from falls  Outcome: PROGRESSING AS EXPECTED     Problem: Communication  Goal: The ability to communicate needs accurately and effectively will improve  Outcome: PROGRESSING SLOWER THAN EXPECTED     Problem: Nutritional:  Goal: Maintenance of adequate nutrition will improve  Outcome: PROGRESSING SLOWER THAN EXPECTED      1.38 1.18 1.19     Component      Latest Ref Rng & Units 11/23/2022   TSH      0.40 - 4.00 mU/L 1.83   T4 Free      0.76 - 1.46 ng/dL      Relevant imaging is as follows:     REVIEW OF SYSTEMS  Answers for HPI/ROS submitted by the patient on 12/22/2022  General Symptoms: No  Skin Symptoms: No  HENT Symptoms: No  EYE SYMPTOMS: No  HEART SYMPTOMS: No  LUNG SYMPTOMS: No  INTESTINAL SYMPTOMS: No  URINARY SYMPTOMS: No  GYNECOLOGIC SYMPTOMS: No  BREAST SYMPTOMS: No  SKELETAL SYMPTOMS: No  BLOOD SYMPTOMS: No  NERVOUS SYSTEM SYMPTOMS: No  MENTAL HEALTH SYMPTOMS: No      10 system ROS otherwise as per the HPI or negative    Past Medical History  Past Medical History:   Diagnosis Date     ASCUS of cervix with negative high risk HPV 08/08/2017     Depressive disorder      Hypothyroidism 2009     Moderate major depression (H)      Morbidly obese (H)        Medications  Current Outpatient Medications   Medication Sig Dispense Refill     ciclopirox (PENLAC) 8 % external solution Apply to adjacent skin and affected nails daily.  Remove with alcohol every 7 days, then repeat. 6.6 mL 5     desogestrel-ethinyl estradiol (ENSKYCE) 0.15-30 MG-MCG tablet Take 1 tablet by mouth daily 84 tablet 3     escitalopram (LEXAPRO) 20 MG tablet Take 1 tablet (20 mg) by mouth daily 90 tablet 1     insulin pen needle (31G X 5 MM) 31G X 5 MM miscellaneous Use one pen needles daily or as directed. 100 each 1     levothyroxine (SYNTHROID/LEVOTHROID) 175 MCG tablet Take 1 tablet by mouth every other day alternating with 200 mcg dose 45 tablet 0     levothyroxine (SYNTHROID/LEVOTHROID) 200 MCG tablet Take 1 tablet by mouth every other day alternating with 175 mcg dose 45 tablet 0     Semaglutide-Weight Management (WEGOVY) 2.4 MG/0.75ML SOAJ Inject 2.4 mg Subcutaneous every 7 days 12 mL 0     topiramate (TOPAMAX) 25 MG tablet Take 4 tablets (100 mg) by mouth 2 times daily 240 tablet 3     traZODone (DESYREL) 50 MG tablet Take 1 tablet (50 mg) by mouth At  Bedtime 30 tablet 11     triamcinolone (KENALOG) 0.1 % external cream as needed         Allergies  Allergies   Allergen Reactions     Biaxin [Clarithromycin]      rash       Family History  family history includes Allergies in her brother; Arthritis in her maternal grandmother; Asthma in her brother and mother; Cancer in her maternal grandfather; Depression in her mother; Melanoma in her mother; Obesity in her maternal grandmother and mother; Other Cancer in her father and mother; Thyroid Disease in her maternal grandmother; Unknown/Adopted in her father, paternal grandfather, and paternal grandmother.    Social History  Social History     Tobacco Use     Smoking status: Never     Smokeless tobacco: Never   Substance Use Topics     Alcohol use: No     Drug use: No       Physical Exam  There is no height or weight on file to calculate BMI.  GENERAL: no distress  SKIN: Visible skin clear. No significant rash, abnormal pigmentation or lesions.  EYES: Eyes grossly normal to inspection.  No discharge or erythema, or obvious scleral/conjunctival abnormalities.  NECK: visible goiter is not present; no visible neck masses  RESP: No audible wheeze, cough, or visible cyanosis.  No visible retractions or increased work of breathing.    NEURO: Awake, alert, responds appropriately to questions.  Mentation and speech fluent.  PSYCH:affect normal and appearance well-groomed.    Video-Visit Details    Type of service:  Video Visit    Video Start Time (time video started): 1300    Video End Time (time video stopped): 1328    Originating Location (pt. Location): Home    Distant Location (provider location):  Off-site    Mode of Communication:  Video Conference via [employer]    Physician has received verbal consent for a Video Visit from the patient? Yes    I spent a total of 37 minutes on the day of this new patient visit on chart review, lab review, coordinating care, exam and counseling of patient    Savage Hernandez MD            Marichuy is a 32 year old who is being evaluated via a billable video visit.      How would you like to obtain your AVS? MyChart  If the video visit is dropped, the invitation should be resent by: Text to cell phone: [phone]  Will anyone else be joining your video visit? No    Answers for HPI/ROS submitted by the patient on 12/22/2022  General Symptoms: No  Skin Symptoms: No  HENT Symptoms: No  EYE SYMPTOMS: No  HEART SYMPTOMS: No  LUNG SYMPTOMS: No  INTESTINAL SYMPTOMS: No  URINARY SYMPTOMS: No  GYNECOLOGIC SYMPTOMS: No  BREAST SYMPTOMS: No  SKELETAL SYMPTOMS: No  BLOOD SYMPTOMS: No  NERVOUS SYSTEM SYMPTOMS: No  MENTAL HEALTH SYMPTOMS: No          Again, thank you for allowing me to participate in the care of your patient.        Sincerely,        Savage Hernandez MD

## 2023-01-03 NOTE — PROGRESS NOTES
Marichuy is a 32 year old who is being evaluated via a billable video visit.      How would you like to obtain your AVS? MyChart  If the video visit is dropped, the invitation should be resent by: Text to cell phone: [phone]  Will anyone else be joining your video visit? No    Answers for HPI/ROS submitted by the patient on 12/22/2022  General Symptoms: No  Skin Symptoms: No  HENT Symptoms: No  EYE SYMPTOMS: No  HEART SYMPTOMS: No  LUNG SYMPTOMS: No  INTESTINAL SYMPTOMS: No  URINARY SYMPTOMS: No  GYNECOLOGIC SYMPTOMS: No  BREAST SYMPTOMS: No  SKELETAL SYMPTOMS: No  BLOOD SYMPTOMS: No  NERVOUS SYSTEM SYMPTOMS: No  MENTAL HEALTH SYMPTOMS: No

## 2023-01-08 ENCOUNTER — HEALTH MAINTENANCE LETTER (OUTPATIENT)
Age: 33
End: 2023-01-08

## 2023-01-12 ENCOUNTER — CARE COORDINATION (OUTPATIENT)
Dept: ENDOCRINOLOGY | Facility: CLINIC | Age: 33
End: 2023-01-12

## 2023-01-12 NOTE — PROGRESS NOTES
Tasklist updated and sent to patient via [employer].    Bariatric Task List    Fax:  Please fax all paperwork to: [phone] -     Status:  Is patient a candidate for bariatric surgery?:  patient is a candidate for bariatric surgery -     Cleared to schedule surgeon consult?:  cleared to schedule surgeon consult - Call [phone] to schedule with Dr Gutierrez. s   Status:  surgery evaluation in process -     Surgeon: Brenda -     Tentative surgery month/year: after sleep clearance is done. -        Insurance: Insurance:  medica -        Patient Info: Initial Weight:  363 -     Date of Initial Weight/Height:  11/10/2022 -     Goal Weight (lbs):  327 -     Required Weight Loss:  36 -     Surgery Type:  sleeve gastrectomy -        Dietician Visits: Structured weight loss required by insurance?:    -     Dietician Visit 1:  Completed - 11/10/22, Sonam Nick [place]   Dietician Visit 2:  Completed - 12/14/22, Sonam Nick [place]   Dietician Visit 3:  Needed - 1/19/22 appt. [facility]   Dietician Visit 4:    -     Dietician Visit 5:    -     Dietician Visit 6:    -     Dietician Visit additional:  Needed - Monthly until surgery for weight loss and postop diet teaching. Call [phone] to schedule. [facility]      Psychological Evaluation: Psych eval:  Completed - List and letter sent to patient, 11/10/22, [place]; 12/29/22 Cleared by Dr Bliss. [facility]   Therapist letter of support:  Completed - Letter sent to patient, 11/10/22, [place]; 12/14/22 Ines Cote MA, [facility] therapist letter received via fax on 12/15/22. [facility]      Lab Work: Complete Blood Count:  Completed -     Comprehensive Metabolic Panel:  Completed -     Vitamin D:  Completed -     PTH:  Completed -     Hgb A1c:  Completed -      Lipids: Completed -      TSH ([facility], [facility], [place] MA): Completed -        Consults/ Clearance Sleep Medicine:  Needed - Letter sent to patient, 11/10/22, [place]; 1/18/23 Diana Russell initial visit. [facility]      PCP: Establish care with PCP:   "Completed -     PCP letter of support:  Completed - Letter sent to patient, 11/10/22, [place]; Vanessa Echeverria PA-C, 11/18/22 ltr in [place]. evelyn      Patient Education:  Information Session:  Needed -     Given \"Making your decision\" handout?:  Yes -     Given \"A Roadmap to you Weight Loss Surgery\" handout?: Yes -     Given \"Get Well Loop\" information?: Yes -     Given support group information?:    -  Yes   Attended support group?:    -     Support plan in place?:  Completed -        Additional Surgery Requirements: Birth control plan:  Needed -     Other: Call Center to schedule the 1 week and 31+ days post-op appts. evelyn -        Final Tasks:  Before surgery online class:  Needed -     Before surgery online class website link:  [email or web address]   After surgery online class:  Needed -     After surgery online class website link:  [email or web address]   Nurse visit per clinic:  Needed -     History and Physical per clinic:   -  PreAssessment Clinic   Final labs per clinic: Needed -        Notes: Starting 1/24/23, please register for the Weight Loss Surgery Care Companion Pathway through the [email or web address] mobile aiden or via web browser.   Information from this care journey can help you be more successful before and after surgery, for up to one year after your surgical procedure. Your Care Companion Pathway through [email or web address] can also help answer any questions you might have related to the surgery.  The Pathway has 3 Phases:  Phase 1 starts when you get your Tasklist after your initial consultation with us or when you decide to start preparing for weight loss surgery.  Phase 2 starts when your surgery is scheduled.- Your journey will start with Phase 2.  Phase 3 starts on the day of discharge from the hospital.  A patient can only be connected to one Care Companion journey at any given time.   You can remove or re-enroll yourself from the Weight Loss Surgery Care Companion Pathway by " contacting us at [phone].     Your Weight Loss Surgery Team  [facility]  Ph:[phone]          -

## 2023-01-17 PROBLEM — F41.1 GAD (GENERALIZED ANXIETY DISORDER): Chronic | Status: ACTIVE | Noted: 2018-05-15

## 2023-01-18 ENCOUNTER — VIRTUAL VISIT (OUTPATIENT)
Dept: SLEEP MEDICINE | Facility: CLINIC | Age: 33
End: 2023-01-18
Attending: NURSE PRACTITIONER
Payer: COMMERCIAL

## 2023-01-18 VITALS — BODY MASS INDEX: 48.82 KG/M2 | WEIGHT: 293 LBS | HEIGHT: 65 IN

## 2023-01-18 DIAGNOSIS — E66.813 CLASS 3 SEVERE OBESITY WITH SERIOUS COMORBIDITY AND BODY MASS INDEX (BMI) OF 60.0 TO 69.9 IN ADULT, UNSPECIFIED OBESITY TYPE (H): ICD-10-CM

## 2023-01-18 DIAGNOSIS — Z72.820 LACK OF ADEQUATE SLEEP: ICD-10-CM

## 2023-01-18 DIAGNOSIS — G47.30 SLEEP APNEA, UNSPECIFIED TYPE: ICD-10-CM

## 2023-01-18 DIAGNOSIS — R06.00 DYSPNEA AND RESPIRATORY ABNORMALITY: ICD-10-CM

## 2023-01-18 DIAGNOSIS — R06.89 DYSPNEA AND RESPIRATORY ABNORMALITY: ICD-10-CM

## 2023-01-18 DIAGNOSIS — E66.01 CLASS 3 SEVERE OBESITY WITH SERIOUS COMORBIDITY AND BODY MASS INDEX (BMI) OF 60.0 TO 69.9 IN ADULT, UNSPECIFIED OBESITY TYPE (H): ICD-10-CM

## 2023-01-18 PROCEDURE — 99215 OFFICE O/P EST HI 40 MIN: CPT | Mod: 95 | Performed by: PHYSICIAN ASSISTANT

## 2023-01-18 ASSESSMENT — SLEEP AND FATIGUE QUESTIONNAIRES
HOW LIKELY ARE YOU TO NOD OFF OR FALL ASLEEP IN A CAR, WHILE STOPPED FOR A FEW MINUTES IN TRAFFIC: WOULD NEVER DOZE
HOW LIKELY ARE YOU TO NOD OFF OR FALL ASLEEP WHEN YOU ARE A PASSENGER IN A CAR FOR AN HOUR WITHOUT A BREAK: WOULD NEVER DOZE
HOW LIKELY ARE YOU TO NOD OFF OR FALL ASLEEP WHILE SITTING AND TALKING TO SOMEONE: WOULD NEVER DOZE
HOW LIKELY ARE YOU TO NOD OFF OR FALL ASLEEP WHILE SITTING INACTIVE IN A PUBLIC PLACE: WOULD NEVER DOZE
HOW LIKELY ARE YOU TO NOD OFF OR FALL ASLEEP WHILE LYING DOWN TO REST IN THE AFTERNOON WHEN CIRCUMSTANCES PERMIT: WOULD NEVER DOZE
HOW LIKELY ARE YOU TO NOD OFF OR FALL ASLEEP WHILE WATCHING TV: WOULD NEVER DOZE
HOW LIKELY ARE YOU TO NOD OFF OR FALL ASLEEP WHILE SITTING QUIETLY AFTER LUNCH WITHOUT ALCOHOL: WOULD NEVER DOZE
HOW LIKELY ARE YOU TO NOD OFF OR FALL ASLEEP WHILE SITTING AND READING: WOULD NEVER DOZE

## 2023-01-18 ASSESSMENT — PAIN SCALES - GENERAL: PAINLEVEL: NO PAIN (0)

## 2023-01-18 NOTE — PATIENT INSTRUCTIONS
MY CONTACT NUMBERS ARE: [phone]  DOCTOR : MARYCARMEN So  SLEEP CENTER :   CPAP EQUIPMENT :    IF I HAVE SLEEP APNEA.....  WHERE CAN I FIND MORE INFORMATION?    American Academy of Sleep Medicine Patient information on sleep disorders:  http://yoursleep.aasmnet.org    THINGS TO REMEMBER  In most situations, sleep apnea is a lifelong disease that must be managed with daily therapy. Untreated disease, when severe, may result in an increased risk for an array of problems from heart disease to mood changes, car accidents and shorter lifespan.    CPAP -  WHY AND HOW?  Continuous positive airway pressure, or CPAP, is the most effective treatment for obstructive sleep apnea. A decision to use CPAP is a major step forward in the pursuit of a healthier life. The successful use of CPAP will help you breathe easier, sleep better and live healthier. Using CPAP can be a positive experience if you keep these black points in mind:  Commitment  CPAP is not a quick fix for your problem. It involves a long-term commitment to improve your sleep and your health.    Communication  Stay in close communication with both your sleep doctor and your CPAP supplier. Ask lots of questions and seek help when you need it.    Consistency  Use CPAP all night, every night and for every nap. You will receive the maximum health benefits from CPAP when you use it every time that you sleep. This will also make it easier for your body to adjust to the treatment.    Correction  The first machine and mask that you try may not be the best ones for you. Work with your sleep doctor and your CPAP supplier to make corrections to your equipment selection. Ask about trying a different type of machine or mask if you have ongoing problems. Make sure that your mask is a good fit and learn to use your equipment properly.    Challenge  Tell a family member or close friend to ask you each morning if you used your CPAP the previous night. Have someone to  "challenge you to give it your best effort.    Connection   Your adjustment to CPAP will be easier if you are able to connect with others who use the same treatment. Ask your sleep doctor if there is a support group in your area for people who have sleep apnea, or look for one on the Internet.    Comfort   Increase your level of comfort by using a saline spray, decongestant or heated humidifier if CPAP irritates your nose, mouth or throat. Use your unit's \"ramp\" setting to slowly get used to the air pressure level. There may be soft pads you can buy that will fit over your mask straps. Look on www.CPAP.com for accessories such as these straps, a pillow contoured for side-sleeping with CPAP, longer hoses, hose covers to reduce condensation, or stands to keep the hose out of your way.                                 Cleaning   Clean your mask, tubing and headgear on a regular basis. Put this time in your schedule so that you don't forget to do it. Check and replace the filters for your CPAP unit and humidifier.    Completion   Although you are never finished with CPAP therapy, you should reward yourself by celebrating the completion of your first month of treatment. Expect this first month to be your hardest period of adjustment. It will involve some trial and error as you find the machine, mask and pressure settings that are right for you.    Continuation  After your first month of treatment, continue to make a daily commitment to use your CPAP all night, every night and for every nap.    CPAP-Tips to starting with success:  Begin using your CPAP for short periods of time during the day while you watch TV or read.    Use CPAP every night and for every nap. Using it less often reduces the health benefits and makes it harder for your body to get used to it.    Newer CPAP models are virtually silent; however, if you find the sound of your CPAP machine to be bothersome, place the unit under your bed to dampen the sound. "     Make small adjustments to your mask, tubing, straps and headgear until you get the right fit. Tightening the mask may actually worsen the leak.  If it leaves significant marks on your face or irritates the bridge of your nose, it may not be the best mask for you.  Speak with the person who supplied the mask and consider trying other masks.    Use a saline nasal spray to ease mild nasal congestion. Neti-Pot or saline nasal rinses may also help. Nasal gel sprays can help reduce nasal dryness.  Biotene mouthwash can be helpful to protect your teeth if you experience frequent dry mouth.  Dry mouth may be a sign of air escaping out of your mouth or out of the mask in the case of a full face mask.  Speak with your provider if you expect that is the case.     Take a nasal decongestant to relieve more severe nasal or sinus congestion.  Do not use Afrin (oxymetazoline) nasal spray more than 3 days in a row.  Speak with your sleep doctor if your nasal congestion is chronic.    Use a heated humidifier that fits your CPAP model to enhance your breathing comfort. Adjust the heat setting up if you get a dry nose or throat, down if you get condensation in the hose or mask.  Position the CPAP lower than you so that any condensation in the hose drains back into the machine rather than towards the mask.    Try a system that uses nasal pillows if traditional masks give you problems.    Clean your mask, tubing and headgear once a week. Make sure the equipment dries fully.    Regularly check and replace the filters for your CPAP unit and humidifier.    Work closely with your sleep doctor and your CPAP supplier to make sure that you have the machine, mask and air pressure setting that works best for you.    BESIDES CPAP, WHAT OTHER THERAPIES ARE THERE?  Postioning devices if you only have the problem on your back  Dental devices if your condition is mild  Nasal valves may be effective though experience is limited  Tongue Retaining  Device if missing teeth precludes the use of a dental device  Weight loss if you are overweight  Surgery in limited cases where devices are not acceptable or there are problems with structures in the nose and throat  If treated with one of these alternative options, further evaluation is necessary to ensure that the therapy is effective. This may require some form of testing.     Healthy Lifestyle:  Healthy diet, exercise and Limit alcohol: Not only will excessive alcohol increase your weight over time, but it irritates the throat tissues and make them swell, shrinking the airway and causing snoring. Drinking alcohol should be limited and stopped within 3-4 hours before going to bed.   Stop smoking: (Red swollen throat, heat, nicotine), also irritates and swells the airway, among numerous other negative health consequences.    Positioning Device  This example shows a pillow that straps around the waist. It may be appropriate for those whose sleep study shows milder sleep apnea that occurs primarily when lying flat on one's back. Preliminary studies have shown benefit but effectiveness at home should be verified.    Nasal Valves              Nasal valves may not be effective if you have frequent nasal congestion or have difficulty breathing through your nose. They may be an option for mild apnea if other options are not well tolerated. The efficacy of these devices is generally less than CPAP or oral appliances.  Oral Appliance  These are examples of two of many custom-made devices that are more likely to work in mild sleep apnea  Oral appliances are dental mouth pieces that fit very much like a sports mouth guards or removable orthodontic retainers. They are used to treat snoring  And obstructive sleep apnea . The device prevents the airway from collapsing by either holding the tongue or supporting the jaw in a forward position. Since oral appliances are non-invasive and easy to use, they may be considered as an  early treatment option. Oral appliance therapy (OAT) involves the customization, selection, fabrication, fitting, adjustments and long-term follow-up care of specially designed oral devices, worn during sleep, which reposition the lower jaw and tongue base forward to maintain an open airway.  Custom made oral appliances are proven to be more effective than over-the-counter devices. Therefore, the over-the-counter devices are recommended not to be used as a screening tool nor as a therapeutic option.  Who gets a dental device?  Oral appliance therapy can be used as an alternative to  CPAP therapy for the treatment of mild to moderate sleep apnea and for those patients who prefer OAT to CPAP. Oral appliance therapy is a first line therapy for the treatment of primary snoring. Additionally, OAT is an option for those that cannot tolerate CPAP as therapy or who have experienced insufficient surgical results.    Possible side effects?  Frequent but minor side effects include: excessive salivation, dry mouth, discomfort of teeth and jaw and temporary changes in the patient s bite.  Potential complications include: jaw pain, permanent occlusal changes and TMJ symptoms.  The above mentioned side effects and complications can be recognized and managed by dentists trained in dental sleep medicine.    Finding a dentist that practices dental sleep medicine  Specific training is available through the American Academy of Dental Sleep Medicine for dentists interested in working in the field of sleep. To find a dentist who is educated in the field of sleep and the use of oral appliances, near you, visit the Web site of the American Academy of Dental Sleep Medicine; also see http://www.accpstorage.org/newOrganization/patients/oralAppliances.pdf   To search for a dentist certified in these practices:  Http://aadsm.org/FindADentist.aspx?1  Http://www.accpstorage.org/newOrganization/patients/oralAppliances.pdf    Tongue Retaining  Device               Tongue Retaining Devices are devices that generally  suction cup  onto the tongue preventing it from falling back into the back of the throat during sleep.  They may be an option for people missing teeth, but can be uncomfortable. This particular device can be purchased online, but similar devices made by dentists fit more precisely and may be tolerated better. In general, they are rarely effective and often not very well tolerated.    Weight Loss:  Some patients may experience reduction or elimination of sleep apnea with weight loss.  Though there are significant health benefits from weight loss, long-term weight loss is very difficult to achieve- studies show success with dietary management in less that 10% of people.     If you are interested in dietary weight loss, you should review the options discussed at the National Institutes of Health patient information site:     Http:/www.health.nih.gov/topic/WeightLossDieting    Bariatric programs offer counseling in all methods of weight loss:    Http:/www.[facility].org/Specialties/WeightLossSurgeryandMedicalMgmt/htm    Surgery:  There are a number of surgeries that have been attempted to treat apnea. In general, surgical options are usually reserved for cases in which there is a physical abnormality contributing to obstruction or other treatment options are ineffective or not tolerated. Most surgical options are either unreliable or quite invasive. One of the more common procedures is:  Uvulopalatopharyngoplasty: In this procedure, the uvula (the finger-like tissue that hangs in the back of the throat), part of the soft palate (the tissue that the uvula is attached to), and sometimes the tonsils or adenoids are removed. The efficacy of this surgery is around 30-50% .  After surgery, complications may include:  Sleepiness and sleep apnea related to post-surgery medication   Swelling, infection and bleeding   A sore throat and/or difficulty  "swallowing   Drainage of secretions into the nose and a nasal quality to the voice. English language speech does not seem to be affected by this surgery.   Narrowing of the airway in the nose and throat (hence constricting breathing) snoring and even iatrogenically caused sleep apnea. By cutting the tissues, excess scar tissue can \"tighten\" the airway and make it even smaller than it was before UPPP.  Patients who have had the uvula removed will become unable to correctly speak Syriac or any other language that has a uvular 'r' phoneme.    Surgeries to help resolve nasal congestion may help reduce the severity of apnea slightly. Nasal congestion does not cause apnea on its own, so these surgeries are usually not performed just for MASON.  They may be worth considering if the nasal congestion is significantly bothersome independent of apnea.   "

## 2023-01-18 NOTE — PROGRESS NOTES
Marichuy is a 32 year old who is being evaluated via a billable video visit.      How would you like to obtain your AVS? MyChart  If the video visit is dropped, the invitation should be resent by: Send to e-mail at: [email or web address]  Will anyone else be joining your video visit? No      Sanaz Jose    Video-Visit Details    Type of service:  Video Visit     Originating Location (pt. Location): Home    Distant Location (provider location):  On-site  Platform used for Video Visit: [facility]     Sleep Apnea - Follow-up Visit:    Impression/Plan:  Possible obstructive sleep apnea and hypoventilation based on history of snoring, gasping, witnessed apnea and BMI of 57 and large neck circumference. Will ideally order Polysomnogram with TCM and VBG, but due to time constraint will start with a peripheral arterial tonometry Home Sleep Apnea Testing including chain of custody for verification of identity.     Marichuy Worrell will follow up once testing is completed    40 minutes spent on day of encounter doing chart review,  history and exam, counseling, coordinating plan of care, documentation and further activities as noted above.      Diana Russell PA-C      History of Present Illness:  Chief Complaint   Patient presents with     Video Visit     Follow Up        Marichuy Worrell is a 32 year old female with medical history remarkable for morbid obesity, depression and anxiety.   She is sent for pre- bariatric surgery evaluation of sleep disordered breathing. She is aiming for March or April surgery.     Patient previously presented in 2020 with concerns for interrupted sleep, snoring, witnessed apneas and waking up gasping. She was evaluated by     What is your typical bedtime:  10:30-11 PM  How long does it take you to go to sleep :  5 minutes  What time do you typically get out of bed for the day:  6:45 AM  How many hours are you sleeping per night:  7-8 hours   Do you feel well rested in the morning:  yes    .[facility]  Last  Comprehensive Metabolic Panel:  Sodium   Date Value Ref Range Status   11/23/2022 140 133 - 144 mmol/L Final   08/05/2020 139 133 - 144 mmol/L Final     Potassium   Date Value Ref Range Status   11/23/2022 3.8 3.4 - 5.3 mmol/L Final   08/05/2020 4.0 3.4 - 5.3 mmol/L Final     Chloride   Date Value Ref Range Status   11/23/2022 114 (H) 94 - 109 mmol/L Final   08/05/2020 113 (H) 94 - 109 mmol/L Final     Carbon Dioxide   Date Value Ref Range Status   08/05/2020 21 20 - 32 mmol/L Final     Carbon Dioxide (CO2)   Date Value Ref Range Status   11/23/2022 17 (L) 20 - 32 mmol/L Final     Anion Gap   Date Value Ref Range Status   11/23/2022 9 3 - 14 mmol/L Final   08/05/2020 5 3 - 14 mmol/L Final     Glucose   Date Value Ref Range Status   11/23/2022 90 70 - 99 mg/dL Final   08/05/2020 122 (H) 70 - 99 mg/dL Final     Comment:     Non Fasting     Urea Nitrogen   Date Value Ref Range Status   11/23/2022 18 7 - 30 mg/dL Final   08/05/2020 16 7 - 30 mg/dL Final     Creatinine   Date Value Ref Range Status   11/23/2022 0.95 0.52 - 1.04 mg/dL Final   08/05/2020 0.74 0.52 - 1.04 mg/dL Final     GFR Estimate   Date Value Ref Range Status   11/23/2022 82 >60 mL/min/1.73m2 Final     Comment:     Effective December 21, 2021 eGFRcr in adults is calculated using the 2021 CKD-EPI creatinine equation which includes age and gender (Angela et al., NEJM, DOI: 10.1056/JVUBej1670871)   08/05/2020 >90 >60 mL/min/[1.73_m2] Final     Comment:     Non  GFR Calc  Starting 12/18/2018, serum creatinine based estimated GFR (eGFR) will be   calculated using the Chronic Kidney Disease Epidemiology Collaboration   (CKD-EPI) equation.       Calcium   Date Value Ref Range Status   11/23/2022 8.8 8.5 - 10.1 mg/dL Final   08/05/2020 8.4 (L) 8.5 - 10.1 mg/dL Final           EPWORTH SLEEPINESS SCALE      [place] Sleepiness Scale ( MELITON Michelle  1990-1997[facility] [place]/English - Final version - 21 Nov 07 - [facility].) 1/18/2023   Sitting  and reading Would never doze   Watching TV Would never doze   Sitting, inactive in a public place (e.g. a theatre or a meeting) Would never doze   As a passenger in a car for an hour without a break Would never doze   Lying down to rest in the afternoon when circumstances permit Would never doze   Sitting and talking to someone Would never doze   Sitting quietly after a lunch without alcohol Would never doze   In a car, while stopped for a few minutes in traffic Would never doze   [place] Score (MC) 0   [place] Score (Sleep) 0       INSOMNIA SEVERITY INDEX (SUAD)      Insomnia Severity Index (SUAD) 1/18/2023   Difficulty falling asleep 0   Difficulty staying asleep 1   Problems waking up too early 0   How SATISFIED/DISSATISFIED are you with your CURRENT sleep pattern? 1   How NOTICEABLE to others do you think your sleep problem is in terms of impairing the quality of your life? 0   How WORRIED/DISTRESSED are you about your current sleep problem? 0   To what extent do you consider your sleep problem to INTERFERE with your daily functioning (e.g. daytime fatigue, mood, ability to function at work/daily chores, concentration, memory, mood, etc.) CURRENTLY? 0   SUAD Total Score 2       Guidelines for Scoring/Interpretation:  Total score categories:  0-7 = No clinically significant insomnia   8-14 = Subthreshold insomnia   15-21 = Clinical insomnia (moderate severity)  22-28 = Clinical insomnia (severe)  Used via courtesy of www.[facility].va.gov with permission from Jordi Abebe PhD., [facility]        Past medical/surgical history, family history, social history, medications and allergies were reviewed.        Problem List:  Patient Active Problem List    Diagnosis Date Noted     OSCAR (generalized anxiety disorder) 05/15/2018     Priority: Medium     Other specified hypothyroidism 09/28/2015     Priority: Medium     CARDIOVASCULAR SCREENING; LDL GOAL LESS THAN 160 12/12/2012     Priority: Medium     Moderate major  "depression (H) 05/02/2011     Priority: Medium     Class 3 severe obesity with serious comorbidity and body mass index (BMI) of 60.0 to 69.9 in adult (H)      Priority: Medium     362lb at consult (BMI 60)  High weight in life 400lb (2015)             Ht 1.651 m (5' 5\")   Wt (!) 157.4 kg (347 lb)   BMI 57.74 kg/m    "

## 2023-01-19 ENCOUNTER — VIRTUAL VISIT (OUTPATIENT)
Dept: ENDOCRINOLOGY | Facility: CLINIC | Age: 33
End: 2023-01-19
Payer: COMMERCIAL

## 2023-01-19 VITALS — BODY MASS INDEX: 57.41 KG/M2 | WEIGHT: 293 LBS

## 2023-01-19 DIAGNOSIS — Z71.3 NUTRITIONAL COUNSELING: Primary | ICD-10-CM

## 2023-01-19 DIAGNOSIS — E66.01 CLASS 3 SEVERE OBESITY WITH SERIOUS COMORBIDITY AND BODY MASS INDEX (BMI) OF 50.0 TO 59.9 IN ADULT, UNSPECIFIED OBESITY TYPE (H): ICD-10-CM

## 2023-01-19 DIAGNOSIS — E66.813 CLASS 3 SEVERE OBESITY WITH SERIOUS COMORBIDITY AND BODY MASS INDEX (BMI) OF 50.0 TO 59.9 IN ADULT, UNSPECIFIED OBESITY TYPE (H): ICD-10-CM

## 2023-01-19 DIAGNOSIS — E03.8 OTHER SPECIFIED HYPOTHYROIDISM: ICD-10-CM

## 2023-01-19 PROCEDURE — 97803 MED NUTRITION INDIV SUBSEQ: CPT | Mod: GT | Performed by: DIETITIAN, REGISTERED

## 2023-01-19 NOTE — PATIENT INSTRUCTIONS
GOALS:  Keep up the hard work!    Relating To Eating:  - Eat slowly (20-30 minutes per meal), chewing foods well (25 chews per bite/applesauce consistency)  - Recommend getting at least 50 g of carbohydrates minimum/day  - Continue with 3 meals/day and aiming for 30 g protein/meal.    Relating to beverages:  - Reduce caffeine/carbonation/calorie containing beverages  - Separate fluids from meals (none during or for 30 min after)  - Limiting alcohol    Post-op Diet Handouts:  Diet Guidelines after Weight-loss Surgery  [email or web address]     Your Stage 1 Diet: Clear Liquids  [email or web address]     Your Stage 2 Diet: Low-fat Full Liquids  [email or web address]     Your Stage 3 Diet: Pureed Foods  [email or web address]     Pureed Recipes  [email or web address]    Your Stage 4 Diet: Soft Foods  [email or web address]    Your Stage 5 Diet: Regular Foods  [email or web address]    Supplements after Sleeve Gastrectomy, Gastric Bypass or Single Anastomosis Duodenal Switch  [email or web address]    Nutritious Eating Resources:   The Plate Method:  https://www.cdc.gov/diabetes/images/managing/Diabetes-Manage-Eat-Well-Plate-Graphic_600px.jpg    Protein Sources?[email or web address]      Carbohydrates  [email or web address]      Mindful Eating  [email or web address]      Summary of Volumetrics Eating Plan  [email or web address]      Seated Exercises for Arms and Legs (can be done before or after surgery)  [email or web address]    SHUKRI Chou, RD, LD  Clinic #: [phone]

## 2023-01-19 NOTE — LETTER
"1/19/2023       RE: Marichuy Worrell  [address] [facility] [address]     Dear Colleague,    Thank you for referring your patient, Marichuy Worrell, to the [facility] [place]. Please see a copy of my visit note below.    Marichuy Worrell is a 32 year old female who is being evaluated via a billable video visit.      The patient has been notified of following:     \"This video visit will be conducted via a call between you and your physician/provider. We have found that certain health care needs can be provided without the need for an in-person physical exam.  This service lets us provide the care you need with a video conversation.  If a prescription is necessary we can send it directly to your pharmacy.  If lab work is needed we can place an order for that and you can then stop by our lab to have the test done at a later time.    Video visits are billed at different rates depending on your insurance coverage.  Please reach out to your insurance provider with any questions.    If during the course of the call the physician/provider feels a video visit is not appropriate, you will not be charged for this service.\"    Patient has given verbal consent for Video visit? Yes  How would you like to obtain your AVS? MyChart  If you are dropped from the video visit, the video invite should be resent to: Text to cell phone: [phone]  Will anyone else be joining your video visit? No  {If patient encounters technical issues they should call [phone]      Video-Visit Details    Type of service:  Video Visit    Video Start Time: 8:35 am  Video End Time: 9:10 am    Originating Location (pt. Location): Home    Distant Location (provider location):  Offsite (providers home)     Platform used for Video Visit: [employer]    During this virtual visit the patient is located in [place], patient verifies this as the location during the entirety of " "this visit.      Bariatric Nutrition Consultation Note    Reason For Visit: Nutrition Assessment    Marichuy Worrell is a 32 year old presenting today for bariatric nutrition consult.   Pt is interested in laparoscopic sleeve gastrectomy.     This is pt's 3rd required nutrition visits prior to surgery.     Pt referred by Sol Quick NP on November 10, 2022.    CO-MORBIDITIES OF OBESITY INCLUDE:       11/9/2022   I have the following health issues associated with obesity: Hypothyroidism     PMH:  Sxs of PCOS    Issues with sleep - sleep clearance recommended    Reflux: None     SUPPORT:  Support System Reviewed With Patient 11/9/2022   Who do you have in your support network that can be available to help you for the first 2 weeks after surgery? My mom and my sister-in-law   Who can you count on for support throughout your weight loss surgery journey? My mom, sister-in-law and my friend. My friend is getting weight loss surgery in January and has been a huge encouragement.       ANTHROPOMETRICS:  Highest weight per pt: 400ish lbs in 2015  Weight 11/9/22: 363 lbs with BMI 60.24    Estimated body mass index is 57.41 kg/m  as calculated from the following:    Height as of 1/18/23: 1.651 m (5' 5\").    Weight as of this encounter: 156.5 kg (345 lb).     Current weight: 345 lbs, pt report    Required weight loss goal pre-op: 36 lbs from initial consult weight (goal weight 327 lbs or less before surgery)       11/9/2022   I have tried the following methods to lose weight Watching portions or calories, Exercise, Atkins type diet (low carb/high protein), Slimfast, OTC Medications, Prescription Medications, Physician directed program       Weight Loss Questions Reviewed With Patient 11/9/2022   How long have you been overweight? Since early childhood     MEDICATIONS FOR WEIGHT LOSS:  Wegovy  Topiramate    SUPPLEMENT INFORMATION:  Vit D - not sure dose 2,000 IUs/day  B12 5000 mcg/day SL    Labs done 11/23/22 -  looked good "     NUTRITION HISTORY:  ESDRAS  Does eat fish, not a big fan of shrimp   Not huge on fruit, really likes vegetables. Only like certain fruits    Thinks she met with a RD 1x in HS but doesn t recall details. Pt shared she has been overweight her whole life. Has tried weight loss on own through diet/exercise and medications. Wants to live a healthier life and improve mobility, etc. Now considering surgery. Leaning towards sleeve.     One of her good friends is getting bariatric surgery in Jan. Started talking with her and learning about surgery on tik [place] as well.     Reviewed surgery guidelines and answered pt questions.    Currently uses carb manager to track occ. Struggles to get enough protein.    Typically eats 2-3 meals/day. Might miss breakfast. Snacks vary.   Tends to get into patterns and will eat same dinner for 2 weeks then change.   Example dinners: cauliflower rice or cereal. Friend recommended she add some nutritional yeast to cauliflower so she started doing that occ. Some days also added some salmon on the side.    Fluids: water, yasmine, bubbler sparkling water, tea   - Tries to avoid pop, occ sprite   - Not a coffee person   - Rare lemonade or juice   - Rare alcohol, usually just one if ordering     Dec 2022:  Doing really well.   Now eating 3 meals/day.   Eating more protein and less carbs.  Aiming for 90 g protein now, 30 per meal- 42 g protein shakes, 30 g protein shakes, cottage cheese, yogurt, tuna, salmon, etc.    Moving end of the month - currently lives in basement and kitchen is upstairs so not very accessible. Doing well with resources she has - microwave, air fryer, etc in basement. Making fish in air fryer.    Working on fluid goals.  Decreasing carbonation - had some on Thanksgiving.   Had discussion with mom about reasons why not to have it post op which was helpful. Feels good about mindset shift.    Jan 2023:  Briefly reviewed task list - seeing Sol tomorrow.    Working on hydration. Set  timers on phone for every 15 minute. Keeping lots of water on hand. Drinking water or water with yasmine. No carbonation or caffeine or alcohol    Typical day  B - protein shake  L - varies (whats at work), chicken or salad. If pasta avoids. Yesterday had some beef stir hawkins and some protein shake to help get enough protein  D - protein with some vegetables     Getting at least 60-90 grams of protein per day    Main carbs: peas, greens beans, chickpeas     Roommate is getting sleeve today.    PA: physical holds at work, light weights at home, treadmill/bike in basement    Additional information:    [facility]    Recall Diet Questions Reviewed With Patient 11/9/2022   Describe what you typically consume for breakfast (typical or most recent): Cereal, protein shake, cheese stick, bagel w/cream cheese, (sometimes skip breakfast)   Describe what you typically consume for lunch (typical or most recent): Different hot meals at work, chicken and rice, enchilada, tacos, salads   Describe what you typically consume for supper (typical or most recent): cereal, soup, toast, cauliflower rice with cheese, potato and veggies   Describe what you typically consume as snacks (typical or most recent): Cheese stick, chips,   How many ounces of water, or other low calorie drinks, do you drink daily (8 oz=1 glass)? 32 oz   How many ounces of caffeine (coffee, tea, pop) do you drink daily (8 oz=1 glass)? 0 oz   How many ounces of carbonated (pop, beer, sparkling water) drinks do you drinky daily (8 oz=1 glass)? 16 oz   How many ounces of juice, pop, sweet tea, sports drinks, protein drinks, other sweetened drinks, do you drink daily (8 oz=1 glass)? 24 oz   How many ounces of milk do you drink daily (8 oz=1 glass) 0 oz   Please indicate the type of milk: 2%   How often do you drink alcohol? Monthly or less   If you do drink alcohol, how many drinks might you have in a day? (one drink = 5 oz. wine, 1 can/bottle of beer,  1 shot liquor) 1 or 2       Eating Habits 11/9/2022   Do you have any dietary restrictions? No   Do you currently binge eat (eat a large amount of food in a short time)? No   Are you an emotional eater? Yes   Do you get up to eat after falling asleep? No   What foods do you crave? It goes in waves, Chips, ice cream, gummy bears.       Dining Out History Reviewed With Patient 11/9/2022   How often do you dine out? Around once a week.   Where do you dine out? (select all that apply) fast food chains   What types of food do you order when you dine out? Chicken and [place]         EXERCISE:       11/9/2022   How often do you exercise? 1 to 2 times per week   What is the duration of your exercise (in minutes)? 20 Minutes   What types of exercise do you do? walking, gym membership, home gym, weightlifting   What keeps you from being more active?  I should be more active but I just have not gotten around to it, Too tired     NUTRITION DIAGNOSIS:  Obesity r/t long history of positive energy balance aeb BMI >30 kg/m2.    INTERVENTION:  Intervention Provided/Education Provided on post-op diet guidelines, vitamins/minerals essential post-operatively, GI anatomy of bariatric surgeries, ways to help prepare for post-op diet guidelines pre-operatively, portion/calorie-control, mindful eating and sources of protein.  Patient demonstrates understanding. Provided pt with list of goals RD contact information.      Questions Reviewed With Patient 11/9/2022   How ready are you to make changes regarding your weight? Number 1 = Not ready at all to make changes up to 10 = very ready. 10   How confident are you that you can change? 1 = Not confident that you will be successful making changes up to 10 = very confident. 9       Expected Engagement: good    GOALS:  Keep up the hard work!    Relating To Eating:  - Eat slowly (20-30 minutes per meal), chewing foods well (25 chews per bite/applesauce consistency)  - Recommend getting at least 50 g  of carbohydrates minimum/day  - Continue with 3 meals/day and aiming for 30 g protein/meal.    Relating to beverages:  - Reduce caffeine/carbonation/calorie containing beverages  - Separate fluids from meals (none during or for 30 min after)  - Limiting alcohol    Post-op Diet Handouts:  Diet Guidelines after Weight-loss Surgery  [email or web address]     Your Stage 1 Diet: Clear Liquids  [email or web address]     Your Stage 2 Diet: Low-fat Full Liquids  [email or web address]     Your Stage 3 Diet: Pureed Foods  [email or web address]     Pureed Recipes  [email or web address]    Your Stage 4 Diet: Soft Foods  [email or web address]    Your Stage 5 Diet: Regular Foods  [email or web address]    Supplements after Sleeve Gastrectomy, Gastric Bypass or Single Anastomosis Duodenal Switch  [email or web address]    Nutritious Eating Resources:   The Plate Method:  https://www.cdc.gov/diabetes/images/managing/Diabetes-Manage-Eat-Well-Plate-Graphic_600px.jpg    Protein Sources?[email or web address]      Carbohydrates  [email or web address]      Mindful Eating  [email or web address]      Summary of Volumetrics Eating Plan  [email or web address]      Seated Exercises for Arms and Legs (can be done before or after surgery)  [email or web address]    Follow up: Wednesday, Feb 22nd at 8:30 am     Time spent with patient: 35 minutes.  SHUKRI Luong, RD, LD

## 2023-01-19 NOTE — PROGRESS NOTES
"Marichuy Worrell is a 32 year old female who is being evaluated via a billable video visit.      The patient has been notified of following:     \"This video visit will be conducted via a call between you and your physician/provider. We have found that certain health care needs can be provided without the need for an in-person physical exam.  This service lets us provide the care you need with a video conversation.  If a prescription is necessary we can send it directly to your pharmacy.  If lab work is needed we can place an order for that and you can then stop by our lab to have the test done at a later time.    Video visits are billed at different rates depending on your insurance coverage.  Please reach out to your insurance provider with any questions.    If during the course of the call the physician/provider feels a video visit is not appropriate, you will not be charged for this service.\"    Patient has given verbal consent for Video visit? Yes  How would you like to obtain your AVS? MyChart  If you are dropped from the video visit, the video invite should be resent to: Text to cell phone: [phone]  Will anyone else be joining your video visit? No  {If patient encounters technical issues they should call [phone]      Video-Visit Details    Type of service:  Video Visit    Video Start Time: 8:35 am  Video End Time: 9:10 am    Originating Location (pt. Location): Home    Distant Location (provider location):  Offsite (providers home)     Platform used for Video Visit: [employer]    During this virtual visit the patient is located in [place], patient verifies this as the location during the entirety of this visit.      Bariatric Nutrition Consultation Note    Reason For Visit: Nutrition Assessment    Marichuy Worrell is a 32 year old presenting today for bariatric nutrition consult.   Pt is interested in laparoscopic sleeve gastrectomy.     This is pt's 3rd required nutrition visits prior to surgery.     Pt referred " "by Sol Quick NP on November 10, 2022.    CO-MORBIDITIES OF OBESITY INCLUDE:       11/9/2022   I have the following health issues associated with obesity: Hypothyroidism     PMH:  Sxs of PCOS    Issues with sleep - sleep clearance recommended    Reflux: None     SUPPORT:  Support System Reviewed With Patient 11/9/2022   Who do you have in your support network that can be available to help you for the first 2 weeks after surgery? My mom and my sister-in-law   Who can you count on for support throughout your weight loss surgery journey? My mom, sister-in-law and my friend. My friend is getting weight loss surgery in January and has been a huge encouragement.       ANTHROPOMETRICS:  Highest weight per pt: 400ish lbs in 2015  Weight 11/9/22: 363 lbs with BMI 60.24    Estimated body mass index is 57.41 kg/m  as calculated from the following:    Height as of 1/18/23: 1.651 m (5' 5\").    Weight as of this encounter: 156.5 kg (345 lb).     Current weight: 345 lbs, pt report    Required weight loss goal pre-op: 36 lbs from initial consult weight (goal weight 327 lbs or less before surgery)       11/9/2022   I have tried the following methods to lose weight Watching portions or calories, Exercise, Atkins type diet (low carb/high protein), Slimfast, OTC Medications, Prescription Medications, Physician directed program       Weight Loss Questions Reviewed With Patient 11/9/2022   How long have you been overweight? Since early childhood     MEDICATIONS FOR WEIGHT LOSS:  Wegovy  Topiramate    SUPPLEMENT INFORMATION:  Vit D - not sure dose 2,000 IUs/day  B12 5000 mcg/day SL    Labs done 11/23/22 -  looked good     NUTRITION HISTORY:  NKADRI  Does eat fish, not a big fan of shrimp   Not huge on fruit, really likes vegetables. Only like certain fruits    Thinks she met with a RD 1x in HS but doesn t recall details. Pt shared she has been overweight her whole life. Has tried weight loss on own through diet/exercise and medications. " Wants to live a healthier life and improve mobility, etc. Now considering surgery. Leaning towards sleeve.     One of her good friends is getting bariatric surgery in Jan. Started talking with her and learning about surgery on tik [employer] as well.     Reviewed surgery guidelines and answered pt questions.    Currently uses carb manager to track occ. Struggles to get enough protein.    Typically eats 2-3 meals/day. Might miss breakfast. Snacks vary.   Tends to get into patterns and will eat same dinner for 2 weeks then change.   Example dinners: cauliflower rice or cereal. Friend recommended she add some nutritional yeast to cauliflower so she started doing that occ. Some days also added some salmon on the side.    Fluids: water, yasmine, bubbler sparkling water, tea   - Tries to avoid pop, occ sprite   - Not a coffee person   - Rare lemonade or juice   - Rare alcohol, usually just one if ordering     Dec 2022:  Doing really well.   Now eating 3 meals/day.   Eating more protein and less carbs.  Aiming for 90 g protein now, 30 per meal- 42 g protein shakes, 30 g protein shakes, cottage cheese, yogurt, tuna, salmon, etc.    Moving end of the month - currently lives in basement and kitchen is upstairs so not very accessible. Doing well with resources she has - microwave, air fryer, etc in basement. Making fish in air fryer.    Working on fluid goals.  Decreasing carbonation - had some on Thanksgiving.   Had discussion with mom about reasons why not to have it post op which was helpful. Feels good about mindset shift.    Jan 2023:  Briefly reviewed task list - seeing Sol tomorrow.    Working on hydration. Set timers on phone for every 15 minute. Keeping lots of water on hand. Drinking water or water with yasmine. No carbonation or caffeine or alcohol    Typical day  B - protein shake  L - varies (whats at work), chicken or salad. If pasta avoids. Yesterday had some beef stir hawkins and some protein shake to help get enough protein  D  - protein with some vegetables     Getting at least 60-90 grams of protein per day    Main carbs: peas, greens beans, chickpeas     Roommate is getting sleeve today.    PA: physical holds at work, light weights at home, treadmill/bike in basement    Additional information:    [facility]    Recall Diet Questions Reviewed With Patient 11/9/2022   Describe what you typically consume for breakfast (typical or most recent): Cereal, protein shake, cheese stick, bagel w/cream cheese, (sometimes skip breakfast)   Describe what you typically consume for lunch (typical or most recent): Different hot meals at work, chicken and rice, enchilada, tacos, salads   Describe what you typically consume for supper (typical or most recent): cereal, soup, toast, cauliflower rice with cheese, potato and veggies   Describe what you typically consume as snacks (typical or most recent): Cheese stick, chips,   How many ounces of water, or other low calorie drinks, do you drink daily (8 oz=1 glass)? 32 oz   How many ounces of caffeine (coffee, tea, pop) do you drink daily (8 oz=1 glass)? 0 oz   How many ounces of carbonated (pop, beer, sparkling water) drinks do you drinky daily (8 oz=1 glass)? 16 oz   How many ounces of juice, pop, sweet tea, sports drinks, protein drinks, other sweetened drinks, do you drink daily (8 oz=1 glass)? 24 oz   How many ounces of milk do you drink daily (8 oz=1 glass) 0 oz   Please indicate the type of milk: 2%   How often do you drink alcohol? Monthly or less   If you do drink alcohol, how many drinks might you have in a day? (one drink = 5 oz. wine, 1 can/bottle of beer, 1 shot liquor) 1 or 2       Eating Habits 11/9/2022   Do you have any dietary restrictions? No   Do you currently binge eat (eat a large amount of food in a short time)? No   Are you an emotional eater? Yes   Do you get up to eat after falling asleep? No   What foods do you crave? It goes in waves, Chips, ice cream, gummy  bears.       Dining Out History Reviewed With Patient 11/9/2022   How often do you dine out? Around once a week.   Where do you dine out? (select all that apply) fast food chains   What types of food do you order when you dine out? Chicken and [place]         EXERCISE:       11/9/2022   How often do you exercise? 1 to 2 times per week   What is the duration of your exercise (in minutes)? 20 Minutes   What types of exercise do you do? walking, gym membership, home gym, weightlifting   What keeps you from being more active?  I should be more active but I just have not gotten around to it, Too tired     NUTRITION DIAGNOSIS:  Obesity r/t long history of positive energy balance aeb BMI >30 kg/m2.    INTERVENTION:  Intervention Provided/Education Provided on post-op diet guidelines, vitamins/minerals essential post-operatively, GI anatomy of bariatric surgeries, ways to help prepare for post-op diet guidelines pre-operatively, portion/calorie-control, mindful eating and sources of protein.  Patient demonstrates understanding. Provided pt with list of goals RD contact information.      Questions Reviewed With Patient 11/9/2022   How ready are you to make changes regarding your weight? Number 1 = Not ready at all to make changes up to 10 = very ready. 10   How confident are you that you can change? 1 = Not confident that you will be successful making changes up to 10 = very confident. 9       Expected Engagement: good    GOALS:  Keep up the hard work!    Relating To Eating:  - Eat slowly (20-30 minutes per meal), chewing foods well (25 chews per bite/applesauce consistency)  - Recommend getting at least 50 g of carbohydrates minimum/day  - Continue with 3 meals/day and aiming for 30 g protein/meal.    Relating to beverages:  - Reduce caffeine/carbonation/calorie containing beverages  - Separate fluids from meals (none during or for 30 min after)  - Limiting alcohol    Post-op Diet Handouts:  Diet Guidelines after Weight-loss  Surgery  [email or web address]     Your Stage 1 Diet: Clear Liquids  [email or web address]     Your Stage 2 Diet: Low-fat Full Liquids  [email or web address]     Your Stage 3 Diet: Pureed Foods  [email or web address]     Pureed Recipes  [email or web address]    Your Stage 4 Diet: Soft Foods  [email or web address]    Your Stage 5 Diet: Regular Foods  [email or web address]    Supplements after Sleeve Gastrectomy, Gastric Bypass or Single Anastomosis Duodenal Switch  [email or web address]    Nutritious Eating Resources:   The Plate Method:  https://www.cdc.gov/diabetes/images/managing/Diabetes-Manage-Eat-Well-Plate-Graphic_600px.jpg    Protein Sources?[email or web address]      Carbohydrates  [email or web address]      Mindful Eating  [email or web address]      Summary of Volumetrics Eating Plan  [email or web address]      Seated Exercises for Arms and Legs (can be done before or after surgery)  [email or web address]    Follow up: Wednesday, Feb 22nd at 8:30 am     Time spent with patient: 35 minutes.  SHUKRI Luong, RD, LD

## 2023-01-20 ENCOUNTER — VIRTUAL VISIT (OUTPATIENT)
Dept: ENDOCRINOLOGY | Facility: CLINIC | Age: 33
End: 2023-01-20
Payer: COMMERCIAL

## 2023-01-20 VITALS — HEIGHT: 65 IN | BODY MASS INDEX: 48.82 KG/M2 | WEIGHT: 293 LBS

## 2023-01-20 DIAGNOSIS — E66.813 CLASS 3 SEVERE OBESITY WITH SERIOUS COMORBIDITY AND BODY MASS INDEX (BMI) OF 60.0 TO 69.9 IN ADULT, UNSPECIFIED OBESITY TYPE (H): Primary | Chronic | ICD-10-CM

## 2023-01-20 DIAGNOSIS — E66.01 CLASS 3 SEVERE OBESITY WITH SERIOUS COMORBIDITY AND BODY MASS INDEX (BMI) OF 60.0 TO 69.9 IN ADULT, UNSPECIFIED OBESITY TYPE (H): Primary | Chronic | ICD-10-CM

## 2023-01-20 PROCEDURE — 99213 OFFICE O/P EST LOW 20 MIN: CPT | Mod: 95 | Performed by: NURSE PRACTITIONER

## 2023-01-20 RX ORDER — TOPIRAMATE 25 MG/1
100 TABLET, FILM COATED ORAL 2 TIMES DAILY
Qty: 240 TABLET | Refills: 3 | Status: SHIPPED | OUTPATIENT
Start: 2023-01-20 | End: 2023-06-15

## 2023-01-20 ASSESSMENT — PAIN SCALES - GENERAL: PAINLEVEL: NO PAIN (0)

## 2023-01-20 NOTE — PROGRESS NOTES
Marichuy Worrell is a 32 year old who is being evaluated via a billable video visit.      How would you like to obtain your AVS? MyChart  If the video visit is dropped, the invitation should be resent by: Text to cell phone: [phone]  Will anyone else be joining your video visit? No    During this virtual visit the patient is located in [place], patient verifies this as the location during the entirety of this visit.      Video-Visit Details    Video Start Time: 1301    Type of service:  Video Visit    Video End Time:1320    Originating Location (pt. Location): Home        Distant Location (provider location):  Off-site    Platform used for Video Visit: [employer]

## 2023-01-20 NOTE — ASSESSMENT & PLAN NOTE
Significant improvement in sleep since last seen. Denies many of her initial sleep apnea symptoms. She did complete sleep consult and has home study ordered. Symptoms improved enough that I don't believe clearance is necessary.     topiramate incerase has helped with cravings and thoughts about food. Down 17lb since consult. Continues to work on weight loss. Would like to do full liquid diet prior to surgery.     Plan:  Consider tapering down to 50mg twice daily of topiramate before surgery   Will stop wegovy one week prior to surgery   Keep working on weight loss.   Continue with dietitian

## 2023-01-20 NOTE — LETTER
2023       RE: Marichuy Worrell  [address] [place] [address]     Dear Colleague,    Thank you for referring your patient, Marichuy Worrell, to the [facility]NAG[facility] [place] at [facility]. Please see a copy of my visit note below.    Marichuy Worrell is a 32 year old who is being evaluated via a billable video visit.      How would you like to obtain your AVS? MyChart  If the video visit is dropped, the invitation should be resent by: Text to cell phone: [phone]  Will anyone else be joining your video visit? No    During this virtual visit the patient is located in [place], patient verifies this as the location during the entirety of this visit.      Video-Visit Details    Video Start Time: 1301    Type of service:  Video Visit    Video End Time:1320    Originating Location (pt. Location): Home        Distant Location (provider location):  Off-site    Platform used for Video Visit: Well        Pre-Bariatric Surgery Note    Vanessa Echeverria    Date: 2023     RE: Marichuy Worrell    MR#: [id number]   : 1990   Date of Visit: 2023    REASON FOR VISIT: Preoperative evaluation for possible weight loss surgery    Dear Vanessa Zelaya,    I had the pleasure of seeing your patient, Marichuy Worrell, in my preoperative bariatric clinic.    As you know, she has morbid obesity and is considering weight loss surgery to treat obesity in association with her medical conditions of obesity.  Her consult weight was 362.  She has lost 17 pounds since her consult weight. She has not met her required pre-surgery weight. Please refer to initial consult note from date 11/10/2022 for patient's weight history and co-morbidities. GW 327lb     Increased topiramate to 100mg bid, cont wegovy 2.4mg(taking at consult)   -increase in topiramate helping with cravings and thoughts about food   -less snacking , less hunger   -occasional tingling  but manageable     Sleep  -no longer working nights  -has been working on sleep hygiene   -working on more consistent sleep   -no longer napping during the day   -wakes feeling rested   -less daily headaches     Psych- states this is completed - can't find documentation today   Therapist- completed   Primary care 11/2022  Dr. Gutierrez       Assessment & Plan   Problem List Items Addressed This Visit        Digestive    Class 3 severe obesity with serious comorbidity and body mass index (BMI) of 60.0 to 69.9 in adult (H) - Primary (Chronic)     Significant improvement in sleep since last seen. Denies many of her initial sleep apnea symptoms. She did complete sleep consult and has home study ordered. Symptoms improved enough that I don't believe clearance is necessary.     topiramate incerase has helped with cravings and thoughts about food. Down 17lb since consult. Continues to work on weight loss. Would like to do full liquid diet prior to surgery.     Plan:  Consider tapering down to 50mg twice daily of topiramate before surgery   Will stop wegovy one week prior to surgery   Keep working on weight loss.   Continue with dietitian              Reviewed tasklist with patient     Most recent weights:  Wt Readings from Last 4 Encounters:   01/20/23 (!) 156.5 kg (345 lb)   01/19/23 (!) 156.5 kg (345 lb)   01/18/23 (!) 157.4 kg (347 lb)   12/22/22 (!) 160.2 kg (353 lb 1.6 oz)         ROS    Past Medical History:   Diagnosis Date     ASCUS of cervix with negative high risk HPV 08/08/2017     Depressive disorder      Hypothyroidism 2009     Moderate major depression (H)      Morbidly obese (H)        Past Surgical History:   Procedure Laterality Date     CHOLECYSTECTOMY, LAPOROSCOPIC  12/2009       Current Outpatient Medications   Medication     ciclopirox (PENLAC) 8 % external solution     desogestrel-ethinyl estradiol (ENSKYCE) 0.15-30 MG-MCG tablet     escitalopram (LEXAPRO) 20 MG tablet     insulin pen needle (31G X 5 MM)  31G X 5 MM miscellaneous     levothyroxine (SYNTHROID/LEVOTHROID) 175 MCG tablet     levothyroxine (SYNTHROID/LEVOTHROID) 200 MCG tablet     Semaglutide-Weight Management (WEGOVY) 2.4 MG/0.75ML SOAJ     topiramate (TOPAMAX) 25 MG tablet     traZODone (DESYREL) 50 MG tablet     triamcinolone (KENALOG) 0.1 % external cream     No current facility-administered medications for this visit.       Allergies   Allergen Reactions     Biaxin [Clarithromycin]      rash       Lab on 11/23/2022   Component Date Value Ref Range Status     TSH 11/23/2022 1.83  0.40 - 4.00 mU/L Final     WBC Count 11/23/2022 6.7  4.0 - 11.0 10e3/uL Final     RBC Count 11/23/2022 5.05  3.80 - 5.20 10e6/uL Final     Hemoglobin 11/23/2022 14.1  11.7 - 15.7 g/dL Final     Hematocrit 11/23/2022 42.9  35.0 - 47.0 % Final     MCV 11/23/2022 85  78 - 100 fL Final     MCH 11/23/2022 27.9  26.5 - 33.0 pg Final     MCHC 11/23/2022 32.9  31.5 - 36.5 g/dL Final     RDW 11/23/2022 13.9  10.0 - 15.0 % Final     Platelet Count 11/23/2022 294  150 - 450 10e3/uL Final     Sodium 11/23/2022 140  133 - 144 mmol/L Final     Potassium 11/23/2022 3.8  3.4 - 5.3 mmol/L Final     Chloride 11/23/2022 114 (H)  94 - 109 mmol/L Final     Carbon Dioxide (CO2) 11/23/2022 17 (L)  20 - 32 mmol/L Final     Anion Gap 11/23/2022 9  3 - 14 mmol/L Final     Urea Nitrogen 11/23/2022 18  7 - 30 mg/dL Final     Creatinine 11/23/2022 0.95  0.52 - 1.04 mg/dL Final     Calcium 11/23/2022 8.8  8.5 - 10.1 mg/dL Final     Glucose 11/23/2022 90  70 - 99 mg/dL Final     Alkaline Phosphatase 11/23/2022 70  40 - 150 U/L Final     AST 11/23/2022 16  0 - 45 U/L Final     ALT 11/23/2022 28  0 - 50 U/L Final     Protein Total 11/23/2022 7.7  6.8 - 8.8 g/dL Final     Albumin 11/23/2022 3.6  3.4 - 5.0 g/dL Final     Bilirubin Total 11/23/2022 0.4  0.2 - 1.3 mg/dL Final     GFR Estimate 11/23/2022 82  >60 mL/min/1.73m2 Final    Effective December 21, 2021 eGFRcr in adults is calculated using the 2021  "CKD-EPI creatinine equation which includes age and gender (Angela et al., NEJ, DOI: 10.1056/FUSPwa7083157)     Hemoglobin A1C 11/23/2022 5.1  0.0 - 5.6 % Final    Normal <5.7%   Prediabetes 5.7-6.4%    Diabetes 6.5% or higher     Note: Adopted from ADA consensus guidelines.     Vitamin D, Total (25-Hydroxy) 11/23/2022 45  20 - 75 ug/L Final     Parathyroid Hormone Intact 11/23/2022 34  15 - 65 pg/mL Final       PHYSICAL EXAM:  Objective     Ht 1.651 m (5' 5\")   Wt (!) 156.5 kg (345 lb)   BMI 57.41 kg/m    Vitals - Patient Reported  Pain Score: No Pain (0)        Physical Exam   GENERAL: Healthy, alert and no distress  EYES: Eyes grossly normal to inspection.  No discharge or erythema, or obvious scleral/conjunctival abnormalities.  RESP: No audible wheeze, cough, or visible cyanosis.  No visible retractions or increased work of breathing.    SKIN: Visible skin clear. No significant rash, abnormal pigmentation or lesions.  NEURO: Cranial nerves grossly intact.  Mentation and speech appropriate for age.  PSYCH: Mentation appears normal, affect normal/bright, judgement and insight intact, normal speech and appearance well-groomed.      I emphasized exercise and activity behavior along with appropriate food choice as the main foundation for weight loss with surgery providing surgical reinforcement of the appropriate behavior set.        Review of general surgery weight loss process    1. Complete preoperative requirements, including weight loss.  Final weight check to confirm MANDATORY weight loss requirement must be documented on a clinic scale.    2. Discuss prior authorization with .    3. History and physical evaluation by PCP of PAC clinic within 30 days of surgery date, preoperative class, and weight check (weigh-in visit) to be scheduled by patient.  Pre-anesthesia clinic for risk evaluation to be scheduled by anesthesia clinic.    4. We cannot guarantee that patient will qualify for surgery " unless all preoperative requirements are met, prior authorization from primary insurance company is granted, and insurance changes do not occur.    5. It is possible for patients to regain all weight after weight loss surgery unless they follow guidelines prescribed by our bariatric center.    6. All patients with gastrointestinal complaints after weight loss surgery must have complaints conveyed to the bariatric team for appropriate treatment.    7. Vitamin deficiencies may develop post-bariatric surgery and annual laboratory testing should be performed.    8. Persistent nausea/vomiting after bariatric surgery entails risk of thiamine deficiency and should be treated early.  Vitamin B12 deficiency may develop, especially after gastric bypass surgery and must be recognized.        If you have any questions about our plans please don't hesitate to contact me.    Sincerely,    Sol Quikc NP      26 minutes spent on the date of the encounter doing chart review, history and exam, documentation and further activities per the note

## 2023-01-20 NOTE — PATIENT INSTRUCTIONS
"Thank you for allowing us the privilege of caring for you. We hope we provided you with the excellent service you deserve.   Please let us know if there is anything else we can do for you so that we can be sure you are completely satisfied with your care experience.    To ensure the quality of our services you may be receiving a patient satisfaction survey from an independent patient satisfaction monitoring company.    The greatest compliment you can give is a \"Likely to Recommend\"    Your visit was with Sol Quick NP today.    Instructions per today's visit:     Talib Worrell, it was great to visit with you today.  Here is a review of our visit.  If our clinic scheduler is not able to reach you please call [phone] to schedule your next appointments.    Plan:  Consider tapering down to 50mg twice daily of topiramate before surgery   Will stop wegovy one week prior to surgery   Keep working on weight loss.   Continue with dietitian       Information about Video Visits with [employer]: video visit information  _________________________________________________________________________________________________________________________________________________________  If you are asked by your clinic team to have your blood pressure checked:  [facility] do offer several locations for blood pressure checks. Please follow the below link to schedule an appointment. Scheduling an appointment at the pharmacy for a blood pressure check is now preferred.    Appointment Plus (appointment-plus.[employer])  _________________________________________________________________________________________________________________________________________________________  Important contact and scheduling information:  Please call our contact center at [phone] to schedule your next appointments.  To find a lab location near you, please call [phone].  For any nursing questions or concerns call Martha Carolina LPN at " [phone] or Jennifer Nolan RN at [phone]  Please call during clinic hours Monday through Friday 8:00a - 4:00p if you have questions or you can contact us via [employer] at anytime and we will reply during clinic hours.    Lab results will be communicated through My Chart or letter (if My Chart not used). Please call the clinic if you have not received communication after 1 week or if you have any questions.?  Clinic Fax: [phone]    _________________________________________________________________________________________________________________________________________________________  Meal Replacement Products:    Here is the link to our new e-store where you can purchase our meal replacement products    [facility] E-Store  [email or web address]    The one week starter kit is a great way to sample a variety of products and see what works for you.    If you want more information about the product go to: [email or web address]    If you are an employee or [facility] or [facility] please contact your care team for a 10% estore discount    Free Shipping for orders over $75     Benefits of meal replacements products:    Portion and calorie control  Improved nutrition  Structured eating  Simplified food choices  Avoid contact with trigger foods  _________________________________________________________________________________________________________________________________________________________  Interested in working with a health ?  Health coaches work with you to improve your overall health and wellbeing.  They look at the whole person, and may involve discussion of different areas of life, including, but not limited to the four pillars of health (sleep, exercise, nutrition, and stress management). Discuss with your care team if you would like to start working a health .  Health Coaching-3 Pack: Schedule by calling [phone]    $99 for three  health coaching visits    Visits may be done in person or via phone    Coaching is a partnership between the  and the client; Coaches do not prescribe or diagnose    Coaching helps inspire the client to reach his/her personal goals   _________________________________________________________________________________________________________________________________________________________  24 Week Healthy Lifestyle Plan:    Our mission in the 24-week Healthy Lifestyle Plan is to provide you with individualized care by giving you the tools, education and support you need to lose weight and maintain a healthy lifestyle. In your 24-week journey, you ll be supported by a dedicated weight loss team that includes registered dietitians, medical weight management providers, health coaches, and nurses -- all with special expertise in weight loss -- to help you every step of the way.     Monthly meetings with your registered dietician or medical weight management provider help to review your progress, update your care plan, and make any adjustments needed to ensure success. Between these visits, weekly and bi-weekly health  visits will help you focus on the four pillars of weight loss -- stress, sleep, nutrition, and exercise -- and how you can best adapt each to achieve sustainable weight loss results.    In addition, you will be given exclusive access to online wellbeing classes through [employer].  Your initial visit will be with a medical weight management provider who will help to understand your weight loss goals and ensure this program is the right fit for you. Please let our team know if you are interested in the 24 week plan by sending a message to your care team or calling [phone] to schedule.  _________________________________________________________________________________________________________________________________________________________    COMPREHENSIVE WEIGHT MANAGEMENT PROGRAM  VIRTUAL SUPPORT  "GROUPS    For Support Group Information:      We offer support groups for patients who are working on weight loss and considering, preparing for or have had weight loss surgery.   There is no cost for this opportunity.  You are invited to attend the?Virtual Support Groups?provided by any of the following locations:    [facility] via Microsoft Teams with Xiomy Nunez RN  2.   [place] via [employer] with Saulo Trinh, PhD, LP  3.   [place] via [employer] with Patricia Capone RN  4.   [facility] via [place] Teams with Patricia Cain [facility]    The following Support Group information can also be found on our website:  [email or web address]    [facility] Weight Loss Surgery Support Group    [facility] Weight Loss Surgery Support Group  The support group is a patient-lead forum that meets monthly to share experiences, encouragement and education. It is open to those who have had weight loss surgery, are scheduled for surgery, and those who are considering surgery.   WHEN: This group meets on the 3rd Wednesday of each month from 5:00PM - 6:00PM virtually using Microsoft Teams.   FACILITATOR: Led by Xiomy Nunez, DONN, SHAYAN, RN, the program's Clinical Coordinator.   TO REGISTER: Please contact the clinic via [employer] or call the nurse line directly at [phone] to inform our staff that you would like an invite sent to you and to let us know the email you would like the invite sent to. Prior to the meeting, a link with directions on how to join the meeting will be sent to you.    2022 Meetings  Maria Luisa 15: \"Let's Talk\" a time for the group to share.  July 20: \"Let's Talk\" a time for the group to share.  August 17: \"Let's Talk\" a time for the group to share.  September 21: \"Let's Talk\" a time for the group to share.  October 19: Guest Speaker: Dr Kevin Kinsey MD Pulmonologist and Sleep Medicine Physician, \"Getting a Good Night's " "Sleep\".  November 16: \"Let's Talk\" a time for the group to share.  December 21: \"Let's Talk\" a time for the group to share.    [facility] Clin[facility] and [facility] Support Groups    Connections: Bariatric Care Support Group?  This is open to all [facility] (and those external to this program) pre- and post- operative bariatric surgery patients as well as their support system.   WHEN: This group meets the 2nd Tuesday of each month from 6:30 PM - 8:00 PM virtually using Microsoft Teams.   FACILITATOR: Led by Saulo Trinh, Ph.D who is a Licensed Psychologist with the [facility] Comprehensive Weight Management Program.   TO REGISTER: Please send an email to Saulo Trinh, Ph.D.,  at?[email or web address]?if you would like an invitation to the group and to learn about using Microsoft Teams.    2022 Meetings  June 14: Gina Helton, DONN, LD at [facility], \"Nutritional Labeling\"  July 12 August 2 (Please Note Date Change)  September 13 October 11 November 8 December 13    Connections: Post-Operative Bariatric Surgery Support Group  This is a support group for [facility] bariatric patients (and those external to [facility]) who have had bariatric surgery and are at least 3 months post-surgery.  WHEN: This support group meets the 4th Wednesday of the month from 11:00 AM - 12:00 PM virtually using Microsoft Teams.   FACILITATOR: Led by Certified Bariatric Nurse, Patricia Capone RN.   TO REGISTER: Please send an email to Patricia at [email or web address] if you would like an invitation to the group and to learn about using Microsoft Teams.    2022 Meetings June 22 July 27 August 24 September 28 October 26 November 23 December 28      [facility] Healthy Lifestyle Virtual Support Group    Healthy Lifestyle Virtual Support Group?  This is 60 minutes of small group guided discussion, support and resources. All are " "welcome who want a healthy lifestyle.  WHEN: This group meets monthly on a Friday from 12:30 PM - 1:30 PM virtually using Microsoft Teams.   FACILITATOR: Led by National Board Certified Health and , Patricia Cain [facility].   TO REGISTER: Please send an email to Patricia [email or web address] to receive monthly invites to the group or if you have any questions about having a health .  Prior to the meeting, a link with directions on how to join the meeting will be sent to you.    2022 Meetings  June 24: Patricia Cain [facility], \"Setting Limits and Boundaries\".  Jul 29: Open Forum  August 26: Guest Speaker: Mica Jenkins Registered Dietitian  September 30: Open Forum  October 28th: Guest Speaker: Lady Schwartz [facility], Health , \"Gratitude Practices\".  November 18: Guest Speaker: Sonam Vizcarra RD Registered Dietitian, \"Navigating How to Eat around the Holidays\".  December 16: Guest Speaker: Марина Hudson [facility], \"Changing Your Relationship with Movement\".    ____________________________________________________________________________________________________________________________________________________________________________  [place] [facility] Get Moving Program  Our team of physical therapists is trained to help you understand and take control of your condition. They will perform a thorough evaluation to determine your ability for activity and develop a customized plan to fit your goals and physical ability.  Scheduling: Unsure if the Get Moving program is right for you? Discuss the program with your medical provider or diabetes educator. You can also call us at [phone] to ask questions or schedule an appointment.   [facility] Get Moving Program  ____________________________________________________________________________________________________________________________________________________________________________  [facility] Diabetes Prevention Program (DPP)  If you have " prediabetes and Medicare please contact us via [email or web address] to learn more about the Diabetes Prevention Program (DPP)  Program Details:  [employer] offers the year-long Diabetes Prevention Program (DPP). The program helps you to make lifestyle changes that prevent or delay type 2 diabetes by supporting healthy eating, increased physical activity, stress reduction and use of coping skills.   On average, previous [employer] DPP cohorts have lost and maintained at least 5% of their starting weight throughout the program and averaged more than 150 minutes of physical activity per week.  Participants meet weekly for one-hour group sessions over sixteen weeks, every other week for the next 8 weeks, and monthly for the last six months.   A year-long maintenance program is also available for participants who complete the first year.   Location & Cost:   During the COVID-19 Public Health Emergency, the program is offered virtually. When in-person classes can resume, they will be held at [facility].  For people with Medicare, the program is covered in full. A self-pay option will also be available for those with non-Medicare insurance plans.   _____________________________________________________________________________________________________________________________________________________________________________________________________________________________________    To work with a Behavioral Health Psychologist:    Call to schedule:    Bert Bryant - [phone]  Wendy Mario - [phone]  Sherrie Cazares - [phone]  Shaniqua Lipscomb - [phone]   Lucy Grady PhD (cannot accept Medicare) [phone]        Thank you,   [facility] Weight Management Team

## 2023-01-20 NOTE — PROGRESS NOTES
Pre-Bariatric Surgery Note    Vanessa Echeverria    Date: 2023     RE: Marichuy Worrell    MR#: [id number]   : 1990   Date of Visit: 2023    REASON FOR VISIT: Preoperative evaluation for possible weight loss surgery    Dear Johana Zelayaleticia Sampsonn,    I had the pleasure of seeing your patient, Marichuy Worrell, in my preoperative bariatric clinic.    As you know, she has morbid obesity and is considering weight loss surgery to treat obesity in association with her medical conditions of obesity.  Her consult weight was 362.  She has lost 17 pounds since her consult weight. She has not met her required pre-surgery weight. Please refer to initial consult note from date 11/10/2022 for patient's weight history and co-morbidities. GW 327lb     Increased topiramate to 100mg bid, cont wegovy 2.4mg(taking at consult)   -increase in topiramate helping with cravings and thoughts about food   -less snacking , less hunger   -occasional tingling but manageable     Sleep  -no longer working nights  -has been working on sleep hygiene   -working on more consistent sleep   -no longer napping during the day   -wakes feeling rested   -less daily headaches     Psych- states this is completed - can't find documentation today   Therapist- completed   Primary care 2022  Dr. Gutierrez       Assessment & Plan   Problem List Items Addressed This Visit        Digestive    Class 3 severe obesity with serious comorbidity and body mass index (BMI) of 60.0 to 69.9 in adult (H) - Primary (Chronic)     Significant improvement in sleep since last seen. Denies many of her initial sleep apnea symptoms. She did complete sleep consult and has home study ordered. Symptoms improved enough that I don't believe clearance is necessary.     topiramate incerase has helped with cravings and thoughts about food. Down 17lb since consult. Continues to work on weight loss. Would like to do full liquid diet prior to surgery.     Plan:  Consider  tapering down to 50mg twice daily of topiramate before surgery   Will stop wegovy one week prior to surgery   Keep working on weight loss.   Continue with dietitian              Reviewed tasklist with patient     Most recent weights:  Wt Readings from Last 4 Encounters:   01/20/23 (!) 156.5 kg (345 lb)   01/19/23 (!) 156.5 kg (345 lb)   01/18/23 (!) 157.4 kg (347 lb)   12/22/22 (!) 160.2 kg (353 lb 1.6 oz)         ROS    Past Medical History:   Diagnosis Date     ASCUS of cervix with negative high risk HPV 08/08/2017     Depressive disorder      Hypothyroidism 2009     Moderate major depression (H)      Morbidly obese (H)        Past Surgical History:   Procedure Laterality Date     CHOLECYSTECTOMY, LAPOROSCOPIC  12/2009       Current Outpatient Medications   Medication     ciclopirox (PENLAC) 8 % external solution     desogestrel-ethinyl estradiol (ENSKYCE) 0.15-30 MG-MCG tablet     escitalopram (LEXAPRO) 20 MG tablet     insulin pen needle (31G X 5 MM) 31G X 5 MM miscellaneous     levothyroxine (SYNTHROID/LEVOTHROID) 175 MCG tablet     levothyroxine (SYNTHROID/LEVOTHROID) 200 MCG tablet     Semaglutide-Weight Management (WEGOVY) 2.4 MG/0.75ML SOAJ     topiramate (TOPAMAX) 25 MG tablet     traZODone (DESYREL) 50 MG tablet     triamcinolone (KENALOG) 0.1 % external cream     No current facility-administered medications for this visit.       Allergies   Allergen Reactions     Biaxin [Clarithromycin]      rash       Lab on 11/23/2022   Component Date Value Ref Range Status     TSH 11/23/2022 1.83  0.40 - 4.00 mU/L Final     WBC Count 11/23/2022 6.7  4.0 - 11.0 10e3/uL Final     RBC Count 11/23/2022 5.05  3.80 - 5.20 10e6/uL Final     Hemoglobin 11/23/2022 14.1  11.7 - 15.7 g/dL Final     Hematocrit 11/23/2022 42.9  35.0 - 47.0 % Final     MCV 11/23/2022 85  78 - 100 fL Final     MCH 11/23/2022 27.9  26.5 - 33.0 pg Final     MCHC 11/23/2022 32.9  31.5 - 36.5 g/dL Final     RDW 11/23/2022 13.9  10.0 - 15.0 % Final      "Platelet Count 11/23/2022 294  150 - 450 10e3/uL Final     Sodium 11/23/2022 140  133 - 144 mmol/L Final     Potassium 11/23/2022 3.8  3.4 - 5.3 mmol/L Final     Chloride 11/23/2022 114 (H)  94 - 109 mmol/L Final     Carbon Dioxide (CO2) 11/23/2022 17 (L)  20 - 32 mmol/L Final     Anion Gap 11/23/2022 9  3 - 14 mmol/L Final     Urea Nitrogen 11/23/2022 18  7 - 30 mg/dL Final     Creatinine 11/23/2022 0.95  0.52 - 1.04 mg/dL Final     Calcium 11/23/2022 8.8  8.5 - 10.1 mg/dL Final     Glucose 11/23/2022 90  70 - 99 mg/dL Final     Alkaline Phosphatase 11/23/2022 70  40 - 150 U/L Final     AST 11/23/2022 16  0 - 45 U/L Final     ALT 11/23/2022 28  0 - 50 U/L Final     Protein Total 11/23/2022 7.7  6.8 - 8.8 g/dL Final     Albumin 11/23/2022 3.6  3.4 - 5.0 g/dL Final     Bilirubin Total 11/23/2022 0.4  0.2 - 1.3 mg/dL Final     GFR Estimate 11/23/2022 82  >60 mL/min/1.73m2 Final    Effective December 21, 2021 eGFRcr in adults is calculated using the 2021 CKD-EPI creatinine equation which includes age and gender (Angela et al., [place], DOI: 10.1056/XYGSia5419014)     Hemoglobin A1C 11/23/2022 5.1  0.0 - 5.6 % Final    Normal <5.7%   Prediabetes 5.7-6.4%    Diabetes 6.5% or higher     Note: Adopted from ADA consensus guidelines.     Vitamin D, Total (25-Hydroxy) 11/23/2022 45  20 - 75 ug/L Final     Parathyroid Hormone Intact 11/23/2022 34  15 - 65 pg/mL Final       PHYSICAL EXAM:  Objective    Ht 1.651 m (5' 5\")   Wt (!) 156.5 kg (345 lb)   BMI 57.41 kg/m    Vitals - Patient Reported  Pain Score: No Pain (0)        Physical Exam   GENERAL: Healthy, alert and no distress  EYES: Eyes grossly normal to inspection.  No discharge or erythema, or obvious scleral/conjunctival abnormalities.  RESP: No audible wheeze, cough, or visible cyanosis.  No visible retractions or increased work of breathing.    SKIN: Visible skin clear. No significant rash, abnormal pigmentation or lesions.  NEURO: Cranial nerves grossly intact.  " Mentation and speech appropriate for age.  PSYCH: Mentation appears normal, affect normal/bright, judgement and insight intact, normal speech and appearance well-groomed.      I emphasized exercise and activity behavior along with appropriate food choice as the main foundation for weight loss with surgery providing surgical reinforcement of the appropriate behavior set.        Review of general surgery weight loss process    1. Complete preoperative requirements, including weight loss.  Final weight check to confirm MANDATORY weight loss requirement must be documented on a clinic scale.    2. Discuss prior authorization with .    3. History and physical evaluation by PCP of PAC clinic within 30 days of surgery date, preoperative class, and weight check (weigh-in visit) to be scheduled by patient.  Pre-anesthesia clinic for risk evaluation to be scheduled by anesthesia clinic.    4. We cannot guarantee that patient will qualify for surgery unless all preoperative requirements are met, prior authorization from primary insurance company is granted, and insurance changes do not occur.    5. It is possible for patients to regain all weight after weight loss surgery unless they follow guidelines prescribed by our bariatric center.    6. All patients with gastrointestinal complaints after weight loss surgery must have complaints conveyed to the bariatric team for appropriate treatment.    7. Vitamin deficiencies may develop post-bariatric surgery and annual laboratory testing should be performed.    8. Persistent nausea/vomiting after bariatric surgery entails risk of thiamine deficiency and should be treated early.  Vitamin B12 deficiency may develop, especially after gastric bypass surgery and must be recognized.        If you have any questions about our plans please don't hesitate to contact me.    Sincerely,    Sol Quick NP      26 minutes spent on the date of the encounter doing chart review,  history and exam, documentation and further activities per the note

## 2023-01-20 NOTE — Clinical Note
18lb from goal (1/2 way there). Clearances are done except for sleep. Did sleep consult. Has worked on sleep hygiene and changed sleep schedule. Symptoms dramatically improved. No longer needs sleep clearance. Takslist updated accordingly.

## 2023-02-15 ENCOUNTER — PREP FOR PROCEDURE (OUTPATIENT)
Dept: ENDOCRINOLOGY | Facility: CLINIC | Age: 33
End: 2023-02-15
Payer: COMMERCIAL

## 2023-02-15 DIAGNOSIS — E66.01 MORBID OBESITY (H): Primary | ICD-10-CM

## 2023-02-15 RX ORDER — ONDANSETRON 2 MG/ML
4 INJECTION INTRAMUSCULAR; INTRAVENOUS
Status: CANCELLED | OUTPATIENT
Start: 2023-02-15

## 2023-02-15 RX ORDER — CEFAZOLIN SODIUM IN 0.9 % NACL 3 G/100 ML
3 INTRAVENOUS SOLUTION, PIGGYBACK (ML) INTRAVENOUS
Status: CANCELLED | OUTPATIENT
Start: 2023-02-15

## 2023-02-15 RX ORDER — ACETAMINOPHEN 325 MG/1
975 TABLET ORAL ONCE
Status: CANCELLED | OUTPATIENT
Start: 2023-02-15 | End: 2023-02-15

## 2023-02-15 RX ORDER — ENOXAPARIN SODIUM 100 MG/ML
40 INJECTION SUBCUTANEOUS
Status: CANCELLED | OUTPATIENT
Start: 2023-02-15

## 2023-02-15 RX ORDER — CEFAZOLIN SODIUM IN 0.9 % NACL 3 G/100 ML
3 INTRAVENOUS SOLUTION, PIGGYBACK (ML) INTRAVENOUS SEE ADMIN INSTRUCTIONS
Status: CANCELLED | OUTPATIENT
Start: 2023-02-15

## 2023-02-22 ENCOUNTER — VIRTUAL VISIT (OUTPATIENT)
Dept: ENDOCRINOLOGY | Facility: CLINIC | Age: 33
End: 2023-02-22
Payer: COMMERCIAL

## 2023-02-22 VITALS — BODY MASS INDEX: 55.91 KG/M2 | WEIGHT: 293 LBS

## 2023-02-22 DIAGNOSIS — E66.01 CLASS 3 SEVERE OBESITY WITH SERIOUS COMORBIDITY AND BODY MASS INDEX (BMI) OF 50.0 TO 59.9 IN ADULT, UNSPECIFIED OBESITY TYPE (H): ICD-10-CM

## 2023-02-22 DIAGNOSIS — Z71.3 NUTRITIONAL COUNSELING: Primary | ICD-10-CM

## 2023-02-22 DIAGNOSIS — E66.813 CLASS 3 SEVERE OBESITY WITH SERIOUS COMORBIDITY AND BODY MASS INDEX (BMI) OF 50.0 TO 59.9 IN ADULT, UNSPECIFIED OBESITY TYPE (H): ICD-10-CM

## 2023-02-22 PROCEDURE — 97803 MED NUTRITION INDIV SUBSEQ: CPT | Mod: VID | Performed by: DIETITIAN, REGISTERED

## 2023-02-22 NOTE — LETTER
"2/22/2023       RE: Marichuy Worrell  [address] [place] [facility] [address]     Dear Colleague,    Thank you for referring your patient, Marichuy Worrell, to the [facility] [place] at [facility]. Please see a copy of my visit note below.    Marichuy Worrell is a 32 year old female who is being evaluated via a billable video visit.      The patient has been notified of following:     \"This video visit will be conducted via a call between you and your physician/provider. We have found that certain health care needs can be provided without the need for an in-person physical exam.  This service lets us provide the care you need with a video conversation.  If a prescription is necessary we can send it directly to your pharmacy.  If lab work is needed we can place an order for that and you can then stop by our lab to have the test done at a later time.    Video visits are billed at different rates depending on your insurance coverage.  Please reach out to your insurance provider with any questions.    If during the course of the call the physician/provider feels a video visit is not appropriate, you will not be charged for this service.\"    Patient has given verbal consent for Video visit? Yes  How would you like to obtain your AVS? MyChart  If you are dropped from the video visit, the video invite should be resent to: Text to cell phone: [phone]  Will anyone else be joining your video visit? No  {If patient encounters technical issues they should call [phone]      Video-Visit Details    Type of service:  Video Visit    Video Start Time: 8:35 am  Video End Time: 9:02 am    Originating Location (pt. Location): Home    Distant Location (provider location):  Offsite (providers home)     Platform used for Video Visit: Mindset Studio    During this virtual visit the patient is located in [place], patient verifies this as the location during the entirety of " "this visit.      Bariatric Nutrition Consultation Note    Reason For Visit: Nutrition Assessment    Marichuy Worrell is a 32 year old presenting today for bariatric nutrition consult and nutrition education regarding clear and low-fat full liquid diet for post-bariatric surgery. Pt is interested in laparoscopic sleeve gastrectomy, scheduled with Dr. Gutierrez 3/14/23.     This is pt's 4th required nutrition visits prior to surgery.     Pt referred by Sol Quick NP on November 10, 2022.    CO-MORBIDITIES OF OBESITY INCLUDE:       11/9/2022   I have the following health issues associated with obesity: Hypothyroidism     PMH:  Sxs of PCOS    Issues with sleep - sleep clearance recommended    Reflux: None     SUPPORT:  Support System Reviewed With Patient 11/9/2022   Who do you have in your support network that can be available to help you for the first 2 weeks after surgery? My mom and my sister-in-law   Who can you count on for support throughout your weight loss surgery journey? My mom, sister-in-law and my friend. My friend is getting weight loss surgery in January and has been a huge encouragement.       ANTHROPOMETRICS:  Highest weight per pt: 400ish lbs in 2015  Weight 11/9/22: 363 lbs with BMI 60.24    Estimated body mass index is 57.41 kg/m  as calculated from the following:    Height as of 1/20/23: 1.651 m (5' 5\").    Weight as of 1/20/23: 156.5 kg (345 lb).     Current weight: 336 lbs, pt report home scale  - Will try to do official weigh in at [facility] by her house    Required weight loss goal pre-op: 36 lbs from initial consult weight (goal weight 327 lbs or less before surgery)       11/9/2022   I have tried the following methods to lose weight Watching portions or calories, Exercise, Atkins type diet (low carb/high protein), Slimfast, OTC Medications, Prescription Medications, Physician directed program       Weight Loss Questions Reviewed With Patient 11/9/2022   How long have you been overweight? Since early " childhood     MEDICATIONS FOR WEIGHT LOSS:  Wegovy  Topiramate     - Pt will plan to send a message to Sol about titration plan if not discussed during PAC or nurse appt    SUPPLEMENT INFORMATION:  Vit D - not sure dose 2,000 IUs/day  B12 5000 mcg/day SL  MVI    Labs done 11/23/22 -  looked good     Post op plan:   Barimelts Calcium (500 mg per 2) and MVI with iron (per 2: 18 mg iron, 500 mcg)     NUTRITION HISTORY:  ESDRAS  Does eat fish, not a big fan of shrimp   Not huge on fruit, really likes vegetables. Only like certain fruits    Thinks she met with a RD 1x in HS but doesn t recall details. Pt shared she has been overweight her whole life. Has tried weight loss on own through diet/exercise and medications. Wants to live a healthier life and improve mobility, etc. Now considering surgery. Leaning towards sleeve.     One of her good friends is getting bariatric surgery in Jan. Started talking with her and learning about surgery on tik Tok as well.     Reviewed surgery guidelines and answered pt questions.    Currently uses carb manager to track occ. Struggles to get enough protein.    Typically eats 2-3 meals/day. Might miss breakfast. Snacks vary.   Tends to get into patterns and will eat same dinner for 2 weeks then change.   Example dinners: cauliflower rice or cereal. Friend recommended she add some nutritional yeast to cauliflower so she started doing that occ. Some days also added some salmon on the side.    Fluids: water, yasmine, bubbler sparkling water, tea   - Tries to avoid pop, occ sprite   - Not a coffee person   - Rare lemonade or juice   - Rare alcohol, usually just one if ordering     Dec 2022:  Doing really well.   Now eating 3 meals/day.   Eating more protein and less carbs.  Aiming for 90 g protein now, 30 per meal- 42 g protein shakes, 30 g protein shakes, cottage cheese, yogurt, tuna, salmon, etc.    Moving end of the month - currently lives in basement and kitchen is upstairs so not very  accessible. Doing well with resources she has - microwave, air fryer, etc in basement. Making fish in air fryer.    Working on fluid goals.  Decreasing carbonation - had some on Thanksgiving.   Had discussion with mom about reasons why not to have it post op which was helpful. Feels good about mindset shift.    Jan 2023:  Briefly reviewed task list - seeing Sol tomorrow.    Working on hydration. Set timers on phone for every 15 minute. Keeping lots of water on hand. Drinking water or water with yasmine. No carbonation or caffeine or alcohol    Typical day  B - protein shake  L - varies (whats at work), chicken or salad. If pasta avoids. Yesterday had some beef stir hawkins and some protein shake to help get enough protein  D - protein with some vegetables     Getting at least 60-90 grams of protein per day    Main carbs: peas, greens beans, chickpeas     Roommate is getting sleeve today.    PA: physical holds at work, light weights at home, treadmill/bike in basement    Feb 2023:    Completed post op education today. Answered pt questions     Pt plans to do 2 week liquid diet starting on Feb 28th.     Reviewed supplements for post op.    Protein plan: fairlife, plain protein powder  Hydration plan: water, propel     Additional information:    [facility]    Recall Diet Questions Reviewed With Patient 11/9/2022   Describe what you typically consume for breakfast (typical or most recent): Cereal, protein shake, cheese stick, bagel w/cream cheese, (sometimes skip breakfast)   Describe what you typically consume for lunch (typical or most recent): Different hot meals at work, chicken and rice, enchilada, tacos, salads   Describe what you typically consume for supper (typical or most recent): cereal, soup, toast, cauliflower rice with cheese, potato and veggies   Describe what you typically consume as snacks (typical or most recent): Cheese stick, chips,   How many ounces of water, or other low calorie  drinks, do you drink daily (8 oz=1 glass)? 32 oz   How many ounces of caffeine (coffee, tea, pop) do you drink daily (8 oz=1 glass)? 0 oz   How many ounces of carbonated (pop, beer, sparkling water) drinks do you drinky daily (8 oz=1 glass)? 16 oz   How many ounces of juice, pop, sweet tea, sports drinks, protein drinks, other sweetened drinks, do you drink daily (8 oz=1 glass)? 24 oz   How many ounces of milk do you drink daily (8 oz=1 glass) 0 oz   Please indicate the type of milk: 2%   How often do you drink alcohol? Monthly or less   If you do drink alcohol, how many drinks might you have in a day? (one drink = 5 oz. wine, 1 can/bottle of beer, 1 shot liquor) 1 or 2       Eating Habits 11/9/2022   Do you have any dietary restrictions? No   Do you currently binge eat (eat a large amount of food in a short time)? No   Are you an emotional eater? Yes   Do you get up to eat after falling asleep? No   What foods do you crave? It goes in waves, Chips, ice cream, gummy bears.       Dining Out History Reviewed With Patient 11/9/2022   How often do you dine out? Around once a week.   Where do you dine out? (select all that apply) fast food chains   What types of food do you order when you dine out? Chicken and Nocona         EXERCISE:       11/9/2022   How often do you exercise? 1 to 2 times per week   What is the duration of your exercise (in minutes)? 20 Minutes   What types of exercise do you do? walking, gym membership, home gym, weightlifting   What keeps you from being more active?  I should be more active but I just have not gotten around to it, Too tired     NUTRITION DIAGNOSIS:  Obesity r/t long history of positive energy balance aeb BMI >30 kg/m2.    Food- and Nutrition-related knowledge deficit r/t lack of prior exposure to information AEB pt scheduled for upcoming bariatric surgery and pt interest in diet education/review    INTERVENTION:  Intervention Provided/Education Provided/Reviewed previous goals and  encouraged patient to continue goals prior to surgery.     Provided instruction on bariatric clear and low-fat full liquid diets.    Provided the following handouts: Diet Guidelines for Bariatric Surgery, Your Stage 1-5 Diet, Keeping Track of Your Fluids, list of recommended vitamin/mineral supplementation after sleeve gastrectomy surgery and RD contact information.     Questions Reviewed With Patient 11/9/2022   How ready are you to make changes regarding your weight? Number 1 = Not ready at all to make changes up to 10 = very ready. 10   How confident are you that you can change? 1 = Not confident that you will be successful making changes up to 10 = very confident. 9       Expected Engagement: good    GOALS:  Keep up the hard work!    Relating To Eating:  - Eat slowly (20-30 minutes per meal), chewing foods well (25 chews per bite/applesauce consistency)  - Recommend getting at least 50 g of carbohydrates minimum/day  - Continue with 3 meals/day and aiming for 30 g protein/meal.    Relating to beverages:  - Reduce caffeine/carbonation/calorie containing beverages  - Separate fluids from meals (none during or for 30 min after)  - Limiting alcohol    Goals after surgery:   1. Follow the bariatric post-op diet advancement schedule (see below)    2. Sip on 48-64 oz (or greater) fluids daily, recording intake to help stay on-track.  - Drink at least 1-2 oz of fluid every 15-30 min.    3. Stop vitamins/minerals 1 week before surgery. We will discuss starting a chewable multivitamin at the one week post-op visit.     4. Work towards consuming 60 gm protein daily.     Plant to start liquid diet (stage 1 and 2) Feb 28th.     Post-op Diet Advancement Schedule:  Clear Liquid Diet (stage 1): 3/13-3/14  Low-Fat Full Liquid Diet (stage 2): 3/15- 3/27  Pureed Diet (stage 3): 3/28- 4/10  Soft Diet (stage 4): 4/11- 5/8  Regular Diet (stage 5): 5/9     Post-op Diet Handouts:  Diet Guidelines after Weight-loss  Surgery  [email or web address]     Your Stage 1 Diet: Clear Liquids  [email or web address]     Your Stage 2 Diet: Low-fat Full Liquids  [email or web address]     Your Stage 3 Diet: Pureed Foods  [email or web address]     Pureed Recipes  [email or web address]    Your Stage 4 Diet: Soft Foods  [email or web address]    Your Stage 5 Diet: Regular Foods  [email or web address]    Supplements after Sleeve Gastrectomy, Gastric Bypass or Single Anastomosis Duodenal Switch  [email or web address]    Keeping Track of Fluids  [email or web address]    Follow up:   1 week post op    Time spent with patient: 27 minutes.  SHUKRI Luong, RD, LD

## 2023-02-22 NOTE — TELEPHONE ENCOUNTER
FUTURE VISIT INFORMATION      SURGERY INFORMATION:    Date: 3/14/23    Location: [facility]    Surgeon:  Thang Gutierrez MD    Anesthesia Type:  general    Procedure: GASTRECTOMY, SLEEVE, LAPAROSCOPIC possible HERNIORRHAPHY, HIATAL, LAPAROSCOPIC    RECORDS REQUESTED FROM:       Primary Care Provider: Salina Whitehead APRN [facility]    Most recent EKG+ Tracin21- [facility]

## 2023-02-22 NOTE — PROGRESS NOTES
"Marichuy Worrell is a 32 year old female who is being evaluated via a billable video visit.      The patient has been notified of following:     \"This video visit will be conducted via a call between you and your physician/provider. We have found that certain health care needs can be provided without the need for an in-person physical exam.  This service lets us provide the care you need with a video conversation.  If a prescription is necessary we can send it directly to your pharmacy.  If lab work is needed we can place an order for that and you can then stop by our lab to have the test done at a later time.    Video visits are billed at different rates depending on your insurance coverage.  Please reach out to your insurance provider with any questions.    If during the course of the call the physician/provider feels a video visit is not appropriate, you will not be charged for this service.\"    Patient has given verbal consent for Video visit? Yes  How would you like to obtain your AVS? MyChart  If you are dropped from the video visit, the video invite should be resent to: Text to cell phone: [phone]  Will anyone else be joining your video visit? No  {If patient encounters technical issues they should call [phone]      Video-Visit Details    Type of service:  Video Visit    Video Start Time: 8:35 am  Video End Time: 9:02 am    Originating Location (pt. Location): Home    Distant Location (provider location):  Offsite (providers home)     Platform used for Video Visit: [employer]    During this virtual visit the patient is located in [place], patient verifies this as the location during the entirety of this visit.      Bariatric Nutrition Consultation Note    Reason For Visit: Nutrition Assessment    Marichuy Worrell is a 32 year old presenting today for bariatric nutrition consult and nutrition education regarding clear and low-fat full liquid diet for post-bariatric surgery. Pt is interested in laparoscopic sleeve " "gastrectomy, scheduled with Dr. Gutierrez 3/14/23.     This is pt's 4th required nutrition visits prior to surgery.     Pt referred by Sol Quick NP on November 10, 2022.    CO-MORBIDITIES OF OBESITY INCLUDE:       11/9/2022   I have the following health issues associated with obesity: Hypothyroidism     PMH:  Sxs of PCOS    Issues with sleep - sleep clearance recommended    Reflux: None     SUPPORT:  Support System Reviewed With Patient 11/9/2022   Who do you have in your support network that can be available to help you for the first 2 weeks after surgery? My mom and my sister-in-law   Who can you count on for support throughout your weight loss surgery journey? My mom, sister-in-law and my friend. My friend is getting weight loss surgery in January and has been a huge encouragement.       ANTHROPOMETRICS:  Highest weight per pt: 400ish lbs in 2015  Weight 11/9/22: 363 lbs with BMI 60.24    Estimated body mass index is 57.41 kg/m  as calculated from the following:    Height as of 1/20/23: 1.651 m (5' 5\").    Weight as of 1/20/23: 156.5 kg (345 lb).     Current weight: 336 lbs, pt report home scale  - Will try to do official weigh in at [facility] by her house    Required weight loss goal pre-op: 36 lbs from initial consult weight (goal weight 327 lbs or less before surgery)       11/9/2022   I have tried the following methods to lose weight Watching portions or calories, Exercise, Atkins type diet (low carb/high protein), Slimfast, OTC Medications, Prescription Medications, Physician directed program       Weight Loss Questions Reviewed With Patient 11/9/2022   How long have you been overweight? Since early childhood     MEDICATIONS FOR WEIGHT LOSS:  Wegovy  Topiramate     - Pt will plan to send a message to Sol about titration plan if not discussed during PAC or nurse appt    SUPPLEMENT INFORMATION:  Vit D - not sure dose 2,000 IUs/day  B12 5000 mcg/day SL  MVI    Labs done 11/23/22 -  looked good     Post op plan: "   Barimelts Calcium (500 mg per 2) and MVI with iron (per 2: 18 mg iron, 500 mcg)     NUTRITION HISTORY:  ESDRAS  Does eat fish, not a big fan of shrimp   Not huge on fruit, really likes vegetables. Only like certain fruits    Thinks she met with a RD 1x in HS but doesn t recall details. Pt shared she has been overweight her whole life. Has tried weight loss on own through diet/exercise and medications. Wants to live a healthier life and improve mobility, etc. Now considering surgery. Leaning towards sleeve.     One of her good friends is getting bariatric surgery in Jan. Started talking with her and learning about surgery on tik Tok as well.     Reviewed surgery guidelines and answered pt questions.    Currently uses carb manager to track occ. Struggles to get enough protein.    Typically eats 2-3 meals/day. Might miss breakfast. Snacks vary.   Tends to get into patterns and will eat same dinner for 2 weeks then change.   Example dinners: cauliflower rice or cereal. Friend recommended she add some nutritional yeast to cauliflower so she started doing that occ. Some days also added some salmon on the side.    Fluids: water, yasmine, bubbler sparkling water, tea   - Tries to avoid pop, occ sprite   - Not a coffee person   - Rare lemonade or juice   - Rare alcohol, usually just one if ordering     Dec 2022:  Doing really well.   Now eating 3 meals/day.   Eating more protein and less carbs.  Aiming for 90 g protein now, 30 per meal- 42 g protein shakes, 30 g protein shakes, cottage cheese, yogurt, tuna, salmon, etc.    Moving end of the month - currently lives in basement and kitchen is upstairs so not very accessible. Doing well with resources she has - microwave, air fryer, etc in basement. Making fish in air fryer.    Working on fluid goals.  Decreasing carbonation - had some on Thanksgiving.   Had discussion with mom about reasons why not to have it post op which was helpful. Feels good about mindset shift.    Philip  2023:  Briefly reviewed task list - seeing Sol tomorrow.    Working on hydration. Set timers on phone for every 15 minute. Keeping lots of water on hand. Drinking water or water with yasmine. No carbonation or caffeine or alcohol    Typical day  B - protein shake  L - varies (whats at work), chicken or salad. If pasta avoids. Yesterday had some beef stir hawkins and some protein shake to help get enough protein  D - protein with some vegetables     Getting at least 60-90 grams of protein per day    Main carbs: peas, greens beans, chickpeas     Roommate is getting sleeve today.    PA: physical holds at work, light weights at home, treadmill/bike in basement    Feb 2023:    Completed post op education today. Answered pt questions     Pt plans to do 2 week liquid diet starting on Feb 28th.     Reviewed supplements for post op.    Protein plan: fairlife, plain protein powder  Hydration plan: water, propel     Additional information:    [facility]    Recall Diet Questions Reviewed With Patient 11/9/2022   Describe what you typically consume for breakfast (typical or most recent): Cereal, protein shake, cheese stick, bagel w/cream cheese, (sometimes skip breakfast)   Describe what you typically consume for lunch (typical or most recent): Different hot meals at work, chicken and rice, enchilada, tacos, salads   Describe what you typically consume for supper (typical or most recent): cereal, soup, toast, cauliflower rice with cheese, potato and veggies   Describe what you typically consume as snacks (typical or most recent): Cheese stick, chips,   How many ounces of water, or other low calorie drinks, do you drink daily (8 oz=1 glass)? 32 oz   How many ounces of caffeine (coffee, tea, pop) do you drink daily (8 oz=1 glass)? 0 oz   How many ounces of carbonated (pop, beer, sparkling water) drinks do you drinky daily (8 oz=1 glass)? 16 oz   How many ounces of juice, pop, sweet tea, sports drinks, protein  drinks, other sweetened drinks, do you drink daily (8 oz=1 glass)? 24 oz   How many ounces of milk do you drink daily (8 oz=1 glass) 0 oz   Please indicate the type of milk: 2%   How often do you drink alcohol? Monthly or less   If you do drink alcohol, how many drinks might you have in a day? (one drink = 5 oz. wine, 1 can/bottle of beer, 1 shot liquor) 1 or 2       Eating Habits 11/9/2022   Do you have any dietary restrictions? No   Do you currently binge eat (eat a large amount of food in a short time)? No   Are you an emotional eater? Yes   Do you get up to eat after falling asleep? No   What foods do you crave? It goes in waves, Chips, ice cream, gummy bears.       Dining Out History Reviewed With Patient 11/9/2022   How often do you dine out? Around once a week.   Where do you dine out? (select all that apply) fast food chains   What types of food do you order when you dine out? Chicken and [place]         EXERCISE:       11/9/2022   How often do you exercise? 1 to 2 times per week   What is the duration of your exercise (in minutes)? 20 Minutes   What types of exercise do you do? walking, gym membership, home gym, weightlifting   What keeps you from being more active?  I should be more active but I just have not gotten around to it, Too tired     NUTRITION DIAGNOSIS:  Obesity r/t long history of positive energy balance aeb BMI >30 kg/m2.    Food- and Nutrition-related knowledge deficit r/t lack of prior exposure to information AEB pt scheduled for upcoming bariatric surgery and pt interest in diet education/review    INTERVENTION:  Intervention Provided/Education Provided/Reviewed previous goals and encouraged patient to continue goals prior to surgery.     Provided instruction on bariatric clear and low-fat full liquid diets.    Provided the following handouts: Diet Guidelines for Bariatric Surgery, Your Stage 1-5 Diet, Keeping Track of Your Fluids, list of recommended vitamin/mineral supplementation after  sleeve gastrectomy surgery and RD contact information.     Questions Reviewed With Patient 11/9/2022   How ready are you to make changes regarding your weight? Number 1 = Not ready at all to make changes up to 10 = very ready. 10   How confident are you that you can change? 1 = Not confident that you will be successful making changes up to 10 = very confident. 9       Expected Engagement: good    GOALS:  Keep up the hard work!    Relating To Eating:  - Eat slowly (20-30 minutes per meal), chewing foods well (25 chews per bite/applesauce consistency)  - Recommend getting at least 50 g of carbohydrates minimum/day  - Continue with 3 meals/day and aiming for 30 g protein/meal.    Relating to beverages:  - Reduce caffeine/carbonation/calorie containing beverages  - Separate fluids from meals (none during or for 30 min after)  - Limiting alcohol    Goals after surgery:   1. Follow the bariatric post-op diet advancement schedule (see below)    2. Sip on 48-64 oz (or greater) fluids daily, recording intake to help stay on-track.  - Drink at least 1-2 oz of fluid every 15-30 min.    3. Stop vitamins/minerals 1 week before surgery. We will discuss starting a chewable multivitamin at the one week post-op visit.     4. Work towards consuming 60 gm protein daily.     Plant to start liquid diet (stage 1 and 2) Feb 28th.     Post-op Diet Advancement Schedule:  Clear Liquid Diet (stage 1): 3/13-3/14  Low-Fat Full Liquid Diet (stage 2): 3/15- 3/27  Pureed Diet (stage 3): 3/28- 4/10  Soft Diet (stage 4): 4/11- 5/8  Regular Diet (stage 5): 5/9     Post-op Diet Handouts:  Diet Guidelines after Weight-loss Surgery  [email or web address]     Your Stage 1 Diet: Clear Liquids  [email or web address]     Your Stage 2 Diet: Low-fat Full Liquids  [email or web address]     Your Stage 3 Diet: Pureed Foods  [email or web address]     Pureed Recipes  [email or web address]    Your Stage 4 Diet: Soft  Foods  [email or web address]    Your Stage 5 Diet: Regular Foods  [email or web address]    Supplements after Sleeve Gastrectomy, Gastric Bypass or Single Anastomosis Duodenal Switch  [email or web address]    Keeping Track of Fluids  [email or web address]    Follow up:   1 week post op    Time spent with patient: 27 minutes.  SHUKRI Luong, RD, LD

## 2023-02-22 NOTE — PATIENT INSTRUCTIONS
GOALS:  Keep up the hard work!    Relating To Eating:  - Eat slowly (20-30 minutes per meal), chewing foods well (25 chews per bite/applesauce consistency)  - Recommend getting at least 50 g of carbohydrates minimum/day  - Continue with 3 meals/day and aiming for 30 g protein/meal.    Relating to beverages:  - Reduce caffeine/carbonation/calorie containing beverages  - Separate fluids from meals (none during or for 30 min after)  - Limiting alcohol    Goals after surgery:   1. Follow the bariatric post-op diet advancement schedule (see below)    2. Sip on 48-64 oz (or greater) fluids daily, recording intake to help stay on-track.  - Drink at least 1-2 oz of fluid every 15-30 min.    3. Stop vitamins/minerals 1 week before surgery. We will discuss starting a chewable multivitamin at the one week post-op visit.     4. Work towards consuming 60 gm protein daily.     Plant to start liquid diet (stage 1 and 2) Feb 28th.     Post-op Diet Advancement Schedule:  Clear Liquid Diet (stage 1): 3/13-3/14  Low-Fat Full Liquid Diet (stage 2): 3/15- 3/27  Pureed Diet (stage 3): 3/28- 4/10  Soft Diet (stage 4): 4/11- 5/8  Regular Diet (stage 5): 5/9     Post-op Diet Handouts:  Diet Guidelines after Weight-loss Surgery  [email or web address]     Your Stage 1 Diet: Clear Liquids  [email or web address]     Your Stage 2 Diet: Low-fat Full Liquids  [email or web address]     Your Stage 3 Diet: Pureed Foods  [email or web address]     Pureed Recipes  [email or web address]    Your Stage 4 Diet: Soft Foods  [email or web address]    Your Stage 5 Diet: Regular Foods  [email or web address]    Supplements after Sleeve Gastrectomy, Gastric Bypass or Single Anastomosis Duodenal Switch  [email or web address]    Keeping Track of Fluids  [email or web address]    SHUKRI Chou, RD, LD  Clinic #: [phone]

## 2023-02-27 ENCOUNTER — ALLIED HEALTH/NURSE VISIT (OUTPATIENT)
Dept: FAMILY MEDICINE | Facility: CLINIC | Age: 33
End: 2023-02-27
Payer: COMMERCIAL

## 2023-02-27 VITALS — WEIGHT: 293 LBS | BODY MASS INDEX: 57.31 KG/M2

## 2023-02-27 DIAGNOSIS — E66.01 MORBIDLY OBESE (H): Primary | ICD-10-CM

## 2023-02-27 PROCEDURE — 99207 PR NO CHARGE NURSE ONLY: CPT

## 2023-02-28 ENCOUNTER — ALLIED HEALTH/NURSE VISIT (OUTPATIENT)
Dept: ENDOCRINOLOGY | Facility: CLINIC | Age: 33
End: 2023-02-28
Payer: COMMERCIAL

## 2023-02-28 DIAGNOSIS — Z91.89 AT HIGH RISK FOR POSTOPERATIVE COMPLICATIONS: ICD-10-CM

## 2023-02-28 DIAGNOSIS — E66.813 CLASS 3 SEVERE OBESITY WITH SERIOUS COMORBIDITY AND BODY MASS INDEX (BMI) OF 50.0 TO 59.9 IN ADULT, UNSPECIFIED OBESITY TYPE (H): Primary | ICD-10-CM

## 2023-02-28 DIAGNOSIS — E66.01 CLASS 3 SEVERE OBESITY WITH SERIOUS COMORBIDITY AND BODY MASS INDEX (BMI) OF 50.0 TO 59.9 IN ADULT, UNSPECIFIED OBESITY TYPE (H): Primary | ICD-10-CM

## 2023-02-28 PROCEDURE — 99207 PR NO CHARGE NURSE ONLY: CPT

## 2023-02-28 NOTE — TELEPHONE ENCOUNTER
Received the prior authorization from [employer] for a sleeve gastrectomy with Dr. Gutierrez. Auth # [id number] effective 02/21/2023 - 12/31/2023 per letter dated 02/24/2023. Sleeve scheduled 03/14/2023.

## 2023-02-28 NOTE — NURSING NOTE
Message sent to sleep technician to assist patient with scheduling [employer] [facility].  Ara Ramsay, CMA

## 2023-03-01 RX ORDER — ONDANSETRON 4 MG/1
4 TABLET, ORALLY DISINTEGRATING ORAL EVERY 6 HOURS PRN
Qty: 15 TABLET | Refills: 0 | Status: SHIPPED | OUTPATIENT
Start: 2023-03-01 | End: 2023-03-23

## 2023-03-01 RX ORDER — AMOXICILLIN 250 MG
2 CAPSULE ORAL DAILY PRN
Qty: 30 TABLET | Refills: 1 | Status: SHIPPED | OUTPATIENT
Start: 2023-03-01 | End: 2023-03-23

## 2023-03-01 RX ORDER — HYOSCYAMINE SULFATE 0.125 MG
0.12 TABLET ORAL EVERY 4 HOURS PRN
Qty: 30 TABLET | Refills: 1 | Status: SHIPPED | OUTPATIENT
Start: 2023-03-01 | End: 2023-03-23

## 2023-03-01 NOTE — PATIENT INSTRUCTIONS
Marichuy Worrell,    Below is a recap of our conversation regarding your upcoming Sleeve Gastrectomy on 3/14/23  at 2:20 PM with Dr. Derek Gutierrez.  You should receive a call from the hospital surgery department 1-2 days before surgery confirming you surgery time and arrival time.  You should arrive at the hospital 2 hours prior to your surgery time.    SURGERY CANCELLATION  If something occurs in which you need to cancel your surgery, please contact Guillermo at [phone], as soon as possible.      BEFORE SURGERY    DAY BEFORE YOUR SURGERY  Starting in the morning, follow a clear liquid diet.  Stay away from red liquids and liquids with pulp.  Ensure you are well hydrated all day!  Strive for at least 64 ounces.  Nothing after midnight except water!  You may have sips of water up to 3 hours before your surgery.   Take your medications as directed from the PAC clinic.  You may have approved medications up to 3 hours before your surgery time.    SHOWER  You will take a shower the night before surgery and a shower the morning of surgery.  Follow instructions for pre-op shower using the required soap (4% CHG,    Hibiclens, Exidine, chlorhexidine).  Let the soap sit on your skin for a minute and then rinse.  After your evening shower, dry off with a clean towel, put on clean pajamas and get into a bed with clean sheets.  After your morning shower, dry off with a clean towel and put on clean comfortable clothes.  The soap can be very drying.  Do not put any deodorant, lotion or powder on after your shower.    TRANSPORTATION & POSTOP CARE  You will need a  to take you to the hospital the day of surgery.  You will need a  to pick you up from the hospital the day of discharge (usually the afternoon/evening the day after surgery).  You will need someone to stay with you for the first couple of days after surgery.  If you live greater than an hour from the hospital, you will need to stop every 50-60 minutes to get out of  the car and walk around.         PRESCRIPTIONS FROM YOUR PHARMACY:   Please plan to pick these up before surgery and have them at home for when you are discharged. Do not start taking them until after surgery. Do not bring them to the hospital with you!    Prilosec (omeprazole) OR Alternative:   This medication is for control of stomach acid.  You will take this medication daily for the first three months after surgery.  TO TAKE: Open the capsule and put the beads in applesauce.  Take every morning.  If you are currently on a medication for GERD or Reflux, you will just continue that medication.    Levsin (hyoscyamine) or Alternative:   This medication is to help control spasms/cramping in the stomach and intestines.    Take one tablet every 4 hours as needed for cramping.    It may be helpful to take in the morning before taking any other medications.    Zofran (ondansetron) or Alternative:    This medication is for nausea.    To Take: Place one tablet on the tongue and let it dissolve.  You can take this every 6 hours, as needed.    Senna:   This medication is a stool softener:  Take as directed to help avoid constipation.  It is important to take this medication if you are taking a narcotic pain medicine as the pain medication can be constipating.    If you experience diarrhea, please stop taking the Senna.      IN THE HOSPITAL  Use your Incentive Spirometer  Get up. You should be up walking at least 3 times (or more) each day while in the hospital.  Trips to the bathroom are great but they are not a long enough distance.  Ask for an abdominal binder for extra abdominal support.  Wear your abdominal as needed, for comfort.  Sip through your fluids!  Frequent sips - about 1 tsp every couple of minutes.  Drinking more than that at a time can contribute to pain and cramping.   Ask for some medicine cups to take home to help you measure your fluids.  Remember to take your abdominal binder and incentive  spirometer home with you when you are discharged!!      AFTER SURGERY    REGULARLY SCHEDULED MEDICATIONS    Depending on the size and number of medications you take, you may need to space/change the time you take your medications, so you do not overfill your stomach.  Make sure you follow-up with your primary provider to make medication changes needed.  DIABETES: If you have diabetes, monitor your blood sugars more frequently after surgery.  Your blood sugars may normalize quickly.  Please contact your prescribing provider if you need your medication adjusted.  HIGH BLOOD PRESSURE: If you have high blood pressure, monitor your blood pressure after surgery.  Your blood pressure may normalize quickly.  Please contact your prescribing provider if you need your medication adjusted.      POSTOPERATIVE MEDICATIONS  Take your postop medications as prescribed.  You can start a chewable Multivitamin when you get home.  Do not start any additional vitamins until you meet with your provider and dietician to receive further instructions.        PAIN CONTROL  Your pain medication prescription at discharge is a different dosage and timing than what you were taking in the hospital!  Follow the new directions.Take your pain medicine as needed, as directed.  Start with the minimal amount prescribed and supplement with Tylenol or Extra Strength Tylenol.  Take the minimal amount as directed for severe pain. The pain medicine can cause constipation.  Do not drive while taking narcotic pain medication.    For mild to moderate pain, you can take Tylenol or Extra Strength Tylenol per the bottle instructions.  DO NOT TAKE NSAIDS: IBUPROFEN, ASPIRIN OR NAPROXEN.  Change positions frequently.  Walking around for 10-15 minutes once an hour will also help.ICE: Use an ice pack on the incisions for the first 24 - 48 hours:  Use a barrier between the ice pack and the skin.  Do not leave the ice on longer than 20 minutes at each time.    HEAT: You  may also try a heating pad on your abdomen to help soothe cramping.  Use a barrier between the heating pad and your skin.  Do not leave on longer than 20 minutes at each time.  Wear your abdominal binder as needed.  You will need someone to stay with you for the first 1-2 days after surgery, especially if you are taking prescription pain medicine.  Please share the information regarding fluid intake and activity with your caregiver so they can help support you in your efforts.  No driving until you have been off narcotic pain medications for at least 24 hours.    DIET  For Dr. Gutierrez Patients:  You will be following the Stage 2 (Full Liquid Diet) - upon discharge.  Ensure you have a variety of proper full liquids at home to support you in taking in the proper nutrition.  Please refer to the Stage 2 - Full Liquid diet handout provided by the Dietician.  Stage 3 (Pureed) Diet Start Date: 3/28/23  Stage 4 (Soft Foods) Diet Start Date: 4/11/23  Stage 5 (Regular) Diet Start Date: 5/11/23       HYDRATION  Your goal is 48 to 64 ounces each day (4-6 ounces each hour).  This amount will help prevent dehydration.    It is important to measure and keep a tally sheet of your intake for the first month after surgery.  Keep your tally sheet next to you and horacio each time you drink one ounce of fluid.  You should track your fluids for the first month after surgery and if you are ill.  All fluids count in your fluid intake!  Keep a water bottle or thermal bottle by your bed.  Drink a little before going to sleep, if you wake in the middle of the night, and in the morning before getting out of bed.  Keep an eye on your urine.  It is a good indicator of your hydration status.  Your urine should be clear yellow or clear light yellow.    You will learn about advancing to a pureed diet at your first postop Dietician appointment.     BOWELS/CONSTIPATION   Movement is important to keep your bowels working.  Get up and walk at least each  hour while you are awake.  It is not unusual to not have a bowel movement for a few days after surgery.  You should be passing gas each day.   Keep taking the SENNA until you have had a regular bowel movement and are off the narcotic pain medication.   If you haven't had a bowel movement by the 4th day after surgery, take one dose of Miralax (according to package directions).  Repeat dose if no results.  If you still don't have any results, use a Fleet Enema.  If still no results, call your provider's office.   It is normal to have a little blood in your first couple of bowel movements.  If the blood continues, please contact our office.  If you are having gas pains and bloating, you can try Gas-X.    BREATHING EXERCISES  Use your incentive spirometer each hour while awake.  Sitting up or standing, take 5 - 10 slow, deep breaths each time, focusing on expanding your lungs.  This should be done for the first couple of weeks after surgery.  These exercises will help eliminate gases from your system (anesthesia and surgical).      ACTIVITY & EXERCISE  Get up and walk around each hour while you are awake - at least 10 minutes.  Walking will help with circulation, bowel regulation and will help you feel better.  Do not lift anything greater than 10-15 lb for the first 4 weeks.  The only exercise you can start right after surgery is WALKING.  Walking is good for the gut, the circulation and your breathing!   Seated Exercises for Arms and Legs (can be done before or after surgery) [email or web address]  Exercise Guidelines after Weight Loss Surgery (1st 4-6 weeks): [email or web address]  Exercises after Weight Loss Surgery (strengthening, when no weightlifting restrictions - after 4-6 weeks) :  [email or web address]       WOUND CARE  You will have steri-strips over your incision after surgery. They will fall off over the first 7-10 days after surgery.  If the steri strips fall off before  your incisions are healed, replace them with a bandaid to pull the incision together.   You may have bruising around your wounds. This is normal. It will go away on its own. The skin around your incisions may be a little red. This is normal, too.   Showering: you may shower the day you get home unless your surgeon tells you differently.  Wash gently around incisions with warm soapy water, rinse well, and gently pat dry.    DO NOT wear tight clothing that rubs against your incisions while they heal.   DO NOT soak in a bathtub, swimming pool, or hot tub until your incisions are healed completely, usually around 7-14 days. No tub baths or swimming until all incisions are healed.      GET WELL LOOP  Track your fluid intake!  Reminders to set up follow up appointments.  Notifications of frequently asked questions.  Please use [employer]) for any concerns regarding your health.    FOLLOW-UP CALL  You will receive a post-op phone call two to three days after surgery to check in and see how you are doing (If your surgery is on a Friday, your phone call will be on the Monday following your surgery).    You can always send us a message via [employer]) if you have any questions or concerns.      CALL YOUR PROVIDER IF:      You have more redness, pain, warmth, swelling, or bleeding around your incision.     The wound is larger or deeper or looks dark or dried out.     The drainage from your incision does not decrease in 3 to 5 days or increases.     The drainage becomes thick, tan or yellow and has a bad smell (pus).     Your temperature is above 100 F (37.7 C) for more than 4 hours.     You have pain that your pain medicine is not helping.     You have chest and/or belly pain.     You have trouble breathing.     You have a cough that does not go away.     You cannot drink or eat.     You have a fast heartbeat.     You are dizzy or lightheaded.     You are not passing gas and have not had a bowel movement after following instructions  above.     Your stools are loose, or you have diarrhea.     You are vomiting after eating.         Please let us know if you have any additional questions.    Sincerely,    Jennifer Allison RN, BSN  RN Care Coordinator  Bariatric Surgery and Comprehensive Weight Management  Phone: [phone]

## 2023-03-01 NOTE — PROGRESS NOTES
PREOP NURSE VISIT     This patient is having laparoscopic gastric sleeve with Dr. Derek Gutierrez.    The following handouts were reviewed with the patient:  Before Your Surgery, Patient Checklist, Weight Loss Surgery Pre-operative Class, Preop Recommendations Quick Reference Guide, History and Physical, Medications to Avoid, Shower or Bathing Before Surgery, Bowel Preparation, Powerful Choices and [place] [employer] [facility] Directive.  Questions were addressed and understanding of content was verbalized.  Contact information was provided.    WEIGHT CHECK:  Patient goal weight: 327    Weight today: 339.4 - patient currently on a liquid diet.    Surgery Date and Time: 3/14/23 at 2:20 PM     Call [phone] [facility] Mauricio to confirm surgery date.     PAC Visit: 3/6/23    Call [phone] to schedule your pre-operative history and physical with our PAC clinic.    PROBLEM LIST:  Patient Active Problem List   Diagnosis     Class 3 severe obesity with serious comorbidity and body mass index (BMI) of 60.0 to 69.9 in adult (H)     Moderate major depression (H)     CARDIOVASCULAR SCREENING; LDL GOAL LESS THAN 160     Other specified hypothyroidism     OSCAR (generalized anxiety disorder)         MEDICAL CONDITIONS REVIEW:  Diabetic? No.     Diabetics who are on medications instructed to monitor blood sugar levels closely after surgery and contact prescribing provider if levels run low as they may need their medications adjusted.    On high blood pressure medications? No.     Patients on high blood pressure medications instructed to monitor blood pressure levels closely after surgery and contact prescribing provider if pressure are running low as they may need their medications adjusted.    CURRENT MEDICATIONS:   Current Outpatient Medications   Medication Sig Dispense Refill     ciclopirox (PENLAC) 8 % external solution Apply to adjacent skin and affected nails daily.  Remove with alcohol every 7 days, then repeat. 6.6 mL 5      desogestrel-ethinyl estradiol (ENSKYCE) 0.15-30 MG-MCG tablet Take 1 tablet by mouth daily 84 tablet 3     escitalopram (LEXAPRO) 20 MG tablet Take 1 tablet (20 mg) by mouth daily 90 tablet 1     insulin pen needle (31G X 5 MM) 31G X 5 MM miscellaneous Use one pen needles daily or as directed. 100 each 1     levothyroxine (SYNTHROID/LEVOTHROID) 175 MCG tablet Take 1 tablet by mouth every other day alternating with 200 mcg dose 45 tablet 3     levothyroxine (SYNTHROID/LEVOTHROID) 200 MCG tablet Take 1 tablet by mouth every other day alternating with 175 mcg dose 45 tablet 3     Semaglutide-Weight Management (WEGOVY) 2.4 MG/0.75ML SOAJ Inject 2.4 mg Subcutaneous every 7 days 12 mL 0     topiramate (TOPAMAX) 25 MG tablet Take 4 tablets (100 mg) by mouth 2 times daily 240 tablet 3     traZODone (DESYREL) 50 MG tablet Take 1 tablet (50 mg) by mouth At Bedtime 30 tablet 11     triamcinolone (KENALOG) 0.1 % external cream as needed         Patient currently taking opioid/narcotic pain medications? No.    CURRENT ALLERGIES:     Allergies   Allergen Reactions     Biaxin [Clarithromycin]      rash       POSTOP MEDICATIONS:  The following postop medications were sent to the patient's pharmacy.  Patient was instructed to  medications before surgery and to have them at home for discharge.    OMEPRAZOLE (PRILOSEC), HYSCYAMINE (LEVSIN), ODANSETRON (ZOFRAN) and SENNA (SENOKOT)    LOGISITICS:  Patient lives greater than 3 hours from hospital?  No.  If patient lives greater than 3 hours away, is patient planning on staying in the area after surgery?  NA     Patients instructed to take breaks each hour on car ride home.  To be out of vehicle, stretch and walk for at least 10 minutes.  To do ankle pump exercises in car.      SUPPORT SYSTEM  Sister-in-law will be helping patient with postop care.    PAPERWORK    FMLA/Time OFF:  Patient is anticipating taking 1 weeks off after surgery.  States she will be working form home the 2nd  week. Patient instructed to have paperwork faxed to [phone] at the attention of the surgeon.    Bariatric Task List    Fax:  Please fax all paperwork to: [phone] -     Status:  Is patient a candidate for bariatric surgery?:  patient is a candidate for bariatric surgery -     Cleared to schedule surgeon consult?:  cleared to schedule surgeon consult - Call [phone] to schedule with Dr Gutierrez. bks   Status:  surgery evaluation in process -     Surgeon: Brenda -     Tentative surgery month/year: after sleep clearance is done. -        Insurance: Insurance:  medica -      Contact insurance to discuss coverage:   -       Cigna: PCP Recommendation and Medical Clearance:    -     HP Referral:    -      Advanced beneficiary notification (ABN) for Medicare patients for RD visits   and surgery:   -      Weight history:   -     Other:    -        Patient Info: Initial Weight:  363 -     Date of Initial Weight/Height:  11/10/2022 -     Goal Weight (lbs):  327 -     Required Weight Loss:  36 -     Surgery Type:  sleeve gastrectomy -     Multidisciplinary Meeting:    -        Dietician Visits: Structured weight loss required by insurance?:    -     Dietician Visit 1:  Completed - 11/10/22, Sonam Nick [place]   Dietician Visit 2:  Completed - 12/14/22, Sonam Nick [place]   Dietician Visit 3:  Completed - 1/19/22 appt. evelyn   Dietician Visit 4:    -     Dietician Visit 5:    -     Dietician Visit 6:    -     Dietician Visit additional:  Needed - Monthly until surgery for weight loss and postop diet teaching. Call [phone] to schedule. bks - [place]   Clearance from dietician to see surgeon?:    -     Dietician Notes:    -        Psychological Evaluation: Psych eval:  Completed - List and letter sent to patient, 11/10/22, [place]; 12/29/22 Cleared by Dr Bliss. [facility]   Therapist letter of support:  Completed - Letter sent to patient, 11/10/22, [place]; 12/14/22 Ines Cote MA, [facility] therapist letter received via  "fax on 12/15/22. bks   Psychiatrist letter of support:    -     Establish care with therapist:    -     Complete eating disorder evaluation:    -     Letter of clearance from therapist/eating disorder program:    -     Other:    -        Lab Work: Complete Blood Count:  Completed -     Comprehensive Metabolic Panel:  Completed -     Vitamin D:  Completed -     PTH:  Completed -     Hgb A1c:  Completed -      Lipids: Completed -      TSH ([facility], [facility], [place] MA): Completed -       Ferritin:   -       Folate:   -       Testosterone, Total and Free:   -     Thiamine:   -     Vitamin A:   -     Vitamin B12:   -     Zinc:   -     C-peptide:   -     H. pylori:    -     MRSA (2 swabs, minimum 48 hours apart):   -     Nicotine Testing:    -     Recheck Vitamin D:   -     Other:    -        Consults/ Clearance Sleep Medicine:  Completed - no longer needs sleep clearance, symptoms significnatly improved with sleep hygien   Cardiac:    -     Pain:   -     Dental:    -     Endocrine:    -     Gastroenterology:    -     Vascular Medicine:    -     Hematology:    -     Medical Weight Management:   -     Physical Therapy/Exercise:    -     Nephrology:    -     Neurology:    -     Pulmonology:    -     Rheumatology:    -     Other:    -     Other:    -     Other:    -        Testing: UGI:    -     EGD:    -     Sleep Study:   -     Other:   -     Other:    -        PCP: Establish care with PCP:  Completed -     Follow up with PCP:    -     PCP letter of support:  Completed - Letter sent to patient, 11/10/22, [place]; Vanessa Echeverria PA-C, 11/18/22 ltr in Epic. [facility]      Stopping Smoking/ Alcohol Use: Quit tobacco use (3 months smoke free)?:    -     Quit date:    -     Quit alcohol use:   -     Quit date:   -     Other:   -     Quit date:   -        Patient Education:  Information Session:  Needed -     Given \"Making your decision\" handout?:  Yes -     Given \"A Roadmap to you Weight Loss Surgery\" handout?: Yes -     Given \"Get Well Loop\" " information?: Yes -     Given support group information?:    -     Attended support group?:    -     Support plan in place?:  Completed -     Research consents signed?:    -     Avoid NSAIDS/ Alternate Plan for Pain:   -        Additional Surgery Requirements: Review Coag plan:    -     HgA1c <8:    -     Inpatient pain consult:    -     Final nicotine screen:    -     Dental work complete:    -     Birth control plan:  Needed -     Gallstone prevention plan (Actigall for 6 months postop):   -     Other:   -     Other: Call Center to schedule the 1 week and 31+ days post-op ulisses rivas -        Final Tasks:  Before surgery online class:  Needed -     Before surgery online class website link:  [email or web address]   After surgery online class:  Needed -     After surgery online class website link:  [email or web address]   Nurse visit per clinic:  Needed -     History and Physical per clinic:   -     Final labs per clinic: Needed -     Chest xray per clinic:   -     Electrocardiogram (ECG) per clinic:   -     Other:   -        Notes: Starting 1/24/23, please register for the Weight Loss Surgery Care Companion Pathway through the [employer] mobile aiden or via web browser.   Information from this care journey can help you be more successful before and after surgery, for up to one year after your surgical procedure. Your Care Companion Pathway through [employer] can also help answer any questions you might have related to the surgery.  The Pathway has 3 Phases:  Phase 1 starts when you get your Tasklist after your initial consultation with us or when you decide to start preparing for weight loss surgery.  Phase 2 starts when your surgery is scheduled.- Your journey will start with Phase 2.  Phase 3 starts on the day of discharge from the hospital.  A patient can only be connected to one Care Companion journey at any given time.   You can remove or re-enroll yourself from the Weight Loss Surgery  Care Companion Pathway by contacting us at [phone].     Your Weight Loss Surgery Team  [facility]  Ph:[phone]          -

## 2023-03-06 ENCOUNTER — PRE VISIT (OUTPATIENT)
Dept: SURGERY | Facility: CLINIC | Age: 33
End: 2023-03-06

## 2023-03-06 ENCOUNTER — ANESTHESIA EVENT (OUTPATIENT)
Dept: SURGERY | Facility: CLINIC | Age: 33
DRG: 621 | End: 2023-03-06
Payer: COMMERCIAL

## 2023-03-06 ENCOUNTER — LAB (OUTPATIENT)
Dept: LAB | Facility: CLINIC | Age: 33
End: 2023-03-06
Payer: COMMERCIAL

## 2023-03-06 ENCOUNTER — OFFICE VISIT (OUTPATIENT)
Dept: SURGERY | Facility: CLINIC | Age: 33
End: 2023-03-06
Payer: COMMERCIAL

## 2023-03-06 VITALS
DIASTOLIC BLOOD PRESSURE: 71 MMHG | SYSTOLIC BLOOD PRESSURE: 106 MMHG | TEMPERATURE: 97.6 F | WEIGHT: 293 LBS | HEIGHT: 65 IN | BODY MASS INDEX: 48.82 KG/M2 | OXYGEN SATURATION: 97 % | HEART RATE: 62 BPM | RESPIRATION RATE: 16 BRPM

## 2023-03-06 DIAGNOSIS — E66.01 MORBID OBESITY (H): ICD-10-CM

## 2023-03-06 DIAGNOSIS — Z01.818 PREOP EXAMINATION: Primary | ICD-10-CM

## 2023-03-06 DIAGNOSIS — Z01.818 PREOP EXAMINATION: ICD-10-CM

## 2023-03-06 DIAGNOSIS — E03.8 OTHER SPECIFIED HYPOTHYROIDISM: ICD-10-CM

## 2023-03-06 LAB
ALBUMIN UR-MCNC: 20 MG/DL
ANION GAP SERPL CALCULATED.3IONS-SCNC: 11 MMOL/L (ref 7–15)
APPEARANCE UR: CLEAR
BACTERIA #/AREA URNS HPF: ABNORMAL /HPF
BASOPHILS # BLD AUTO: 0.1 10E3/UL (ref 0–0.2)
BASOPHILS NFR BLD AUTO: 1 %
BILIRUB UR QL STRIP: NEGATIVE
BUN SERPL-MCNC: 13.2 MG/DL (ref 6–20)
CALCIUM SERPL-MCNC: 9.6 MG/DL (ref 8.6–10)
CHLORIDE SERPL-SCNC: 108 MMOL/L (ref 98–107)
COLOR UR AUTO: YELLOW
CREAT SERPL-MCNC: 0.94 MG/DL (ref 0.51–0.95)
DEPRECATED HCO3 PLAS-SCNC: 20 MMOL/L (ref 22–29)
EOSINOPHIL # BLD AUTO: 0.1 10E3/UL (ref 0–0.7)
EOSINOPHIL NFR BLD AUTO: 2 %
ERYTHROCYTE [DISTWIDTH] IN BLOOD BY AUTOMATED COUNT: 13 % (ref 10–15)
GFR SERPL CREATININE-BSD FRML MDRD: 83 ML/MIN/1.73M2
GLUCOSE SERPL-MCNC: 82 MG/DL (ref 70–99)
GLUCOSE UR STRIP-MCNC: NEGATIVE MG/DL
HCT VFR BLD AUTO: 43.2 % (ref 35–47)
HGB BLD-MCNC: 14.3 G/DL (ref 11.7–15.7)
HGB UR QL STRIP: NEGATIVE
IMM GRANULOCYTES # BLD: 0 10E3/UL
IMM GRANULOCYTES NFR BLD: 0 %
KETONES UR STRIP-MCNC: 40 MG/DL
LEUKOCYTE ESTERASE UR QL STRIP: ABNORMAL
LYMPHOCYTES # BLD AUTO: 2 10E3/UL (ref 0.8–5.3)
LYMPHOCYTES NFR BLD AUTO: 24 %
MCH RBC QN AUTO: 27.4 PG (ref 26.5–33)
MCHC RBC AUTO-ENTMCNC: 33.1 G/DL (ref 31.5–36.5)
MCV RBC AUTO: 83 FL (ref 78–100)
MONOCYTES # BLD AUTO: 0.5 10E3/UL (ref 0–1.3)
MONOCYTES NFR BLD AUTO: 6 %
MUCOUS THREADS #/AREA URNS LPF: PRESENT /LPF
NEUTROPHILS # BLD AUTO: 5.8 10E3/UL (ref 1.6–8.3)
NEUTROPHILS NFR BLD AUTO: 67 %
NITRATE UR QL: NEGATIVE
NRBC # BLD AUTO: 0 10E3/UL
NRBC BLD AUTO-RTO: 0 /100
PH UR STRIP: 5.5 [PH] (ref 5–7)
PLATELET # BLD AUTO: 304 10E3/UL (ref 150–450)
POTASSIUM SERPL-SCNC: 4.2 MMOL/L (ref 3.4–5.3)
RBC # BLD AUTO: 5.22 10E6/UL (ref 3.8–5.2)
RBC URINE: 4 /HPF
SODIUM SERPL-SCNC: 139 MMOL/L (ref 136–145)
SP GR UR STRIP: 1.03 (ref 1–1.03)
SQUAMOUS EPITHELIAL: 4 /HPF
T4 FREE SERPL-MCNC: 1.64 NG/DL (ref 0.9–1.7)
THYROPEROXIDASE AB SERPL-ACNC: 95 IU/ML
TSH SERPL DL<=0.005 MIU/L-ACNC: 0.33 UIU/ML (ref 0.3–4.2)
UROBILINOGEN UR STRIP-MCNC: NORMAL MG/DL
WBC # BLD AUTO: 8.5 10E3/UL (ref 4–11)
WBC URINE: 7 /HPF

## 2023-03-06 PROCEDURE — 84439 ASSAY OF FREE THYROXINE: CPT | Performed by: PATHOLOGY

## 2023-03-06 PROCEDURE — 99215 OFFICE O/P EST HI 40 MIN: CPT | Performed by: PHYSICIAN ASSISTANT

## 2023-03-06 PROCEDURE — 87086 URINE CULTURE/COLONY COUNT: CPT | Mod: 90 | Performed by: PATHOLOGY

## 2023-03-06 PROCEDURE — 84443 ASSAY THYROID STIM HORMONE: CPT | Performed by: PATHOLOGY

## 2023-03-06 PROCEDURE — 85025 COMPLETE CBC W/AUTO DIFF WBC: CPT | Performed by: PATHOLOGY

## 2023-03-06 PROCEDURE — 36415 COLL VENOUS BLD VENIPUNCTURE: CPT | Performed by: PATHOLOGY

## 2023-03-06 PROCEDURE — 99000 SPECIMEN HANDLING OFFICE-LAB: CPT | Performed by: PATHOLOGY

## 2023-03-06 PROCEDURE — 81001 URINALYSIS AUTO W/SCOPE: CPT | Performed by: PATHOLOGY

## 2023-03-06 PROCEDURE — 80048 BASIC METABOLIC PNL TOTAL CA: CPT | Performed by: PATHOLOGY

## 2023-03-06 PROCEDURE — 86376 MICROSOMAL ANTIBODY EACH: CPT | Mod: 90 | Performed by: PATHOLOGY

## 2023-03-06 RX ORDER — APREPITANT 40 MG/1
40 CAPSULE ORAL ONCE
Status: CANCELLED | OUTPATIENT
Start: 2023-03-06 | End: 2023-03-06

## 2023-03-06 ASSESSMENT — PAIN SCALES - GENERAL: PAINLEVEL: NO PAIN (0)

## 2023-03-06 ASSESSMENT — LIFESTYLE VARIABLES: TOBACCO_USE: 0

## 2023-03-06 ASSESSMENT — ENCOUNTER SYMPTOMS: SEIZURES: 0

## 2023-03-06 NOTE — PATIENT INSTRUCTIONS
Preparing for Your Surgery      Name:  Marichuy Worrell   MRN:  [id number]   :  1990   Today's Date:  3/6/2023       Arriving for surgery:  Surgery date:  3/14/23  Arrival time:  12:20 pm     Surgeries and procedures: Adult patients can have 2 visitors all through the surgery process.   Visiting hours: 8 a.m. to 8:30 p.m.   Hospital: Adult patients and children under age 18 can have 4 visitor at a time     No visitors under the age of 5 are allowed for hospital patients.  Double occupancy rooms: Patients can have only two visitors at a time.   Patients with disabilities: Can have a support person with them (family member, service provider     Or someone well informed about their needs) plus the allowed number of visitors   Patients confirmed or suspected to have symptoms of COVID 19 or flu:     No visitors allowed for adult patients.   Children (under age 18) can have 1 named visitor.   People who are sick or showing symptoms of COVID 19 or flu:    Are not allowed to visit patients--we can only make exceptions in special situations.     Please follow these guidelines for your visit:   Arrive wearing a mask over your mouth and nose; we will give you a medical mask to wear    If you arrive wearing a cloth mask.   Keep it on during your entire visit, even when in patient's room.   If you don't wear a mask we'll ask you to leave.   Clean your hands with alcohol hand . Do this when you arrive at and leave the building and patient room,    And again after you touch your mask or anything in the room.   You can t visit if you have a fever, cough, shortness of breath, muscle aches, headaches, sore throat    Or diarrhea    Stay 6 feet away from others during your visit and between visits   Go directly to and from the room you are visiting.   Stay in the patient s room during your visit. Limit going to other places in the hospital as much as possible   Leave bags and jackets at home or in the car.   For  everyone s health, please don t come and go during your visit. That includes for smoking   during your visit.     Please come to:     [facility] [address]  [address] [place]  [address]    - ? parking is available in front of the hospital      -   Parking is available in the Patient Visitor Ramp on [facility].     -   When entering the hospital you will be asked COVID screening questions, you will then be directed to Registration.  Registration will direct you to the 3rd floor Surgery waiting room.     -   Please ask if you need an escort or a wheelchair to the Surgery Waiting Room.  Preop number- [phone]      -    Please proceed to [address] on the 3rd floor. [phone]?     - ?If you are in need of directions, wheelchair or escort please stop at the Information Desk in the lobby.  Inform the information person that you are here for surgery; a wheelchair and escort to [address] will be provided.?       OPTIMAL RECOVERY AFTER SURGERY - start this the day before surgery       Begin hydrating yourself by drinking at least 8-10 glasses of clear liquids for 24 hours before surgery:      Suggested clear liquids:   Water    Clear Juices   Clear Broth   Non- carbonated beverages    (Crystal Light or Cole Aid)   Gatorade            Drink sips of water up until 4 hours before your surgery.       We would like you to purchase a drink such as Gatorade or Ensure Clear (not the milkshake type).  Drink this before bedtime - drink between 8-10 ounces or until you feel hydrated.        Keeping well hydrated leads to your veins being plump, you wake up faster, and you are less likely to be nauseated.  IV fluids contain salt, drinking fluids will minimize the amount of IV fluids you need and decrease the amount of salt you get.                 The most common reason for the patient to be readmitted is dehydration. Staying hydrated after you go  home from the hospital is very important.  Ensure or Ensure Clear are good options to keep you hydrated.       What can I eat or drink?  -  You will start a clear liquid diet upon waking on 3/13/23 (the day before surgery). Drink plenty of clear liquids until 12:00 am (midnight). After midnight, just sips of water until 10:20 am on 3/14/23 (the morning of surgery)    Examples of clear liquids:  Water  Clear broth  Juices (apple, white grape, white cranberry  and cider) without pulp  Noncarbonated, powder based beverages  (lemonade and Cole-Aid)  Sodas (Sprite, 7-Up, ginger ale and seltzer)  Coffee or tea (without milk or cream)  Gatorade    -  No Alcohol for at least 24 hours before surgery.     Which medicines can I take?    Hold Aspirin for 7 days before surgery.   Hold Multivitamins for 7 days before surgery.  Hold Supplements for 7 days before surgery.  Hold Ibuprofen (Advil, Motrin) for 3 day(s) before surgery--unless otherwise directed by surgeon.  Hold Naproxen (Aleve) for 4 days before surgery.    -  DO NOT take these medications the day of surgery:  See above    -  PLEASE TAKE these medications the day of surgery or the night before depending on your usual schedule:  You may take your Semaglutide (Wegovy) the Saturday before surgery  Enskyce  Lexapro  Levothyroxine  Topamax  Trazodone    How do I prepare myself?  - Please take 2 showers (one the night prior to surgery and one the morning of surgery) using Scrubcare or Hibiclens soap.    Use this soap only from the neck to your toes.     Leave the soap on your skin for one minute--then rinse thoroughly.      You may use your own shampoo and conditioner. No other hair products.   - Please remove all jewelry and body piercings.  - No lotions, deodorants or fragrance.  - No makeup or fingernail polish.   - Bring your ID and insurance card.      Covid testing policy as of 12/06/2022  Your surgeon will notify and schedule you for a COVID test if one is needed  before surgery--please direct any questions or COVID symptoms to your surgeon      Questions or Concerns:    - For any questions regarding the day of surgery or your hospital stay, please contact the Pre Admission Nursing Office at [phone].     - If you have health changes between today and your surgery, please call your surgeon.     - For questions after surgery, please call your surgeons office.

## 2023-03-06 NOTE — H&P
Pre-Operative H & P     CC:  Preoperative exam to assess for increased cardiopulmonary risk while undergoing surgery and anesthesia.    Date of Encounter: 3/6/2023  Primary Care Physician:  Vanessa Echeverria     Reason for Visit: Morbid obesity (H)     YARELY Worrell is a 32 year old female who presents for pre-operative H & P in preparation for  Procedure Information     Case: [id number] Date/Time: 03/14/23 1420    Procedures:       GASTRECTOMY, SLEEVE, LAPAROSCOPIC (Abdomen)      possible HERNIORRHAPHY, HIATAL, LAPAROSCOPIC (Abdomen)    Anesthesia type: General    Diagnosis: Morbid obesity (H) [E66.01]    Pre-op diagnosis: Morbid obesity (H) [E66.01]    Location:  OR [address] [place] [address] OR    Providers: Thang Gutierrez MD          Patient is being evaluated for comorbid conditions of anxiety, depression, hypothyroidism.      Ms. Worrell has a history of morbid obesity. She has been unsuccessful with other methods of permanent weight loss and suffers from multiple weight related medical conditions.  Due to lack of success in achieving weight loss through other methods, she is interested in undergoing bariatric surgery.     History was obtained from patient & chart review.    Hx of abnormal bleeding or anti-platelet use: denies    Menstrual history: Patient's last menstrual period was 02/15/2023 (within days).:       Past Medical History  Past Medical History:   Diagnosis Date     ASCUS of cervix with negative high risk HPV 08/08/2017     Depressive disorder      Hypothyroidism 2009     Moderate major depression (H)      Morbidly obese (H)        Past Surgical History  Past Surgical History:   Procedure Laterality Date     CHOLECYSTECTOMY, LAPOROSCOPIC  12/2009     DENTAL SURGERY      wisdom teeth       Prior to Admission Medications  Current Outpatient Medications   Medication Sig Dispense Refill     ciclopirox (PENLAC) 8 % external solution Apply to adjacent skin and affected nails daily.  Remove with alcohol every  7 days, then repeat. (Patient taking differently: as needed Apply to adjacent skin and affected nails daily.  Remove with alcohol every 7 days, then repeat.) 6.6 mL 5     desogestrel-ethinyl estradiol (ENSKYCE) 0.15-30 MG-MCG tablet Take 1 tablet by mouth daily 84 tablet 3     escitalopram (LEXAPRO) 20 MG tablet Take 1 tablet (20 mg) by mouth daily (Patient taking differently: Take 20 mg by mouth every morning) 90 tablet 1     levothyroxine (SYNTHROID/LEVOTHROID) 175 MCG tablet Take 1 tablet by mouth every other day alternating with 200 mcg dose (Patient taking differently: Take 1 tablet by mouth every other day alternating with 200 mcg dose. Taken in AM) 45 tablet 3     levothyroxine (SYNTHROID/LEVOTHROID) 200 MCG tablet Take 1 tablet by mouth every other day alternating with 175 mcg dose (Patient taking differently: Take 1 tablet by mouth every other day alternating with 175 mcg dose. Taken in AM) 45 tablet 3     Semaglutide-Weight Management (WEGOVY) 2.4 MG/0.75ML SOAJ Inject 2.4 mg Subcutaneous every 7 days (Patient taking differently: Inject 2.4 mg Subcutaneous every 7 days Saturday) 12 mL 0     topiramate (TOPAMAX) 25 MG tablet Take 4 tablets (100 mg) by mouth 2 times daily 240 tablet 3     traZODone (DESYREL) 50 MG tablet Take 1 tablet (50 mg) by mouth At Bedtime 30 tablet 11     triamcinolone (KENALOG) 0.1 % external cream as needed       hyoscyamine (LEVSIN) 0.125 MG tablet Take 1 tablet (125 mcg) by mouth every 4 hours as needed for cramping (Patient not taking: Reported on 3/6/2023) 30 tablet 1     insulin pen needle (31G X 5 MM) 31G X 5 MM miscellaneous Use one pen needles daily or as directed. 100 each 1     ondansetron (ZOFRAN ODT) 4 MG ODT tab Take 1 tablet (4 mg) by mouth every 6 hours as needed for nausea (Patient not taking: Reported on 3/6/2023) 15 tablet 0     senna-docusate (SENOKOT-S/PERICOLACE) 8.6-50 MG tablet Take 2 tablets by mouth daily as needed for constipation (While taking narcotic  pain medications.  Stop taking if having loose stools.) (Patient not taking: Reported on 3/6/2023) 30 tablet 1       Allergies  Allergies   Allergen Reactions     Biaxin [Clarithromycin]      rash       Social History  Social History     Socioeconomic History     Marital status: Single     Spouse name: Not on file     Number of children: Not on file     Years of education: Not on file     Highest education level: Not on file   Occupational History     Not on file   Tobacco Use     Smoking status: Never     Smokeless tobacco: Never   Substance and Sexual Activity     Alcohol use: No     Drug use: No     Sexual activity: Yes     Partners: Male   Other Topics Concern     Parent/sibling w/ CABG, MI or angioplasty before 65F 55M? No   Social History Narrative     Not on file     Social Determinants of Health     Financial Resource Strain: Not on file   Food Insecurity: Not on file   Transportation Needs: Not on file   Physical Activity: Not on file   Stress: Not on file   Social Connections: Not on file   Intimate Partner Violence: Not on file   Housing Stability: Not on file       Family History  Family History   Problem Relation Age of Onset     Asthma Mother      Other Cancer Mother      Depression Mother      Obesity Mother      Melanoma Mother      Unknown/Adopted Father      Other Cancer Father      Asthma Brother      Allergies Brother      Arthritis Maternal Grandmother      Thyroid Disease Maternal Grandmother      Obesity Maternal Grandmother      Cancer Maternal Grandfather         lung cancer     Unknown/Adopted Paternal Grandmother      Unknown/Adopted Paternal Grandfather      Anesthesia Reaction No family hx of      Deep Vein Thrombosis (DVT) No family hx of        Review of Systems  The complete review of systems is negative other than noted in the HPI or here.     Anesthesia Evaluation   Pt has had prior anesthetic.     No history of anesthetic complications       ROS/MED HX  ENT/Pulmonary:     (+) MASON  "risk factors, obese,  (-) tobacco use, asthma and sleep apnea   Neurologic:  - neg neurologic ROS  (-) no seizures, no CVA and migraines   Cardiovascular:  - neg cardiovascular ROS     METS/Exercise Tolerance: >4 METS Comment: Uses elliptical regularly, walks on treadmill     Hematologic:  - neg hematologic  ROS  (-) history of blood clots and history of blood transfusion   Musculoskeletal:  - neg musculoskeletal ROS     GI/Hepatic: Comment: S/p cholecystectomy age 19   (-) GERD and liver disease   Renal/Genitourinary:  - neg Renal ROS  (-) renal disease   Endo:     (+) thyroid problem, hypothyroidism, Obesity,     Psychiatric/Substance Use:     (+) psychiatric history anxiety and depression     Infectious Disease:  - neg infectious disease ROS     Malignancy:  - neg malignancy ROS     Other:  - neg other ROS          /71 (BP Location: Right arm, Patient Position: Sitting, Cuff Size: Thigh)   Pulse 62   Temp 97.6  F (36.4  C) (Oral)   Resp 16   Ht 1.651 m (5' 5\")   Wt (!) 150.1 kg (331 lb)   LMP 02/15/2023 (Within Days)   SpO2 97%   Breastfeeding No   BMI 55.08 kg/m      Physical Exam  Constitutional: Awake, alert, cooperative, no apparent distress, and appears stated age.  Eyes: Pupils equal, round and reactive to light, extra ocular muscles intact, sclera clear, conjunctiva normal.  HENT: Normocephalic, oral pharynx with moist mucus membranes, good dentition. No goiter appreciated. No removable dental hardware.  Respiratory: Clear to auscultation bilaterally, no crackles or wheezing. No SOB when supine.  Cardiovascular: Regular rate and rhythm, normal S1 and S2, and no murmur noted.  Carotids +2, no bruits. No edema. Palpable pulses to radial, DP and PT arteries.   GI: Normal bowel sounds, soft, obese, non-tender, no masses palpated.    Lymph/Hematologic: No cervical lymphadenopathy and no supraclavicular lymphadenopathy.  Genitourinary:  deferred  Skin: Warm and dry.  No rashes.   Musculoskeletal: " full ROM of neck. There is no redness, warmth, or swelling of the joints. Gross motor strength is normal.    Neurologic: Awake, alert, oriented to name, place and time. Cranial nerves II-XII are grossly intact. Gait is normal. Ambulates from chair to exam table, seats self, lies supine and sits back up w/o assistance.  Neuropsychiatric: Calm, cooperative. Normal affect. Pleasant. Answers questions appropriately, follows commands w/o difficulty.        PRIOR LABS/DIAGNOSTIC STUDIES:    All labs and imaging personally reviewed      Sodium 133 - 144 mmol/L 140  138  139  141        Potassium 3.4 - 5.3 mmol/L 3.8  3.9  4.0  4.1       Chloride 94 - 109 mmol/L 114 High   110 High   113 High   110 High        Carbon Dioxide (CO2) 20 - 32 mmol/L 17 Low   20  21  23       Anion Gap 3 - 14 mmol/L 9  8  5  8       Urea Nitrogen 7 - 30 mg/dL 18  16  16  10       Creatinine 0.52 - 1.04 mg/dL 0.95  0.81  0.74  0.93       Calcium 8.5 - 10.1 mg/dL 8.8  8.6  8.4 Low   9.0       Glucose 70 - 99 mg/dL 90  85  122 High  CM  91  81 CM  94 R, CM     Alkaline Phosphatase 40 - 150 U/L 70          AST 0 - 45 U/L 16          ALT 0 - 50 U/L 28          Protein Total 6.8 - 8.8 g/dL 7.7          Albumin 3.4 - 5.0 g/dL 3.6          Bilirubin Total 0.2 - 1.3 mg/dL 0.4          GFR Estimate >60 mL/min/1.73m2 82  >90 CM  >90 R, CM  73 R, CM          WBC Count 4.0 - 11.0 10e3/uL 6.7      RBC Count 3.80 - 5.20 10e6/uL 5.05     Hemoglobin 11.7 - 15.7 g/dL 14.1     Hematocrit 35.0 - 47.0 % 42.9     MCV 78 - 100 fL 85     MCH 26.5 - 33.0 pg 27.9     MCHC 31.5 - 36.5 g/dL 32.9     RDW 10.0 - 15.0 % 13.9     Platelet Count 150 - 450 10e3/uL 294          The patient's records and results personally reviewed by this provider.       LAB/DIAGNOSTIC STUDIES TODAY:  BMP, CBC, UA    Color Urine Colorless, Straw, Light Yellow, Yellow Yellow   Yellow R   Yellow R      Appearance Urine Clear Clear   Clear R   Clear R     Glucose Urine Negative mg/dL Negative    Negative R   Negative R     Bilirubin Urine Negative Negative   Negative R   Negative R     Ketones Urine Negative mg/dL 40 Abnormal    Negative R   Trace Abnormal  R     Specific Gravity Urine 1.003 - 1.035 1.030   >1.030   >1.030     Blood Urine Negative Negative   Negative R   Trace Abnormal  R     pH Urine 5.0 - 7.0 5.5   6.0 R   6.0 R     Protein Albumin Urine Negative mg/dL 20 Abnormal    Negative R   Negative R     Urobilinogen Urine Normal, 2.0 mg/dL Normal         Nitrite Urine Negative Negative   Negative R   Negative R     Leukocyte Esterase Urine Negative Moderate Abnormal    Small Abnormal  R   Small Abnormal  R     Bacteria Urine None Seen /HPF Few Abnormal          Mucus Urine None Seen /LPF Present Abnormal          RBC Urine <=2 /HPF 4 High   O - 2 R   5-10 Abnormal  R      WBC Urine <=5 /HPF 7 High   5-10 Abnormal  R   10-25 Abnormal  R      Squamous Epithelials Urine <=1 /HPF 4 High           WBC Count 4.0 - 11.0 10e3/uL 8.5  6.7      RBC Count 3.80 - 5.20 10e6/uL 5.22 High   5.05     Hemoglobin 11.7 - 15.7 g/dL 14.3  14.1     Hematocrit 35.0 - 47.0 % 43.2  42.9     MCV 78 - 100 fL 83  85     MCH 26.5 - 33.0 pg 27.4  27.9     MCHC 31.5 - 36.5 g/dL 33.1  32.9     RDW 10.0 - 15.0 % 13.0  13.9     Platelet Count 150 - 450 10e3/uL 304  294     % Neutrophils % 67      % Lymphocytes % 24      % Monocytes % 6      % Eosinophils % 2      % Basophils % 1      % Immature Granulocytes % 0      NRBCs per 100 WBC <1 /100 0      Absolute Neutrophils 1.6 - 8.3 10e3/uL 5.8      Absolute Lymphocytes 0.8 - 5.3 10e3/uL 2.0      Absolute Monocytes 0.0 - 1.3 10e3/uL 0.5      Absolute Eosinophils 0.0 - 0.7 10e3/uL 0.1      Absolute Basophils 0.0 - 0.2 10e3/uL 0.1      Absolute Immature Granulocytes <=0.4 10e3/uL 0.0      Absolute NRBCs 10e3/uL 0.0         Sodium 136 - 145 mmol/L 139  140 R  138 R  139 R  141 R      Potassium 3.4 - 5.3 mmol/L 4.2         Chloride 98 - 107 mmol/L 108 High          Carbon Dioxide (CO2) 22  "- 29 mmol/L 20 Low          Anion Gap 7 - 15 mmol/L 11  9 R  8 R  5 R  8 R     Urea Nitrogen 6.0 - 20.0 mg/dL 13.2         Creatinine 0.51 - 0.95 mg/dL 0.94  0.95 R  0.81 R  0.74 R  0.93 R     Calcium 8.6 - 10.0 mg/dL 9.6  8.8 R  8.6 R  8.4 Low  R  9.0 R     Glucose 70 - 99 mg/dL 82         GFR Estimate >60 mL/min/1.73m2 83  82 CM  >90 CM  >90 R, CM  73          Assessment      Marichuy Worrell is a 32 year old female seen as a PAC referral for risk assessment and optimization for anesthesia.    Plan/Recommendations  Pt will be optimized for the proposed procedure.  See below for details on the assessment, risk, and preoperative recommendations    NEUROLOGY  - No history of TIA, CVA or seizure    -Post Op delirium risk factors:  No risk identified    ENT  - No current airway concerns.  Will need to be reassessed day of surgery.  Mallampati: II  TM: > 3    CARDIAC  - No history of CAD, Hypertension and Afib  - METS (Metabolic Equivalents)  Uses elliptical regularly, walks on treadmill. Denies CP.    Patient performs 4 or more METS exercise without symptoms            Total Score: 0      RCRI-Very low risk: Class 1 0.4% complication rate            Total Score: 0        PULMONARY  MASON Low Risk            Total Score: 1    MASON: BMI over 35 kg/m2      - Denies asthma or inhaler use  - Tobacco History      History   Smoking Status     Never   Smokeless Tobacco     Never       GI  - Denies GERD   - Aprepitant ordered for DOS    PONV Medium Risk  Total Score: 2           1 AN PONV: Pt is Female    1 AN PONV: Patient is not a current smoker          ENDOCRINE    - BMI: Estimated body mass index is 55.08 kg/m  as calculated from the following:    Height as of this encounter: 1.651 m (5' 5\").    Weight as of this encounter: 150.1 kg (331 lb).  Class 3 Obesity (BMI > 40)  - No history of Diabetes Mellitus   - taking semaglutide for wt mgmt; will take weekly dose on Saturday prior  - A1c on 11/23/22 was 5.1    HEME  VTE Low Risk " 0.26%            Total Score: 1    VTE: BMI greater than 39      - No history of abnormal bleeding or antiplatelet use.        The patient is aware that the final anesthesia plan will be decided by the assigned anesthesia provider on the date of service.    The patie/nt is optimized for their procedure. AVS with information on surgery time/arrival time, meds and NPO status given by nursing staff. No further diagnostic testing indicated.    On the day of service:     Prep time: 12 minutes  Visit time: 23 minutes  Documentation time: 12 minutes  ------------------------------------------  Total time: 47 minutes      Amarilys Gant PA-C  Preoperative Assessment Center  [facility]  Cl[facility] and [facility] Center  Phone: [phone]  Fax: [phone]

## 2023-03-07 LAB — BACTERIA UR CULT: NORMAL

## 2023-03-14 ENCOUNTER — HOSPITAL ENCOUNTER (INPATIENT)
Facility: CLINIC | Age: 33
LOS: 1 days | Discharge: HOME | DRG: 621 | End: 2023-03-15
Attending: SURGERY | Admitting: SURGERY
Payer: COMMERCIAL

## 2023-03-14 ENCOUNTER — ANESTHESIA (OUTPATIENT)
Dept: SURGERY | Facility: CLINIC | Age: 33
DRG: 621 | End: 2023-03-14
Payer: COMMERCIAL

## 2023-03-14 DIAGNOSIS — E66.01 MORBID OBESITY (H): Primary | ICD-10-CM

## 2023-03-14 DIAGNOSIS — Z98.84 S/P LAPAROSCOPIC SLEEVE GASTRECTOMY: ICD-10-CM

## 2023-03-14 LAB
CREAT SERPL-MCNC: 0.78 MG/DL (ref 0.51–0.95)
GFR SERPL CREATININE-BSD FRML MDRD: >90 ML/MIN/1.73M2
GLUCOSE BLDC GLUCOMTR-MCNC: 72 MG/DL (ref 70–99)

## 2023-03-14 PROCEDURE — 120N000002 HC R&B MED SURG/OB UMMC

## 2023-03-14 PROCEDURE — 250N000025 HC SEVOFLURANE, PER MIN: Performed by: SURGERY

## 2023-03-14 PROCEDURE — 710N000009 HC RECOVERY PHASE 1, LEVEL 1, PER MIN: Performed by: SURGERY

## 2023-03-14 PROCEDURE — 250N000011 HC RX IP 250 OP 636: Performed by: NURSE ANESTHETIST, CERTIFIED REGISTERED

## 2023-03-14 PROCEDURE — 250N000011 HC RX IP 250 OP 636: Performed by: SURGERY

## 2023-03-14 PROCEDURE — 360N000077 HC SURGERY LEVEL 4, PER MIN: Performed by: SURGERY

## 2023-03-14 PROCEDURE — 250N000009 HC RX 250: Performed by: NURSE PRACTITIONER

## 2023-03-14 PROCEDURE — 43775 LAP SLEEVE GASTRECTOMY: CPT | Performed by: SURGERY

## 2023-03-14 PROCEDURE — 88305 TISSUE EXAM BY PATHOLOGIST: CPT | Mod: TC | Performed by: SURGERY

## 2023-03-14 PROCEDURE — 36415 COLL VENOUS BLD VENIPUNCTURE: CPT | Performed by: NURSE PRACTITIONER

## 2023-03-14 PROCEDURE — 250N000009 HC RX 250: Performed by: NURSE ANESTHETIST, CERTIFIED REGISTERED

## 2023-03-14 PROCEDURE — 0DB64Z3 EXCISION OF STOMACH, PERCUTANEOUS ENDOSCOPIC APPROACH, VERTICAL: ICD-10-PCS | Performed by: SURGERY

## 2023-03-14 PROCEDURE — 258N000003 HC RX IP 258 OP 636: Performed by: NURSE ANESTHETIST, CERTIFIED REGISTERED

## 2023-03-14 PROCEDURE — 370N000017 HC ANESTHESIA TECHNICAL FEE, PER MIN: Performed by: SURGERY

## 2023-03-14 PROCEDURE — 272N000001 HC OR GENERAL SUPPLY STERILE: Performed by: SURGERY

## 2023-03-14 PROCEDURE — 999N000141 HC STATISTIC PRE-PROCEDURE NURSING ASSESSMENT: Performed by: SURGERY

## 2023-03-14 PROCEDURE — 250N000012 HC RX MED GY IP 250 OP 636 PS 637: Performed by: PHYSICIAN ASSISTANT

## 2023-03-14 PROCEDURE — 82565 ASSAY OF CREATININE: CPT | Performed by: NURSE PRACTITIONER

## 2023-03-14 PROCEDURE — 250N000013 HC RX MED GY IP 250 OP 250 PS 637: Performed by: SURGERY

## 2023-03-14 PROCEDURE — 88305 TISSUE EXAM BY PATHOLOGIST: CPT | Mod: 26 | Performed by: PATHOLOGY

## 2023-03-14 PROCEDURE — 250N000011 HC RX IP 250 OP 636: Performed by: NURSE PRACTITIONER

## 2023-03-14 PROCEDURE — 258N000003 HC RX IP 258 OP 636: Performed by: NURSE PRACTITIONER

## 2023-03-14 PROCEDURE — 250N000011 HC RX IP 250 OP 636: Performed by: STUDENT IN AN ORGANIZED HEALTH CARE EDUCATION/TRAINING PROGRAM

## 2023-03-14 PROCEDURE — 250N000013 HC RX MED GY IP 250 OP 250 PS 637: Performed by: NURSE PRACTITIONER

## 2023-03-14 RX ORDER — ACETAMINOPHEN 325 MG/1
975 TABLET ORAL ONCE
Status: COMPLETED | OUTPATIENT
Start: 2023-03-14 | End: 2023-03-14

## 2023-03-14 RX ORDER — DIPHENHYDRAMINE HCL 25 MG
25 CAPSULE ORAL EVERY 6 HOURS PRN
Status: DISCONTINUED | OUTPATIENT
Start: 2023-03-14 | End: 2023-03-15 | Stop reason: HOSPADM

## 2023-03-14 RX ORDER — SODIUM CHLORIDE, SODIUM LACTATE, POTASSIUM CHLORIDE, CALCIUM CHLORIDE 600; 310; 30; 20 MG/100ML; MG/100ML; MG/100ML; MG/100ML
INJECTION, SOLUTION INTRAVENOUS CONTINUOUS
Status: DISCONTINUED | OUTPATIENT
Start: 2023-03-14 | End: 2023-03-14 | Stop reason: HOSPADM

## 2023-03-14 RX ORDER — FENTANYL CITRATE 50 UG/ML
INJECTION, SOLUTION INTRAMUSCULAR; INTRAVENOUS PRN
Status: DISCONTINUED | OUTPATIENT
Start: 2023-03-14 | End: 2023-03-14

## 2023-03-14 RX ORDER — DESOGESTREL AND ETHINYL ESTRADIOL 0.15-0.03
1 KIT ORAL DAILY
Status: DISCONTINUED | OUTPATIENT
Start: 2023-03-15 | End: 2023-03-15 | Stop reason: HOSPADM

## 2023-03-14 RX ORDER — ACETAMINOPHEN 325 MG/1
650 TABLET ORAL EVERY 4 HOURS PRN
Status: DISCONTINUED | OUTPATIENT
Start: 2023-03-14 | End: 2023-03-15 | Stop reason: HOSPADM

## 2023-03-14 RX ORDER — PROPOFOL 10 MG/ML
INJECTION, EMULSION INTRAVENOUS CONTINUOUS PRN
Status: DISCONTINUED | OUTPATIENT
Start: 2023-03-14 | End: 2023-03-14

## 2023-03-14 RX ORDER — LEVOTHYROXINE SODIUM 175 UG/1
175 TABLET ORAL DAILY
Status: DISCONTINUED | OUTPATIENT
Start: 2023-03-14 | End: 2023-03-15 | Stop reason: HOSPADM

## 2023-03-14 RX ORDER — NALOXONE HYDROCHLORIDE 0.4 MG/ML
0.4 INJECTION, SOLUTION INTRAMUSCULAR; INTRAVENOUS; SUBCUTANEOUS
Status: DISCONTINUED | OUTPATIENT
Start: 2023-03-14 | End: 2023-03-15 | Stop reason: HOSPADM

## 2023-03-14 RX ORDER — ENOXAPARIN SODIUM 100 MG/ML
40 INJECTION SUBCUTANEOUS EVERY 12 HOURS
Status: DISCONTINUED | OUTPATIENT
Start: 2023-03-15 | End: 2023-03-15 | Stop reason: HOSPADM

## 2023-03-14 RX ORDER — FENTANYL CITRATE 50 UG/ML
50 INJECTION, SOLUTION INTRAMUSCULAR; INTRAVENOUS EVERY 5 MIN PRN
Status: DISCONTINUED | OUTPATIENT
Start: 2023-03-14 | End: 2023-03-14 | Stop reason: HOSPADM

## 2023-03-14 RX ORDER — FENTANYL CITRATE 50 UG/ML
25 INJECTION, SOLUTION INTRAMUSCULAR; INTRAVENOUS EVERY 5 MIN PRN
Status: DISCONTINUED | OUTPATIENT
Start: 2023-03-14 | End: 2023-03-14 | Stop reason: HOSPADM

## 2023-03-14 RX ORDER — NALOXONE HYDROCHLORIDE 0.4 MG/ML
0.2 INJECTION, SOLUTION INTRAMUSCULAR; INTRAVENOUS; SUBCUTANEOUS
Status: DISCONTINUED | OUTPATIENT
Start: 2023-03-14 | End: 2023-03-15 | Stop reason: HOSPADM

## 2023-03-14 RX ORDER — SODIUM CHLORIDE, SODIUM LACTATE, POTASSIUM CHLORIDE, CALCIUM CHLORIDE 600; 310; 30; 20 MG/100ML; MG/100ML; MG/100ML; MG/100ML
INJECTION, SOLUTION INTRAVENOUS CONTINUOUS PRN
Status: DISCONTINUED | OUTPATIENT
Start: 2023-03-14 | End: 2023-03-14

## 2023-03-14 RX ORDER — ENOXAPARIN SODIUM 100 MG/ML
40 INJECTION SUBCUTANEOUS
Status: COMPLETED | OUTPATIENT
Start: 2023-03-14 | End: 2023-03-14

## 2023-03-14 RX ORDER — ESCITALOPRAM OXALATE 20 MG/1
20 TABLET ORAL EVERY MORNING
Status: DISCONTINUED | OUTPATIENT
Start: 2023-03-15 | End: 2023-03-15 | Stop reason: HOSPADM

## 2023-03-14 RX ORDER — LABETALOL HYDROCHLORIDE 5 MG/ML
10 INJECTION, SOLUTION INTRAVENOUS
Status: DISCONTINUED | OUTPATIENT
Start: 2023-03-14 | End: 2023-03-14 | Stop reason: HOSPADM

## 2023-03-14 RX ORDER — OXYCODONE HYDROCHLORIDE 10 MG/1
10 TABLET ORAL
Status: DISCONTINUED | OUTPATIENT
Start: 2023-03-14 | End: 2023-03-15 | Stop reason: HOSPADM

## 2023-03-14 RX ORDER — ONDANSETRON 4 MG/1
4 TABLET, ORALLY DISINTEGRATING ORAL EVERY 6 HOURS PRN
Status: DISCONTINUED | OUTPATIENT
Start: 2023-03-14 | End: 2023-03-15 | Stop reason: HOSPADM

## 2023-03-14 RX ORDER — ONDANSETRON 2 MG/ML
4 INJECTION INTRAMUSCULAR; INTRAVENOUS
Status: DISCONTINUED | OUTPATIENT
Start: 2023-03-14 | End: 2023-03-14 | Stop reason: HOSPADM

## 2023-03-14 RX ORDER — DEXAMETHASONE SODIUM PHOSPHATE 4 MG/ML
INJECTION, SOLUTION INTRA-ARTICULAR; INTRALESIONAL; INTRAMUSCULAR; INTRAVENOUS; SOFT TISSUE PRN
Status: DISCONTINUED | OUTPATIENT
Start: 2023-03-14 | End: 2023-03-14

## 2023-03-14 RX ORDER — OXYCODONE HYDROCHLORIDE 5 MG/1
5 TABLET ORAL
Status: DISCONTINUED | OUTPATIENT
Start: 2023-03-14 | End: 2023-03-14 | Stop reason: HOSPADM

## 2023-03-14 RX ORDER — BUPIVACAINE HYDROCHLORIDE 2.5 MG/ML
INJECTION, SOLUTION EPIDURAL; INFILTRATION; INTRACAUDAL PRN
Status: DISCONTINUED | OUTPATIENT
Start: 2023-03-14 | End: 2023-03-14 | Stop reason: HOSPADM

## 2023-03-14 RX ORDER — CEFAZOLIN SODIUM/WATER 3 G/30 ML
3 SYRINGE (ML) INTRAVENOUS SEE ADMIN INSTRUCTIONS
Status: DISCONTINUED | OUTPATIENT
Start: 2023-03-14 | End: 2023-03-14 | Stop reason: HOSPADM

## 2023-03-14 RX ORDER — OXYCODONE HYDROCHLORIDE 10 MG/1
10 TABLET ORAL
Status: DISCONTINUED | OUTPATIENT
Start: 2023-03-14 | End: 2023-03-14 | Stop reason: HOSPADM

## 2023-03-14 RX ORDER — CEFAZOLIN SODIUM/WATER 3 G/30 ML
3 SYRINGE (ML) INTRAVENOUS
Status: COMPLETED | OUTPATIENT
Start: 2023-03-14 | End: 2023-03-14

## 2023-03-14 RX ORDER — APREPITANT 40 MG/1
40 CAPSULE ORAL ONCE
Status: COMPLETED | OUTPATIENT
Start: 2023-03-14 | End: 2023-03-14

## 2023-03-14 RX ORDER — HYDROMORPHONE HCL IN WATER/PF 6 MG/30 ML
0.4 PATIENT CONTROLLED ANALGESIA SYRINGE INTRAVENOUS EVERY 5 MIN PRN
Status: DISCONTINUED | OUTPATIENT
Start: 2023-03-14 | End: 2023-03-14 | Stop reason: HOSPADM

## 2023-03-14 RX ORDER — ONDANSETRON 2 MG/ML
4 INJECTION INTRAMUSCULAR; INTRAVENOUS EVERY 30 MIN PRN
Status: DISCONTINUED | OUTPATIENT
Start: 2023-03-14 | End: 2023-03-14 | Stop reason: HOSPADM

## 2023-03-14 RX ORDER — DIPHENHYDRAMINE HYDROCHLORIDE 50 MG/ML
25 INJECTION INTRAMUSCULAR; INTRAVENOUS EVERY 6 HOURS PRN
Status: DISCONTINUED | OUTPATIENT
Start: 2023-03-14 | End: 2023-03-15 | Stop reason: HOSPADM

## 2023-03-14 RX ORDER — ENALAPRILAT 1.25 MG/ML
1.25 INJECTION INTRAVENOUS EVERY 6 HOURS PRN
Status: DISCONTINUED | OUTPATIENT
Start: 2023-03-14 | End: 2023-03-15 | Stop reason: HOSPADM

## 2023-03-14 RX ORDER — KETOROLAC TROMETHAMINE 30 MG/ML
30 INJECTION, SOLUTION INTRAMUSCULAR; INTRAVENOUS EVERY 6 HOURS
Status: COMPLETED | OUTPATIENT
Start: 2023-03-14 | End: 2023-03-15

## 2023-03-14 RX ORDER — OXYCODONE HYDROCHLORIDE 5 MG/1
5 TABLET ORAL
Status: DISCONTINUED | OUTPATIENT
Start: 2023-03-14 | End: 2023-03-15 | Stop reason: HOSPADM

## 2023-03-14 RX ORDER — HYDROMORPHONE HCL IN WATER/PF 6 MG/30 ML
0.2 PATIENT CONTROLLED ANALGESIA SYRINGE INTRAVENOUS
Status: DISCONTINUED | OUTPATIENT
Start: 2023-03-14 | End: 2023-03-15 | Stop reason: HOSPADM

## 2023-03-14 RX ORDER — ONDANSETRON 2 MG/ML
INJECTION INTRAMUSCULAR; INTRAVENOUS PRN
Status: DISCONTINUED | OUTPATIENT
Start: 2023-03-14 | End: 2023-03-14

## 2023-03-14 RX ORDER — LIDOCAINE 40 MG/G
CREAM TOPICAL
Status: DISCONTINUED | OUTPATIENT
Start: 2023-03-14 | End: 2023-03-15 | Stop reason: HOSPADM

## 2023-03-14 RX ORDER — ONDANSETRON 2 MG/ML
4 INJECTION INTRAMUSCULAR; INTRAVENOUS EVERY 6 HOURS PRN
Status: DISCONTINUED | OUTPATIENT
Start: 2023-03-14 | End: 2023-03-15 | Stop reason: HOSPADM

## 2023-03-14 RX ORDER — TOPIRAMATE 50 MG/1
100 TABLET, FILM COATED ORAL 2 TIMES DAILY
Status: DISCONTINUED | OUTPATIENT
Start: 2023-03-14 | End: 2023-03-15 | Stop reason: HOSPADM

## 2023-03-14 RX ORDER — SODIUM CHLORIDE, SODIUM LACTATE, POTASSIUM CHLORIDE, CALCIUM CHLORIDE 600; 310; 30; 20 MG/100ML; MG/100ML; MG/100ML; MG/100ML
INJECTION, SOLUTION INTRAVENOUS CONTINUOUS
Status: DISCONTINUED | OUTPATIENT
Start: 2023-03-14 | End: 2023-03-15 | Stop reason: HOSPADM

## 2023-03-14 RX ORDER — ONDANSETRON 4 MG/1
4 TABLET, ORALLY DISINTEGRATING ORAL EVERY 30 MIN PRN
Status: DISCONTINUED | OUTPATIENT
Start: 2023-03-14 | End: 2023-03-14 | Stop reason: HOSPADM

## 2023-03-14 RX ORDER — LIDOCAINE HYDROCHLORIDE 20 MG/ML
INJECTION, SOLUTION INFILTRATION; PERINEURAL PRN
Status: DISCONTINUED | OUTPATIENT
Start: 2023-03-14 | End: 2023-03-14

## 2023-03-14 RX ORDER — AMOXICILLIN 250 MG
2 CAPSULE ORAL 2 TIMES DAILY
Status: DISCONTINUED | OUTPATIENT
Start: 2023-03-14 | End: 2023-03-15 | Stop reason: HOSPADM

## 2023-03-14 RX ORDER — PROPOFOL 10 MG/ML
INJECTION, EMULSION INTRAVENOUS PRN
Status: DISCONTINUED | OUTPATIENT
Start: 2023-03-14 | End: 2023-03-14

## 2023-03-14 RX ORDER — PROCHLORPERAZINE MALEATE 5 MG
10 TABLET ORAL EVERY 6 HOURS PRN
Status: DISCONTINUED | OUTPATIENT
Start: 2023-03-14 | End: 2023-03-15 | Stop reason: HOSPADM

## 2023-03-14 RX ORDER — HYDROMORPHONE HCL IN WATER/PF 6 MG/30 ML
0.2 PATIENT CONTROLLED ANALGESIA SYRINGE INTRAVENOUS EVERY 5 MIN PRN
Status: DISCONTINUED | OUTPATIENT
Start: 2023-03-14 | End: 2023-03-14 | Stop reason: HOSPADM

## 2023-03-14 RX ORDER — SCOLOPAMINE TRANSDERMAL SYSTEM 1 MG/1
1 PATCH, EXTENDED RELEASE TRANSDERMAL
Status: DISCONTINUED | OUTPATIENT
Start: 2023-03-14 | End: 2023-03-15 | Stop reason: HOSPADM

## 2023-03-14 RX ORDER — LIDOCAINE 40 MG/G
CREAM TOPICAL
Status: DISCONTINUED | OUTPATIENT
Start: 2023-03-14 | End: 2023-03-14 | Stop reason: HOSPADM

## 2023-03-14 RX ADMIN — PROPOFOL 200 MG: 10 INJECTION, EMULSION INTRAVENOUS at 12:12

## 2023-03-14 RX ADMIN — PROPOFOL 50 MCG/KG/MIN: 10 INJECTION, EMULSION INTRAVENOUS at 12:20

## 2023-03-14 RX ADMIN — SODIUM CHLORIDE, POTASSIUM CHLORIDE, SODIUM LACTATE AND CALCIUM CHLORIDE: 600; 310; 30; 20 INJECTION, SOLUTION INTRAVENOUS at 12:02

## 2023-03-14 RX ADMIN — TOPIRAMATE 100 MG: 100 TABLET, FILM COATED ORAL at 20:09

## 2023-03-14 RX ADMIN — ACETAMINOPHEN 975 MG: 325 TABLET, FILM COATED ORAL at 11:11

## 2023-03-14 RX ADMIN — ONDANSETRON 4 MG: 2 INJECTION INTRAMUSCULAR; INTRAVENOUS at 12:50

## 2023-03-14 RX ADMIN — SENNOSIDES AND DOCUSATE SODIUM 2 TABLET: 50; 8.6 TABLET ORAL at 20:34

## 2023-03-14 RX ADMIN — Medication 50 MG: at 12:13

## 2023-03-14 RX ADMIN — KETOROLAC TROMETHAMINE 30 MG: 30 INJECTION, SOLUTION INTRAMUSCULAR; INTRAVENOUS at 22:01

## 2023-03-14 RX ADMIN — DEXAMETHASONE SODIUM PHOSPHATE 10 MG: 4 INJECTION, SOLUTION INTRA-ARTICULAR; INTRALESIONAL; INTRAMUSCULAR; INTRAVENOUS; SOFT TISSUE at 12:12

## 2023-03-14 RX ADMIN — PROCHLORPERAZINE EDISYLATE 5 MG: 5 INJECTION INTRAMUSCULAR; INTRAVENOUS at 13:43

## 2023-03-14 RX ADMIN — FAMOTIDINE 20 MG: 10 INJECTION INTRAVENOUS at 11:57

## 2023-03-14 RX ADMIN — FENTANYL CITRATE 100 MCG: 50 INJECTION, SOLUTION INTRAMUSCULAR; INTRAVENOUS at 12:10

## 2023-03-14 RX ADMIN — APREPITANT 40 MG: 40 CAPSULE ORAL at 11:05

## 2023-03-14 RX ADMIN — SUGAMMADEX 200 MG: 100 INJECTION, SOLUTION INTRAVENOUS at 12:58

## 2023-03-14 RX ADMIN — HYDROMORPHONE HYDROCHLORIDE 0.4 MG: 0.2 INJECTION, SOLUTION INTRAMUSCULAR; INTRAVENOUS; SUBCUTANEOUS at 13:55

## 2023-03-14 RX ADMIN — ONDANSETRON 4 MG: 2 INJECTION INTRAMUSCULAR; INTRAVENOUS at 13:27

## 2023-03-14 RX ADMIN — KETOROLAC TROMETHAMINE 30 MG: 30 INJECTION, SOLUTION INTRAMUSCULAR; INTRAVENOUS at 16:18

## 2023-03-14 RX ADMIN — SODIUM CHLORIDE, POTASSIUM CHLORIDE, SODIUM LACTATE AND CALCIUM CHLORIDE: 600; 310; 30; 20 INJECTION, SOLUTION INTRAVENOUS at 16:42

## 2023-03-14 RX ADMIN — HYDROMORPHONE HYDROCHLORIDE 0.2 MG: 0.2 INJECTION, SOLUTION INTRAMUSCULAR; INTRAVENOUS; SUBCUTANEOUS at 14:39

## 2023-03-14 RX ADMIN — FENTANYL CITRATE 50 MCG: 50 INJECTION, SOLUTION INTRAMUSCULAR; INTRAVENOUS at 13:28

## 2023-03-14 RX ADMIN — FENTANYL CITRATE 50 MCG: 50 INJECTION, SOLUTION INTRAMUSCULAR; INTRAVENOUS at 13:33

## 2023-03-14 RX ADMIN — LIDOCAINE HYDROCHLORIDE 100 MG: 20 INJECTION, SOLUTION INFILTRATION; PERINEURAL at 12:12

## 2023-03-14 RX ADMIN — SCOPALAMINE 1 PATCH: 1 PATCH, EXTENDED RELEASE TRANSDERMAL at 16:10

## 2023-03-14 RX ADMIN — MIDAZOLAM 2 MG: 1 INJECTION INTRAMUSCULAR; INTRAVENOUS at 12:00

## 2023-03-14 RX ADMIN — ENOXAPARIN SODIUM 40 MG: 40 INJECTION SUBCUTANEOUS at 11:11

## 2023-03-14 RX ADMIN — Medication 3 G: at 12:17

## 2023-03-14 ASSESSMENT — ACTIVITIES OF DAILY LIVING (ADL)
ADLS_ACUITY_SCORE: 18

## 2023-03-14 NOTE — ANESTHESIA PROCEDURE NOTES
Airway       Patient location during procedure: OR       Procedure Start/Stop Times: 3/14/2023 12:13 PM and 3/14/2023 12:20 PM  Staff -        CRNA: Adalberto Alberto APRN CRNA       Performed By: CRNA  Consent for Airway        Urgency: elective  Indications and Patient Condition       Indications for airway management: sherita-procedural       Induction type:intravenous       Mask difficulty assessment: 1 - vent by mask    Final Airway Details       Final airway type: endotracheal airway       Successful airway: ETT - single  Endotracheal Airway Details        ETT size (mm): 7.0       Cuffed: yes       Successful intubation technique: direct laryngoscopy       DL Blade Type: MAC 4       Grade View of Cords: 1       Adjucts: stylet       Position: Right       Measured from: lips       Secured at (cm): 22       Bite block used: None    Post intubation assessment        Placement verified by: capnometry, equal breath sounds and chest rise        Number of attempts at approach: 1       Number of other approaches attempted: 0       Secured with: pink tape       Ease of procedure: easy       Dentition: Intact    Medication(s) Administered   Medication Administration Time: 3/14/2023 12:13 PM    Additional Comments       Laryngoscopy and intubation by ALEXI Hill.

## 2023-03-14 NOTE — ANESTHESIA CARE TRANSFER NOTE
Patient: Marichuy Worrell    Procedure: Procedure(s):  GASTRECTOMY, SLEEVE, LAPAROSCOPIC       Diagnosis: Morbid obesity (H) [E66.01]  Diagnosis Additional Information: No value filed.    Anesthesia Type:   General     Note:    Oropharynx: spontaneously breathing  Level of Consciousness: drowsy  Oxygen Supplementation: nasal cannula    Independent Airway: airway patency satisfactory and stable  Dentition: dentition unchanged  Vital Signs Stable: post-procedure vital signs reviewed and stable  Report to RN Given: handoff report given  Patient transferred to: PACU    Handoff Report: Identifed the Patient, Identified the Reponsible Provider, Reviewed the pertinent medical history, Discussed the surgical course, Reviewed Intra-OP anesthesia mangement and issues during anesthesia, Set expectations for post-procedure period and Allowed opportunity for questions and acknowledgement of understanding      Vitals:  Vitals Value Taken Time   /76 03/14/23 1310   Temp     Pulse 90 03/14/23 1310   Resp 37 03/14/23 1313   SpO2 96 % 03/14/23 1313   Vitals shown include unvalidated device data.    Electronically Signed By: ROXI Saunders CRNA  March 14, 2023  1:14 PM

## 2023-03-14 NOTE — ANESTHESIA POSTPROCEDURE EVALUATION
Patient: Marichuy Worrell    Procedure: Procedure(s):  GASTRECTOMY, SLEEVE, LAPAROSCOPIC       Anesthesia Type:  General    Note:  Disposition: Admission   Postop Pain Control: Uneventful            Sign Out: Well controlled pain   PONV: No   Neuro/Psych: Uneventful            Sign Out: Acceptable/Baseline neuro status   Airway/Respiratory: Uneventful            Sign Out: Acceptable/Baseline resp. status   CV/Hemodynamics: Uneventful            Sign Out: Acceptable CV status; No obvious hypovolemia; No obvious fluid overload   Other NRE: NONE   DID A NON-ROUTINE EVENT OCCUR? No           Last vitals:  Vitals Value Taken Time   /80 03/14/23 1415   Temp 36.3  C (97.4  F) 03/14/23 1315   Pulse 80 03/14/23 1418   Resp 25 03/14/23 1418   SpO2 98 % 03/14/23 1418   Vitals shown include unvalidated device data.    Electronically Signed By: Tam Aragon MD  March 14, 2023  2:19 PM

## 2023-03-14 NOTE — BRIEF OP NOTE
[facility]    Brief Operative Note    Pre-operative diagnosis: Morbid obesity (H) [E66.01]  Post-operative diagnosis Same as pre-operative diagnosis    Procedure: Procedure(s):  GASTRECTOMY, SLEEVE, LAPAROSCOPIC  Surgeon: Surgeon(s) and Role:     * Thang Gutierrez MD - Primary  Anesthesia: General   Estimated Blood Loss: 30cc    Drains: None  Specimens:   ID Type Source Tests Collected by Time Destination   1 : Partial Gastrectomy Tissue Other SURGICAL PATHOLOGY EXAM Thang Gutierrez MD 3/14/2023 12:50 PM      Findings:   None.  Complications: None.  Implants: * No implants in log *

## 2023-03-14 NOTE — ANESTHESIA PREPROCEDURE EVALUATION
Anesthesia Pre-Procedure Evaluation    Patient: Marichuy Worrell   MRN: [id number] : 1990        Procedure : Procedure(s):  GASTRECTOMY, SLEEVE, LAPAROSCOPIC  possible HERNIORRHAPHY, HIATAL, LAPAROSCOPIC          Past Medical History:   Diagnosis Date     ASCUS of cervix with negative high risk HPV 2017     Depressive disorder      Hypothyroidism 2009     Moderate major depression (H)      Morbidly obese (H)       Past Surgical History:   Procedure Laterality Date     CHOLECYSTECTOMY, LAPOROSCOPIC  2009     DENTAL SURGERY      wisdom teeth      Allergies   Allergen Reactions     Biaxin [Clarithromycin]      rash      Social History     Tobacco Use     Smoking status: Never     Smokeless tobacco: Never   Substance Use Topics     Alcohol use: No      Wt Readings from Last 1 Encounters:   23 148.3 kg (326 lb 15.1 oz)        Anesthesia Evaluation   Pt has had prior anesthetic.     History of anesthetic complications  - PONV.      ROS/MED HX  ENT/Pulmonary:    (-) sleep apnea, MASON risk factors, allergic rhinitis and vocal abnormalities   Neurologic:  - neg neurologic ROS     Cardiovascular:    (-) CAD, dyslipidemia, PVD and stent   METS/Exercise Tolerance: >4 METS    Hematologic:  - neg hematologic  ROS     Musculoskeletal:  - neg musculoskeletal ROS     GI/Hepatic:     (+) Asymptomatic on medication,     Renal/Genitourinary:       Endo:     (+) thyroid problem, hypothyroidism, Obesity,     Psychiatric/Substance Use:  - neg psychiatric ROS   (+) psychiatric history anxiety     Infectious Disease:       Malignancy:    (-) malignancy   Other:            Physical Exam    Airway        Mallampati: I   TM distance: > 3 FB   Neck ROM: full   Mouth opening: > 3 cm    Respiratory Devices and Support         Dental       (+) Minor Abnormalities - some fillings, tiny chips      Cardiovascular   cardiovascular exam normal          Pulmonary   pulmonary exam normal                OUTSIDE LABS:  CBC:   Lab  Results   Component Value Date    WBC 8.5 03/06/2023    WBC 6.7 11/23/2022    HGB 14.3 03/06/2023    HGB 14.1 11/23/2022    HCT 43.2 03/06/2023    HCT 42.9 11/23/2022     03/06/2023     11/23/2022     BMP:   Lab Results   Component Value Date     03/06/2023     11/23/2022    POTASSIUM 4.2 03/06/2023    POTASSIUM 3.8 11/23/2022    CHLORIDE 108 (H) 03/06/2023    CHLORIDE 114 (H) 11/23/2022    CO2 20 (L) 03/06/2023    CO2 17 (L) 11/23/2022    BUN 13.2 03/06/2023    BUN 18 11/23/2022    CR 0.94 03/06/2023    CR 0.95 11/23/2022    GLC 72 03/14/2023    GLC 82 03/06/2023     COAGS: No results found for: PTT, INR, FIBR  POC:   Lab Results   Component Value Date    HCG Negative 06/30/2022     HEPATIC:   Lab Results   Component Value Date    ALBUMIN 3.6 11/23/2022    PROTTOTAL 7.7 11/23/2022    ALT 28 11/23/2022    AST 16 11/23/2022    ALKPHOS 70 11/23/2022    BILITOTAL 0.4 11/23/2022     OTHER:   Lab Results   Component Value Date    A1C 5.1 11/23/2022    YOUSIF 9.6 03/06/2023    TSH 0.33 03/06/2023    T4 1.64 03/06/2023    T3 121 06/30/2022       Anesthesia Plan    ASA Status:  2   NPO Status:  NPO Appropriate    Anesthesia Type: General.     - Airway: ETT   Induction: Propofol.   Maintenance: TIVA.   Techniques and Equipment:     - Lines/Monitors: 2nd IV, BIS     Consents    Anesthesia Plan(s) and associated risks, benefits, and realistic alternatives discussed. Questions answered and patient/representative(s) expressed understanding.     - Discussed: Risks, Benefits and Alternatives for BOTH SEDATION and the PROCEDURE were discussed     - Discussed with:  Patient, Other (See Comment)      - Extended Intubation/Ventilatory Support Discussed: No.      - Patient is DNR/DNI Status: No    Use of blood products discussed: No .     Postoperative Care    Pain management: Multi-modal analgesia, IV analgesics.   PONV prophylaxis: Ondansetron (or other 5HT-3), Dexamethasone or Solumedrol, Background Propofol  Infusion     Comments:                Stella Ott MD

## 2023-03-14 NOTE — OP NOTE
[facility]    Operative Note    Pre-operative diagnosis: Morbid obesity (H) [E66.01]   Post-operative diagnosis * No post-op diagnosis entered *   Procedure: Procedure(s):  GASTRECTOMY, SLEEVE, LAPAROSCOPIC  possible HERNIORRHAPHY, HIATAL, LAPAROSCOPIC   Surgeon: Surgeon(s) and Role:     * Thang Gutierrez MD - Primary   Assistant Surgeon                      Anesthesia: The assistance of Sol Quick NP was required in this case due to advanced laparoscopy technique; she assisted by performing port assistance and providing exposure to patient bedside while I was dissecting.    I attest that no qualified resident or fellow was available to assist for this surgery due adequate training and skills to assist with this technically challenging case and instrumentation; as a consequence, I attest that Sol performed these needed skills.  General    Estimated blood loss: Less than 50 ml   Drains: None   Specimens: * No specimens in log *   Findings: None.   Complications: None.   Implants: None.         BOUGIE SIZE: 40 FR  DISTANCE FROM PYLORUS: 10 CM  STAPLE LINE REINFORCEMENT: NO  STAPLE LINE OVERSEW: NO  COMORBIDITIES:   Past Medical History:   Diagnosis Date     ASCUS of cervix with negative high risk HPV 08/08/2017     Depressive disorder      Hypothyroidism 2009     Moderate major depression (H)      Morbidly obese (H)        INDICATIONS FOR PROCEDURE  Marichuy Worrell is a 32 year old female who is morbidly obese.  Numerous weight loss attempts without surgery have been without success.     After understanding the risks and benefits of proceeding with a laparoscopic vertical sleeve gastrectomy, she agreed to an operation as outlined by me.    I reviewed the risks of surgery with Marichuy Worrell.    These include, but are not limited to, death, myocardial infarction, pneumonia, urinary tract infection, deep venous thrombosis with or without pulmonary embolus,  abdominal infection from bowel injury or abscess, bowel obstruction, wound infection, and bleeding.    More specific risks related to vertical sleeve gastrectomy were detailed at the bariatric informational seminar and include the followin.) leak at the vertical sleeve staple line, 2.) stricture in the sleeve, 3.) nausea, vomiting, and dehydration for several months, 4.) adhesions causing bowel obstruction, 5.) rapid weight loss causing a higher rate of gallstone formation during the first 6 months after surgery, 6.) decreased absorption of vitamins because of the reduced stomach size, 7.) weight regain if inappropriate food intake occurs.    The BMI that we are treating this patient for was measured at the initial consultation visit in our bariatric program and it was: 60.2 kg/m2 (as calculated just below).      The initial consult height, weight, and BMI are as follows:    Height: Data Unavailable   BARIATRIC WEIGHT TRACKING 2023   Initial BMI 60.24       Our weight loss surgery program requires weight loss prior to bariatric surgery and currently the height, weight, and BMI are as follows:    Currently the Body mass index is 54.41 kg/m .    Due to the patient's comorbidity conditions of class III obesity, in association with elevated body mass index, bariatric surgery has been recommended and is being performed today.    Moreover, as the surgeon performing this procedure, I certify that the following are true in regards to this patient at or prior to the day of surgery:    1. The patient's body mass index (BMI) is or has been greater than or equal to 35 kg/m2.  2. The patient has at least one co-morbidity related to obesity (as outlined above).  3. The patient has been previously unsuccessful with medical treatment for obesity.  Please note that some of this information has been documented as part of a comprehensive pre-bariatric surgery process in the outpatient clinic and is NOT immediately available  in the inpatient encounter for this bariatric operation.      OPERATIVE PROCEDURE:     Marichuy Worrell was brought to the operating room and prepared in routine fashion. Under the benefits of general anesthesia, a left upper quadrant Veress needle was inserted and pneumoperitoneum was established using carbon dioxide gas to a maximum pressure of 15 mmHg. A total of five ports were placed into the abdomen.     A liver retractor was placed through the rightmost port and this provided a view of the upper stomach. The operation was started by dividing the short gastric vessels off the greater curvature of the stomach. This dissection was carried up to the angle of His, and ligasure dissector was used for hemostasis.     A bougie (size noted above) was passed into the stomach and I used 4 blue loads of the [employer] flex linear cutter stapler device to create a vertical sleeve gastrectomy with the bougie as a template. The bougie was removed.    A transabdominal properitoneal anesthetic block was performed using 30 ml 0.5% bupivicaine diluted with 90 ml saline (120 mL total); this was injected using 22gauge spinal needle into the properitoneal planes around the ports and laterally along the anterior axillary line under direct optical guidance. Some of the mixture was used to inject local anesthetic at the skin of each incision as well.    The sleeve gastrectomy specimen (partial gastrectomy) was now removed from the abdomen through the 15 mm port.    Hemostasis was secured, and the liver retractor and all ports were removed from the abdomen under direct visualization.     All needle and sponge counts were correct x2 at the end of the operation, and I was present for all critical components of the procedure.     Skin incisions were closed using skin staples, and sterile dressings were placed.     Thang Gutierrez MD  Surgery  [phone] (hospital )  [phone] (clinic nurses)

## 2023-03-15 VITALS
HEART RATE: 63 BPM | BODY MASS INDEX: 48.82 KG/M2 | OXYGEN SATURATION: 98 % | SYSTOLIC BLOOD PRESSURE: 123 MMHG | DIASTOLIC BLOOD PRESSURE: 63 MMHG | RESPIRATION RATE: 16 BRPM | HEIGHT: 65 IN | WEIGHT: 293 LBS | TEMPERATURE: 97.7 F

## 2023-03-15 LAB — GLUCOSE BLDC GLUCOMTR-MCNC: 90 MG/DL (ref 70–99)

## 2023-03-15 PROCEDURE — 258N000003 HC RX IP 258 OP 636: Performed by: NURSE PRACTITIONER

## 2023-03-15 PROCEDURE — 250N000013 HC RX MED GY IP 250 OP 250 PS 637: Performed by: NURSE PRACTITIONER

## 2023-03-15 PROCEDURE — 250N000011 HC RX IP 250 OP 636: Performed by: NURSE PRACTITIONER

## 2023-03-15 RX ORDER — OXYCODONE HYDROCHLORIDE 5 MG/1
5 TABLET ORAL EVERY 6 HOURS PRN
Qty: 12 TABLET | Refills: 0 | Status: SHIPPED | OUTPATIENT
Start: 2023-03-15 | End: 2023-03-23

## 2023-03-15 RX ADMIN — ESCITALOPRAM OXALATE 20 MG: 20 TABLET ORAL at 07:53

## 2023-03-15 RX ADMIN — LEVOTHYROXINE SODIUM 175 MCG: 0.17 TABLET ORAL at 07:53

## 2023-03-15 RX ADMIN — TOPIRAMATE 100 MG: 100 TABLET, FILM COATED ORAL at 07:53

## 2023-03-15 RX ADMIN — SENNOSIDES AND DOCUSATE SODIUM 2 TABLET: 50; 8.6 TABLET ORAL at 07:53

## 2023-03-15 RX ADMIN — KETOROLAC TROMETHAMINE 30 MG: 30 INJECTION, SOLUTION INTRAMUSCULAR; INTRAVENOUS at 05:12

## 2023-03-15 RX ADMIN — KETOROLAC TROMETHAMINE 30 MG: 30 INJECTION, SOLUTION INTRAMUSCULAR; INTRAVENOUS at 10:18

## 2023-03-15 RX ADMIN — ENOXAPARIN SODIUM 40 MG: 40 INJECTION SUBCUTANEOUS at 07:52

## 2023-03-15 RX ADMIN — SODIUM CHLORIDE, POTASSIUM CHLORIDE, SODIUM LACTATE AND CALCIUM CHLORIDE: 600; 310; 30; 20 INJECTION, SOLUTION INTRAVENOUS at 05:18

## 2023-03-15 RX ADMIN — ACETAMINOPHEN 650 MG: 325 TABLET ORAL at 10:17

## 2023-03-15 RX ADMIN — OMEPRAZOLE 20 MG: 20 CAPSULE, DELAYED RELEASE ORAL at 07:53

## 2023-03-15 ASSESSMENT — ACTIVITIES OF DAILY LIVING (ADL)
ADLS_ACUITY_SCORE: 20

## 2023-03-15 NOTE — PROGRESS NOTES
"Post Op Check    03/14/2023    Marichuy Worrell is a 32 year old female with h/o hypothyroidism, obesity and laparoscopic cholecystectomy (2009), now POD#0 s/p laparoscopic gastrectomy sleeve.    Pt reports feeling well and was sitting up in chair. Denies SOB, chest pain, or dizziness. Has gotten out of bed to use the restroom. Denies nausea/vomiting. Adequate UOP per patient report.    /73 (Cuff Size: Adult Regular)   Pulse 67   Temp 97.3  F (36.3  C) (Oral)   Resp 15   Ht 1.651 m (5' 5\")   Wt 148.3 kg (326 lb 15.1 oz)   LMP 02/15/2023 (Approximate)   SpO2 99%   BMI 54.41 kg/m      Gen: A&O x3, NAD  Chest: breathing non-labored  Abdomen: soft, non-tender, non-distended. Dressings c/d/i   Incision: clean, dry, intact  Extremities: warm and well perfused    A/P: No acute post-op issues. Continue plan of care per primary team. Please call with any questions.    Please page if questions,     Sarah Jean-Baptiste, PhD  Medical School Student, MS3  MD/PhD Candidate  [employer]      --------------------------------------------------------------------------------------------------------------------      I saw and evaluated the patient concurrently with the care team and agree with the student's assessment and plan as written above. I was present at all times for any mentioned procedures.      Please page on call resident via [facility] w/ questions,      Adama Dasilva MD  Surgical Resident        "

## 2023-03-15 NOTE — DISCHARGE SUMMARY
Pt. discharged at 12:10 PM to home, was accompanied by family, and left with personal belongings. Pt. received complete discharge paperwork and medications as filled by discharge pharmacy. Pt. was given times of last dose for all discharge medications in writing on discharge medication sheets. Discharge teaching included  medication, pain management, activity restrictions, dressing changes, and signs and symptoms of infection. Pt. had no further questions at the time of discharge and no unmet needs were identified.

## 2023-03-15 NOTE — PHARMACY-CONSULT NOTE
Bariatric Consult    Medications evaluated as requested per Bariatric Consult. Patient may swallow tablets/capsules ONLY if less than   inch.  Larger tablets/capsules should be broken, crushed or split.    No medication changes were needed based on the Bariatric Medication Management Policy.    The pharmacist will continue to follow as new medications are ordered.

## 2023-03-15 NOTE — DISCHARGE INSTRUCTIONS
After Bariatric Surgery Discharge Instructions  [facility]     Note: Ask your nurse to order your medications from the pharmacy. Be sure you have your medications with you when you leave.  Diet on discharge : bariatric full liquid.    How much fluid should I drink?  Strive for 48-64 ounces daily.  Carry a water bottle with you without a straw or sports top. Drink from it often.  Keep track of how much fluid you drink in a day.  Remember:  -Do not use straws, chew gum or suck on hard candies. They may cause painful gas.    -Sip, don't slurp when you drink.    -Practice small sips using a medicine cup for the first week postop.   -No ice or cold drinks. This could cause gas or spasms.   -No coffee, soda pop or drinks with caffeine. These may cause stomach pain.   -No alcohol. It is bad for your liver and will cause stomach pain.    How often should I do my deep breathing and coughing?  Use your incentive spirometer (small plastic breathing device) every hour while awake after you get home. Using the incentive spirometer helps you deep breath. Continuing to cough and deep breath will help prevent fevers and pneumonia.   If you do not have a fever after one week, you can stop using the incentive spirometer and discard it.     You can continue to take deep breaths without the incentive spirometer every one to two hours while awake for the month after surgery.    What kinds of activity can I do?  Get plenty of rest the first few days after surgery and try to balance rest and activity. You will need some time to recover - you may be more tired than you realize at first. You'll feel better and heal faster if you take good care of yourself.  For 4 weeks after surgery (Please review restrictions at your one week visit, they could change based on how well you are doing):  -Don't lift more than 20 pounds.   -Take 4-5 short walks every day.  -Don't jog, run, or do belly exercises.  Don't  swim, bathe or use a hot tub until your cuts are healed (scabs are gone).  You may shower  Don't plan to fly or take a road trip within the first 1 to 3 months after surgery.  You could get a blood clot in your legs. If you must travel, get up and move around every hour for at least 5 minutes before continuing on your journey.  Your care team can help you decide when it's safe for you to travel.     What can I do for pain control?  You had major belly surgery that involves all layers of your belly muscles. Pain is expected, even for some as far out as 6-8 weeks after surgery. Moving, sneezing, coughing, and breathing will cause discomfort because these activities use your belly muscles.   Please see your after visit summary medication review for what pain medication will be continued, discontinued and newly started for you.    You can take opioid pain medicine if prescribed and if needed. Try to wean off from it as soon as you feel comfortable.   Do not drive while you are taking opioid pain medicine. This is dangerous.  You can take acetaminophen (Tylenol) between your prescribed pain medicine on a scheduled basis OR take it scheduled every 6 hours (check with your care team for specifics).  -Acetaminophen formulation options:    -Liquid   -Caplet (Cut caplet in half before taking)   -Do not take more than 3000 mg Tylenol in a 24 hour period.  -You may also take Tylenol for pain in place of the opioid pain medicine (check with your care team for specifics).   You can apply ice or heat to the affected area(s). Just remember to wrap the ice in something and limit icing sessions to 20 minutes. Excessive icing can irritate the skin or cause skin damage.  You can apply heat with a hot, wet towel or heating pad. Just like cold therapy, limit heat application to 20 minutes. Never sleep with a heating pad on. It could cause severe burns to your skin.  Wear your binder to support your belly muscles if you have one.  Take  this off a little more each day and try to be off completely by 2 weeks after surgery. If you don't need your binder for comfort or support, you don't need to wear it.   You may not be able to sleep in a comfortable position for a few weeks after surgery. This is normal. You may be more comfortable sleeping in a recliner or propped up with 3 pillows for the first couple of weeks after surgery.  Do not take NSAID's (Non-Steroidal Anti-Inflammatory Drugs) (examples: ibuprofen, Motrin, Advil, Aleve, and Naproxen), aspirin, or use pain patches with NSAID's. They will increase your risk of bleeding or getting an ulcer.    Please call the clinic for any of the following pain concerns, we would like to talk to you:  -pain that does not improve with rest  -pain that gets worse and worse  -pain that is not controlled by your pain medicine  -a sudden severe increase in pain    What medications will I need to take after surgery?  You may be discharged with the following types of medications: omeprazole 20 mg once daily for heartburn symptom prevention, zofran as needed for nausea and a medication called hyoscyamine (levsin) as needed for cramping.Please see your after visit summary medication review for what will be continued, discontinued and newly started.  It is important to reduce the amount of acid in your new stomach for a couple of months after surgery while it is still healing. We will prescribe an acid reducer or antacid. Take it as directed. This will help prevent ulcers, heartburn and acid reflux.  If you took an acid reducer before you had surgery, your care team will let you know what acid reducer you will take after surgery.   It is okay to swallow any medications smaller than   inch in size.   -If it is larger than   inch, it may need to be cut, crushed, or in a liquid form. Check with your care team about which way is most appropriate for you and your medications.    What should I know about my incisions  (cuts)?  Your incisions are covered with white steri strips or butterfly tape and have band aids or gauze over the top. If you have gauze or band aids, they can come off in the hospital.  Leave your steri strips on until they fall off on their own. If the steri strips don't fall off after 1 to 2 weeks, you can take them off. If they fall off earlier, replace them with clean band aids trying to avoid touching the incision itself.   If you have gauze covered by a clear dressing, remove 2-3 days after surgery or as directed by your care team.  You may shower in the hospital after surgery and can get your incision coverings wet.  Do not submerge in water (e.g. No baths, swimming pools, hot tubs) until your care team tells you it is okay and your incisions are completely healed.    Call the clinic if you have any of these signs or symptoms of infection:  -Redness around the site.  -Drainage that smells bad.  -Drainage that is thick yellow or green.  -An increase in pain around the incision site  -An increase in swelling around the incision site  -Heat or warmth around incision site   -Fever of 101.5  F (38.3  C) or higher when taken under the tongue.    -Chills    Will my urine or bowel movements change?   Your first bowel movements (stools) will likely be liquid. You may also notice old blood or a darker color (black or maroon color) in your bowel movements.  This is not unusual and usually goes away after the first week, if not sooner. You may not have a bowel movement for a week.   If you have not had a bowel movement for at least three days after your surgery date and are passing gas, you can use over the counter stool softeners.  Please stop taking the stool softeners and laxatives if your stools are loose.  Increasing fluids and activity as well as getting off narcotics will help prevent constipation.    Call the clinic if:   -You have stomach pain.   -You continue to have constipation.  -You have excessive  "bloating after walking and passing gas.    How can I prevent dehydration if I feel nauseated (sick to my stomach) and vomit (throw up)?   Vomiting is not normal after surgery. If you continue to have nausea and vomiting, call the clinic.   Nausea can be a sign of dehydration. That is why it is very important to track your fluids.  Do not nap more than one hour during the day. Set a timer to wake yourself up, if needed. Too much sleep will keep you from drinking enough fluid during the day and lead to dehydration.  No outside activity in hot, humid weather until you can drink 48 to 64 ounces of fluid in 24 hours. If you sweat a lot, your body may lose too much water.  Try to take a 1 ounce sip of water (one medicine cup) every 15 minutes.  Set a timer to remind yourself.    Call the clinic if you have any of these signs or symptoms of dehydration:  -Dark colored urine  -Urinating (pass water) less than 2-3 times per day  -Lack of energy  -Nausea  -Dizziness  -Headache    Call the clinic ANY TIME at [phone] if:  -Your pain medicine is not working.  -You have a fever ? 101.5 F.  -You have belly or left shoulder pain that gets worse and worse.  -You have a swollen leg with redness, warmth, or pain behind the knee or calf.  -You have chest pain   -You feel very short of breath.  -You have a sudden severe increase in heart rate.  -You have vomiting that gets worse and worse.  -You have constant nausea (feeling sick to your stomach) that does not go away with medication.  -You have trouble swallowing.  -You have an increasing feeling that \"something is not right\".  -You have hiccups that do not stop.  -You have any questions or concerns.    AFTER HOURS QUESTIONS OR CONCERNS: Call [phone] and ask to speak with surgery resident if you are having troubles in the evenings, at night, or on weekends. Please call if you experience increasing abdominal pain, nausea, vomiting, increasing drainage from your wounds, chills, " or fever >101.5    If you have to go to the Emergency Room, we prefer you go to the hospital that did your surgery. Please let them know that you had bariatric surgery and to notify your surgeon.    When should I go back to the clinic?  Follow up with your care team in 1-2 weeks.   If this appointment was not already made, please call: [phone]    Appointments located at [facility] clinic:  [facility] [place]    [address]  [place] [address]

## 2023-03-15 NOTE — PLAN OF CARE
"/72 (BP Location: Right arm)   Pulse 70   Temp 97.6  F (36.4  C) (Oral)   Resp 15   Ht 1.651 m (5' 5\")   Wt 147.8 kg (325 lb 14.4 oz)   LMP 02/15/2023 (Approximate)   SpO2 97%   BMI 54.23 kg/m      Time: 1939-2411  Reason for admission: POD#0 s/p laparoscopic gastrectomy sleeve.  Activity: Stand by assist  Pain\Nausea: maintained with scheduled Toradol  Neuro: A&O x4, calm and cooperative.   Cardiac: WDL, denies chest pain   Respiratory: WDL, denies SOB.   GI/: Voids spontaneously, +flatus, +BS, no BM yet.   Diet: Bariatric clear   Lines:  L PIV infusing LR @ 75 ml/hr   Incisions/Drains/Skin:  5 abd lap sites.   Lab:  Reviewed      Plan: Continue with POC        Admitted/transferred from: PACU   2 RN full   skin assessment completed by Lucrecia Still RN and Valerie RHODES RN .  Skin assessment finding: issues found 5 abd lap sites, L PIV    Interventions/actions: other Continue to monitor     Bedside Emergency Equipment Present:  Suction Regulator: Yes  Suction Canister: Yes  Tubing between Regulator and Canister: Yes  O2 Regulator with Tree: Yes  Ambu Bag: Yes   "

## 2023-03-15 NOTE — DISCHARGE SUMMARY
"[facility]   MIS Discharge Summary    Date of Admission: 3/14/2023  Date of Discharge: 3/15/2023    Admission Diagnosis:  1. Morbid Obesity    Discharge Diagnosis:  1. Same as above  2. S/p Laparoscopic sleeve gastrectomy      Consultations:  IP pharmacy    Procedures:  1. Laparoscopic sleeve gastrectomy  by Dr Brenda Rm HPI:  Difficulty managing weight since childhood. Was doing calorie restriction diets before age 8. High weight in life is 400lb, in 2015. She has been working with medical weight management since 2018 through a number of medical providers. She has decided to pursue sleeve gastrectomy. comorbidities include hypothyroidism and depression.       Hospital Course:  The patient was admitted and underwent the above procedure. The patient tolerated the procedure well. There were no complications. The patient's diet was slowly advanced as bowel function returned. Pain was controlled with oral pain medication and the patient was able to ambulate and void without difficulty. The patient received appropriate education post operatively. On POD #1 the patient was discharged to home.    Discharge Physical Exam:  /63 (BP Location: Right arm)   Pulse 63   Temp 97.7  F (36.5  C) (Oral)   Resp 16   Ht 1.651 m (5' 5\")   Wt 147.8 kg (325 lb 14.4 oz)   LMP 02/15/2023 (Approximate)   SpO2 98%   BMI 54.23 kg/m      Gen: NAD  Lungs: non-labored breathing  CV: regular rhythm, normal rate   Abd: obese, soft, nondistended, appropriately tender, incisions are c/d/i  Ext: no peripheral edema  Neuro: AOx3    Meds:       Review of your medicines      START taking      Dose / Directions   hyoscyamine 0.125 MG tablet  Commonly known as: LEVSIN  Used for: Class 3 severe obesity with serious comorbidity and body mass index (BMI) of 50.0 to 59.9 in adult, unspecified obesity type (H), At high risk for postoperative complications      Dose: 125 mcg  Take 1 tablet (125 mcg) by " mouth every 4 hours as needed for cramping  Quantity: 30 tablet  Refills: 1     omeprazole 20 MG DR capsule  Commonly known as: priLOSEC  Used for: S/P laparoscopic sleeve gastrectomy      Dose: 20 mg  Take 1 capsule (20 mg) by mouth every morning (before breakfast)  Quantity: 30 capsule  Refills: 0     ondansetron 4 MG ODT tab  Commonly known as: ZOFRAN ODT  Used for: Class 3 severe obesity with serious comorbidity and body mass index (BMI) of 50.0 to 59.9 in adult, unspecified obesity type (H), At high risk for postoperative complications      Dose: 4 mg  Take 1 tablet (4 mg) by mouth every 6 hours as needed for nausea  Quantity: 15 tablet  Refills: 0     oxyCODONE 5 MG tablet  Commonly known as: ROXICODONE  Used for: S/P laparoscopic sleeve gastrectomy      Dose: 5 mg  Take 1 tablet (5 mg) by mouth every 6 hours as needed for moderate to severe pain  Quantity: 12 tablet  Refills: 0     senna-docusate 8.6-50 MG tablet  Commonly known as: SENOKOT-S/PERICOLACE  Used for: Class 3 severe obesity with serious comorbidity and body mass index (BMI) of 50.0 to 59.9 in adult, unspecified obesity type (H), At high risk for postoperative complications      Dose: 2 tablet  Take 2 tablets by mouth daily as needed for constipation (While taking narcotic pain medications.  Stop taking if having loose stools.)  Quantity: 30 tablet  Refills: 1        CONTINUE these medicines which may have CHANGED, or have new prescriptions. If we are uncertain of the size of tablets/capsules you have at home, strength may be listed as something that might have changed.      Dose / Directions   ciclopirox 8 % external solution  Commonly known as: Penlac  This may have changed:     when to take this    reasons to take this  Used for: Onychomycosis      Apply to adjacent skin and affected nails daily.  Remove with alcohol every 7 days, then repeat.  Quantity: 6.6 mL  Refills: 5     escitalopram 20 MG tablet  Commonly known as: LEXAPRO  This may have  changed: when to take this  Used for: OSCAR (generalized anxiety disorder), Moderate major depression (H)      Dose: 20 mg  Take 1 tablet (20 mg) by mouth daily  Quantity: 90 tablet  Refills: 1     * levothyroxine 200 MCG tablet  Commonly known as: SYNTHROID/LEVOTHROID  This may have changed: additional instructions  Used for: Other specified hypothyroidism      Take 1 tablet by mouth every other day alternating with 175 mcg dose  Quantity: 45 tablet  Refills: 3     * levothyroxine 175 MCG tablet  Commonly known as: SYNTHROID/LEVOTHROID  This may have changed: additional instructions  Used for: Other specified hypothyroidism      Take 1 tablet by mouth every other day alternating with 200 mcg dose  Quantity: 45 tablet  Refills: 3         * This list has 2 medication(s) that are the same as other medications prescribed for you. Read the directions carefully, and ask your doctor or other care provider to review them with you.            CONTINUE these medicines which have NOT CHANGED      Dose / Directions   desogestrel-ethinyl estradiol 0.15-30 MG-MCG tablet  Commonly known as: Enskyce  Used for: Menometrorrhagia      Dose: 1 tablet  Take 1 tablet by mouth daily  Quantity: 84 tablet  Refills: 3     insulin pen needle 31G X 5 MM miscellaneous  Commonly known as: 31G X 5 MM  Used for: Morbidly obese (H)      Use one pen needles daily or as directed.  Quantity: 100 each  Refills: 1     topiramate 25 MG tablet  Commonly known as: TOPAMAX  Used for: Class 3 severe obesity with serious comorbidity and body mass index (BMI) of 60.0 to 69.9 in adult, unspecified obesity type (H)      Dose: 100 mg  Take 4 tablets (100 mg) by mouth 2 times daily  Quantity: 240 tablet  Refills: 3     traZODone 50 MG tablet  Commonly known as: DESYREL  Used for: Moderate major depression (H), OSCAR (generalized anxiety disorder), Insomnia, unspecified type      Dose: 50 mg  Take 1 tablet (50 mg) by mouth At Bedtime  Quantity: 30 tablet  Refills:  11     triamcinolone 0.1 % external cream  Commonly known as: KENALOG      as needed  Refills: 0        STOP taking    Wegovy 2.4 MG/0.75ML pen  Generic drug: Semaglutide-Weight Management              Where to get your medicines      These medications were sent to [facility] - [place], [place] - [address]  [address] [address]    Phone: [phone]     omeprazole 20 MG DR capsule    oxyCODONE 5 MG tablet         Additional instructions:  After Care     Future Labs/Procedures    Activity     Comments:    Your activity upon discharge: activity as tolerated and no lifting of more than 20lb for 4 weeks    Contact Surgeon     Comments:    Contact your Surgeon IF:  ~  Your pain is not controlled by pain medication or pain that suddenly increases;  ~  You develop a fever greater than 101 and/or chills 24 hours after surgery;  ~  You notice redness or bad smelling drainage at the surgery site;  ~  You notice a large amount of bleeding from the surgery site that does not stop when moderate pressure is applied;  ~  You are unable to pass urine within 8-10 hours after surgery;  ~  You have an upset stomach or vomiting lasting more than a day;  ~  You have a skin reaction to tape or dressing such as redness, itching or rash at the surgery site.    Diet     Comments:    Follow this diet upon discharge: bariatric full liquids    Discharge diet     Comments:    Phase II: Full liquid diet, room temperature with one protein shake per day. Goal water intake: 40oz per day by post-operative day #4. Meet with Bariatric Dietitian in 1 week to discuss dietary changes.    Do not drive     Comments:    Do NOT drive until after you have been completely off narcotics for a minimum of 24 hours.    Follow-up Care - Dietician     Comments:    Follow-up with your bariatric dietitian in 1 week.    Return to Work     Comments:    May return to work in 2 weeks if cleared by Bariatric surgeon.     Shower/Bathing     Comments:    Okay to shower. Do NOT soak in tub for 2-4 weeks.    Surgical Site Care     Comments:    If you have skin tapes on your surgical site(s), leave them on the surgical site(s) until they fall off on their own or 1 week after surgery date. May remove Band-Aid one day after surgery.    Wash your hands     Comments:    Wash your hands with soap and warm water before you touch the site of surgery.    Weight Restrictions     Comments:    Do not lift over 20 pounds for 2 weeks.              Follow Up:  -Follow up with Tegan Tucker PA-C 3/23/2023      BARIATRIC PATIENTS:  Call [phone] to schedule or to reach your care team    Sol Quick [facility] [place]

## 2023-03-16 ENCOUNTER — PATIENT OUTREACH (OUTPATIENT)
Dept: ENDOCRINOLOGY | Facility: CLINIC | Age: 33
End: 2023-03-16
Payer: COMMERCIAL

## 2023-03-16 NOTE — PROGRESS NOTES
RN Post-Op/Post-Discharge Care Coordination Note    Ms. Marichuy Worrell is a 32 year old female who underwent laparoscopic gastric sleeve on 3/14/23 with  Dr. Derek Gutierrez.  Spoke with Patient.    Support  Patient able to care for self independently     Health Status  Fevers/chills: Patient denies any fever or chills.    Nausea/Vomiting: Patient denies nausea/vomiting.  Encouraged patient to use Levsin for cramping.  Also encouraged warmer fluids.    Eating/drinking: Tolerating full liquid diet. Reminded to drink small sips slowly.  Encouraged room temperature/warm fluids.    Fluid Ounces per day: 20-30 ml so far today    Bowel/Bladder habits: Patient reports no bowel movement since surgery. and is passing gas. Last bowel movement 3/14/23.  Urine Clear light yellow.    Activity: standing, sitting, walking and ambulating stairs.    Breathing: Reminded patient to use incentive spirometer- 5 to 10 deep breaths each hour while awake.    Other symptoms: Tachycardia: no, Dizziness/lightheadedness: no, Shortness of breath, dyspnea with exertion, leg pain/swelling: no.      Drains (ROSALINA): N/A    Incisions: Patient denies any signs and symptoms of infection..  Wound closure:  Steri-strips    Pain: Rates pain a 1 on a 10 Scale.  None.  Encouraged non-medication management modalities: Heating pad, with barrier, to abdomen, Frequent position changes, Walking, Ice packs, with barrier, to incisions and Abdominal Binder    New Medications:  Omeprazole (opening capsules), Levsin, Zofran, Oxycodone, Tylenol and Senna    Activity/Restrictions  No lifting in excess of 20 pounds for 4 weeks    Pathology reviewed with patient:  No, not yet available    Forms/Letters  No. All forms should be faxed to [phone].  Off work for 2 days/weeks.  RTW 3/27/23    Postop Appointment Reminder  March 23 at 11:00 AM confirmed with the patient:  Yes.    Additional Questions: All of her questions were answered including reviewing diet, restrictions, and  wound care.  She will call this office if she has any further questions and/or concerns.        Whom and When to Call  Nurses: [phone]  Fax: [phone]     Patient advised if any concerns outside of clinic hours, to call hospital at [phone] and ask to speak to on-call bariatric resident. They should present to ED at [facility] to be assessed if urgency arises.      Risk for:  urinary tract infection, pneumonia, leg clots (deep vein thrombosis), lung clots (pulmonary emboli), injury to the bowels or other organs, bowel obstruction, hernia, and gastrointestinal bleeding.      Weight loss surgery side effects:  abdominal pain, cramping, bloating, difficulty swallowing, constipation, nausea, vomiting, diarrhea, frequent loose stools, dehydration, hair loss, excess skin, protein, iron and vitamin deficiencies, malnutrition, fatigue, and heartburn.    Bariatric surgery risks may include:  an internal leak at the staple line, narrowing of the esophagus, stomach or intestines (strictures), gallstones, bowel obstruction, inflammation of the esophagus or stomach, ulcers with or without bleeding, injuries to other organs, hernias, and iron deficiency.    Sleeve gastrectomy side effects:  leaks are more common after this procedure.  Risks include:  internal leak at the vertical sleeve staple line; nausea, vomiting, and dehydration for several months; bowel obstruction; gallstones during the first 6 months related to rapid weight loss; decreased absorption of vitamins and protein because of the reduced stomach size; weight regain if you increase food intake; narrowing of stomach, esophagus or intestines (stricture); injury to other organs; hernia; and ulcers.

## 2023-03-17 LAB
PATH REPORT.COMMENTS IMP SPEC: NORMAL
PATH REPORT.COMMENTS IMP SPEC: NORMAL
PATH REPORT.FINAL DX SPEC: NORMAL
PATH REPORT.GROSS SPEC: NORMAL
PATH REPORT.MICROSCOPIC SPEC OTHER STN: NORMAL
PATH REPORT.RELEVANT HX SPEC: NORMAL
PHOTO IMAGE: NORMAL

## 2023-03-23 ENCOUNTER — OFFICE VISIT (OUTPATIENT)
Dept: ENDOCRINOLOGY | Facility: CLINIC | Age: 33
End: 2023-03-23
Payer: COMMERCIAL

## 2023-03-23 VITALS
SYSTOLIC BLOOD PRESSURE: 120 MMHG | BODY MASS INDEX: 48.82 KG/M2 | RESPIRATION RATE: 17 BRPM | HEIGHT: 65 IN | HEART RATE: 73 BPM | WEIGHT: 293 LBS | OXYGEN SATURATION: 98 % | TEMPERATURE: 97.9 F | DIASTOLIC BLOOD PRESSURE: 78 MMHG

## 2023-03-23 DIAGNOSIS — E66.01 CLASS 3 SEVERE OBESITY WITH SERIOUS COMORBIDITY AND BODY MASS INDEX (BMI) OF 50.0 TO 59.9 IN ADULT, UNSPECIFIED OBESITY TYPE (H): ICD-10-CM

## 2023-03-23 DIAGNOSIS — E66.813 CLASS 3 SEVERE OBESITY WITH SERIOUS COMORBIDITY AND BODY MASS INDEX (BMI) OF 50.0 TO 59.9 IN ADULT, UNSPECIFIED OBESITY TYPE (H): ICD-10-CM

## 2023-03-23 DIAGNOSIS — Z91.89 AT HIGH RISK FOR POSTOPERATIVE COMPLICATIONS: ICD-10-CM

## 2023-03-23 PROCEDURE — 99024 POSTOP FOLLOW-UP VISIT: CPT

## 2023-03-23 RX ORDER — ONDANSETRON 4 MG/1
4 TABLET, ORALLY DISINTEGRATING ORAL EVERY 6 HOURS PRN
Qty: 15 TABLET | Refills: 0 | Status: SHIPPED | OUTPATIENT
Start: 2023-03-23 | End: 2024-04-18

## 2023-03-23 ASSESSMENT — PAIN SCALES - GENERAL: PAINLEVEL: MILD PAIN (2)

## 2023-03-23 NOTE — PROGRESS NOTES
"Postoperative bariatric surgery visit.    Patient underwent sleeve gastrectomy 1 weeks ago, on 3/14/2023 with Dr. Gutierrez.      Tolerating liquids: 64oz of fluid. 60g of protein. Drinking - water, protein shakes, broth, propel, Gatorade. Nausea comes and goes throughout the day. Seems to be worse with broth with protein powder and protein shake. Has had some increase nausea in the past 24hours. Has been taking zofran, with relief. Denies dysphagia, odynophagia, vomiting.   Lightheadedness: No  Abdominal pain: If walks too much will feel diffuse abdominal pain. No incisional pain or abdominal cramping. Had gas pain in her back for little, but has improved.   Bowel movements: Had looser stools. Normal BM yesterday. No longer taking Senna.   Fevers/shakes/chills: No  GERD: No symptoms Taking omeprazole daily  Leg/calf pain: No  Chest pain/SOB: No, no palpitations  Walking: Been walking around house and staying active.   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 Scheduled to go back full time on Monday. Has been working from home. No note needed      Neck pain - was getting dress and moved her kneck wrong and tweaked the left side of neck. Pain with movement. Describes the pain as pinching. Does not radiate. It has been improving. Taking tylenol and heating pad with some relief.     Rash - red, bumpy rash over cheeks and chin. Stopped Levsin and has since improved. No rash today.     How many opioid pain medications used after surgery? 2 What did you do with extra pills? home  Were any opioid pain medication refills provided after surgery? No  Were any opioid pain medications needed after 30 days postop? N/A    /78   Pulse 73   Temp 97.9  F (36.6  C)   Resp 17   Ht 1.651 m (5' 5\")   Wt 143.4 kg (316 lb 1.6 oz)   LMP 02/15/2023 (Approximate)   SpO2 98%   BMI 52.60 kg/m    NAD  Overall looks well  Incisions c/d/i; Abdomen not tender to palpation    Wt " Readings from Last 5 Encounters:   03/23/23 143.4 kg (316 lb 1.6 oz)   03/14/23 147.8 kg (325 lb 14.4 oz)   03/06/23 (!) 150.1 kg (331 lb)   02/27/23 (!) 156.2 kg (344 lb 6.4 oz)   02/22/23 (!) 152.4 kg (336 lb)         Pathology   A. PARTIAL GASTRECTOMY:  - Unremarkable gastric mucosa  - No H. pylori-like organisms identified on routine staining  - No intestinal metaplasia identified    Plan:  1. RD visit tomorrow. Discuss nausea with protein.       - Start vitamin supplements per RD directions.      - Advance diet per RD directions.  2. Follow-up: Sol Quick 4/14/2023   3. Actigall prescription - N/A  4. B12 SL or injection - discuss at one month visit  5. Pathology reviewed with no concerns  6. Weight loss medications - Decrease Topiramate 75mg twice daily for 5 days, then decrease to 50mg twice daily for 5 days, then 25mg twice daily for 5 days, then stop.   7. Need to restart statin - N/A   8. Discussed restrictions of no lifting, pushing pulling >20lbs till 4weeks post op.  9. Discussed no water submersion till incisions are completely healed.  10. Zofran refilled due to nausea with protein use.   11. Neck pain - reach out to PCP if no improvement. Continue tylenol, cold and heat packs as needed.       MALISSA GARCIA PA-C

## 2023-03-23 NOTE — PATIENT INSTRUCTIONS
Plan:  1. RD visit tomorrow. Discuss nausea with protein.       - Start vitamin supplements per RD directions.      - Advance diet per RD directions.  2. Follow-up: Sol Quick 4/14/2023   3. Actigall prescription - N/A  4. B12 SL or injection - discuss at one month visit  5. Pathology reviewed with no concerns  6. Weight loss medications - Decrease Topiramate 75mg twice daily for 5 days, then decrease to 50mg twice daily for 5 days, then 25mg twice daily for 5 days, then stop.   7. Need to restart statin - N/A   8. Discussed restrictions of no lifting, pushing pulling >20lbs till 4weeks post op.  9. Discussed no water submersion till incisions are completely healed.  10. Zofran refilled due to nausea with protein use.

## 2023-03-23 NOTE — NURSING NOTE
"(   Chief Complaint   Patient presents with     Post-op Visit     DOS 3/14/23    )    ( Weight: 316 lb 1.6 oz )  ( Height: 5' 5\" )  ( BMI (Calculated): 52.6 )  (   )  (   )  (   )  (   )  (   )  (   )    ( BP: (!) 148/78 )  (   )  ( Temp: 97.9  F (36.6  C) )  (   )  ( Pulse: 75 )  ( Resp: 17 )  ( SpO2: 98 % )    (   Patient Active Problem List   Diagnosis     Class 3 severe obesity with serious comorbidity and body mass index (BMI) of 60.0 to 69.9 in adult (H)     Moderate major depression (H)     CARDIOVASCULAR SCREENING; LDL GOAL LESS THAN 160     Other specified hypothyroidism     OSCAR (generalized anxiety disorder)     Morbid obesity (H)    )  (   Current Outpatient Medications   Medication Sig Dispense Refill     ciclopirox (PENLAC) 8 % external solution Apply to adjacent skin and affected nails daily.  Remove with alcohol every 7 days, then repeat. (Patient taking differently: as needed Apply to adjacent skin and affected nails daily.  Remove with alcohol every 7 days, then repeat.) 6.6 mL 5     desogestrel-ethinyl estradiol (ENSKYCE) 0.15-30 MG-MCG tablet Take 1 tablet by mouth daily 84 tablet 3     escitalopram (LEXAPRO) 20 MG tablet Take 1 tablet (20 mg) by mouth daily (Patient taking differently: Take 20 mg by mouth every morning) 90 tablet 1     hyoscyamine (LEVSIN) 0.125 MG tablet Take 1 tablet (125 mcg) by mouth every 4 hours as needed for cramping (Patient not taking: Reported on 3/23/2023) 30 tablet 1     insulin pen needle (31G X 5 MM) 31G X 5 MM miscellaneous Use one pen needles daily or as directed. 100 each 1     levothyroxine (SYNTHROID/LEVOTHROID) 175 MCG tablet Take 1 tablet by mouth every other day alternating with 200 mcg dose (Patient taking differently: Take 1 tablet by mouth every other day alternating with 200 mcg dose. Taken in AM) 45 tablet 3     levothyroxine (SYNTHROID/LEVOTHROID) 200 MCG tablet Take 1 tablet by mouth every other day alternating with 175 mcg dose (Patient taking " differently: Take 1 tablet by mouth every other day alternating with 175 mcg dose. Taken in AM) 45 tablet 3     omeprazole (PRILOSEC) 20 MG DR capsule Take 1 capsule (20 mg) by mouth every morning (before breakfast) 30 capsule 0     ondansetron (ZOFRAN ODT) 4 MG ODT tab Take 1 tablet (4 mg) by mouth every 6 hours as needed for nausea 15 tablet 0     oxyCODONE (ROXICODONE) 5 MG tablet Take 1 tablet (5 mg) by mouth every 6 hours as needed for moderate to severe pain 12 tablet 0     senna-docusate (SENOKOT-S/PERICOLACE) 8.6-50 MG tablet Take 2 tablets by mouth daily as needed for constipation (While taking narcotic pain medications.  Stop taking if having loose stools.) 30 tablet 1     topiramate (TOPAMAX) 25 MG tablet Take 4 tablets (100 mg) by mouth 2 times daily 240 tablet 3     traZODone (DESYREL) 50 MG tablet Take 1 tablet (50 mg) by mouth At Bedtime 30 tablet 11     triamcinolone (KENALOG) 0.1 % external cream as needed      )  ( Diabetes Eval:    )    ( Pain Eval:  Mild Pain (2) )    ( Wound Eval:       )    (   History   Smoking Status     Never   Smokeless Tobacco     Never    )    ( Signed By:  Isreal Padilla, EMT; March 23, 2023; 11:00 AM )

## 2023-03-23 NOTE — LETTER
3/23/2023       RE: Marichuy Worrell  [address] [place] [address]     Dear Colleague,    Thank you for referring your patient, Marichuy Worrell, to the [facility] [place] at [facility]. Please see a copy of my visit note below.    Postoperative bariatric surgery visit.    Patient underwent sleeve gastrectomy 1 weeks ago, on 3/14/2023 with Dr. Gutierrez.      Tolerating liquids: 64oz of fluid. 60g of protein. Drinking - water, protein shakes, broth, propel, Gatorade. Nausea comes and goes throughout the day. Seems to be worse with broth with protein powder and protein shake. Has had some increase nausea in the past 24hours. Has been taking zofran, with relief. Denies dysphagia, odynophagia, vomiting.   Lightheadedness: No  Abdominal pain: If walks too much will feel diffuse abdominal pain. No incisional pain or abdominal cramping. Had gas pain in her back for little, but has improved.   Bowel movements: Had looser stools. Normal BM yesterday. No longer taking Senna.   Fevers/shakes/chills: No  GERD: No symptoms Taking omeprazole daily  Leg/calf pain: No  Chest pain/SOB: No, no palpitations  Walking: Been walking around house and staying active.   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 Scheduled to go back full time on Monday. Has been working from home. No note needed      Neck pain - was getting dress and moved her kneck wrong and tweaked the left side of neck. Pain with movement. Describes the pain as pinching. Does not radiate. It has been improving. Taking tylenol and heating pad with some relief.     Rash - red, bumpy rash over cheeks and chin. Stopped Levsin and has since improved. No rash today.     How many opioid pain medications used after surgery? 2 What did you do with extra pills? home  Were any opioid pain medication refills provided after surgery? No  Were any opioid  "pain medications needed after 30 days postop? N/A    /78   Pulse 73   Temp 97.9  F (36.6  C)   Resp 17   Ht 1.651 m (5' 5\")   Wt 143.4 kg (316 lb 1.6 oz)   LMP 02/15/2023 (Approximate)   SpO2 98%   BMI 52.60 kg/m    NAD  Overall looks well  Incisions c/d/i; Abdomen not tender to palpation    Wt Readings from Last 5 Encounters:   03/23/23 143.4 kg (316 lb 1.6 oz)   03/14/23 147.8 kg (325 lb 14.4 oz)   03/06/23 (!) 150.1 kg (331 lb)   02/27/23 (!) 156.2 kg (344 lb 6.4 oz)   02/22/23 (!) 152.4 kg (336 lb)         Pathology   A. PARTIAL GASTRECTOMY:  - Unremarkable gastric mucosa  - No H. pylori-like organisms identified on routine staining  - No intestinal metaplasia identified    Plan:  1. RD visit tomorrow. Discuss nausea with protein.       - Start vitamin supplements per RD directions.      - Advance diet per RD directions.  2. Follow-up: Sol Quick 4/14/2023   3. Actigall prescription - N/A  4. B12 SL or injection - discuss at one month visit  5. Pathology reviewed with no concerns  6. Weight loss medications - Decrease Topiramate 75mg twice daily for 5 days, then decrease to 50mg twice daily for 5 days, then 25mg twice daily for 5 days, then stop.   7. Need to restart statin - N/A   8. Discussed restrictions of no lifting, pushing pulling >20lbs till 4weeks post op.  9. Discussed no water submersion till incisions are completely healed.  10. Zofran refilled due to nausea with protein use.   11. Neck pain - reach out to PCP if no improvement. Continue tylenol, cold and heat packs as needed.       MALISSA GARCIA PA-C     "

## 2023-03-24 ENCOUNTER — VIRTUAL VISIT (OUTPATIENT)
Dept: ENDOCRINOLOGY | Facility: CLINIC | Age: 33
End: 2023-03-24

## 2023-03-24 DIAGNOSIS — E66.01 CLASS 3 SEVERE OBESITY WITH SERIOUS COMORBIDITY AND BODY MASS INDEX (BMI) OF 50.0 TO 59.9 IN ADULT, UNSPECIFIED OBESITY TYPE (H): ICD-10-CM

## 2023-03-24 DIAGNOSIS — E66.813 CLASS 3 SEVERE OBESITY WITH SERIOUS COMORBIDITY AND BODY MASS INDEX (BMI) OF 50.0 TO 59.9 IN ADULT, UNSPECIFIED OBESITY TYPE (H): ICD-10-CM

## 2023-03-24 DIAGNOSIS — Z71.3 NUTRITIONAL COUNSELING: Primary | ICD-10-CM

## 2023-03-24 PROCEDURE — 99207 PR NO CHARGE LOS: CPT | Mod: VID | Performed by: DIETITIAN, REGISTERED

## 2023-03-24 PROCEDURE — 97803 MED NUTRITION INDIV SUBSEQ: CPT | Mod: VID | Performed by: DIETITIAN, REGISTERED

## 2023-03-24 NOTE — PATIENT INSTRUCTIONS
Goals:  1) Follow bariatric diet advancement schedule noted below  2) Work towards 60 gm protein/day the best you can. Focus on hydration and reducing nausea first.  3) Consume 48-64+ oz fluids daily- between meals only once on puree diet  4) Eat slowly (>20 min/meal), chewing well to smooth consistency once on the bariatric soft diet.  5) Limit portions to ~1/4 to 1/2 cup/meal.  6) Start chewable/liquid multivitamin/minerals daily    Other things discussed today:    Okay to try less dense protein items -can go back to clear liquid options, yogurt - Ratio, Okios and Okios pro, yogurt with protein powder (if able to tolerate)  Things to consider trying on puree diet: Tori s chili (pureed - caution with beans and easiness), smashed cottage cheese, mashed potatoes/sweet potatoes - can add protein powder     Post-op Diet Advancement Schedule:  Pureed Diet (stage 3): 3/28- 4/10  Soft Diet (stage 4): 4/11- 5/8  Regular Diet (stage 5): 5/9    Post-op Diet Handouts:  Diet Guidelines after Weight-loss Surgery  [email or web address]     Your Stage 1 Diet: Clear Liquids  [email or web address]     Your Stage 2 Diet: Low-fat Full Liquids  [email or web address]     Your Stage 3 Diet: Pureed Foods  [email or web address]     Pureed Recipes  [email or web address]    Your Stage 4 Diet: Soft Foods  [email or web address]    Your Stage 5 Diet: Regular Foods  [email or web address]    Supplements after Sleeve Gastrectomy, Gastric Bypass or Single Anastomosis Duodenal Switch  [email or web address]    Keeping Track of Fluids  [email or web address]    Exercise Guidelines after Weight Loss Surgery (1st 4-6 weeks)  [email or web address]      Follow-Up: 3 weeks    SHUKRI Chou, RD, LD  Clinic #: [phone]

## 2023-03-24 NOTE — PROGRESS NOTES
"Marichuy Worrell is a 32 year old female who is being evaluated via a billable video visit.      The patient has been notified of following:     \"This video visit will be conducted via a call between you and your physician/provider. We have found that certain health care needs can be provided without the need for an in-person physical exam.  This service lets us provide the care you need with a video conversation.  If a prescription is necessary we can send it directly to your pharmacy.  If lab work is needed we can place an order for that and you can then stop by our lab to have the test done at a later time.    Video visits are billed at different rates depending on your insurance coverage.  Please reach out to your insurance provider with any questions.    If during the course of the call the physician/provider feels a video visit is not appropriate, you will not be charged for this service.\"    Patient has given verbal consent for Video visit? Yes  How would you like to obtain your AVS? MyChart  If you are dropped from the video visit, the video invite should be resent to: Text to cell phone: [phone]  Will anyone else be joining your video visit? No  {If patient encounters technical issues they should call [phone]      Video-Visit Details    Type of service:  Video Visit    Video Start Time: 1:37 pm  Video End Time: 1:59 pm    Originating Location (pt. Location): Home    Distant Location (provider location): Offsite (providers home)    Platform used for Video Visit: [employer]    During this virtual visit the patient is located in [facility]), patient verifies this as the location during the entirety of this visit.     Nutrition Assessment  Reason For Visit:  Marichuy Worrell is a 32 year old female presenting today for nutrition follow-up, 1 week s/p sleeve 3/14/23 with Dr Gutierrez.     Pt referred by Sol Quick NP on November 10, 2022 and by Dr. Gutierrez 3/14/23.    Anthropometrics  Highest weight " "per pt: 400ish lbs in 2015  Consult Weight 11/9/22: 363 lbs with BMI 60.24    Day of Surgery Weight (3/14/23): 325 lbs    Estimated body mass index is 52.6 kg/m  as calculated from the following:    Height as of 3/23/23: 1.651 m (5' 5\").    Weight as of 3/23/23: 143.4 kg (316 lb 1.6 oz).    Current Weight: 316 lbs    Weight loss: -84 lbs from highest weight, -47 lbs from initial consult; -9 lbs from day of surgery    SUPPLEMENT INFORMATION:  Prior to surgery:   Vit D - not sure dose 2,000 IUs/day  B12 5000 mcg/day SL  MVI     Labs done 11/23/22 -  looked good      Will start MVI today: MVI with iron (per 2: 18 mg iron, B12 500 mcg)     Will start next month: Barimelts Calcium (500 mg per 2)    Nutrition History    Things going well overall.  Doing well with hydration. Struggling with protein - nausea. Takes zofran if really nausea, helps.     No vomiting but nausea sometimes makes her feel like she will.   No dysphagia.   BM: last one two days ago, regular consistency.     Consuming protein shakes, broth, protein powder, yogurt, soup, propel, and Gatorade.     Fluids: 55+ oz/day. Average of 64 oz/day.  Protein: Aiming for 60 g/day   - Isopure, fairlife protein shakes, yogurt (12 g)    PA: walking around house, work.  Heading back to work FT on Monday.     Additional information:    [facility]    Nutrition Prescription:  Grams Protein: 60 (minimum)  Amount of Fluid: 48-64 oz    Nutrition Diagnosis  Food and nutrition-related knowledge deficit r/t lack of prior exposure to diet advancements beyond bariatric low-fat full liquid diet aeb recent bariatric surgery and pt interest in diet education/review    Intervention  Intervention At Appointment:  Materials/education provided on bariatric pureed and soft diets, protein intake, fluid intake, eating pace, portion control, avoiding excess sugar and fat, recommended vitamin/mineral supplements. Patient demonstrates understanding.     Expected " Engagement: good    Goals:  1) Follow bariatric diet advancement schedule noted below  2) Work towards 60 gm protein/day the best you can. Focus on hydration and reducing nausea first.  3) Consume 48-64+ oz fluids daily- between meals only once on puree diet  4) Eat slowly (>20 min/meal), chewing well to smooth consistency once on the bariatric soft diet.  5) Limit portions to ~1/4 to 1/2 cup/meal.  6) Start chewable/liquid multivitamin/minerals daily    Other things discussed today:    1. Okay to try less dense protein items -can go back to clear liquid options, yogurt - Ratio, Okios and Okios pro, yogurt with protein powder (if able to tolerate)  2. Things to consider trying on puree diet: Tori s chili (pureed - caution with beans and easiness), smashed cottage cheese, mashed potatoes/sweet potatoes - can add protein powder     Post-op Diet Advancement Schedule:  Pureed Diet (stage 3): 3/28- 4/10  Soft Diet (stage 4): 4/11- 5/8  Regular Diet (stage 5): 5/9    Post-op Diet Handouts:  Diet Guidelines after Weight-loss Surgery  [email or web address]     Your Stage 1 Diet: Clear Liquids  [email or web address]     Your Stage 2 Diet: Low-fat Full Liquids  [email or web address]     Your Stage 3 Diet: Pureed Foods  [email or web address]     Pureed Recipes  [email or web address]    Your Stage 4 Diet: Soft Foods  [email or web address]    Your Stage 5 Diet: Regular Foods  [email or web address]    Supplements after Sleeve Gastrectomy, Gastric Bypass or Single Anastomosis Duodenal Switch  [email or web address]    Keeping Track of Fluids  [email or web address]    Exercise Guidelines after Weight Loss Surgery (1st 4-6 weeks)  [email or web address]      Follow-Up: 3 weeks    Time spent with patient: 22 minutes.  SHUKRI Luong, RD, LD

## 2023-03-24 NOTE — LETTER
"3/24/2023       RE: Marichuy Worrell  [address] [facility] [address]     Dear Colleague,    Thank you for referring your patient, Marichuy Worrell, to the [facility]NAG[facility] [place] at [facility]. Please see a copy of my visit note below.    Marichuy Worrell is a 32 year old female who is being evaluated via a billable video visit.      The patient has been notified of following:     \"This video visit will be conducted via a call between you and your physician/provider. We have found that certain health care needs can be provided without the need for an in-person physical exam.  This service lets us provide the care you need with a video conversation.  If a prescription is necessary we can send it directly to your pharmacy.  If lab work is needed we can place an order for that and you can then stop by our lab to have the test done at a later time.    Video visits are billed at different rates depending on your insurance coverage.  Please reach out to your insurance provider with any questions.    If during the course of the call the physician/provider feels a video visit is not appropriate, you will not be charged for this service.\"    Patient has given verbal consent for Video visit? Yes  How would you like to obtain your AVS? MyChart  If you are dropped from the video visit, the video invite should be resent to: Text to cell phone: [phone]  Will anyone else be joining your video visit? No  {If patient encounters technical issues they should call [phone]      Video-Visit Details    Type of service:  Video Visit    Video Start Time: 1:37 pm  Video End Time: 1:59 pm    Originating Location (pt. Location): Home    Distant Location (provider location): Offsite (providers home)    Platform used for Video Visit: [employer]    During this virtual visit the patient is located in [facility] ([place]), patient verifies this as the location " "during the entirety of this visit.     Nutrition Assessment  Reason For Visit:  Marichuy Worrell is a 32 year old female presenting today for nutrition follow-up, 1 week s/p sleeve 3/14/23 with Dr Gutierrez.     Pt referred by Sol Quick NP on November 10, 2022 and by Dr. Gutierrez 3/14/23.    Anthropometrics  Highest weight per pt: 400ish lbs in 2015  Consult Weight 11/9/22: 363 lbs with BMI 60.24    Day of Surgery Weight (3/14/23): 325 lbs    Estimated body mass index is 52.6 kg/m  as calculated from the following:    Height as of 3/23/23: 1.651 m (5' 5\").    Weight as of 3/23/23: 143.4 kg (316 lb 1.6 oz).    Current Weight: 316 lbs    Weight loss: -84 lbs from highest weight, -47 lbs from initial consult; -9 lbs from day of surgery    SUPPLEMENT INFORMATION:  Prior to surgery:   Vit D - not sure dose 2,000 IUs/day  B12 5000 mcg/day SL  MVI     Labs done 11/23/22 -  looked good      Will start MVI today: MVI with iron (per 2: 18 mg iron, B12 500 mcg)     Will start next month: Barimelts Calcium (500 mg per 2)    Nutrition History    Things going well overall.  Doing well with hydration. Struggling with protein - nausea. Takes zofran if really nausea, helps.     No vomiting but nausea sometimes makes her feel like she will.   No dysphagia.   BM: last one two days ago, regular consistency.     Consuming protein shakes, broth, protein powder, yogurt, soup, propel, and Gatorade.     Fluids: 55+ oz/day. Average of 64 oz/day.  Protein: Aiming for 60 g/day   - Isopure, fairlife protein shakes, yogurt (12 g)    PA: walking around house, work.  Heading back to work FT on Monday.     Additional information:    [facility]    Nutrition Prescription:  Grams Protein: 60 (minimum)  Amount of Fluid: 48-64 oz    Nutrition Diagnosis  Food and nutrition-related knowledge deficit r/t lack of prior exposure to diet advancements beyond bariatric low-fat full liquid diet aeb recent bariatric surgery and pt interest in " diet education/review    Intervention  Intervention At Appointment:  Materials/education provided on bariatric pureed and soft diets, protein intake, fluid intake, eating pace, portion control, avoiding excess sugar and fat, recommended vitamin/mineral supplements. Patient demonstrates understanding.     Expected Engagement: good    Goals:  1) Follow bariatric diet advancement schedule noted below  2) Work towards 60 gm protein/day the best you can. Focus on hydration and reducing nausea first.  3) Consume 48-64+ oz fluids daily- between meals only once on puree diet  4) Eat slowly (>20 min/meal), chewing well to smooth consistency once on the bariatric soft diet.  5) Limit portions to ~1/4 to 1/2 cup/meal.  6) Start chewable/liquid multivitamin/minerals daily    Other things discussed today:    1. Okay to try less dense protein items -can go back to clear liquid options, yogurt - Ratio, Okios and Okios pro, yogurt with protein powder (if able to tolerate)  2. Things to consider trying on puree diet: Tori s chili (pureed - caution with beans and easiness), smashed cottage cheese, mashed potatoes/sweet potatoes - can add protein powder     Post-op Diet Advancement Schedule:  Pureed Diet (stage 3): 3/28- 4/10  Soft Diet (stage 4): 4/11- 5/8  Regular Diet (stage 5): 5/9    Post-op Diet Handouts:  Diet Guidelines after Weight-loss Surgery  [email or web address]     Your Stage 1 Diet: Clear Liquids  [email or web address]     Your Stage 2 Diet: Low-fat Full Liquids  [email or web address]     Your Stage 3 Diet: Pureed Foods  [email or web address]     Pureed Recipes  [email or web address]    Your Stage 4 Diet: Soft Foods  [email or web address]    Your Stage 5 Diet: Regular Foods  [email or web address]    Supplements after Sleeve Gastrectomy, Gastric Bypass or Single Anastomosis Duodenal Switch  [email or web address]    Keeping Track of  Fluids  [email or web address]    Exercise Guidelines after Weight Loss Surgery (1st 4-6 weeks)  [email or web address]      Follow-Up: 3 weeks    Time spent with patient: 22 minutes.  SHUKRI Luong, RD, LD

## 2023-04-14 ENCOUNTER — VIRTUAL VISIT (OUTPATIENT)
Dept: ENDOCRINOLOGY | Facility: CLINIC | Age: 33
End: 2023-04-14
Payer: COMMERCIAL

## 2023-04-14 VITALS — BODY MASS INDEX: 48.82 KG/M2 | WEIGHT: 293 LBS | HEIGHT: 65 IN

## 2023-04-14 DIAGNOSIS — E66.813 CLASS 3 SEVERE OBESITY WITH SERIOUS COMORBIDITY AND BODY MASS INDEX (BMI) OF 60.0 TO 69.9 IN ADULT, UNSPECIFIED OBESITY TYPE (H): Chronic | ICD-10-CM

## 2023-04-14 DIAGNOSIS — Z98.84 S/P LAPAROSCOPIC SLEEVE GASTRECTOMY: Primary | ICD-10-CM

## 2023-04-14 DIAGNOSIS — E66.813 CLASS 3 SEVERE OBESITY WITH SERIOUS COMORBIDITY AND BODY MASS INDEX (BMI) OF 50.0 TO 59.9 IN ADULT, UNSPECIFIED OBESITY TYPE (H): ICD-10-CM

## 2023-04-14 DIAGNOSIS — E66.01 CLASS 3 SEVERE OBESITY WITH SERIOUS COMORBIDITY AND BODY MASS INDEX (BMI) OF 60.0 TO 69.9 IN ADULT, UNSPECIFIED OBESITY TYPE (H): Chronic | ICD-10-CM

## 2023-04-14 DIAGNOSIS — Z71.3 NUTRITIONAL COUNSELING: ICD-10-CM

## 2023-04-14 DIAGNOSIS — E66.01 CLASS 3 SEVERE OBESITY WITH SERIOUS COMORBIDITY AND BODY MASS INDEX (BMI) OF 50.0 TO 59.9 IN ADULT, UNSPECIFIED OBESITY TYPE (H): ICD-10-CM

## 2023-04-14 PROCEDURE — 97803 MED NUTRITION INDIV SUBSEQ: CPT | Mod: VID | Performed by: DIETITIAN, REGISTERED

## 2023-04-14 PROCEDURE — 99024 POSTOP FOLLOW-UP VISIT: CPT | Mod: VID | Performed by: NURSE PRACTITIONER

## 2023-04-14 PROCEDURE — 99207 PR NO CHARGE LOS: CPT | Mod: VID | Performed by: DIETITIAN, REGISTERED

## 2023-04-14 ASSESSMENT — PAIN SCALES - GENERAL: PAINLEVEL: NO PAIN (0)

## 2023-04-14 NOTE — NURSING NOTE
"(   Chief Complaint   Patient presents with     Post-op Visit    )    ( Weight: 312 lb (pt reported) )  ( Height: 5' 5\" (pt reported) )  ( BMI (Calculated): 51.92 )  (   )  (   )  (   )  (   )  (   )  (   )    (   )  (   )  (   )  (   )  (   )  (   )  (   )    (   Patient Active Problem List   Diagnosis     Class 3 severe obesity with serious comorbidity and body mass index (BMI) of 60.0 to 69.9 in adult (H)     Moderate major depression (H)     CARDIOVASCULAR SCREENING; LDL GOAL LESS THAN 160     Other specified hypothyroidism     OSCAR (generalized anxiety disorder)     Morbid obesity (H)    )  (   Current Outpatient Medications   Medication Sig Dispense Refill     ciclopirox (PENLAC) 8 % external solution Apply to adjacent skin and affected nails daily.  Remove with alcohol every 7 days, then repeat. (Patient taking differently: as needed Apply to adjacent skin and affected nails daily.  Remove with alcohol every 7 days, then repeat.) 6.6 mL 5     desogestrel-ethinyl estradiol (ENSKYCE) 0.15-30 MG-MCG tablet Take 1 tablet by mouth daily 84 tablet 3     escitalopram (LEXAPRO) 20 MG tablet Take 1 tablet (20 mg) by mouth daily (Patient taking differently: Take 20 mg by mouth every morning) 90 tablet 1     levothyroxine (SYNTHROID/LEVOTHROID) 175 MCG tablet Take 1 tablet by mouth every other day alternating with 200 mcg dose (Patient taking differently: Take 1 tablet by mouth every other day alternating with 200 mcg dose. Taken in AM) 45 tablet 3     levothyroxine (SYNTHROID/LEVOTHROID) 200 MCG tablet Take 1 tablet by mouth every other day alternating with 175 mcg dose (Patient taking differently: Take 1 tablet by mouth every other day alternating with 175 mcg dose. Taken in AM) 45 tablet 3     omeprazole (PRILOSEC) 20 MG DR capsule Take 1 capsule (20 mg) by mouth every morning (before breakfast) 30 capsule 0     ondansetron (ZOFRAN ODT) 4 MG ODT tab Take 1 tablet (4 mg) by mouth every 6 hours as needed for nausea 15 " tablet 0     topiramate (TOPAMAX) 25 MG tablet Take 4 tablets (100 mg) by mouth 2 times daily 240 tablet 3     traZODone (DESYREL) 50 MG tablet Take 1 tablet (50 mg) by mouth At Bedtime 30 tablet 11    )  ( Diabetes Eval:    )    ( Pain Eval:  No Pain (0) )    ( Wound Eval:       )    (   History   Smoking Status     Never   Smokeless Tobacco     Never    )    ( Signed By:  Zhanna Jerez; April 14, 2023; 7:18 AM )

## 2023-04-14 NOTE — LETTER
4/14/2023       RE: Marichuy Worrell  [address]  [place] [address]     Dear Colleague,    Thank you for referring your patient, Marichuy Worrell, to the [facility]NAGEMENT CL[facility] [place] at [facility]. Please see a copy of my visit note below.    Postoperative bariatric surgery visit.    Patient underwent sleeve gastrectomy 4.5 weeks ago.  3/14/2023 Dr. Gutierrez     Tolerating liquids: working on this, setting an alarm to remember drinking, can get to 64oz when remembering. Improving- tolerating protein shakes now   Lightheadedness: none   Abdominal pain: resolved, some difficulty swallowing - but mostly when eating too quickly - practicing slowing down   Bowel movements: normal   Fevers/shakes/chills: none  GERD: none Taking omeprazole daily   Leg/calf pain: none     Work is very active - no structured exercise yet   Off topiramate now- occasional feeling hungry in the evenings   Mood- good     How many opioid pain medications used after surgery?  What did you do with extra pills?  Were any opioid pain medication refills provided after surgery?  Were any opioid pain medications needed after 30 days postop?    LMP 02/15/2023 (Approximate)    Wt Readings from Last 5 Encounters:   03/23/23 143.4 kg (316 lb 1.6 oz)   03/14/23 147.8 kg (325 lb 14.4 oz)   03/06/23 (!) 150.1 kg (331 lb)   02/27/23 (!) 156.2 kg (344 lb 6.4 oz)   02/22/23 (!) 152.4 kg (336 lb)      NAD  Overall looks well  Incisions c/d/i; non-tender per report     Plan:  1. RD visit today.  2. Start vitamin supplements per RD directions.  3. Advance diet per RD directions.  4. Follow-up: 6/15 with labs   5. Actigall prescription NA   6. B12 in multivitamin   7. Pathology reviewed normal   8. Weight loss medications - tapered off topiramate at 1 week postop?   9. Need to restart statin? NA     No activity restrictions  Can take pills whole   Considering retrial of topiramate in the future- no  side effects previously  Okay to taper off omeprazole - every other day x 2 weeks then as needed       Sol Quick CNP  [facility] [place] Worrell is a 32 year old who is being evaluated via a billable video visit.      How would you like to obtain your AVS? MyChart  If the video visit is dropped, the invitation should be resent by: Text to cell phone: [phone]  Will anyone else be joining your video visit? No  If patient encounters technical issues they should call [phone]    During this virtual visit the patient is located in [place], patient verifies this as the location during the entirety of this visit.     Video-Visit Details  Video Start Time: 0800    Type of service:  Video Visit    Video End Time:0810    Originating Location (pt. Location): Home  Distant Location (provider location):  [facility]  Platform used for Video Visit: Enzo Jerez CMA  4/14/2023      7:17 AM      Sol Quick NP

## 2023-04-14 NOTE — PROGRESS NOTES
"Marichuy Worrell is a 32 year old female who is being evaluated via a billable video visit.      The patient has been notified of following:     \"This video visit will be conducted via a call between you and your physician/provider. We have found that certain health care needs can be provided without the need for an in-person physical exam.  This service lets us provide the care you need with a video conversation.  If a prescription is necessary we can send it directly to your pharmacy.  If lab work is needed we can place an order for that and you can then stop by our lab to have the test done at a later time.    Video visits are billed at different rates depending on your insurance coverage.  Please reach out to your insurance provider with any questions.    If during the course of the call the physician/provider feels a video visit is not appropriate, you will not be charged for this service.\"    Patient has given verbal consent for Video visit? Yes  How would you like to obtain your AVS? MyChart  If you are dropped from the video visit, the video invite should be resent to: Text to cell phone: [phone]  Will anyone else be joining your video visit? No  {If patient encounters technical issues they should call [phone]      Video-Visit Details    Type of service:  Video Visit    Video Start Time: 9:10 am  Video End Time: 9:32 am    Originating Location (pt. Location): Home    Distant Location (provider location): Offsite (providers home)    Platform used for Video Visit: Flipps    During this virtual visit the patient is located in [place],, patient verifies this as the location during the entirety of this visit.     Nutrition Assessment  Reason For Visit:  Marichuy Worrell is a 32 year old female presenting today for nutrition follow-up, 1 month s/p sleeve 3/14/23 with Dr Gutierrez.     Pt referred by Sol Quick NP on November 10, 2022 and by Dr. Gutierrez 3/14/23.    Anthropometrics  Highest weight per pt: 400ish lbs " "in 2015  Consult Weight 11/9/22: 363 lbs with BMI 60.24    Day of Surgery Weight (3/14/23): 325 lbs    Estimated body mass index is 51.92 kg/m  as calculated from the following:    Height as of an earlier encounter on 4/14/23: 1.651 m (5' 5\").    Weight as of an earlier encounter on 4/14/23: 141.5 kg (312 lb).    Current Weight: 312 lbs, pt report    Weight loss: -88 lbs from highest weight, -51 lbs from initial consult; -13 lbs from day of surgery    SUPPLEMENT INFORMATION:  Prior to surgery:   Vit D - not sure dose 2,000 IUs/day  B12 5000 mcg/day SL  MVI     Labs done 11/23/22 -  looked good      MVI with iron (per 2: 18 mg iron, B12 500 mcg)     Will start today: Barimelts Calcium (500 mg per 2)    Nutrition History  NKFA  Does eat fish, not a big fan of shrimp     1 week post op:  Things going well overall.  Doing well with hydration. Struggling with protein - nausea. Takes zofran if really nausea, helps.     No vomiting but nausea sometimes makes her feel like she will.   No dysphagia.   BM: last one two days ago, regular consistency.     Consuming protein shakes, broth, protein powder, yogurt, soup, propel, and Gatorade.     Fluids: 55+ oz/day. Average of 64 oz/day.  Protein: Aiming for 60 g/day   - Isopure, fairlife protein shakes, yogurt (12 g)    PA: walking around house, work.  Heading back to work FT on Monday.     Today:    Things going well overall.  No N/V  Some difficulty swallowing - more so if eating too fast or not chewing well enough.   Reflux: none, taking omeprazole daily  BM: normal right now per pt    Foods example: chicken, guacamole, deli meat, protein shakes, cottage cheese, yogurt (25 g), beef, peppers, broccoli, cauliflower     Hydration: water, Gatorade zero, yasmine, fairlife protein shakes  Doing well  fluids from meal time.  Hydration easy on weekends. Harder at work - trying to carry phone with timers around.    PA: active with work, no structured exercise at this " time    Additional information:    [facility]    Nutrition Prescription:  Grams Protein: 60 (minimum)  Amount of Fluid: 48-64 oz    Nutrition Diagnosis  Food and nutrition-related knowledge deficit r/t lack of prior exposure to diet advancements beyond bariatric puree diet aeb recent bariatric surgery and pt interest in diet education/review    Intervention  Intervention At Appointment:  Materials/education provided on bariatric pureed and soft diets, protein intake, fluid intake, eating pace, portion control, avoiding excess sugar and fat, recommended vitamin/mineral supplements. Patient demonstrates understanding.     Expected Engagement: good    Goals:  1) Follow bariatric diet advancement schedule noted below  2)  Aim for 60 gm protein/day  3) Consume 48-64+ oz fluids daily- between meals only once on puree diet  4) Eat slowly (>20 min/meal), chewing well to smooth consistency once on the bariatric soft diet.  5) Limit portions to ~1/4 to 1/2 cup/meal until 3 months post op.   6) Take vitamins/minerals as recommended    - MVI 2 per day   - Calcium: want to 0322-8210 mg/day (between food and supplements)     Post-op Diet Advancement Schedule:  Soft Diet (stage 4): 4/11- 5/8  Regular Diet (stage 5): 5/9    Post-op Diet Handouts:  Diet Guidelines after Weight-loss Surgery  [email or web address]     Your Stage 1 Diet: Clear Liquids  [email or web address]     Your Stage 2 Diet: Low-fat Full Liquids  [email or web address]     Your Stage 3 Diet: Pureed Foods  [email or web address]     Pureed Recipes  [email or web address]    Your Stage 4 Diet: Soft Foods  [email or web address]    Your Stage 5 Diet: Regular Foods  [email or web address]    Supplements after Sleeve Gastrectomy, Gastric Bypass or Single Anastomosis Duodenal Switch  [email or web address]    Keeping Track of Fluids  [email or web address]    Exercise  Guidelines after Weight Loss Surgery (1st 4-6 weeks)  [email or web address]      Follow-Up:  Thursday, June 15th at 8:00 am    Time spent with patient: 22 minutes.  SHUKRI Luong, RD, LD

## 2023-04-14 NOTE — ASSESSMENT & PLAN NOTE
Difficulty managing weight since childhood. Was doing calorie restriction diets before age 8. High weight in life is 400lb, in 2015. She has been working with medical weight management since 2018 through a number of medical providers. Most recently, she started 12/2022 at a weight of 364lb. She has decided to pursue bariatric surgery.     Has taken phentermine in the past. Currently on topiramate, was beneficial initially, no recent adjustments, no adverse side effects. Would like to try to increase this. She is already taking wegovy 2.4mg with earlier satiety but not seeing a lot of weight loss.     Plan:  Increase topiramate to 100mg bid- start with 75mg bid x 1 week, 75mgAM and 100mg PM x 1 week then 100mg bid   Schedule sleep consult  Schedule psych eval  Letter of support from therapist  Establish with new primary care   Start assessing hunger before snacking   Follow up 2 months    [employer]

## 2023-04-14 NOTE — PROGRESS NOTES
Postoperative bariatric surgery visit.    Patient underwent sleeve gastrectomy 4.5 weeks ago.  3/14/2023 Dr. Gutierrez     Tolerating liquids: working on this, setting an alarm to remember drinking, can get to 64oz when remembering. Improving- tolerating protein shakes now   Lightheadedness: none   Abdominal pain: resolved, some difficulty swallowing - but mostly when eating too quickly - practicing slowing down   Bowel movements: normal   Fevers/shakes/chills: none  GERD: none Taking omeprazole daily   Leg/calf pain: none     Work is very active - no structured exercise yet   Off topiramate now- occasional feeling hungry in the evenings   Mood- good     How many opioid pain medications used after surgery?  What did you do with extra pills?  Were any opioid pain medication refills provided after surgery?  Were any opioid pain medications needed after 30 days postop?    LMP 02/15/2023 (Approximate)    Wt Readings from Last 5 Encounters:   03/23/23 143.4 kg (316 lb 1.6 oz)   03/14/23 147.8 kg (325 lb 14.4 oz)   03/06/23 (!) 150.1 kg (331 lb)   02/27/23 (!) 156.2 kg (344 lb 6.4 oz)   02/22/23 (!) 152.4 kg (336 lb)      NAD  Overall looks well  Incisions c/d/i; non-tender per report     Plan:  1. RD visit today.  2. Start vitamin supplements per RD directions.  3. Advance diet per RD directions.  4. Follow-up: 6/15 with labs   5. Actigall prescription NA   6. B12 in multivitamin   7. Pathology reviewed normal   8. Weight loss medications - tapered off topiramate at 1 week postop?   9. Need to restart statin? NA     No activity restrictions  Can take pills whole   Considering retrial of topiramate in the future- no side effects previously  Okay to taper off omeprazole - every other day x 2 weeks then as needed       Sol Quick CNP  [facility]NAG[facility]IN[facility] [place]

## 2023-04-14 NOTE — PROGRESS NOTES
Marichuy Worrell is a 32 year old who is being evaluated via a billable video visit.      How would you like to obtain your AVS? MyChart  If the video visit is dropped, the invitation should be resent by: Text to cell phone: [phone]  Will anyone else be joining your video visit? No  If patient encounters technical issues they should call [phone]    During this virtual visit the patient is located in [place], patient verifies this as the location during the entirety of this visit.     Video-Visit Details  Video Start Time: 0800    Type of service:  Video Visit    Video End Time:0810    Originating Location (pt. Location): Home  Distant Location (provider location):  [facility] [place]  Platform used for Video Visit: Enzo Jerez CMA  4/14/2023      7:17 AM

## 2023-04-14 NOTE — LETTER
"4/14/2023       RE: Marichuy Worrell  [address] [facility] [address]     Dear Colleague,    Thank you for referring your patient, Marichuy Worrell, to the [facility] [place] at [facility]. Please see a copy of my visit note below.    Marichuy Worrell is a 32 year old female who is being evaluated via a billable video visit.      The patient has been notified of following:     \"This video visit will be conducted via a call between you and your physician/provider. We have found that certain health care needs can be provided without the need for an in-person physical exam.  This service lets us provide the care you need with a video conversation.  If a prescription is necessary we can send it directly to your pharmacy.  If lab work is needed we can place an order for that and you can then stop by our lab to have the test done at a later time.    Video visits are billed at different rates depending on your insurance coverage.  Please reach out to your insurance provider with any questions.    If during the course of the call the physician/provider feels a video visit is not appropriate, you will not be charged for this service.\"    Patient has given verbal consent for Video visit? Yes  How would you like to obtain your AVS? MyChart  If you are dropped from the video visit, the video invite should be resent to: Text to cell phone: [phone]  Will anyone else be joining your video visit? No  {If patient encounters technical issues they should call [phone]      Video-Visit Details    Type of service:  Video Visit    Video Start Time: 9:10 am  Video End Time: 9:32 am    Originating Location (pt. Location): Home    Distant Location (provider location): Offsite (providers home)    Platform used for Video Visit: [employer]    During this virtual visit the patient is located in [place],, patient verifies this as the location during the entirety of this " "visit.     Nutrition Assessment  Reason For Visit:  Marichuy Worrell is a 32 year old female presenting today for nutrition follow-up, 1 month s/p sleeve 3/14/23 with Dr Gutierrez.     Pt referred by Sol Quick NP on November 10, 2022 and by Dr. Gutierrez 3/14/23.    Anthropometrics  Highest weight per pt: 400ish lbs in 2015  Consult Weight 11/9/22: 363 lbs with BMI 60.24    Day of Surgery Weight (3/14/23): 325 lbs    Estimated body mass index is 51.92 kg/m  as calculated from the following:    Height as of an earlier encounter on 4/14/23: 1.651 m (5' 5\").    Weight as of an earlier encounter on 4/14/23: 141.5 kg (312 lb).    Current Weight: 312 lbs, pt report    Weight loss: -88 lbs from highest weight, -51 lbs from initial consult; -13 lbs from day of surgery    SUPPLEMENT INFORMATION:  Prior to surgery:   Vit D - not sure dose 2,000 IUs/day  B12 5000 mcg/day SL  MVI     Labs done 11/23/22 -  looked good      MVI with iron (per 2: 18 mg iron, B12 500 mcg)     Will start today: Barimelts Calcium (500 mg per 2)    Nutrition History  NKFA  Does eat fish, not a big fan of shrimp     1 week post op:  Things going well overall.  Doing well with hydration. Struggling with protein - nausea. Takes zofran if really nausea, helps.     No vomiting but nausea sometimes makes her feel like she will.   No dysphagia.   BM: last one two days ago, regular consistency.     Consuming protein shakes, broth, protein powder, yogurt, soup, propel, and Gatorade.     Fluids: 55+ oz/day. Average of 64 oz/day.  Protein: Aiming for 60 g/day   - Isopure, fairlife protein shakes, yogurt (12 g)    PA: walking around house, work.  Heading back to work FT on Monday.     Today:    Things going well overall.  No N/V  Some difficulty swallowing - more so if eating too fast or not chewing well enough.   Reflux: none, taking omeprazole daily  BM: normal right now per pt    Foods example: chicken, guacamole, deli meat, protein shakes, cottage cheese, yogurt " (25 g), beef, peppers, broccoli, cauliflower     Hydration: water, Gatorade zero, yasmine, fairlife protein shakes  Doing well  fluids from meal time.  Hydration easy on weekends. Harder at work - trying to carry phone with timers around.    PA: active with work, no structured exercise at this time    Additional information:    [facility]    Nutrition Prescription:  Grams Protein: 60 (minimum)  Amount of Fluid: 48-64 oz    Nutrition Diagnosis  Food and nutrition-related knowledge deficit r/t lack of prior exposure to diet advancements beyond bariatric puree diet aeb recent bariatric surgery and pt interest in diet education/review    Intervention  Intervention At Appointment:  Materials/education provided on bariatric pureed and soft diets, protein intake, fluid intake, eating pace, portion control, avoiding excess sugar and fat, recommended vitamin/mineral supplements. Patient demonstrates understanding.     Expected Engagement: good    Goals:  1) Follow bariatric diet advancement schedule noted below  2)  Aim for 60 gm protein/day  3) Consume 48-64+ oz fluids daily- between meals only once on puree diet  4) Eat slowly (>20 min/meal), chewing well to smooth consistency once on the bariatric soft diet.  5) Limit portions to ~1/4 to 1/2 cup/meal until 3 months post op.   6) Take vitamins/minerals as recommended    - MVI 2 per day   - Calcium: want to 8435-3275 mg/day (between food and supplements)     Post-op Diet Advancement Schedule:  Soft Diet (stage 4): 4/11- 5/8  Regular Diet (stage 5): 5/9    Post-op Diet Handouts:  Diet Guidelines after Weight-loss Surgery  [email or web address]     Your Stage 1 Diet: Clear Liquids  [email or web address]     Your Stage 2 Diet: Low-fat Full Liquids  [email or web address]     Your Stage 3 Diet: Pureed Foods  [email or web address]     Pureed Recipes  [email or web address]    Your Stage 4 Diet: Soft  Foods  [email or web address]    Your Stage 5 Diet: Regular Foods  [email or web address]    Supplements after Sleeve Gastrectomy, Gastric Bypass or Single Anastomosis Duodenal Switch  [email or web address]    Keeping Track of Fluids  [email or web address]    Exercise Guidelines after Weight Loss Surgery (1st 4-6 weeks)  [email or web address]      Follow-Up:  Thursday, June 15th at 8:00 am    Time spent with patient: 22 minutes.  Sonam Vizcarra, SHUKRI, RD, LD

## 2023-04-14 NOTE — PATIENT INSTRUCTIONS
Goals:  1) Follow bariatric diet advancement schedule noted below  2)  Aim for 60 gm protein/day  3) Consume 48-64+ oz fluids daily- between meals only once on puree diet  4) Eat slowly (>20 min/meal), chewing well to smooth consistency once on the bariatric soft diet.  5) Limit portions to ~1/4 to 1/2 cup/meal until 3 months post op.   6) Take vitamins/minerals as recommended    - MVI 2 per day   - Calcium: want to 3279-2299 mg/day (between food and supplements)     Post-op Diet Advancement Schedule:  Soft Diet (stage 4): 4/11- 5/8  Regular Diet (stage 5): 5/9    Post-op Diet Handouts:  Diet Guidelines after Weight-loss Surgery  [email or web address]     Your Stage 1 Diet: Clear Liquids  [email or web address]     Your Stage 2 Diet: Low-fat Full Liquids  [email or web address]     Your Stage 3 Diet: Pureed Foods  [email or web address]     Pureed Recipes  [email or web address]    Your Stage 4 Diet: Soft Foods  [email or web address]    Your Stage 5 Diet: Regular Foods  [email or web address]    Supplements after Sleeve Gastrectomy, Gastric Bypass or Single Anastomosis Duodenal Switch  [email or web address]    Keeping Track of Fluids  [email or web address]    Exercise Guidelines after Weight Loss Surgery (1st 4-6 weeks)  [email or web address]      Follow-Up:  Thursday, June 15th at 8:00 am    SHUKRI Chou, RD, LD  Clinic #: [phone]

## 2023-05-09 DIAGNOSIS — F32.1 MODERATE MAJOR DEPRESSION (H): ICD-10-CM

## 2023-05-09 DIAGNOSIS — F41.1 GAD (GENERALIZED ANXIETY DISORDER): ICD-10-CM

## 2023-05-09 NOTE — TELEPHONE ENCOUNTER
Pending Prescriptions:                       Disp   Refills    escitalopram (LEXAPRO) 20 MG tablet       90 tab*1            Sig: Take 1 tablet (20 mg) by mouth daily

## 2023-05-11 RX ORDER — ESCITALOPRAM OXALATE 20 MG/1
20 TABLET ORAL EVERY MORNING
Qty: 90 TABLET | Refills: 0 | Status: SHIPPED | OUTPATIENT
Start: 2023-05-11 | End: 2023-06-21

## 2023-06-02 ENCOUNTER — HEALTH MAINTENANCE LETTER (OUTPATIENT)
Age: 33
End: 2023-06-02

## 2023-06-15 ENCOUNTER — VIRTUAL VISIT (OUTPATIENT)
Dept: ENDOCRINOLOGY | Facility: CLINIC | Age: 33
End: 2023-06-15
Payer: COMMERCIAL

## 2023-06-15 VITALS — WEIGHT: 293 LBS | HEIGHT: 65 IN | BODY MASS INDEX: 48.82 KG/M2

## 2023-06-15 DIAGNOSIS — E66.813 CLASS 3 SEVERE OBESITY WITH SERIOUS COMORBIDITY AND BODY MASS INDEX (BMI) OF 60.0 TO 69.9 IN ADULT, UNSPECIFIED OBESITY TYPE (H): Primary | Chronic | ICD-10-CM

## 2023-06-15 DIAGNOSIS — Z98.84 S/P LAPAROSCOPIC SLEEVE GASTRECTOMY: ICD-10-CM

## 2023-06-15 DIAGNOSIS — Z71.3 NUTRITIONAL COUNSELING: Primary | ICD-10-CM

## 2023-06-15 DIAGNOSIS — E66.01 CLASS 3 SEVERE OBESITY WITH SERIOUS COMORBIDITY AND BODY MASS INDEX (BMI) OF 60.0 TO 69.9 IN ADULT, UNSPECIFIED OBESITY TYPE (H): Primary | Chronic | ICD-10-CM

## 2023-06-15 DIAGNOSIS — E66.9 OBESITY: ICD-10-CM

## 2023-06-15 PROCEDURE — 99207 PR NO CHARGE LOS: CPT | Mod: VID | Performed by: DIETITIAN, REGISTERED

## 2023-06-15 PROCEDURE — 97803 MED NUTRITION INDIV SUBSEQ: CPT | Mod: VID | Performed by: DIETITIAN, REGISTERED

## 2023-06-15 PROCEDURE — 99213 OFFICE O/P EST LOW 20 MIN: CPT | Mod: VID | Performed by: NURSE PRACTITIONER

## 2023-06-15 RX ORDER — TOPIRAMATE 25 MG/1
50 TABLET, FILM COATED ORAL 2 TIMES DAILY
Qty: 120 TABLET | Refills: 3 | Status: SHIPPED | OUTPATIENT
Start: 2023-06-15 | End: 2024-01-31

## 2023-06-15 ASSESSMENT — PAIN SCALES - GENERAL: PAINLEVEL: NO PAIN (0)

## 2023-06-15 NOTE — NURSING NOTE
Is the patient currently in the state of [place]? YES    Visit mode:VIDEO    If the visit is dropped, the patient can be reconnected by: VIDEO VISIT: Text to cell phone: [phone]    Will anyone else be joining the visit? NO      How would you like to obtain your AVS? MyChart    Are changes needed to the allergy or medication list? NO    Reason for visit: SILVIA Meyers on 6/15/2023 at 6:58 AM

## 2023-06-15 NOTE — PROGRESS NOTES
Return Bariatric Surgery Note    RE: Marichuy Worrell  MR#: [id number]  : 1990  VISIT DATE: Charles 15, 2023      Dear Jacki, Vanessa Little,    I had the pleasure of seeing your patient, Marichuy Worrell, in my post-bariatric surgery assessment clinic.    Assessment & Plan   Problem List Items Addressed This Visit        Digestive    Class 3 severe obesity with serious comorbidity and body mass index (BMI) of 60.0 to 69.9 in adult (H) - Primary (Chronic)    Relevant Medications    topiramate (TOPAMAX) 25 MG tablet       Other    S/P laparoscopic sleeve gastrectomy     3mo postop. Doing well. Resumed omeprazole after trial of tapering off that increased epigastric pain. Describes excruciating and intrusive hunger without topiramate, has restarted topiramate 25mg bid with some resolution of this. She notes she still has to be mindful about urges to eat without hunger- especially in the AM and after dinner. Discussed increasing topiramate.       ncrease topiramate to 50mg twice daily   Continue omeprazole   Consider meal prepping before getting started at school   Labs available (fasting)   Activity Goal: walk to mom's and back by end of summer   Follow up 3 months                21 minutes spent by me on the date of the encounter doing chart review, history and exam, documentation and further activities per the note    CHIEF COMPLAINT: Post-bariatric surgery follow-up. 3 months s/p sleeve with Dr. Gutierrez. 3/14/2023    HISTORY OF PRESENT ILLNESS:       View : No data to display.              Reflux, discussed tapering off omeprazole- increased epigastric pain when tapering off. Continues to take omeprazole with good control     Hunger pain is described as excruciating, feels different that typical hunger pain. Had similar pains as a child.     Anti-obesity medications:     Current:   topiramate 25mg bid with good benefit- excruciating pain has ceased. No side effects     Recent diet changes:   3 meals and a snack   1/2 to  3/4 cup at each meal   - 4oz chicken and maybe some greens   Lunch is more difficult to plan/ prep  Snack - usually gets hungry between lunch and dinner (1130-6pm). Gets hungry at 9pm also     -Protein- at least 20g with each meal, sometimes needs snack with protein   -Water? 60-80oz daily     Recent exercise/activity changes: going to gym with roommate   -goal is to walk to [facility] and back by end of summer (2 miles each way)     Recent stressors:   New job 1 month ago   Starting her masters in a couple months     Mood is good     Recent sleep changes: less restful sleep this week- but had been doing well until this week    Vitamins/Labs: bariatric labs due     Weight History:       View : No data to display.              Initial Weight (lbs): 362 lbs  Weight: 133.8 kg (295 lb)  Last Visits Weight: 141.5 kg (312 lb)  Cumulative weight loss (lbs): 67  Weight Loss Percentage: 18.51%         View : No data to display.                Social History:       View : No data to display.                Medications:  Current Outpatient Medications   Medication     topiramate (TOPAMAX) 25 MG tablet     ciclopirox (PENLAC) 8 % external solution     desogestrel-ethinyl estradiol (ENSKYCE) 0.15-30 MG-MCG tablet     escitalopram (LEXAPRO) 20 MG tablet     levothyroxine (SYNTHROID/LEVOTHROID) 175 MCG tablet     levothyroxine (SYNTHROID/LEVOTHROID) 200 MCG tablet     omeprazole (PRILOSEC) 20 MG DR capsule     ondansetron (ZOFRAN ODT) 4 MG ODT tab     traZODone (DESYREL) 50 MG tablet     No current facility-administered medications for this visit.          View : No data to display.                ROS:  GI:        View : No data to display.              Skin:        View : No data to display.              Psych:        View : No data to display.              Female Only:       11/9/2022     3:19 PM   BEV PARADA   Female only Loss of menstrual cycles    Birth control    Regular menstrual cycles       Admission on 03/14/2023,  "Discharged on 03/15/2023   Component Date Value Ref Range Status     GLUCOSE BY METER POCT 03/14/2023 72  70 - 99 mg/dL Final     Case Report 03/14/2023    Final                    Value:Surgical Pathology Report                         Case: [id number]                                  Authorizing Provider:  Thang Gutierrez MD   Collected:           03/14/2023 12:50 PM          Ordering Location:      MAIN OR                 Received:            03/14/2023 03:45 PM          Pathologist:           Paco Goodman DO                                                            Specimen:    Other, Partial Gastrectomy                                                                  Final Diagnosis 03/14/2023    Final                    Value:This result contains rich text formatting which cannot be displayed here.     Clinical Information 03/14/2023    Final                    Value:This result contains rich text formatting which cannot be displayed here.     Gross Description 03/14/2023    Final                    Value:This result contains rich text formatting which cannot be displayed here.     Microscopic Description 03/14/2023    Final                    Value:This result contains rich text formatting which cannot be displayed here.     Performing Labs 03/14/2023    Final                    Value:This result contains rich text formatting which cannot be displayed here.     Creatinine 03/14/2023 0.78  0.51 - 0.95 mg/dL Final     GFR Estimate 03/14/2023 >90  >60 mL/min/1.73m2 Final    eGFR calculated using 2021 CKD-EPI equation.     GLUCOSE BY METER POCT 03/15/2023 90  70 - 99 mg/dL Final         PHYSICAL EXAM:  Objective    Ht 1.651 m (5' 5\")   Wt 133.8 kg (295 lb)   BMI 49.09 kg/m    Vitals - Patient Reported  Pain Score: No Pain (0)      Vitals:  No vitals were obtained today due to virtual visit.    Physical Exam   GENERAL: Healthy, alert and no distress  EYES: Eyes grossly normal to inspection.  No " discharge or erythema, or obvious scleral/conjunctival abnormalities.  RESP: No audible wheeze, cough, or visible cyanosis.  No visible retractions or increased work of breathing.    SKIN: Visible skin clear. No significant rash, abnormal pigmentation or lesions.  NEURO: Cranial nerves grossly intact.  Mentation and speech appropriate for age.  PSYCH: Mentation appears normal, affect normal/bright, judgement and insight intact, normal speech and appearance well-groomed.        Sincerely,    Sol Quick, NP

## 2023-06-15 NOTE — LETTER
6/15/2023       RE: Marichuy Worrell  [address] [place] [address]     Dear Colleague,    Thank you for referring your patient, Marichuy Worrell, to the [facility]NAG[facility] CLIN[facility] [place] at [facility]. Please see a copy of my visit note below.    Return Bariatric Surgery Note    RE: Marichuy Worrell  MR#: [id number]  : 1990  VISIT DATE: Charles 15, 2023      Dear Jacki, Vanessa Little,    I had the pleasure of seeing your patient, Marichuy Worrell, in my post-bariatric surgery assessment clinic.    Assessment & Plan   Problem List Items Addressed This Visit          Digestive    Class 3 severe obesity with serious comorbidity and body mass index (BMI) of 60.0 to 69.9 in adult (H) - Primary (Chronic)    Relevant Medications    topiramate (TOPAMAX) 25 MG tablet       Other    S/P laparoscopic sleeve gastrectomy     3mo postop. Doing well. Resumed omeprazole after trial of tapering off that increased epigastric pain. Describes excruciating and intrusive hunger without topiramate, has restarted topiramate 25mg bid with some resolution of this. She notes she still has to be mindful about urges to eat without hunger- especially in the AM and after dinner. Discussed increasing topiramate.       ncrease topiramate to 50mg twice daily   Continue omeprazole   Consider meal prepping before getting started at school   Labs available (fasting)   Activity Goal: walk to mom's and back by end of summer   Follow up 3 months                21 minutes spent by me on the date of the encounter doing chart review, history and exam, documentation and further activities per the note    CHIEF COMPLAINT: Post-bariatric surgery follow-up. 3 months s/p sleeve with Dr. Gutierrez. 3/14/2023    HISTORY OF PRESENT ILLNESS:       View : No data to display.              Reflux, discussed tapering off omeprazole- increased epigastric pain when tapering off. Continues to take omeprazole with  good control     Hunger pain is described as excruciating, feels different that typical hunger pain. Had similar pains as a child.     Anti-obesity medications:     Current:   topiramate 25mg bid with good benefit- excruciating pain has ceased. No side effects     Recent diet changes:   3 meals and a snack   1/2 to 3/4 cup at each meal   - 4oz chicken and maybe some greens   Lunch is more difficult to plan/ prep  Snack - usually gets hungry between lunch and dinner (1130-6pm). Gets hungry at 9pm also     -Protein- at least 20g with each meal, sometimes needs snack with protein   -Water? 60-80oz daily     Recent exercise/activity changes: going to gym with roommate   -goal is to walk to [facility] and back by end of summer (2 miles each way)     Recent stressors:   New job 1 month ago   Starting her masters in a couple months     Mood is good     Recent sleep changes: less restful sleep this week- but had been doing well until this week    Vitamins/Labs: bariatric labs due     Weight History:       View : No data to display.              Initial Weight (lbs): 362 lbs  Weight: 133.8 kg (295 lb)  Last Visits Weight: 141.5 kg (312 lb)  Cumulative weight loss (lbs): 67  Weight Loss Percentage: 18.51%         View : No data to display.                Social History:       View : No data to display.                Medications:  Current Outpatient Medications   Medication    topiramate (TOPAMAX) 25 MG tablet    ciclopirox (PENLAC) 8 % external solution    desogestrel-ethinyl estradiol (ENSKYCE) 0.15-30 MG-MCG tablet    escitalopram (LEXAPRO) 20 MG tablet    levothyroxine (SYNTHROID/LEVOTHROID) 175 MCG tablet    levothyroxine (SYNTHROID/LEVOTHROID) 200 MCG tablet    omeprazole (PRILOSEC) 20 MG DR capsule    ondansetron (ZOFRAN ODT) 4 MG ODT tab    traZODone (DESYREL) 50 MG tablet     No current facility-administered medications for this visit.          View : No data to display.                ROS:  GI:        View : No data  to display.              Skin:        View : No data to display.              Psych:        View : No data to display.              Female Only:       11/9/2022     3:19 PM   BAR RBS ROS -    Female only Loss of menstrual cycles    Birth control    Regular menstrual cycles       Admission on 03/14/2023, Discharged on 03/15/2023   Component Date Value Ref Range Status    GLUCOSE BY METER POCT 03/14/2023 72  70 - 99 mg/dL Final    Case Report 03/14/2023    Final                    Value:Surgical Pathology Report                         Case: [id number]                                  Authorizing Provider:  Thang Gutierrez MD   Collected:           03/14/2023 12:50 PM          Ordering Location:     [facility] OR                 Received:            03/14/2023 03:45 PM          Pathologist:           Paco Goodman DO                                                            Specimen:    Other, Partial Gastrectomy                                                                 Final Diagnosis 03/14/2023    Final                    Value:This result contains rich text formatting which cannot be displayed here.    Clinical Information 03/14/2023    Final                    Value:This result contains rich text formatting which cannot be displayed here.    Gross Description 03/14/2023    Final                    Value:This result contains rich text formatting which cannot be displayed here.    Microscopic Description 03/14/2023    Final                    Value:This result contains rich text formatting which cannot be displayed here.    Performing Labs 03/14/2023    Final                    Value:This result contains rich text formatting which cannot be displayed here.    Creatinine 03/14/2023 0.78  0.51 - 0.95 mg/dL Final    GFR Estimate 03/14/2023 >90  >60 mL/min/1.73m2 Final    eGFR calculated using 2021 CKD-EPI equation.    GLUCOSE BY METER POCT 03/15/2023 90  70 - 99 mg/dL Final         PHYSICAL  "EXAM:  Objective    Ht 1.651 m (5' 5\")   Wt 133.8 kg (295 lb)   BMI 49.09 kg/m    Vitals - Patient Reported  Pain Score: No Pain (0)      Vitals:  No vitals were obtained today due to virtual visit.    Physical Exam   GENERAL: Healthy, alert and no distress  EYES: Eyes grossly normal to inspection.  No discharge or erythema, or obvious scleral/conjunctival abnormalities.  RESP: No audible wheeze, cough, or visible cyanosis.  No visible retractions or increased work of breathing.    SKIN: Visible skin clear. No significant rash, abnormal pigmentation or lesions.  NEURO: Cranial nerves grossly intact.  Mentation and speech appropriate for age.  PSYCH: Mentation appears normal, affect normal/bright, judgement and insight intact, normal speech and appearance well-groomed.        Sincerely,    Sol Quick NP      Virtual Visit Details    Type of service:  Video Visit     Originating Location (pt. Location): Home    Distant Location (provider location):  Off-site  Platform used for Video Visit: [employer]zo"

## 2023-06-15 NOTE — NURSING NOTE
Is the patient currently in the state of [place]? YES    Visit mode:VIDEO    If the visit is dropped, the patient can be reconnected by: VIDEO VISIT: Text to cell phone: [phone]    Will anyone else be joining the visit? NO      How would you like to obtain your AVS? MyChart    Are changes needed to the allergy or medication list? NO    Reason for visit: SILVIA Meyers on 6/15/2023 at 6:50 AM

## 2023-06-15 NOTE — PROGRESS NOTES
"Marichuy Worrell is a 32 year old female who is being evaluated via a billable video visit.      The patient has been notified of following:     \"This video visit will be conducted via a call between you and your physician/provider. We have found that certain health care needs can be provided without the need for an in-person physical exam.  This service lets us provide the care you need with a video conversation.  If a prescription is necessary we can send it directly to your pharmacy.  If lab work is needed we can place an order for that and you can then stop by our lab to have the test done at a later time.    Video visits are billed at different rates depending on your insurance coverage.  Please reach out to your insurance provider with any questions.    If during the course of the call the physician/provider feels a video visit is not appropriate, you will not be charged for this service.\"    Patient has given verbal consent for Video visit? Yes  How would you like to obtain your AVS? MyChart  If you are dropped from the video visit, the video invite should be resent to: Text to cell phone: [phone]  Will anyone else be joining your video visit? No  {If patient encounters technical issues they should call [phone]      Video-Visit Details    Type of service:  Video Visit    Video Start Time: 7:54 am  Video End Time: 8:06 am    Originating Location (pt. Location): Home    Distant Location (provider location): Offsite (providers home)    Platform used for Video Visit: [employer]    During this virtual visit the patient is located in [place],, patient verifies this as the location during the entirety of this visit.     Nutrition Assessment  Reason For Visit:  Marichuy Worrell is a 32 year old female presenting today for nutrition follow-up, 3 months s/p sleeve 3/14/23 with Dr Gutierrez.     Pt referred by Sol Quick NP on November 10, 2022 and by Dr. Gutierrez 3/14/23.    Anthropometrics  Highest weight per pt: 400ish lbs " "in 2015  Consult Weight 11/9/22: 363 lbs with BMI 60.24    Day of Surgery Weight (3/14/23): 325 lbs    Estimated body mass index is 49.09 kg/m  as calculated from the following:    Height as of an earlier encounter on 6/15/23: 1.651 m (5' 5\").    Weight as of an earlier encounter on 6/15/23: 133.8 kg (295 lb).    Current Weight: 295 lbs, pt report    Weight loss: -105 lbs from highest weight, -68 lbs from initial consult; -30 lbs from day of surgery    MEDICATIONS FOR WEIGHT LOSS:  Topiramate     SUPPLEMENT INFORMATION:  Prior to surgery:   Vit D - not sure dose 2,000 IUs/day  B12 5000 mcg/day SL  MVI     Labs done 11/23/22 -  looked good      MVI with iron (per 2: 18 mg iron, B12 500 mcg)   Barimelts Calcium (500 mg per 2) - Has been doing 1 tab/day.     Dietary calcium: yogurt, cheese, cottage cheese    Due for 3 month labs - plans to get done when she sees her PCP 6/21    Nutrition History  NKFA  Does eat fish, not a big fan of shrimp     Last appt     Things going well overall.  No N/V  Some difficulty swallowing - more so if eating too fast or not chewing well enough.   Reflux: none, taking omeprazole daily  BM: normal right now per pt    Foods example: chicken, guacamole, deli meat, protein shakes, cottage cheese, yogurt (25 g), beef, peppers, broccoli, cauliflower     Hydration: water, Gatorade zero, yasmine, fairlife protein shakes  Doing well  fluids from meal time.  Hydration easy on weekends. Harder at work - trying to carry phone with timers around.    PA: active with work, no structured exercise at this time    June 2023:    Things going well overall.  Vomiting rarely - if eating too fast. Hasn't happened for a few weeks.  Re-started topiramate - was noticing lots pf intrusive hunger without.     Eating 3 meals/day and a snack  Portions: 1/2 to 3/4 cup  Example meal: 4 oz chicken or tuna and some greens   Also doing ratio 20 gram yogurt, cottage cheese/quest protein chips    Getting at least 20 " grams of protein with each meal. Sometimes her snack includes protein as well.    Hydration: 60-80 oz of water/day or water with flavor packs    PA: gym with roommate. 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 Patient demonstrates understanding.     Expected Engagement: good    Goals:  1) Continue bariatric regular diet  2)  Aim for 60+ gm protein/day  3) Consume 48-64+ oz fluids daily- between meals only once on puree diet  4) Eat slowly (>20 min/meal), chewing well to smooth consistency once on the bariatric soft diet.  5) Limit portions, stop when satiated   6) Take vitamins/minerals as recommended    - MVI 2 per day   - Calcium: want to 8522-1958 mg/day (between food and supplements)   7) Get 3 month labs done when able  8) Schedule 6 month provider/RD appointments     Long-term goal: is to walk 2 miles each way to her moms house/back by end of summer    Post-op Diet Handouts:  Diet Guidelines after Weight-loss Surgery  [email or web address]     Your Stage 1 Diet: Clear Liquids  [email or web address]     Your Stage 2 Diet: Low-fat Full Liquids  [email or web address]     Your Stage 3  Diet: Pureed Foods  [email or web address]     Pureed Recipes  [email or web address]    Your Stage 4 Diet: Soft Foods  [email or web address]    Your Stage 5 Diet: Regular Foods  [email or web address]    Supplements after Sleeve Gastrectomy, Gastric Bypass or Single Anastomosis Duodenal Switch  [email or web address]    Keeping Track of Fluids  [email or web address]    Exercise Guidelines after Weight Loss Surgery (1st 4-6 weeks)  [email or web address]      Follow-Up: 6 months post op (due around 9/14/23 - scheduled for Thursday, September 14th at 8:00 am)    Time spent with patient: 12 minutes.  SHUKRI Luong, RD, LD

## 2023-06-15 NOTE — PATIENT INSTRUCTIONS
"Hi Sol Quick NP, it was nice to meet you today!  Thank you for allowing us the privilege of caring for you. We hope we provided you with the excellent service you deserve.   Please let us know if there is anything else we can do for you so that we can be sure you are completely satisfied with your care experience.    To ensure the quality of our services you may be receiving a patient satisfaction survey from an independent patient satisfaction monitoring company.    The greatest compliment you can give is a \"Likely to Recommend\"    Your visit was with Sol Quick NP today.    Instructions per today's visit:     Follow up  plan:  Increase topiramate to 50mg twice daily   Continue omeprazole   Consider meal prepping before getting started at school   Labs available (fasting)   Activity Goal: walk to mom's and back by end of summer   Follow up 3 months     ___________________________________________________________________________  Important contact and scheduling information:  Please call our contact center at [phone] to schedule your next appointments.  For any nursing questions or concerns call Martha Carolina LPN at [phone] or Jennifer Nolan RN at [phone]  Please call during clinic hours Monday through Friday 8:00a - 4:00p if you have questions or you can contact us via [employer] at anytime and we will reply during clinic hours.    Lab results will be communicated through My Chart or letter (if My Chart not used). Please call the clinic if you have not received communication after 1 week or if you have any questions.?  Clinic Fax: [phone]  __________________________________________________________________________    If labs were ordered today:    Please make an appointment to have them drawn at your convenience.     To schedule the Lab Appointment using [employer] ([employer]):  Select \"Schedule an Appointment\"  Select \"Lab Only\"  For \"A couple of questions\", select \"Other\"  For \"Which locations work for you?, " select the location and set up the appointment    To schedule by phone call [phone] to schedule a lab only appointment at any [facility] lab.  ___________________________________________________________________________  Work with A Health !  Virtual Sessions are Available through [facility]    To learn more, call to schedule a free, Health  Q&A appointment: [phone]     What is Health Coaching?  Do you know what you are supposed to do, but you just aren't doing it?  Then, HEALTH COACHING may help you!   Get unstuck and move forward with the support of a professionally trained NBC-HWC (National Board-Certified Health and ) who uses evidence-based approaches to help you move forward with healthy lifestyle changes in the areas of weight loss, stress management and overall well-being.    Health Coaches help you identify goals that will work best for you. Health Coaches provide support and encouragement with overcoming barriers and help you to find inspiration and motivation to lead a healthy lifestyle.    Option one:  Health Coaching 3-Pack; Three, 30-minute Health Coaching Visits, for $99  Visits are done virtually (phone or video)  This is a self pay service; we do not accept insurance for young coaching.    Option two:   The 24 week Plan; 11 Health Coaching Visits, and a 7 months subscription to [employer]-- on-demand fitness, nutrition and mindfulness classes, for $499 (employee discounts may be available). Participants will also meet regularly with a weight management Medical Provider and a Registered/Licensed Dietician.  This is a self-pay service; we do not accept insurance for health coaching.    To Schedule a free Health  Q&A appointment to learn more,  call [phone].  ____________________________________________________________________     [facility] [place]   Healthy Lifestyle Virtual Support  Group    Healthy Lifestyle Virtual Support Group?  This is 60 minutes of small group guided discussion, support and resources. All are welcome who want a healthy lifestyle.  WHEN: Starting in July 2023, this group meets the 1st Friday of the month from 12:30 PM - 1:30 PM virtually using Microsoft Teams.    FACILITATOR: Led by National Board Certified Health and , Patricia Cain, [facility].   TO REGISTER: Please send an email to Patricia [email or web address] to receive monthly invites to the group or if you have any questions about having a health .  Prior to the meeting, a link with directions on how to join the meeting will be sent to you.    2023 Meetings  May 19: Let's Talk  June 9: Create Your Coaching Toolkit: Learn How to  Yourself  July 7: Let's Talk  August 4: Benefits of Fiber with DONN Larios  September 1: Show and Tell (share your aps, podcasts, recipes, hacks, books)  October 6 :Let's Talk  November 3: Introduction to Mindfulness   December 1: Let's Talk    If you would like bariatric surgery specific support group info please let your care team know.         Thank you,   [facility] Comprehensive Weight Management Team

## 2023-06-15 NOTE — LETTER
"6/15/2023       RE: Marichuy Worrell  [address] [address]     Dear Colleague,    Thank you for referring your patient, Marichuy Worrell, to the [facility] [place] [facility]. Please see a copy of my visit note below.    Marichuy Worrell is a 32 year old female who is being evaluated via a billable video visit.      The patient has been notified of following:     \"This video visit will be conducted via a call between you and your physician/provider. We have found that certain health care needs can be provided without the need for an in-person physical exam.  This service lets us provide the care you need with a video conversation.  If a prescription is necessary we can send it directly to your pharmacy.  If lab work is needed we can place an order for that and you can then stop by our lab to have the test done at a later time.    Video visits are billed at different rates depending on your insurance coverage.  Please reach out to your insurance provider with any questions.    If during the course of the call the physician/provider feels a video visit is not appropriate, you will not be charged for this service.\"    Patient has given verbal consent for Video visit? Yes  How would you like to obtain your AVS? MyChart  If you are dropped from the video visit, the video invite should be resent to: Text to cell phone: [phone]  Will anyone else be joining your video visit? No  {If patient encounters technical issues they should call [phone]      Video-Visit Details    Type of service:  Video Visit    Video Start Time: 7:54 am  Video End Time: 8:06 am    Originating Location (pt. Location): Home    Distant Location (provider location): Offsite (providers home)    Platform used for Video Visit: [employer]    During this virtual visit the patient is located in [place],, patient verifies this as the location during the entirety of this " "visit.     Nutrition Assessment  Reason For Visit:  Marichuy Worrell is a 32 year old female presenting today for nutrition follow-up, 3 months s/p sleeve 3/14/23 with Dr Gutierrez.     Pt referred by Sol Quick NP on November 10, 2022 and by Dr. Gutierrez 3/14/23.    Anthropometrics  Highest weight per pt: 400ish lbs in 2015  Consult Weight 11/9/22: 363 lbs with BMI 60.24    Day of Surgery Weight (3/14/23): 325 lbs    Estimated body mass index is 49.09 kg/m  as calculated from the following:    Height as of an earlier encounter on 6/15/23: 1.651 m (5' 5\").    Weight as of an earlier encounter on 6/15/23: 133.8 kg (295 lb).    Current Weight: 295 lbs, pt report    Weight loss: -105 lbs from highest weight, -68 lbs from initial consult; -30 lbs from day of surgery    MEDICATIONS FOR WEIGHT LOSS:  Topiramate     SUPPLEMENT INFORMATION:  Prior to surgery:   Vit D - not sure dose 2,000 IUs/day  B12 5000 mcg/day SL  MVI     Labs done 11/23/22 -  looked good      MVI with iron (per 2: 18 mg iron, B12 500 mcg)   Barimelts Calcium (500 mg per 2) - Has been doing 1 tab/day.     Dietary calcium: yogurt, cheese, cottage cheese    Due for 3 month labs - plans to get done when she sees her PCP 6/21    Nutrition History  NKFA  Does eat fish, not a big fan of shrimp     Last appt     Things going well overall.  No N/V  Some difficulty swallowing - more so if eating too fast or not chewing well enough.   Reflux: none, taking omeprazole daily  BM: normal right now per pt    Foods example: chicken, guacamole, deli meat, protein shakes, cottage cheese, yogurt (25 g), beef, peppers, broccoli, cauliflower     Hydration: water, Gatorade zero, yasmine, fairlife protein shakes  Doing well  fluids from meal time.  Hydration easy on weekends. Harder at work - trying to carry phone with timers around.    PA: active with work, no structured exercise at this time    June 2023:    Things going well overall.  Vomiting rarely - if eating too fast. " Hasn't happened for a few weeks.  Re-started topiramate - was noticing lots pf intrusive hunger without.     Eating 3 meals/day and a snack  Portions: 1/2 to 3/4 cup  Example meal: 4 oz chicken or tuna and some greens   Also doing ratio 20 gram yogurt, cottage cheese/quest protein chips    Getting at least 20 grams of protein with each meal. Sometimes her snack includes protein as well.    Hydration: 60-80 oz of water/day or water with flavor packs    PA: gym with roommate. 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 Patient demonstrates understanding.     Expected Engagement: good    Goals:  1) Continue bariatric regular diet  2)  Aim for 60+ gm protein/day  3) Consume 48-64+ oz fluids daily- between meals only once on puree diet  4) Eat slowly (>20 min/meal), chewing well to smooth consistency once on the bariatric soft diet.  5) Limit portions, stop when satiated   6) Take vitamins/minerals as recommended    - MVI 2 per day   - Calcium: want to 5561-2284 mg/day (between food and supplements)   7) Get 3 month labs done when able  8) Schedule 6 month provider/RD appointments     Long-term goal: is to walk 2  miles each way to her moms house/back by end of summer    Post-op Diet Handouts:  Diet Guidelines after Weight-loss Surgery  [email or web address]     Your Stage 1 Diet: Clear Liquids  [email or web address]     Your Stage 2 Diet: Low-fat Full Liquids  [email or web address]     Your Stage 3 Diet: Pureed Foods  [email or web address]     Pureed Recipes  [email or web address]    Your Stage 4 Diet: Soft Foods  [email or web address]    Your Stage 5 Diet: Regular Foods  [email or web address]    Supplements after Sleeve Gastrectomy, Gastric Bypass or Single Anastomosis Duodenal Switch  [email or web address]    Keeping Track of Fluids  [email or web address]    Exercise Guidelines after Weight Loss Surgery (1st 4-6 weeks)  [email or web address]      Follow-Up: 6 months post op (due around 9/14/23 - scheduled for Thursday, September 14th at 8:00 am)    Time spent with patient: 12 minutes.  SHUKRI Luong, RD, LD

## 2023-06-15 NOTE — PATIENT INSTRUCTIONS
Goals:  1) Continue bariatric regular diet  2)  Aim for 60+ gm protein/day  3) Consume 48-64+ oz fluids daily- between meals only once on puree diet  4) Eat slowly (>20 min/meal), chewing well to smooth consistency once on the bariatric soft diet.  5) Limit portions, stop when satiated   6) Take vitamins/minerals as recommended    - MVI 2 per day   - Calcium: want to 2528-4459 mg/day (between food and supplements)   7) Get 3 month labs done when able  8) Schedule 6 month provider/RD appointments     Long-term goal: is to walk 2 miles each way to her moms house/back by end of summer    Post-op Diet Handouts:  Diet Guidelines after Weight-loss Surgery  [email or web address]     Your Stage 1 Diet: Clear Liquids  [email or web address]     Your Stage 2 Diet: Low-fat Full Liquids  [email or web address]     Your Stage 3 Diet: Pureed Foods  [email or web address]     Pureed Recipes  [email or web address]    Your Stage 4 Diet: Soft Foods  [email or web address]    Your Stage 5 Diet: Regular Foods  [email or web address]    Supplements after Sleeve Gastrectomy, Gastric Bypass or Single Anastomosis Duodenal Switch  [email or web address]    Keeping Track of Fluids  [email or web address]    Exercise Guidelines after Weight Loss Surgery (1st 4-6 weeks)  [email or web address]      Follow-Up: 6 months post op (due around 9/14/23 - scheduled for Thursday, September 14th at 8:00 am)    SHUKRI Chou, RD, LD  Clinic #: [phone]

## 2023-06-15 NOTE — ASSESSMENT & PLAN NOTE
3mo postop. Doing well. Resumed omeprazole after trial of tapering off that increased epigastric pain. Describes excruciating and intrusive hunger without topiramate, has restarted topiramate 25mg bid with some resolution of this. She notes she still has to be mindful about urges to eat without hunger- especially in the AM and after dinner. Discussed increasing topiramate.       ncrease topiramate to 50mg twice daily   Continue omeprazole   Consider meal prepping before getting started at school   Labs available (fasting)   Activity Goal: walk to mom's and back by end of summer   Follow up 3 months

## 2023-06-15 NOTE — PROGRESS NOTES
Virtual Visit Details    Type of service:  Video Visit     Originating Location (pt. Location): Home    Distant Location (provider location):  Off-site  Platform used for Video Visit: [employer]

## 2023-06-21 ENCOUNTER — OFFICE VISIT (OUTPATIENT)
Dept: FAMILY MEDICINE | Facility: CLINIC | Age: 33
End: 2023-06-21
Payer: COMMERCIAL

## 2023-06-21 VITALS
OXYGEN SATURATION: 100 % | BODY MASS INDEX: 48.82 KG/M2 | HEART RATE: 54 BPM | DIASTOLIC BLOOD PRESSURE: 68 MMHG | TEMPERATURE: 98.1 F | HEIGHT: 65 IN | RESPIRATION RATE: 16 BRPM | WEIGHT: 293 LBS | SYSTOLIC BLOOD PRESSURE: 112 MMHG

## 2023-06-21 DIAGNOSIS — F41.1 GAD (GENERALIZED ANXIETY DISORDER): ICD-10-CM

## 2023-06-21 DIAGNOSIS — F32.1 MODERATE MAJOR DEPRESSION (H): ICD-10-CM

## 2023-06-21 DIAGNOSIS — G47.00 INSOMNIA, UNSPECIFIED TYPE: ICD-10-CM

## 2023-06-21 DIAGNOSIS — Z98.84 S/P LAPAROSCOPIC SLEEVE GASTRECTOMY: ICD-10-CM

## 2023-06-21 DIAGNOSIS — Z00.00 ROUTINE GENERAL MEDICAL EXAMINATION AT A HEALTH CARE FACILITY: Primary | ICD-10-CM

## 2023-06-21 DIAGNOSIS — E66.01 MORBID OBESITY (H): ICD-10-CM

## 2023-06-21 DIAGNOSIS — E03.8 OTHER SPECIFIED HYPOTHYROIDISM: ICD-10-CM

## 2023-06-21 DIAGNOSIS — N92.1 MENOMETRORRHAGIA: ICD-10-CM

## 2023-06-21 PROCEDURE — 91312 COVID-19 BIVALENT 12+ (PFIZER): CPT | Performed by: PHYSICIAN ASSISTANT

## 2023-06-21 PROCEDURE — 99214 OFFICE O/P EST MOD 30 MIN: CPT | Mod: 25 | Performed by: PHYSICIAN ASSISTANT

## 2023-06-21 PROCEDURE — 0121A COVID-19 BIVALENT 12+ (PFIZER): CPT | Performed by: PHYSICIAN ASSISTANT

## 2023-06-21 PROCEDURE — 99395 PREV VISIT EST AGE 18-39: CPT | Mod: 25 | Performed by: PHYSICIAN ASSISTANT

## 2023-06-21 RX ORDER — ESCITALOPRAM OXALATE 20 MG/1
20 TABLET ORAL EVERY MORNING
Qty: 90 TABLET | Refills: 1 | Status: SHIPPED | OUTPATIENT
Start: 2023-06-21 | End: 2024-04-18

## 2023-06-21 RX ORDER — TRAZODONE HYDROCHLORIDE 50 MG/1
50 TABLET, FILM COATED ORAL AT BEDTIME
Qty: 90 TABLET | Refills: 1 | Status: SHIPPED | OUTPATIENT
Start: 2023-06-21 | End: 2024-04-18

## 2023-06-21 ASSESSMENT — ENCOUNTER SYMPTOMS
MYALGIAS: 0
CHILLS: 0
ARTHRALGIAS: 0
NERVOUS/ANXIOUS: 0
DIZZINESS: 0
WEAKNESS: 0
CONSTIPATION: 0
PALPITATIONS: 0
ABDOMINAL PAIN: 0
JOINT SWELLING: 0
NAUSEA: 0
HEADACHES: 0
HEMATURIA: 0
SORE THROAT: 0
FEVER: 0
PARESTHESIAS: 0
HEMATOCHEZIA: 0
FREQUENCY: 0
DIARRHEA: 0
EYE PAIN: 0
COUGH: 1
HEARTBURN: 0
BREAST MASS: 0
SHORTNESS OF BREATH: 0
DYSURIA: 0

## 2023-06-21 ASSESSMENT — PATIENT HEALTH QUESTIONNAIRE - PHQ9
SUM OF ALL RESPONSES TO PHQ QUESTIONS 1-9: 2
SUM OF ALL RESPONSES TO PHQ QUESTIONS 1-9: 2
10. IF YOU CHECKED OFF ANY PROBLEMS, HOW DIFFICULT HAVE THESE PROBLEMS MADE IT FOR YOU TO DO YOUR WORK, TAKE CARE OF THINGS AT HOME, OR GET ALONG WITH OTHER PEOPLE: NOT DIFFICULT AT ALL

## 2023-06-21 ASSESSMENT — ANXIETY QUESTIONNAIRES
IF YOU CHECKED OFF ANY PROBLEMS ON THIS QUESTIONNAIRE, HOW DIFFICULT HAVE THESE PROBLEMS MADE IT FOR YOU TO DO YOUR WORK, TAKE CARE OF THINGS AT HOME, OR GET ALONG WITH OTHER PEOPLE: NOT DIFFICULT AT ALL
GAD7 TOTAL SCORE: 0
8. IF YOU CHECKED OFF ANY PROBLEMS, HOW DIFFICULT HAVE THESE MADE IT FOR YOU TO DO YOUR WORK, TAKE CARE OF THINGS AT HOME, OR GET ALONG WITH OTHER PEOPLE?: NOT DIFFICULT AT ALL
2. NOT BEING ABLE TO STOP OR CONTROL WORRYING: NOT AT ALL
GAD7 TOTAL SCORE: 0
GAD7 TOTAL SCORE: 0
4. TROUBLE RELAXING: NOT AT ALL
5. BEING SO RESTLESS THAT IT IS HARD TO SIT STILL: NOT AT ALL
1. FEELING NERVOUS, ANXIOUS, OR ON EDGE: NOT AT ALL
3. WORRYING TOO MUCH ABOUT DIFFERENT THINGS: NOT AT ALL
7. FEELING AFRAID AS IF SOMETHING AWFUL MIGHT HAPPEN: NOT AT ALL
7. FEELING AFRAID AS IF SOMETHING AWFUL MIGHT HAPPEN: NOT AT ALL
6. BECOMING EASILY ANNOYED OR IRRITABLE: NOT AT ALL

## 2023-06-21 NOTE — PATIENT INSTRUCTIONS
Take the birth control pills consistently and do not stop the active pills even if you start having your period.        Preventive Health Recommendations  Female Ages 26 - 39  Yearly exam:   See your health care provider every year in order to  Review health changes.   Discuss preventive care.    Review your medicines if you your doctor has prescribed any.    Until age 30: Get a Pap test every three years (more often if you have had an abnormal result).    After age 30: Talk to your doctor about whether you should have a Pap test every 3 years or have a Pap test with HPV screening every 5 years.   You do not need a Pap test if your uterus was removed (hysterectomy) and you have not had cancer.  You should be tested each year for STDs (sexually transmitted diseases), if you're at risk.   Talk to your provider about how often to have your cholesterol checked.  If you are at risk for diabetes, you should have a diabetes test (fasting glucose).  Shots: Get a flu shot each year. Get a tetanus shot every 10 years.   Nutrition:   Eat at least 5 servings of fruits and vegetables each day.  Eat whole-grain bread, whole-wheat pasta and brown rice instead of white grains and rice.  Get adequate Calcium and Vitamin D.     Lifestyle  Exercise at least 150 minutes a week (30 minutes a day, 5 days of the week). This will help you control your weight and prevent disease.  Limit alcohol to one drink per day.  No smoking.   Wear sunscreen to prevent skin cancer.  See your dentist every six months for an exam and cleaning.

## 2023-06-21 NOTE — PROGRESS NOTES
SUBJECTIVE:   CC: Marichuy is an 33 year old who presents for preventive health visit.        No data to display              Healthy Habits:     Getting at least 3 servings of Calcium per day:  Yes    Bi-annual eye exam:  NO    Dental care twice a year:  NO    Sleep apnea or symptoms of sleep apnea:  None    Diet:  Carbohydrate counting    Frequency of exercise:  2-3 days/week    Duration of exercise:  15-30 minutes    Taking medications regularly:  Yes    Medication side effects:  None    PHQ-2 Total Score: 0    Additional concerns today:  No    Patient with history of depression/anxiety and hypothyroidism arrived for Annual Physical. Not due for PAP, declined to undress for breast exam. GAD7 and PHQ9 completed online.       Has noticed period is coming about a week early.  Taking OCPs consistently and originally prescribed due to heavy periods and being on topamax.   She does state that if her period comes early, she has been stopping her active pill and taking the placebo instead       Doing well on lexapro and trazodone .  Tried prozac in the past which did not work well for her.              Depression and Anxiety Follow-Up    How are you doing with your depression since your last visit? No change    How are you doing with your anxiety since your last visit?  No change    Are you having other symptoms that might be associated with depression or anxiety? No    Have you had a significant life event? No  Starting masters program for psychotherapy    Do you have any concerns with your use of alcohol or other drugs? No    Social History     Tobacco Use     Smoking status: Never     Smokeless tobacco: Never   Vaping Use     Vaping Use: Never used   Substance Use Topics     Alcohol use: No     Drug use: No         6/2/2022     8:10 AM 11/16/2022    11:04 AM 6/21/2023     4:41 PM   PHQ   PHQ-9 Total Score 5 5 2   Q9: Thoughts of better off dead/self-harm past 2 weeks Not at all Not at all Not at all         6/2/2022      8:10 AM 11/16/2022    11:04 AM 6/21/2023     4:41 PM   OSCAR-7 SCORE   Total Score 5 (mild anxiety)  0 (minimal anxiety)   Total Score 5 1 0         6/21/2023     4:41 PM   Last PHQ-9   1.  Little interest or pleasure in doing things 0   2.  Feeling down, depressed, or hopeless 0   3.  Trouble falling or staying asleep, or sleeping too much 1   4.  Feeling tired or having little energy 1   5.  Poor appetite or overeating 0   6.  Feeling bad about yourself 0   7.  Trouble concentrating 0   8.  Moving slowly or restless 0   Q9: Thoughts of better off dead/self-harm past 2 weeks 0   PHQ-9 Total Score 2         6/21/2023     4:41 PM   OSCAR-7    1. Feeling nervous, anxious, or on edge 0   2. Not being able to stop or control worrying 0   3. Worrying too much about different things 0   4. Trouble relaxing 0   5. Being so restless that it is hard to sit still 0   6. Becoming easily annoyed or irritable 0   7. Feeling afraid, as if something awful might happen 0   OSCAR-7 Total Score 0   If you checked any problems, how difficult have they made it for you to do your work, take care of things at home, or get along with other people? Not difficult at all       Suicide Assessment Five-step Evaluation and Treatment (SAFE-T)      Have you ever done Advance Care Planning? (For example, a Health Directive, POLST, or a discussion with a medical provider or your loved ones about your wishes): No, advance care planning information given to patient to review.  Patient declined advance care planning discussion at this time.    Social History     Tobacco Use     Smoking status: Never     Smokeless tobacco: Never   Substance Use Topics     Alcohol use: No             6/21/2023     4:43 PM   Alcohol Use   Prescreen: >3 drinks/day or >7 drinks/week? Not Applicable     Reviewed orders with patient.  Reviewed health maintenance and updated orders accordingly - Yes  Lab work is in process  Labs reviewed in EPIC  BP Readings from Last 3  Encounters:   06/21/23 112/68   03/23/23 120/78   03/15/23 123/63    Wt Readings from Last 3 Encounters:   06/21/23 136.1 kg (300 lb)   06/15/23 133.8 kg (295 lb)   04/14/23 141.5 kg (312 lb)                    Breast Cancer Screening:    FHS-7:        No data to display                Patient under 40 years of age: Routine Mammogram Screening not recommended.   Pertinent mammograms are reviewed under the imaging tab.    History of abnormal Pap smear: NO - age 30- 65 PAP every 3 years recommended      Latest Ref Rng & Units 12/9/2021     8:58 AM 8/8/2017     9:28 AM 8/8/2017     9:26 AM   PAP / HPV   PAP  Negative for Intraepithelial Lesion or Malignancy (NILM)      PAP (Historical)   ASC-US     HPV 16 DNA NEG^Negative   Negative    HPV 18 DNA NEG^Negative   Negative    Other HR HPV NEG^Negative   Negative      Reviewed and updated as needed this visit by clinical staff   Tobacco  Allergies  Meds              Reviewed and updated as needed this visit by Provider                     Review of Systems   Constitutional: Negative for chills and fever.   HENT: Negative for congestion, ear pain, hearing loss and sore throat.    Eyes: Negative for pain and visual disturbance.   Respiratory: Positive for cough. Negative for shortness of breath.    Cardiovascular: Negative for chest pain, palpitations and peripheral edema.   Gastrointestinal: Negative for abdominal pain, constipation, diarrhea, heartburn, hematochezia and nausea.   Breasts:  Negative for tenderness, breast mass and discharge.   Genitourinary: Negative for dysuria, frequency, genital sores, hematuria, pelvic pain, urgency, vaginal bleeding and vaginal discharge.   Musculoskeletal: Negative for arthralgias, joint swelling and myalgias.   Skin: Negative for rash.   Neurological: Negative for dizziness, weakness, headaches and paresthesias.   Psychiatric/Behavioral: Negative for mood changes. The patient is not nervous/anxious.      CONSTITUTIONAL: NEGATIVE  "for fever, chills, change in weight  INTEGUMENTARU/SKIN: NEGATIVE for worrisome rashes, moles or lesions  EYES: NEGATIVE for vision changes or irritation  ENT: NEGATIVE for ear, mouth and throat problems  RESP: NEGATIVE for significant cough or SOB  BREAST: NEGATIVE for masses, tenderness or discharge  CV: NEGATIVE for chest pain, palpitations or peripheral edema  GI: NEGATIVE for nausea, abdominal pain, heartburn, or change in bowel habits  : NEGATIVE for unusual urinary or vaginal symptoms. Periods are regular.  MUSCULOSKELETAL: NEGATIVE for significant arthralgias or myalgia  NEURO: NEGATIVE for weakness, dizziness or paresthesias  PSYCHIATRIC: NEGATIVE for changes in mood or affect     OBJECTIVE:   /68 (BP Location: Left arm, Patient Position: Chair, Cuff Size: Adult Large)   Pulse 54   Temp 98.1  F (36.7  C) (Tympanic)   Resp 16   Ht 1.651 m (5' 5\")   Wt 136.1 kg (300 lb)   LMP 05/27/2023 (Exact Date)   SpO2 100%   BMI 49.92 kg/m    Physical Exam  GENERAL: healthy, alert and no distress  EYES: Eyes grossly normal to inspection, PERRL and conjunctivae and sclerae normal  HENT: ear canals and TM's normal, nose and mouth without ulcers or lesions  NECK: no adenopathy, no asymmetry, masses, or scars and thyroid normal to palpation  RESP: lungs clear to auscultation - no rales, rhonchi or wheezes  BREAST: normal without masses, tenderness or nipple discharge and no palpable axillary masses or adenopathy  CV: regular rate and rhythm, normal S1 S2, no S3 or S4, no murmur, click or rub, no peripheral edema and peripheral pulses strong  ABDOMEN: soft, nontender, no hepatosplenomegaly, no masses and bowel sounds normal  MS: no gross musculoskeletal defects noted, no edema  SKIN: no suspicious lesions or rashes  NEURO: Normal strength and tone, mentation intact and speech normal  PSYCH: mentation appears normal, affect normal/bright    Diagnostic Test Results:  Labs reviewed in Epic    ASSESSMENT/PLAN: " "  (Z00.00) Routine general medical examination at a health care facility  (primary encounter diagnosis)  Comment:      HEALTH CARE MAINTENANCE              Reviewed USPTF recommendations and anticipatory guidance.              See orders.   Due for covid booster, given today.     (F41.1) OSCAR (generalized anxiety disorder)  Comment: doing well, will leave medication as is. Starting masters program soon. Discussed importance of self care and reach out if stress levels too high  Plan: escitalopram (LEXAPRO) 20 MG tablet, traZODone         (DESYREL) 50 MG tablet            (F32.1) Moderate major depression (H)  Comment: doing well, will leave as is.    Plan: escitalopram (LEXAPRO) 20 MG tablet, traZODone         (DESYREL) 50 MG tablet            (G47.00) Insomnia, unspecified type  Comment: stable.   Plan: traZODone (DESYREL) 50 MG tablet            (E03.8) Other specified hypothyroidism  Comment: will recheck levels, this may be contributing to abnormal period.   Plan: TSH            (E66.01) Morbid obesity (H)  Comment: morbid obesity s/p  laparoscopic sleeve gastrectomy  Comment: managed by surgical weight loss team  Plan:     (N92.1) Menometrorrhagia  Comment: Take the birth control pills consistently and do not stop the active pills even if you start having your period.      Plan:     Patient has been advised of split billing requirements and indicates understanding: Yes      COUNSELING:  Reviewed preventive health counseling, as reflected in patient instructions       Regular exercise       Healthy diet/nutrition      BMI:   Estimated body mass index is 49.92 kg/m  as calculated from the following:    Height as of this encounter: 1.651 m (5' 5\").    Weight as of this encounter: 136.1 kg (300 lb).   Weight management plan: Discussed healthy diet and exercise guidelines      She reports that she has never smoked. She has never used smokeless tobacco.      Vanessa Echeverria PA-C  [facility] " ROSS  Answers for HPI/ROS submitted by the patient on 6/21/2023  If you checked off any problems, how difficult have these problems made it for you to do your work, take care of things at home, or get along with other people?: Not difficult at all  PHQ9 TOTAL SCORE: 2  OSCAR 7 TOTAL SCORE: 0

## 2023-07-25 ENCOUNTER — LAB (OUTPATIENT)
Dept: LAB | Facility: CLINIC | Age: 33
End: 2023-07-25
Payer: COMMERCIAL

## 2023-07-25 DIAGNOSIS — E66.813 CLASS 3 SEVERE OBESITY WITH SERIOUS COMORBIDITY AND BODY MASS INDEX (BMI) OF 50.0 TO 59.9 IN ADULT, UNSPECIFIED OBESITY TYPE (H): ICD-10-CM

## 2023-07-25 DIAGNOSIS — Z98.84 S/P LAPAROSCOPIC SLEEVE GASTRECTOMY: ICD-10-CM

## 2023-07-25 DIAGNOSIS — Z71.3 NUTRITIONAL COUNSELING: ICD-10-CM

## 2023-07-25 DIAGNOSIS — E66.01 CLASS 3 SEVERE OBESITY WITH SERIOUS COMORBIDITY AND BODY MASS INDEX (BMI) OF 50.0 TO 59.9 IN ADULT, UNSPECIFIED OBESITY TYPE (H): ICD-10-CM

## 2023-07-25 DIAGNOSIS — E03.8 OTHER SPECIFIED HYPOTHYROIDISM: ICD-10-CM

## 2023-07-25 LAB
ALBUMIN SERPL BCG-MCNC: 4 G/DL (ref 3.5–5.2)
ALP SERPL-CCNC: 55 U/L (ref 35–104)
ALT SERPL W P-5'-P-CCNC: 15 U/L (ref 0–50)
ANION GAP SERPL CALCULATED.3IONS-SCNC: 10 MMOL/L (ref 7–15)
AST SERPL W P-5'-P-CCNC: 19 U/L (ref 0–45)
BILIRUB SERPL-MCNC: 0.4 MG/DL
BUN SERPL-MCNC: 19 MG/DL (ref 6–20)
CALCIUM SERPL-MCNC: 9.2 MG/DL (ref 8.6–10)
CHLORIDE SERPL-SCNC: 111 MMOL/L (ref 98–107)
CHOLEST SERPL-MCNC: 180 MG/DL
CREAT SERPL-MCNC: 0.95 MG/DL (ref 0.51–0.95)
DEPRECATED CALCIDIOL+CALCIFEROL SERPL-MC: 52 UG/L (ref 20–75)
DEPRECATED HCO3 PLAS-SCNC: 20 MMOL/L (ref 22–29)
ERYTHROCYTE [DISTWIDTH] IN BLOOD BY AUTOMATED COUNT: 13 % (ref 10–15)
GFR SERPL CREATININE-BSD FRML MDRD: 82 ML/MIN/1.73M2
GLUCOSE SERPL-MCNC: 81 MG/DL (ref 70–99)
HCT VFR BLD AUTO: 40.8 % (ref 35–47)
HDLC SERPL-MCNC: 61 MG/DL
HGB BLD-MCNC: 13.3 G/DL (ref 11.7–15.7)
LDLC SERPL CALC-MCNC: 98 MG/DL
MCH RBC QN AUTO: 27.7 PG (ref 26.5–33)
MCHC RBC AUTO-ENTMCNC: 32.6 G/DL (ref 31.5–36.5)
MCV RBC AUTO: 85 FL (ref 78–100)
NONHDLC SERPL-MCNC: 119 MG/DL
PLATELET # BLD AUTO: 250 10E3/UL (ref 150–450)
POTASSIUM SERPL-SCNC: 4.2 MMOL/L (ref 3.4–5.3)
PROT SERPL-MCNC: 6.7 G/DL (ref 6.4–8.3)
PTH-INTACT SERPL-MCNC: 25 PG/ML (ref 15–65)
RBC # BLD AUTO: 4.8 10E6/UL (ref 3.8–5.2)
SODIUM SERPL-SCNC: 141 MMOL/L (ref 136–145)
TRIGL SERPL-MCNC: 104 MG/DL
TSH SERPL DL<=0.005 MIU/L-ACNC: 0.17 UIU/ML (ref 0.3–4.2)
VIT B12 SERPL-MCNC: 479 PG/ML (ref 232–1245)
WBC # BLD AUTO: 7.9 10E3/UL (ref 4–11)

## 2023-07-25 PROCEDURE — 85027 COMPLETE CBC AUTOMATED: CPT

## 2023-07-25 PROCEDURE — 80061 LIPID PANEL: CPT

## 2023-07-25 PROCEDURE — 84590 ASSAY OF VITAMIN A: CPT | Mod: 90

## 2023-07-25 PROCEDURE — 80053 COMPREHEN METABOLIC PANEL: CPT

## 2023-07-25 PROCEDURE — 82306 VITAMIN D 25 HYDROXY: CPT

## 2023-07-25 PROCEDURE — 99000 SPECIMEN HANDLING OFFICE-LAB: CPT

## 2023-07-25 PROCEDURE — 36415 COLL VENOUS BLD VENIPUNCTURE: CPT

## 2023-07-25 PROCEDURE — 82607 VITAMIN B-12: CPT

## 2023-07-25 PROCEDURE — 83970 ASSAY OF PARATHORMONE: CPT

## 2023-07-25 PROCEDURE — 84443 ASSAY THYROID STIM HORMONE: CPT

## 2023-07-27 DIAGNOSIS — E03.8 OTHER SPECIFIED HYPOTHYROIDISM: ICD-10-CM

## 2023-07-27 RX ORDER — LEVOTHYROXINE SODIUM 175 UG/1
175 TABLET ORAL DAILY
Qty: 90 TABLET | Refills: 0 | Status: SHIPPED | OUTPATIENT
Start: 2023-07-27 | End: 2023-11-09

## 2023-07-28 LAB
ANNOTATION COMMENT IMP: NORMAL
RETINYL PALMITATE SERPL-MCNC: <0.02 MG/L
VIT A SERPL-MCNC: 0.67 MG/L

## 2023-09-11 DIAGNOSIS — N92.1 MENOMETRORRHAGIA: ICD-10-CM

## 2023-09-11 RX ORDER — DESOGESTREL AND ETHINYL ESTRADIOL 0.15-0.03
1 KIT ORAL DAILY
Qty: 84 TABLET | Refills: 3 | Status: SHIPPED | OUTPATIENT
Start: 2023-09-11

## 2023-09-14 ENCOUNTER — VIRTUAL VISIT (OUTPATIENT)
Dept: ENDOCRINOLOGY | Facility: CLINIC | Age: 33
End: 2023-09-14

## 2023-09-14 VITALS — WEIGHT: 280 LBS | BODY MASS INDEX: 46.65 KG/M2 | HEIGHT: 65 IN

## 2023-09-14 DIAGNOSIS — E66.9 OBESITY: ICD-10-CM

## 2023-09-14 DIAGNOSIS — Z71.3 NUTRITIONAL COUNSELING: Primary | ICD-10-CM

## 2023-09-14 DIAGNOSIS — Z98.84 S/P LAPAROSCOPIC SLEEVE GASTRECTOMY: ICD-10-CM

## 2023-09-14 PROCEDURE — 97803 MED NUTRITION INDIV SUBSEQ: CPT | Mod: 95 | Performed by: DIETITIAN, REGISTERED

## 2023-09-14 PROCEDURE — 99207 PR NO CHARGE LOS: CPT | Mod: 95 | Performed by: DIETITIAN, REGISTERED

## 2023-09-14 ASSESSMENT — PAIN SCALES - GENERAL: PAINLEVEL: NO PAIN (0)

## 2023-09-14 NOTE — LETTER
"9/14/2023       RE: Marichuy Worrell  [address]  [place] [address]     Dear Colleague,    Thank you for referring your patient, Marichuy Worrell, to the [facility] [place] at [facility]. Please see a copy of my visit note below.    Video-Visit Details    Type of service:  Video Visit    Video Start Time: 8:03 am   Video End Time: 8:20 am    Originating Location (pt. Location): Home    Distant Location (provider location):  Offsite (providers home)     Platform used for Video Visit: [employer]    Nutrition Assessment  Reason For Visit:  Marichuy Worrell is a 33 year old female presenting today for nutrition follow-up, 6 months s/p sleeve 3/14/23 with Dr Gutierrez.     Pt referred by Sol Quick NP on November 10, 2022 and by Dr. Gutierrez 3/14/23.    Anthropometrics  Highest weight per pt: 400ish lbs in 2015  Consult Weight 11/9/22: 363 lbs with BMI 60.24    Day of Surgery Weight (3/14/23): 325 lbs    Estimated body mass index is 46.59 kg/m  as calculated from the following:    Height as of this encounter: 1.651 m (5' 5\").    Weight as of this encounter: 127 kg (280 lb).    Current Weight: 280 lbs, pt report   - Stable recently     Weight loss: -120 lbs from highest weight, -83 lbs from initial consult; -45 lbs from day of surgery    MEDICATIONS FOR WEIGHT LOSS:  Topiramate     SUPPLEMENT INFORMATION:     MVI with iron (per 2: 18 mg iron, B12 500 mcg)   Barimelts Calcium (500 mg per 2) - Has been doing 1 tab/day.     Dietary calcium: yogurt, cheese, cottage cheese    Labs 7/25/23  CMP suggested possible dehydration  Otherwise WNL    Nutrition History  NKFA  Does eat fish, not a big fan of shrimp     June 2023:    Things going well overall.  Vomiting rarely - if eating too fast. Hasn't happened for a few weeks.  Re-started topiramate - was noticing lots pf intrusive hunger without.     Eating 3 meals/day and a snack  Portions: 1/2 to 3/4 " cup  Example meal: 4 oz chicken or tuna and some greens   Also doing ratio 20 gram yogurt, cottage cheese/quest protein chips    Getting at least 20 grams of protein with each meal. Sometimes her snack includes protein as well.    Hydration: 60-80 oz of water/day or water with flavor packs    PA: gym with roommate. Long-term goal is to walk 2 miles each way to her moms house/back by end of summer    BM: no concerns     Sept 2023:    Things going well overall per pt.  Concerned about slowing rate of weight loss. Has hit 30% weight loss from highest weight.   Rare reflux feeling- will take omeprazole if notices.     In a food rut - her roommate has been cooking to help with this.    Typical day  B - high protein yogurt OR protein shake  L - leftovers OR cottage cheese and quest protein chips  D - meat and vegetable    Portions: ~1 cup    Hydration: water, flavor packs occ, sugar-free juice occ  - up/down per pt, varies pending on work day.  Easier if at desk.    Carbonation: has tried a bubbly water but will pour out and add ice to water it down and let it sit     Caffeine: none - rare tea but typically herbal    Alcohol: tried 1x in July but not regularly.     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 Patient demonstrates understanding.      Expected Engagement: good    Goals:  1) Continue bariatric regular diet  2)  Aim for 60+ gm protein/day  3) Consume 48-64+ oz fluids daily- between meals  4) Eat slowly (>20 min/meal), chewing well to smooth consistency once on the bariatric soft diet.  5) Limit portions, stop when satiated   6) Take vitamins/minerals as recommended    - MVI 2 per day   - Calcium: want to 7814-3010 mg/day (between food and supplements)   7) Schedule 6 month follow up with Sol. Can call [phone]  8) Due again at minimum 1 year post surgery     Long-term goal: is to walk 2 miles each way to her moms house/back by end of summer    Post-op Diet Handouts:  Diet Guidelines after Weight-loss Surgery  [email or web address]     Your Stage 5 Diet: Regular Foods  [email or web address]    Supplements after Sleeve Gastrectomy, Gastric Bypass or Single Anastomosis Duodenal Switch  [email or web address]    Keeping Track of Fluids  [email or web address]    Exercise Guidelines after Weight Loss Surgery (1st 4-6 weeks)  [email or web address]      Follow-Up: 12 months post op (due around 3/14/24)    Time spent with patient: 17 minutes.  SHUKRI Luong, RD, LD

## 2023-09-14 NOTE — PROGRESS NOTES
"Video-Visit Details    Type of service:  Video Visit    Video Start Time: 8:03 am   Video End Time: 8:20 am    Originating Location (pt. Location): Home    Distant Location (provider location):  Offsite (providers home)     Platform used for Video Visit: [employer]    Nutrition Assessment  Reason For Visit:  Marichuy Worrell is a 33 year old female presenting today for nutrition follow-up, 6 months s/p sleeve 3/14/23 with Dr Gutierrez.     Pt referred by Sol Quick NP on November 10, 2022 and by Dr. Gutierrez 3/14/23.    Anthropometrics  Highest weight per pt: 400ish lbs in 2015  Consult Weight 11/9/22: 363 lbs with BMI 60.24    Day of Surgery Weight (3/14/23): 325 lbs    Estimated body mass index is 46.59 kg/m  as calculated from the following:    Height as of this encounter: 1.651 m (5' 5\").    Weight as of this encounter: 127 kg (280 lb).    Current Weight: 280 lbs, pt report   - Stable recently     Weight loss: -120 lbs from highest weight, -83 lbs from initial consult; -45 lbs from day of surgery    MEDICATIONS FOR WEIGHT LOSS:  Topiramate     SUPPLEMENT INFORMATION:     MVI with iron (per 2: 18 mg iron, B12 500 mcg)   Barimelts Calcium (500 mg per 2) - Has been doing 1 tab/day.     Dietary calcium: yogurt, cheese, cottage cheese    Labs 7/25/23  CMP suggested possible dehydration  Otherwise WNL    Nutrition History  NKFA  Does eat fish, not a big fan of shrimp     June 2023:    Things going well overall.  Vomiting rarely - if eating too fast. Hasn't happened for a few weeks.  Re-started topiramate - was noticing lots pf intrusive hunger without.     Eating 3 meals/day and a snack  Portions: 1/2 to 3/4 cup  Example meal: 4 oz chicken or tuna and some greens   Also doing ratio 20 gram yogurt, cottage cheese/quest protein chips    Getting at least 20 grams of protein with each meal. Sometimes her snack includes protein as well.    Hydration: 60-80 oz of water/day or water with flavor packs    PA: gym with roommate. " Long-term goal is to walk 2 miles each way to her moms house/back by end of summer    BM: no concerns     Sept 2023:    Things going well overall per pt.  Concerned about slowing rate of weight loss. Has hit 30% weight loss from highest weight.   Rare reflux feeling- will take omeprazole if notices.     In a food rut - her roommate has been cooking to help with this.    Typical day  B - high protein yogurt OR protein shake  L - leftovers OR cottage cheese and quest protein chips  D - meat and vegetable    Portions: ~1 cup    Hydration: water, flavor packs occ, sugar-free juice occ  - up/down per pt, varies pending on work day.  Easier if at desk.    Carbonation: has tried a bubbly water but will pour out and add ice to water it down and let it sit     Caffeine: none - rare tea but typically herbal    Alcohol: tried 1x in July but not regularly.     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 Patient demonstrates understanding.     Expected Engagement: good    Goals:  1) Continue bariatric regular diet  2)  Aim for 60+ gm protein/day  3) Consume 48-64+ oz fluids daily- between meals  4) Eat slowly (>20 min/meal), chewing well to smooth consistency once on the bariatric soft diet.  5) Limit portions, stop when satiated   6) Take  vitamins/minerals as recommended    - MVI 2 per day   - Calcium: want to 0371-9391 mg/day (between food and supplements)   7) Schedule 6 month follow up with Sol. Can call [phone]  8) Due again at minimum 1 year post surgery     Long-term goal: is to walk 2 miles each way to her moms house/back by end of summer    Post-op Diet Handouts:  Diet Guidelines after Weight-loss Surgery  [email or web address]     Your Stage 5 Diet: Regular Foods  [email or web address]    Supplements after Sleeve Gastrectomy, Gastric Bypass or Single Anastomosis Duodenal Switch  [email or web address]    Keeping Track of Fluids  [email or web address]    Exercise Guidelines after Weight Loss Surgery (1st 4-6 weeks)  [email or web address]      Follow-Up: 12 months post op (due around 3/14/24)    Time spent with patient: 17 minutes.  Sonam Vizcarra, SHUKRI, RD, LD

## 2023-09-14 NOTE — NURSING NOTE
Is the patient currently in the state of [place]? YES    Visit mode:VIDEO    If the visit is dropped, the patient can be reconnected by: VIDEO VISIT: Send to e-mail at: [email or web address]    Will anyone else be joining the visit? NO  (If patient encounters technical issues they should call [phone])    How would you like to obtain your AVS? MyChart    Are changes needed to the allergy or medication list? No, Pt stated no changes to allergies, and Pt stated no med changes    Reason for visit: RECHECK ([facility] Nutrition)    Aziza WELCH

## 2023-11-08 DIAGNOSIS — E03.8 OTHER SPECIFIED HYPOTHYROIDISM: ICD-10-CM

## 2023-11-09 RX ORDER — LEVOTHYROXINE SODIUM 175 UG/1
175 TABLET ORAL DAILY
Qty: 30 TABLET | Refills: 0 | Status: SHIPPED | OUTPATIENT
Start: 2023-11-09 | End: 2023-11-28

## 2023-11-24 ENCOUNTER — LAB (OUTPATIENT)
Dept: LAB | Facility: CLINIC | Age: 33
End: 2023-11-24
Payer: COMMERCIAL

## 2023-11-24 DIAGNOSIS — E03.8 OTHER SPECIFIED HYPOTHYROIDISM: ICD-10-CM

## 2023-11-24 LAB — TSH SERPL DL<=0.005 MIU/L-ACNC: 0.57 UIU/ML (ref 0.3–4.2)

## 2023-11-24 PROCEDURE — 36415 COLL VENOUS BLD VENIPUNCTURE: CPT

## 2023-11-24 PROCEDURE — 84443 ASSAY THYROID STIM HORMONE: CPT

## 2023-11-28 DIAGNOSIS — E03.8 OTHER SPECIFIED HYPOTHYROIDISM: ICD-10-CM

## 2023-11-28 RX ORDER — LEVOTHYROXINE SODIUM 175 UG/1
175 TABLET ORAL DAILY
Qty: 90 TABLET | Refills: 3 | Status: SHIPPED | OUTPATIENT
Start: 2023-11-28 | End: 2024-04-22

## 2024-01-10 NOTE — LETTER
February 13, 2020    Marichuy Worrell  [address] [facility] [address]    Dear Marichuy,       We recently received a refill request for ENSKYCE 0.15-30 MG-MCG tablet.  We have refilled this for a one time supply only because you are due for a:    Physical office visit      Please call at your earliest convenience so that there will not be a delay with your future refills.          Thank you,   Your [facility] Team/mkr  [phone]                   normal

## 2024-01-22 DIAGNOSIS — E66.813 CLASS 3 SEVERE OBESITY WITH SERIOUS COMORBIDITY AND BODY MASS INDEX (BMI) OF 60.0 TO 69.9 IN ADULT, UNSPECIFIED OBESITY TYPE (H): Chronic | ICD-10-CM

## 2024-01-22 DIAGNOSIS — E66.01 CLASS 3 SEVERE OBESITY WITH SERIOUS COMORBIDITY AND BODY MASS INDEX (BMI) OF 60.0 TO 69.9 IN ADULT, UNSPECIFIED OBESITY TYPE (H): Chronic | ICD-10-CM

## 2024-01-24 RX ORDER — TOPIRAMATE 25 MG/1
50 TABLET, FILM COATED ORAL 2 TIMES DAILY
Qty: 120 TABLET | Status: CANCELLED | OUTPATIENT
Start: 2024-01-24

## 2024-01-24 NOTE — TELEPHONE ENCOUNTER
topiramate (TOPAMAX) 25 MG tablet   120 tablet 3 6/15/2023       Last Office Visit : 6-  Future Office visit:  none    Labs completed on :7-    Potassium  3.4 - 5.3 mmol/L 4.2     Anion Gap  7 - 15 mmol/L 10     Creatinine  0.51 - 0.95 mg/dL 0.95     Chloride  98 - 107 mmol/L 111

## 2024-01-25 NOTE — TELEPHONE ENCOUNTER
Refill denied at this time. Patient needs appointment with Sol Quick CNP. [employer] message sent to patient to schedule appointment.

## 2024-01-31 RX ORDER — TOPIRAMATE 25 MG/1
50 TABLET, FILM COATED ORAL 2 TIMES DAILY
Qty: 120 TABLET | Refills: 1 | Status: SHIPPED | OUTPATIENT
Start: 2024-01-31 | End: 2024-03-14

## 2024-03-14 ENCOUNTER — VIRTUAL VISIT (OUTPATIENT)
Dept: ENDOCRINOLOGY | Facility: CLINIC | Age: 34
End: 2024-03-14
Payer: COMMERCIAL

## 2024-03-14 VITALS — HEIGHT: 65 IN | BODY MASS INDEX: 42.49 KG/M2 | WEIGHT: 255 LBS

## 2024-03-14 DIAGNOSIS — E66.813 CLASS 3 SEVERE OBESITY WITH SERIOUS COMORBIDITY AND BODY MASS INDEX (BMI) OF 60.0 TO 69.9 IN ADULT, UNSPECIFIED OBESITY TYPE (H): Primary | ICD-10-CM

## 2024-03-14 DIAGNOSIS — E66.01 CLASS 3 SEVERE OBESITY WITH SERIOUS COMORBIDITY AND BODY MASS INDEX (BMI) OF 60.0 TO 69.9 IN ADULT, UNSPECIFIED OBESITY TYPE (H): Primary | ICD-10-CM

## 2024-03-14 DIAGNOSIS — Z98.84 S/P LAPAROSCOPIC SLEEVE GASTRECTOMY: ICD-10-CM

## 2024-03-14 PROCEDURE — 99213 OFFICE O/P EST LOW 20 MIN: CPT | Mod: 95 | Performed by: NURSE PRACTITIONER

## 2024-03-14 RX ORDER — METFORMIN HCL 500 MG
TABLET, EXTENDED RELEASE 24 HR ORAL
Qty: 60 TABLET | Refills: 2 | Status: SHIPPED | OUTPATIENT
Start: 2024-03-14

## 2024-03-14 RX ORDER — TOPIRAMATE 25 MG/1
50 TABLET, FILM COATED ORAL 2 TIMES DAILY
Qty: 120 TABLET | Refills: 3 | Status: SHIPPED | OUTPATIENT
Start: 2024-03-14

## 2024-03-14 NOTE — NURSING NOTE
Is the patient currently in the state of [place]? YES    Visit mode:VIDEO    If the visit is dropped, the patient can be reconnected by: VIDEO VISIT: Send to e-mail at: [email or web address]    Will anyone else be joining the visit? NO  (If patient encounters technical issues they should call [phone])    How would you like to obtain your AVS? MyChart    Are changes needed to the allergy or medication list? Pt stated no changes to allergies and Pt stated no med changes    Reason for visit: Follow Up    Lucrecia WELCH

## 2024-03-14 NOTE — PROGRESS NOTES
Return Medical Weight Management Note     Marichuy Worrell  MRN:  [id number]  :  1990  NELSON:  3/14/2024    Dear Vanessa Echeverria PA-C,    I had the pleasure of seeing your patient Marichuy Worrell. She is a 33 year old female who I am continuing to see for treatment of obesity related to:        2022     3:19 PM   --   I have the following health issues associated with obesity Hypothyroidism       Assessment & Plan   Problem List Items Addressed This Visit        Digestive    Class 3 severe obesity with serious comorbidity and body mass index (BMI) of 60.0 to 69.9 in adult (H) - Primary (Chronic)     1 year s/p sleeve. Starting to see plateau. Appetite well controlled. Meeting protein/ hydration goals. Going to try metformin. Recommend rechecking thyroid too. Goal is to increase activity.     Continue topiramate  Start metformin  Follow up with primary care about thyroid- lab ordered  Try 10 min of activity daily- maybe 2 of those days are strength training  Great work with meal prepping :)   Keep working on hydration   Follow up 3 months          Relevant Medications    metFORMIN (GLUCOPHAGE XR) 500 MG 24 hr tablet    topiramate (TOPAMAX) 25 MG tablet    Other Relevant Orders    TSH with free T4 reflex       Other    S/P laparoscopic sleeve gastrectomy    Relevant Orders    TSH with free T4 reflex          INTERVAL HISTORY:   s/p sleeve 3/14/2023   Hunger described as excruciating previously - less often now. No heart burn recently, stopped omeprazole   At a stall now for a while - first time since surgery     Anti-obesity medication history    Current:   topiramate 50mg bid     Recent diet changes:   Roommate loves cooking and meal preps for her when she does her own   Some days she has a lot of hunger and other days not hungry - most of the time appetite is manageable   Brings protein- healthy snacks to work to help avoid the temptations   50-70g daily   Hydration - struggles when not at work, does  better at work  Not a lot of snacking      Recent exercise/activity changes:   Had gym membership but since first of the year this has been more difficult to get to consistently   Got a treadmill - helpful especially in the winter - has had a hard time making this a habit - trying to get on it during the week more - in smaller chunks of time     Recent stressors:   Some body dysmorphia   Some mental health struggles recently -- tries to avoid thinking too much about not seeing body changes -- rather look at how the measurements change and clothing sizes change   Continues to work with therapist- working on processing some past trauma   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     Roommate is very supportive    Recent sleep changes: good     Vitamins/Labs: 7/2023    CURRENT WEIGHT:   255 lbs 0 oz    Initial Weight (lbs): 362 lbs  Last Visits Weight: 127 kg (280 lb)  Cumulative weight loss (lbs): 107  Weight Loss Percentage: 29.56%        1/13/2023     4:53 PM   Changes and Difficulties   I have made the following changes to my diet since my last visit: Increase my water intake   With regards to my diet, I am still struggling with: Portions, snacking, feeling full   With regards to my activity/exercise, I am still struggling with: Feeling motivated         MEDICATIONS:   Current Outpatient Medications   Medication Sig Dispense Refill     metFORMIN (GLUCOPHAGE XR) 500 MG 24 hr tablet 1 tablet by mouth daily with meal for 1 week, then 2 tablets daily with meal. 60 tablet 2     topiramate (TOPAMAX) 25 MG tablet Take 2 tablets (50 mg) by mouth 2 times daily 120 tablet 3     ciclopirox (PENLAC) 8 % external solution Apply to adjacent skin and affected nails daily.  Remove with alcohol every 7 days, then repeat. (Patient taking differently: as needed Apply to adjacent skin and affected nails daily.  Remove with alcohol every 7 days, then repeat.) 6.6 mL 5     desogestrel-ethinyl estradiol  "(ENSKYCE) 0.15-30 MG-MCG tablet Take 1 tablet by mouth daily 84 tablet 3     escitalopram (LEXAPRO) 20 MG tablet Take 1 tablet (20 mg) by mouth every morning 90 tablet 1     levothyroxine (SYNTHROID/LEVOTHROID) 175 MCG tablet Take 1 tablet (175 mcg) by mouth daily 90 tablet 3     omeprazole (PRILOSEC) 20 MG DR capsule Take 1 capsule (20 mg) by mouth every morning (before breakfast) 30 capsule 0     ondansetron (ZOFRAN ODT) 4 MG ODT tab Take 1 tablet (4 mg) by mouth every 6 hours as needed for nausea 15 tablet 0     traZODone (DESYREL) 50 MG tablet Take 1 tablet (50 mg) by mouth At Bedtime 90 tablet 1           1/13/2023     4:53 PM   Weight Loss Medication History Reviewed With Patient   Which weight loss medications are you currently taking on a regular basis? Topamax (topiramate)    Wegovy       Lab on 11/24/2023   Component Date Value Ref Range Status     TSH 11/24/2023 0.57  0.30 - 4.20 uIU/mL Final           4/1/2014    10:00 AM   RYAN Score (Last Two)   RYAN Raw Score 44   Activation Score 70.8   RYAN Level 4         PHYSICAL EXAM:  Objective    Ht 1.651 m (5' 5\")   Wt 115.7 kg (255 lb)   BMI 42.43 kg/m             Vitals:  No vitals were obtained today due to virtual visit.    GENERAL: alert and no distress  EYES: Eyes grossly normal to inspection.  No discharge or erythema, or obvious scleral/conjunctival abnormalities.  RESP: No audible wheeze, cough, or visible cyanosis.    SKIN: Visible skin clear. No significant rash, abnormal pigmentation or lesions.  NEURO: Cranial nerves grossly intact.  Mentation and speech appropriate for age.  PSYCH: Appropriate affect, tone, and pace of words        Sincerely,    Sol Quick NP      25 minutes spent by me on the date of the encounter doing chart review, history and exam, documentation and further activities per the note    Virtual Visit Details    Type of service:  Video Visit     Originating Location (pt. Location): Home    Distant Location (provider location):  " Off-site  Platform used for Video Visit: [employer]

## 2024-03-14 NOTE — ASSESSMENT & PLAN NOTE
1 year s/p sleeve. Starting to see plateau. Appetite well controlled. Meeting protein/ hydration goals. Going to try metformin. Recommend rechecking thyroid too. Goal is to increase activity.     Continue topiramate  Start metformin  Follow up with primary care about thyroid- lab ordered  Try 10 min of activity daily- maybe 2 of those days are strength training  Great work with meal prepping :)   Keep working on hydration   Follow up 3 months

## 2024-03-14 NOTE — LETTER
3/14/2024       RE: Marichuy Worrell  [address] [place] [address]     Dear Colleague,    Thank you for referring your patient, Marichuy Worrell, to the [facility] [place] [facility]. Please see a copy of my visit note below.      Return Medical Weight Management Note     Marichuy Worrell  MRN:  [id number]  :  1990  NELSON:  3/14/2024    Dear Vanessa Echeverria PA-C,    I had the pleasure of seeing your patient Marichuy Worrell. She is a 33 year old female who I am continuing to see for treatment of obesity related to:        2022     3:19 PM   --   I have the following health issues associated with obesity Hypothyroidism       Assessment & Plan   Problem List Items Addressed This Visit          Digestive    Class 3 severe obesity with serious comorbidity and body mass index (BMI) of 60.0 to 69.9 in adult (H) - Primary (Chronic)     1 year s/p sleeve. Starting to see plateau. Appetite well controlled. Meeting protein/ hydration goals. Going to try metformin. Recommend rechecking thyroid too. Goal is to increase activity.     Continue topiramate  Start metformin  Follow up with primary care about thyroid- lab ordered  Try 10 min of activity daily- maybe 2 of those days are strength training  Great work with meal prepping :)   Keep working on hydration   Follow up 3 months          Relevant Medications    metFORMIN (GLUCOPHAGE XR) 500 MG 24 hr tablet    topiramate (TOPAMAX) 25 MG tablet    Other Relevant Orders    TSH with free T4 reflex       Other    S/P laparoscopic sleeve gastrectomy    Relevant Orders    TSH with free T4 reflex          INTERVAL HISTORY:   s/p sleeve 3/14/2023   Hunger described as excruciating previously - less often now. No heart burn recently, stopped omeprazole   At a stall now for a while - first time since surgery     Anti-obesity medication history    Current:   topiramate 50mg bid     Recent diet  changes:   Roommate loves cooking and meal preps for her when she does her own   Some days she has a lot of hunger and other days not hungry - most of the time appetite is manageable   Brings protein- healthy snacks to work to help avoid the temptations   50-70g daily   Hydration - struggles when not at work, does better at work  Not a lot of snacking      Recent exercise/activity changes:   Had gym membership but since first of the year this has been more difficult to get to consistently   Got a treadmill - helpful especially in the winter - has had a hard time making this a habit - trying to get on it during the week more - in smaller chunks of time     Recent stressors:   Some body dysmorphia   Some mental health struggles recently -- tries to avoid thinking too much about not seeing body changes -- rather look at how the measurements change and clothing sizes change   Continues to work with therapist- working on processing some past trauma   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     Roommate is very supportive    Recent sleep changes: good     Vitamins/Labs: 7/2023    CURRENT WEIGHT:   255 lbs 0 oz    Initial Weight (lbs): 362 lbs  Last Visits Weight: 127 kg (280 lb)  Cumulative weight loss (lbs): 107  Weight Loss Percentage: 29.56%        1/13/2023     4:53 PM   Changes and Difficulties   I have made the following changes to my diet since my last visit: Increase my water intake   With regards to my diet, I am still struggling with: Portions, snacking, feeling full   With regards to my activity/exercise, I am still struggling with: Feeling motivated         MEDICATIONS:   Current Outpatient Medications   Medication Sig Dispense Refill    metFORMIN (GLUCOPHAGE XR) 500 MG 24 hr tablet 1 tablet by mouth daily with meal for 1 week, then 2 tablets daily with meal. 60 tablet 2    topiramate (TOPAMAX) 25 MG tablet Take 2 tablets (50 mg) by mouth 2 times daily 120 tablet 3    ciclopirox  "(PENLAC) 8 % external solution Apply to adjacent skin and affected nails daily.  Remove with alcohol every 7 days, then repeat. (Patient taking differently: as needed Apply to adjacent skin and affected nails daily.  Remove with alcohol every 7 days, then repeat.) 6.6 mL 5    desogestrel-ethinyl estradiol (ENSKYCE) 0.15-30 MG-MCG tablet Take 1 tablet by mouth daily 84 tablet 3    escitalopram (LEXAPRO) 20 MG tablet Take 1 tablet (20 mg) by mouth every morning 90 tablet 1    levothyroxine (SYNTHROID/LEVOTHROID) 175 MCG tablet Take 1 tablet (175 mcg) by mouth daily 90 tablet 3    omeprazole (PRILOSEC) 20 MG DR capsule Take 1 capsule (20 mg) by mouth every morning (before breakfast) 30 capsule 0    ondansetron (ZOFRAN ODT) 4 MG ODT tab Take 1 tablet (4 mg) by mouth every 6 hours as needed for nausea 15 tablet 0    traZODone (DESYREL) 50 MG tablet Take 1 tablet (50 mg) by mouth At Bedtime 90 tablet 1           1/13/2023     4:53 PM   Weight Loss Medication History Reviewed With Patient   Which weight loss medications are you currently taking on a regular basis? Topamax (topiramate)    Wegovy       Lab on 11/24/2023   Component Date Value Ref Range Status    TSH 11/24/2023 0.57  0.30 - 4.20 uIU/mL Final           4/1/2014    10:00 AM   RYAN Score (Last Two)   RYAN Raw Score 44   Activation Score 70.8   RYAN Level 4         PHYSICAL EXAM:  Objective    Ht 1.651 m (5' 5\")   Wt 115.7 kg (255 lb)   BMI 42.43 kg/m             Vitals:  No vitals were obtained today due to virtual visit.    GENERAL: alert and no distress  EYES: Eyes grossly normal to inspection.  No discharge or erythema, or obvious scleral/conjunctival abnormalities.  RESP: No audible wheeze, cough, or visible cyanosis.    SKIN: Visible skin clear. No significant rash, abnormal pigmentation or lesions.  NEURO: Cranial nerves grossly intact.  Mentation and speech appropriate for age.  PSYCH: Appropriate affect, tone, and pace of words        Sincerely,    Sol " Abdelrahman NP      25 minutes spent by me on the date of the encounter doing chart review, history and exam, documentation and further activities per the note    Virtual Visit Details    Type of service:  Video Visit     Originating Location (pt. Location): Home    Distant Location (provider location):  Off-site  Platform used for Video Visit: [facility]

## 2024-03-14 NOTE — PATIENT INSTRUCTIONS
"Thank you for allowing us the privilege of caring for you. We hope we provided you with the excellent service you deserve.   Please let us know if there is anything else we can do for you so that we can be sure you are completely satisfied with your care experience.    To ensure the quality of our services you may be receiving a patient satisfaction survey from an independent patient satisfaction monitoring company.    The greatest compliment you can give is a \"Likely to Recommend\"    Your visit was with Sol Quick NP today.    Instructions per today's visit:     Talib Worrell, it was great to visit with you today.  Here is a review of our visit.  If our clinic scheduler is not able to reach you please call [phone] to schedule your next appointments.    Continue topiramate  Start metformin  Follow up with primary care about thyroid- lab ordered  Try 10 min of activity daily- maybe 2 of those days are strength training  Great work with meal prepping :)   Keep working on hydration   Follow up 3 months       Information about Video Visits with [facility]: video visit information  _________________________________________________________________________________________________________________________________________________________  If you are asked by your clinic team to have your blood pressure checked:  [facility] do offer several locations for blood pressure checks. Please follow the below link to schedule an appointment. Scheduling an appointment at the pharmacy for a blood pressure check is now preferred.    Appointment Plus ([email or web address])  _________________________________________________________________________________________________________________________________________________________  Important contact and scheduling information:  Please call our contact center at [phone] to schedule your next appointments.  To find a lab location near you, please call [phone].  For any nursing questions or concerns call Martha Carolina LPN at [phone] or Jennifer Nolan RN at [phone]  Please call during clinic hours Monday through Friday 8:00a - 4:00p if you have questions or you can contact us via [email or web address] at anytime and we will reply during clinic hours.    Lab results will be communicated through My Chart or letter (if My Chart not used). Please call the clinic if you have not received communication after 1 week or if you have any questions.?  Clinic Fax: [phone]    _________________________________________________________________________________________________________________________________________________________  Meal Replacement Products:    Here is the link to our new e-store where you can purchase our meal replacement products    [facility] E-Store  [email or web address]    The one week starter kit is a great way to sample a variety of products and see what works for you.    If you want more information about the product go to: [employer] [email or web address]    If you are an employee or [facility] or [facility] please contact your care team for a 10% estore discount    Free Shipping for orders over $75     Benefits of meal replacements products:    Portion and calorie control  Improved nutrition  Structured eating  Simplified food choices  Avoid contact with trigger foods  _________________________________________________________________________________________________________________________________________________________  Interested in working with a health ?  Health coaches work with you to improve your overall health and wellbeing.  They look at the whole person, and may involve discussion of different areas of life, including, but not limited to the four pillars of health (sleep, exercise, nutrition, and stress management). Discuss with your care team if you would like to start working a health  .  Health Coaching-3 Pack: Schedule by calling [phone]    $99 for three health coaching visits    Visits may be done in person or via phone    Coaching is a partnership between the  and the client; Coaches do not prescribe or diagnose    Coaching helps inspire the client to reach his/her personal goals   _________________________________________________________________________________________________________________________________________________________  24 Week Healthy Lifestyle Plan:    Our mission in the 24-week Healthy Lifestyle Plan is to provide you with individualized care by giving you the tools, education and support you need to lose weight and maintain a healthy lifestyle. In your 24-week journey, you ll be supported by a dedicated weight loss team that includes registered dietitians, medical weight management providers, health coaches, and nurses -- all with special expertise in weight loss -- to help you every step of the way.     Monthly meetings with your registered dietician or medical weight management provider help to review your progress, update your care plan, and make any adjustments needed to ensure success. Between these visits, weekly and bi-weekly health  visits will help you focus on the four pillars of weight loss -- stress, sleep, nutrition, and exercise -- and how you can best adapt each to achieve sustainable weight loss results.    In addition, you will be given exclusive access to online wellbeing classes through [employer].  Your initial visit will be with a medical weight management provider who will help to understand your weight loss goals and ensure this program is the right fit for you. Please let our team know if you are interested in the 24 week plan by sending a message to your care team or calling [phone] to  schedule.  _________________________________________________________________________________________________________________________________________________________  __________  [facility] Get Moving Program  Our team of physical therapists is trained to help you understand and take control of your condition. They will perform a thorough evaluation to determine your ability for activity and develop a customized plan to fit your goals and physical ability.  Scheduling: Unsure if the Get Moving program is right for you? Discuss the program with your medical provider or diabetes educator. You can also call us at [phone] to ask questions or schedule an appointment.   [facility] Get Moving Program  ____________________________________________________________________________________________________________________________________________________________________________   [facility] Diabetes Prevention Program (DPP)  If you have prediabetes and Medicare please contact us via Mogadt to learn more about the Diabetes Prevention Program (DPP)  Program Details:   [facility] offers the year-long Diabetes Prevention Program (DPP). The program helps you to make lifestyle changes that prevent or delay type 2 diabetes by supporting healthy eating, increased physical activity, stress reduction and use of coping skills.   On average, previous [facility] DPP cohorts have lost and maintained at least 5% of their starting weight throughout the program and averaged more than 150 minutes of physical activity per week.  Participants meet weekly for one-hour group sessions over sixteen weeks, every other week for the next 8 weeks, and monthly for the last six months.   A year-long maintenance program is also available for participants who complete the first year.   Location & Cost:   During the COVID-19 Public Health Emergency, the program is offered virtually. When in-person classes can resume, they  will be held at [facility].  For people with Medicare, the program is covered in full. A self-pay option will also be available for those with non-Medicare insurance plans.   ______________________________________________________________________________________________________________________________________________________________________________________________________________________________    To work with a Behavioral Health Psychologist:    Call to schedule:    Bert Bryant - [phone]  Wendy Mario - [phone]  Sherrie Cazares - [phone]  Shaniqua Lipscomb - [phone]   Lucy Grady PhD (cannot accept Medicare) [phone]        Thank you,   [facility] Comprehensive Weight Management Team                            MEDICATION STARTED AT THIS APPOINTMENT    We are starting metformin.  Starting instructions:    Immediate release tablet: Take 1 tablet by mouth daily with a meal for 1 week, then increase to 1 tablet twice daily with meals.   Extended release tablet: Take 1 tablet by mouth daily with a meal for 1 week, then increase to 2 tablets daily with a meal or 1 tablet twice daily with meals.      Contact the nurse via Free [employer] Clear or call [phone] if you have any questions or concerns    Metformin is a medication that is used to assist with lowering blood sugars in patients that have Pre-Diabetes or Diabetes. It has also been found to help with weight loss.    Metformin helps to lower blood sugars by lowering the amount of sugar (glucose) your liver produces that would otherwise cause your blood sugar to increase. It also makes your body respond better to insulin (the product that lowers your blood sugar). It is unclear how metformin assists with weight loss.     Side-effects. Metformin is generally well tolerated. The main side-effects we see are diarrhea, nausea and stomach upset - this tends to subside over time as your body gets used to the  medication. Taking this medications with food will lower these side effects.    Low blood sugar (hypoglycemia) is rare to occur with metformin, but can occur if you do not eat enough or are taking other medications that assist to lower blood sugar. The signs of low blood sugar are:  Weakness  Shaky   Hungry  Sweating  Confusion    See below for ways to treat low blood sugar without adding in lots of extra calories.    Treating Low Blood Sugar    If you have symptoms of low blood sugar (sweating, shaking, dizzy, confused) eat 15 grams of carbs and wait 15 minutes:    Glucose Tabs are best for sugars under 70 -  Dex4 or BD Glucose tablets are good, you will need to take 3-4 of these to equal 15 grams.     One small box of raisins  4 oz fruit juice box or   cup fruit juice  1 small apple  1 small banana    cup canned fruit in water    English muffin or a slice of bread with jelly   1 low fat frozen waffle with sugar-free syrup    cup cottage cheese with   cup frozen or fresh blueberries  1 cup skim or low-fat milk    cup whole grain cereal  4-6 crackers such as Triscuits    Please refer to the pharmacy insert for more information on side-effects. Please call the clinic should you have more questions regarding this medication.      In order to get refills of this or any medication we prescribe you must be seen in the medical weight mgmt clinic every 2-3 months.

## 2024-04-18 ENCOUNTER — OFFICE VISIT (OUTPATIENT)
Dept: FAMILY MEDICINE | Facility: CLINIC | Age: 34
End: 2024-04-18
Payer: COMMERCIAL

## 2024-04-18 VITALS
HEART RATE: 50 BPM | WEIGHT: 260 LBS | SYSTOLIC BLOOD PRESSURE: 116 MMHG | OXYGEN SATURATION: 97 % | TEMPERATURE: 97.4 F | BODY MASS INDEX: 43.27 KG/M2 | DIASTOLIC BLOOD PRESSURE: 62 MMHG | RESPIRATION RATE: 16 BRPM

## 2024-04-18 DIAGNOSIS — E66.01 MORBID OBESITY (H): ICD-10-CM

## 2024-04-18 DIAGNOSIS — Z98.84 S/P LAPAROSCOPIC SLEEVE GASTRECTOMY: ICD-10-CM

## 2024-04-18 DIAGNOSIS — G47.00 INSOMNIA, UNSPECIFIED TYPE: ICD-10-CM

## 2024-04-18 DIAGNOSIS — E03.8 OTHER SPECIFIED HYPOTHYROIDISM: Primary | ICD-10-CM

## 2024-04-18 DIAGNOSIS — F41.1 GAD (GENERALIZED ANXIETY DISORDER): ICD-10-CM

## 2024-04-18 DIAGNOSIS — F32.1 MODERATE MAJOR DEPRESSION (H): ICD-10-CM

## 2024-04-18 PROCEDURE — 36415 COLL VENOUS BLD VENIPUNCTURE: CPT | Performed by: PHYSICIAN ASSISTANT

## 2024-04-18 PROCEDURE — 99213 OFFICE O/P EST LOW 20 MIN: CPT | Performed by: PHYSICIAN ASSISTANT

## 2024-04-18 PROCEDURE — 84443 ASSAY THYROID STIM HORMONE: CPT | Performed by: PHYSICIAN ASSISTANT

## 2024-04-18 RX ORDER — TRAZODONE HYDROCHLORIDE 50 MG/1
50 TABLET, FILM COATED ORAL AT BEDTIME
Qty: 90 TABLET | Refills: 1 | Status: SHIPPED | OUTPATIENT
Start: 2024-04-18

## 2024-04-18 RX ORDER — ESCITALOPRAM OXALATE 20 MG/1
20 TABLET ORAL EVERY MORNING
Qty: 90 TABLET | Refills: 1 | Status: SHIPPED | OUTPATIENT
Start: 2024-04-18

## 2024-04-18 ASSESSMENT — ANXIETY QUESTIONNAIRES
1. FEELING NERVOUS, ANXIOUS, OR ON EDGE: NOT AT ALL
7. FEELING AFRAID AS IF SOMETHING AWFUL MIGHT HAPPEN: NOT AT ALL
5. BEING SO RESTLESS THAT IT IS HARD TO SIT STILL: NOT AT ALL
GAD7 TOTAL SCORE: 0
2. NOT BEING ABLE TO STOP OR CONTROL WORRYING: NOT AT ALL
GAD7 TOTAL SCORE: 0
3. WORRYING TOO MUCH ABOUT DIFFERENT THINGS: NOT AT ALL
GAD7 TOTAL SCORE: 0
6. BECOMING EASILY ANNOYED OR IRRITABLE: NOT AT ALL
4. TROUBLE RELAXING: NOT AT ALL
7. FEELING AFRAID AS IF SOMETHING AWFUL MIGHT HAPPEN: NOT AT ALL
8. IF YOU CHECKED OFF ANY PROBLEMS, HOW DIFFICULT HAVE THESE MADE IT FOR YOU TO DO YOUR WORK, TAKE CARE OF THINGS AT HOME, OR GET ALONG WITH OTHER PEOPLE?: NOT DIFFICULT AT ALL
IF YOU CHECKED OFF ANY PROBLEMS ON THIS QUESTIONNAIRE, HOW DIFFICULT HAVE THESE PROBLEMS MADE IT FOR YOU TO DO YOUR WORK, TAKE CARE OF THINGS AT HOME, OR GET ALONG WITH OTHER PEOPLE: NOT DIFFICULT AT ALL

## 2024-04-18 ASSESSMENT — PATIENT HEALTH QUESTIONNAIRE - PHQ9
SUM OF ALL RESPONSES TO PHQ QUESTIONS 1-9: 3
SUM OF ALL RESPONSES TO PHQ QUESTIONS 1-9: 3
10. IF YOU CHECKED OFF ANY PROBLEMS, HOW DIFFICULT HAVE THESE PROBLEMS MADE IT FOR YOU TO DO YOUR WORK, TAKE CARE OF THINGS AT HOME, OR GET ALONG WITH OTHER PEOPLE: SOMEWHAT DIFFICULT

## 2024-04-18 NOTE — PATIENT INSTRUCTIONS
I suggest you reach out to Sol Quick about the Metformin and weight loss management.     You could try restarting just 500 mg of Metformin.    We'll check your thyroid today to see if that is contributing to weight gain.

## 2024-04-20 LAB — TSH SERPL DL<=0.005 MIU/L-ACNC: 0.32 UIU/ML (ref 0.3–4.2)

## 2024-04-22 RX ORDER — LEVOTHYROXINE SODIUM 175 UG/1
175 TABLET ORAL DAILY
Qty: 90 TABLET | Refills: 3 | Status: SHIPPED | OUTPATIENT
Start: 2024-04-22

## 2024-06-18 ENCOUNTER — PATIENT OUTREACH (OUTPATIENT)
Dept: CARE COORDINATION | Facility: CLINIC | Age: 34
End: 2024-06-18
Payer: COMMERCIAL

## 2024-08-17 DIAGNOSIS — E66.01 CLASS 3 SEVERE OBESITY WITH SERIOUS COMORBIDITY AND BODY MASS INDEX (BMI) OF 60.0 TO 69.9 IN ADULT, UNSPECIFIED OBESITY TYPE (H): ICD-10-CM

## 2024-08-17 DIAGNOSIS — E66.813 CLASS 3 SEVERE OBESITY WITH SERIOUS COMORBIDITY AND BODY MASS INDEX (BMI) OF 60.0 TO 69.9 IN ADULT, UNSPECIFIED OBESITY TYPE (H): ICD-10-CM

## 2024-08-21 RX ORDER — TOPIRAMATE 25 MG/1
50 TABLET, FILM COATED ORAL 2 TIMES DAILY
Qty: 120 TABLET | Refills: 3 | OUTPATIENT
Start: 2024-08-21

## 2024-08-21 NOTE — TELEPHONE ENCOUNTER
topiramate (TOPAMAX) 25 MG tablet 120 tablet 3 3/14/2024     Last Office Visit : 3/14/25-mopvbj-cp 3 months  Future Office visit:  0    Routing refill request to provider for review/approval because:  Received refill request for topamax . Patient needs appointment scheduled prior to any refills. Clinic Coordinator notified and will follow up with the patient as appropriate. The pharmacy has been notified that the medication will not be refilled prior to an appointment being scheduled.

## 2024-08-21 NOTE — TELEPHONE ENCOUNTER
Refill denied at this time. Patient needs appointment with Sol Quick CNP. [employer] [facility] [employer] message sent to patient to schedule appointment.

## 2024-09-07 ENCOUNTER — HEALTH MAINTENANCE LETTER (OUTPATIENT)
Age: 34
End: 2024-09-07

## 2025-01-07 ENCOUNTER — VIRTUAL VISIT (OUTPATIENT)
Dept: ENDOCRINOLOGY | Facility: CLINIC | Age: 35
End: 2025-01-07
Payer: COMMERCIAL

## 2025-01-07 VITALS — WEIGHT: 290 LBS | BODY MASS INDEX: 48.32 KG/M2 | HEIGHT: 65 IN

## 2025-01-07 DIAGNOSIS — Z98.84 S/P LAPAROSCOPIC SLEEVE GASTRECTOMY: ICD-10-CM

## 2025-01-07 DIAGNOSIS — F41.1 GAD (GENERALIZED ANXIETY DISORDER): Chronic | ICD-10-CM

## 2025-01-07 DIAGNOSIS — E66.813 CLASS 3 SEVERE OBESITY WITH SERIOUS COMORBIDITY AND BODY MASS INDEX (BMI) OF 45.0 TO 49.9 IN ADULT, UNSPECIFIED OBESITY TYPE (H): Primary | ICD-10-CM

## 2025-01-07 DIAGNOSIS — F32.1 MODERATE MAJOR DEPRESSION (H): ICD-10-CM

## 2025-01-07 DIAGNOSIS — E66.01 CLASS 3 SEVERE OBESITY WITH SERIOUS COMORBIDITY AND BODY MASS INDEX (BMI) OF 45.0 TO 49.9 IN ADULT, UNSPECIFIED OBESITY TYPE (H): Primary | ICD-10-CM

## 2025-01-07 RX ORDER — TOPIRAMATE 25 MG/1
50 TABLET, FILM COATED ORAL 2 TIMES DAILY
Qty: 120 TABLET | Refills: 3 | Status: SHIPPED | OUTPATIENT
Start: 2025-01-07

## 2025-01-07 RX ORDER — BUPROPION HYDROCHLORIDE 150 MG/1
150 TABLET ORAL EVERY MORNING
Qty: 90 TABLET | Refills: 3 | Status: SHIPPED | OUTPATIENT
Start: 2025-01-07

## 2025-01-07 RX ORDER — NALTREXONE HYDROCHLORIDE 50 MG/1
TABLET, FILM COATED ORAL
Qty: 30 TABLET | Refills: 2 | Status: SHIPPED | OUTPATIENT
Start: 2025-01-07

## 2025-01-07 RX ORDER — TOPIRAMATE 25 MG/1
50 TABLET, FILM COATED ORAL 2 TIMES DAILY
Qty: 120 TABLET | Refills: 3 | Status: SHIPPED | OUTPATIENT
Start: 2025-01-07 | End: 2025-01-07

## 2025-01-07 ASSESSMENT — PATIENT HEALTH QUESTIONNAIRE - PHQ9: SUM OF ALL RESPONSES TO PHQ QUESTIONS 1-9: 18

## 2025-01-07 ASSESSMENT — PAIN SCALES - GENERAL: PAINLEVEL_OUTOF10: NO PAIN (0)

## 2025-01-07 NOTE — PATIENT INSTRUCTIONS
"Continue topiramate 50 twice daily   Add bupropion in the AM   Add naltrexone 1/2 tab around lunch for the first week then increase to full tab as tolerated. Okay to take in the AM too  Great work planning to prep protein ahead of time   Great work prioritizing hydration   Consider trazadone nightly for a couple of weeks before trying to go just as needed   Follow up March 2025     MEDICATION STARTED AT THIS APPOINTMENT  We are starting Naltrexone. Start with 1/2 tab 1-2 hours prior to the time you have the most trouble with cravings or extra hunger. If you are doing well you may switch to a whole tablet taken at the same time period.     WARNING: This medication blocks the action of opioid type pain medications. If you routinely take any medication like Codeine, Oxycontin, Percocet, Morphine, Dilaudid or Methodone, do not take this until you have talked with weight management staff. If you are planning surgery you should stop Naltrexone 4 days prior to the surgery. If you have an injury that requires pain medication, make sure the health care staff knows you take Naltrexone.     Contact the nurse via [employer] or call [phone] if you have any questions or concerns. (Do not stop taking it if you don't think it's working. For some people it works without them knowing it.)    Naltrexone is a medication that is used most often to help people who are troubled by dependence on prescription pain killers or alcohol. It has also been found to help with weight loss. Although it's not currently FDA approved for weight loss, it has been used safely for a number of years to help people who are carrying extra weight.     Just how Naltrexone helps with weight loss has not been exactly determined.  It seems to work by quieting down brain signals related to strong food cravings. Many of our patients use the word \"addiction\" to describe their feelings and constant thoughts about food. It makes sense then to treat the feeling of " dependence on food, outside of real hunger, with a medication designed to help with other sorts of dependence.     Our patients on Naltrexone find that they:    >feel less interest in food   >think less about food and eating and have more time to think of other things   >find it easier to push the plate away   >have an easier time eating less    For some of our patients, these feelings are very immediate. Other patients, don't feel much of a change but find they've lost weight. Like all weight loss medications, Naltrexone works best when you help it work. This means:  1. Having less tempting high calorie (fattening) food around the house or office. (For people with strong cravings this is very important.)   2. Staying away from situations or people that may trigger your cravings .   3. Eating out only one time or less each week.  4. Eating your meals at a table with the TV or computer off.    Side-effects. Naltrexone is generally well tolerated. The main side-effect we see is  nausea or a woozy feeling. A small number of people feel quite ill. Most people have a mild reaction and some people have no reaction at all.  The good news is that this feeling does go away.     In order to avoid nausea, please start the medication with half a pill for the first few days. Go on to a full pill if you are feeling well.      If you  are nauseated on 1/2 a pill it is okay to cut back to 1/4 pill ( a very small amount). Take this for a couple of days and work your way back up to a 1/2 pill and then a whole pill. Taking the medication at night or with food  to start also may help prevent the feeling of nausea.         Please refer to the pharmacy insert for more information on side-effects. Since many pharmacists are not familiar with the use of naltrexone in weight loss, calling the nurse at [phone] will get you the most accurate information.  In order to get refills of this or any medication we prescribe you must be seen in  the medical weight mgmt clinic every 2-3 months.

## 2025-01-07 NOTE — NURSING NOTE
Current patient location: [address] [place] [id number]    Is the patient currently in the state of [place]? YES    Visit mode:VIDEO    If the visit is dropped, the patient can be reconnected by:VIDEO VISIT: Text to cell phone:   Telephone Information:   Mobile [phone]       Will anyone else be joining the visit? NO  (If patient encounters technical issues they should call [phone])    Are changes needed to the allergy or medication list? No    Are refills needed on medications prescribed by this physician? YES    Rooming Documentation:  Patient will complete questionnaire(s) in [facility]    Reason for visit: RECHECK    Depression Response    Patient completed the PHQ-9 assessment for depression and scored >9? Yes  Question 9 on the PHQ-9 was positive for suicidality? No  Does patient have current mental health provider? Yes    Is this a virtual visit? Yes   Does patient have suicidal ideation (positive question 9)? No - offer to place Mental Health Referral.  Patient declined referral/not needed    I personally notified the following: visit provider      Mauricio WELCH

## 2025-01-07 NOTE — ASSESSMENT & PLAN NOTE
Continues topiramate twice daily. Has had more hunger/ cravings but not the same painful hunger she was previously experiencing. No weight loss with addition of metformin x 1 month (1000mg daily). Patient denies adverse side effects but independently stopped the mediation due to lack of efficacy.     Mood has been worse recently. Just started with therapist again. Work and school are both stressful/ busy. Mood impacts her ability to prep and plan meals and eating. Discussed trial of bupropion and naltrexone to mimic contrave for cravings, mood, hunger.     Sleep has been disrupted. Has been intermittently taking trazadole, more on the nights where she can't get back to sleep after 1am. Discussed more consistently using for a short time to get back into a regular sleep routine.     Going back to the gym. Has accountability partners in place.     Continue topiramate 50 twice daily   Add bupropion in the AM   Add naltrexone 1/2 tab around lunch for the first week then increase to full tab as tolerated. Okay to take in the AM too  Great work planning to prep protein ahead of time   Great work prioritizing hydration   Consider trazadone nightly for a couple of weeks before trying to go just as needed   Follow up March 2025

## 2025-01-07 NOTE — PROGRESS NOTES
Return Bariatric Surgery Note    RE: Marichuy Worrell  MR#: [id number]  : 1990  VISIT DATE: 2025      Dear Jacki, Vanessa Little,    I had the pleasure of seeing your patient, Marichuy Worrell, in my post-bariatric surgery assessment clinic.    Assessment & Plan   Problem List Items Addressed This Visit          Digestive    Class 3 severe obesity due to excess calories with serious comorbidity and body mass index (BMI) of 45.0 to 49.9 in adult (H) - Primary     Continues topiramate twice daily. Has had more hunger/ cravings but not the same painful hunger she was previously experiencing. No weight loss with addition of metformin x 1 month (1000mg daily). Patient denies adverse side effects but independently stopped the mediation due to lack of efficacy.     Mood has been worse recently. Just started with therapist again. Work and school are both stressful/ busy. Mood impacts her ability to prep and plan meals and eating. Discussed trial of bupropion and naltrexone to mimic contrave for cravings, mood, hunger.     Sleep has been disrupted. Has been intermittently taking trazadole, more on the nights where she can't get back to sleep after 1am. Discussed more consistently using for a short time to get back into a regular sleep routine.     Going back to the gym. Has accountability partners in place.     Continue topiramate 50 twice daily   Add bupropion in the AM   Add naltrexone 1/2 tab around lunch for the first week then increase to full tab as tolerated. Okay to take in the AM too  Great work planning to prep protein ahead of time   Great work prioritizing hydration   Consider trazadone nightly for a couple of weeks before trying to go just as needed   Follow up 2025          Relevant Medications    buPROPion (WELLBUTRIN XL) 150 MG 24 hr tablet    naltrexone (DEPADE/REVIA) 50 MG tablet    topiramate (TOPAMAX) 25 MG tablet       Behavioral    OSCAR (generalized anxiety disorder) (Chronic)    Relevant  Medications    buPROPion (WELLBUTRIN XL) 150 MG 24 hr tablet    naltrexone (DEPADE/REVIA) 50 MG tablet    topiramate (TOPAMAX) 25 MG tablet    Moderate major depression (H)    Relevant Medications    buPROPion (WELLBUTRIN XL) 150 MG 24 hr tablet    naltrexone (DEPADE/REVIA) 50 MG tablet       Other    S/P laparoscopic sleeve gastrectomy    Relevant Medications    buPROPion (WELLBUTRIN XL) 150 MG 24 hr tablet    naltrexone (DEPADE/REVIA) 50 MG tablet          30 minutes spent by me on the date of the encounter doing chart review, history and exam, documentation and further activities per the note    The longitudinal plan of care for the diagnosis(es)/condition(s) as documented were addressed during this visit. Due to the added complexity in care, I will continue to support Marichuy in the subsequent management and with ongoing continuity of care.    CHIEF COMPLAINT: Post-bariatric surgery follow-up. NBS 11/2022 BMI 60 with hypothyroidism and dyslipidemia. Other symptoms include irregular menstrual cycles and restless sleep.  S/p Laparoscopic sleeve gastrectomy with Dr. Gutierrez 3/2023. Last seen 3/2024- cont top, add metformin, recommended follow up with PCP regards to thyroid labs.    HISTORY OF PRESENT ILLNESS:      1/7/2025     7:22 AM   Questions Regarding Prior Weight Loss Surgery Reviewed With Patient   I had the following weight loss procedure Sleeve Gastrectomy   What year was your surgery? 2023   How has your weight changed since your last visit? I have gained weight   Do you currently have any of the following None of the above   Do you have any concerns today? Weight gain     Around 1 year postop developed Hunger pain is described as excruciating, feels different that typical hunger pain. Had similar pains as a child. Improved with starting topiramate     Anti-obesity medications:     Current:   Topiramate 50mg bid     Failed/contraindicated:   Metformin - no benefit when taking so stopped, no side effects -      Recent diet changes:   Less of the painful hunger but hungry more often. More cravings   Sometimes will just grab a snack instead of a meal due to being busy at work   Eating when time   Trying to meal prep to avoid impulsive urgency choices when very hungry - never health options she chooses in those times  Trying to prep ahead with snacks to have more urgently   Starting to prep ahead   More hungry after work - into the evening - more food around at home     -Protein- improving   -Water- improving     Recent exercise/activity changes:   Going to start going to the gym with niece and sister - accountability    Recent stressors:   Depression worse recently   Just got back in with her therapist   Work and school stressful and really busy   Mood sometimes gets in the way of prepping ahead     Recent sleep changes:   Sometimes disrupted     Vitamins/Labs: 7/2024    GERD symptoms: none    Weight History:      1/7/2025     7:22 AM   --   What is your highest lifetime weight? 401   What is your lowest weight since surgery? (In pounds) 361     Initial Weight (lbs): 362 lbs  Weight: 131.5 kg (290 lb)  Last Visits Weight: 115.7 kg (255 lb)  Cumulative weight loss (lbs): 72  Weight Loss Percentage: 19.89%        1/7/2025     7:22 AM   Questions Regarding Co-Morbidities and Health Concerns Reviewed With Patient   Pre-diabetes Never   Diabetes II Never   High Blood Pressure Never   High cholesterol Never   Heartburn/Reflux Never   Sleep apnea Never   PCOS Never   Back pain Gone away   Joint pain Never   Lower leg swelling Never           1/7/2025     7:22 AM   Eating Habits   How many meals do you eat per day? 3   Do you snack between meals? Yes   How much food are you eating at each meal? Greater than 1 cup   Are you able to separate your meals and liquids by at least 30 minutes? Sometimes   Are you able to avoid liquid calories? Sometimes           1/7/2025     7:22 AM   Exercise Questions Reviewed With Patient   How  often do you exercise? Less than 1 time per week   What is the duration of your exercise (in minutes)? 20 Minutes   What types of exercise do you do? walking   What keeps you from being more active? I should be more active but I just have not gotten around to it    Lack of Time       Social History:      1/7/2025     7:22 AM   --   Are you smoking? No   Are you drinking alcohol? No       Medications:  Current Outpatient Medications   Medication Sig Dispense Refill    buPROPion (WELLBUTRIN XL) 150 MG 24 hr tablet Take 1 tablet (150 mg) by mouth every morning. 90 tablet 3    naltrexone (DEPADE/REVIA) 50 MG tablet Take 1/2 tab around lunch for week one then increase to full tab as tolerated 30 tablet 2    topiramate (TOPAMAX) 25 MG tablet Take 2 tablets (50 mg) by mouth 2 times daily. 120 tablet 3    ciclopirox (PENLAC) 8 % external solution Apply to adjacent skin and affected nails daily.  Remove with alcohol every 7 days, then repeat. (Patient not taking: Reported on 4/18/2024) 6.6 mL 5    desogestrel-ethinyl estradiol (ENSKYCE) 0.15-30 MG-MCG tablet Take 1 tablet by mouth daily 84 tablet 3    escitalopram (LEXAPRO) 20 MG tablet Take 1 tablet (20 mg) by mouth every morning 90 tablet 1    levothyroxine (SYNTHROID/LEVOTHROID) 175 MCG tablet Take 1 tablet (175 mcg) by mouth daily 90 tablet 3    traZODone (DESYREL) 50 MG tablet Take 1 tablet (50 mg) by mouth at bedtime 90 tablet 1     No current facility-administered medications for this visit.         1/7/2025     7:22 AM   --   Do you avoid NSAIDs such as (Ibuprofen, Aleve, Naproxen, Advil)? Yes       ROS:  GI:       1/7/2025     7:22 AM   --   Vomiting No   Diarrhea No   Constipation No   Swallowing trouble No   Abdominal pain No   Heartburn No     Skin:       1/7/2025     7:22 AM   BEV JOYCE ROS - SKIN   Rash in skin folds No     Psych:       1/7/2025     7:22 AM   --   Depression Yes   Anxiety Yes     Female Only:       1/7/2025     7:22 AM   BEV JOYCE ROS -    Female  "only None of the above   Stress urinary incontinence No       Office Visit on 04/18/2024   Component Date Value Ref Range Status    TSH 04/18/2024 0.32  0.30 - 4.20 uIU/mL Final             4/1/2014    10:00 AM   RYAN Score (Last Two)   RYAN Raw Score 44   Activation Score 70.8   RYAN Level 4         PHYSICAL EXAM:  Objective    Ht 1.651 m (5' 5\")   Wt 131.5 kg (290 lb)   BMI 48.26 kg/m    Vitals - Patient Reported  Pain Score: No Pain (0)        Physical Exam   GENERAL: alert and no distress  EYES: Eyes grossly normal to inspection.  No discharge or erythema, or obvious scleral/conjunctival abnormalities.  RESP: No audible wheeze, cough, or visible cyanosis.    SKIN: Visible skin clear. No significant rash, abnormal pigmentation or lesions.  NEURO: Cranial nerves grossly intact.  Mentation and speech appropriate for age.  PSYCH: Appropriate affect, tone, and pace of words        Sincerely,    Sol Quick NP      Virtual Visit Details    Type of service:  Video Visit     Originating Location (pt. Location): Home    Distant Location (provider location):  Off-site  Platform used for Video Visit: Enzo  "

## 2025-01-07 NOTE — LETTER
2025       RE: Marichuy Worrell  [address]  [place] [address]     Dear Colleague,    Thank you for referring your patient, Marichuy Worrell, to the [facility] [place] [facility]. Please see a copy of my visit note below.    Return Bariatric Surgery Note    RE: Marichuy Worrell  MR#: [id number]  : 1990  VISIT DATE: 2025      Dear Jacki, Vanessa Little,    I had the pleasure of seeing your patient, Marichuy Worrell, in my post-bariatric surgery assessment clinic.    Assessment & Plan  Problem List Items Addressed This Visit          Digestive    Class 3 severe obesity due to excess calories with serious comorbidity and body mass index (BMI) of 45.0 to 49.9 in adult (H) - Primary     Continues topiramate twice daily. Has had more hunger/ cravings but not the same painful hunger she was previously experiencing. No weight loss with addition of metformin x 1 month (1000mg daily). Patient denies adverse side effects but independently stopped the mediation due to lack of efficacy.     Mood has been worse recently. Just started with therapist again. Work and school are both stressful/ busy. Mood impacts her ability to prep and plan meals and eating. Discussed trial of bupropion and naltrexone to mimic contrave for cravings, mood, hunger.     Sleep has been disrupted. Has been intermittently taking trazadole, more on the nights where she can't get back to sleep after 1am. Discussed more consistently using for a short time to get back into a regular sleep routine.     Going back to the gym. Has accountability partners in place.     Continue topiramate 50 twice daily   Add bupropion in the AM   Add naltrexone 1/2 tab around lunch for the first week then increase to full tab as tolerated. Okay to take in the AM too  Great work planning to prep protein ahead of time   Great work prioritizing hydration   Consider trazadone nightly for a  couple of weeks before trying to go just as needed   Follow up March 2025          Relevant Medications    buPROPion (WELLBUTRIN XL) 150 MG 24 hr tablet    naltrexone (DEPADE/REVIA) 50 MG tablet    topiramate (TOPAMAX) 25 MG tablet       Behavioral    OSCAR (generalized anxiety disorder) (Chronic)    Relevant Medications    buPROPion (WELLBUTRIN XL) 150 MG 24 hr tablet    naltrexone (DEPADE/REVIA) 50 MG tablet    topiramate (TOPAMAX) 25 MG tablet    Moderate major depression (H)    Relevant Medications    buPROPion (WELLBUTRIN XL) 150 MG 24 hr tablet    naltrexone (DEPADE/REVIA) 50 MG tablet       Other    S/P laparoscopic sleeve gastrectomy    Relevant Medications    buPROPion (WELLBUTRIN XL) 150 MG 24 hr tablet    naltrexone (DEPADE/REVIA) 50 MG tablet          30 minutes spent by me on the date of the encounter doing chart review, history and exam, documentation and further activities per the note    The longitudinal plan of care for the diagnosis(es)/condition(s) as documented were addressed during this visit. Due to the added complexity in care, I will continue to support Marichuy in the subsequent management and with ongoing continuity of care.    CHIEF COMPLAINT: Post-bariatric surgery follow-up. NBS 11/2022 BMI 60 with hypothyroidism and dyslipidemia. Other symptoms include irregular menstrual cycles and restless sleep.  S/p Laparoscopic sleeve gastrectomy with Dr. Gutierrez 3/2023. Last seen 3/2024- cont top, add metformin, recommended follow up with PCP regards to thyroid labs.    HISTORY OF PRESENT ILLNESS:      1/7/2025     7:22 AM   Questions Regarding Prior Weight Loss Surgery Reviewed With Patient   I had the following weight loss procedure Sleeve Gastrectomy   What year was your surgery? 2023   How has your weight changed since your last visit? I have gained weight   Do you currently have any of the following None of the above   Do you have any concerns today? Weight gain     Around 1 year postop developed  Hunger pain is described as excruciating, feels different that typical hunger pain. Had similar pains as a child. Improved with starting topiramate     Anti-obesity medications:     Current:   Topiramate 50mg bid     Failed/contraindicated:   Metformin - no benefit when taking so stopped, no side effects -     Recent diet changes:   Less of the painful hunger but hungry more often. More cravings   Sometimes will just grab a snack instead of a meal due to being busy at work   Eating when time   Trying to meal prep to avoid impulsive urgency choices when very hungry - never health options she chooses in those times  Trying to prep ahead with snacks to have more urgently   Starting to prep ahead   More hungry after work - into the evening - more food around at home     -Protein- improving   -Water- improving     Recent exercise/activity changes:   Going to start going to the gym with niece and sister - accountability    Recent stressors:   Depression worse recently   Just got back in with her therapist   Work and school stressful and really busy   Mood sometimes gets in the way of prepping ahead     Recent sleep changes:   Sometimes disrupted     Vitamins/Labs: 7/2024    GERD symptoms: none    Weight History:      1/7/2025     7:22 AM   --   What is your highest lifetime weight? 401   What is your lowest weight since surgery? (In pounds) 361     Initial Weight (lbs): 362 lbs  Weight: 131.5 kg (290 lb)  Last Visits Weight: 115.7 kg (255 lb)  Cumulative weight loss (lbs): 72  Weight Loss Percentage: 19.89%        1/7/2025     7:22 AM   Questions Regarding Co-Morbidities and Health Concerns Reviewed With Patient   Pre-diabetes Never   Diabetes II Never   High Blood Pressure Never   High cholesterol Never   Heartburn/Reflux Never   Sleep apnea Never   PCOS Never   Back pain Gone away   Joint pain Never   Lower leg swelling Never           1/7/2025     7:22 AM   Eating Habits   How many meals do you eat per day? 3   Do you  snack between meals? Yes   How much food are you eating at each meal? Greater than 1 cup   Are you able to separate your meals and liquids by at least 30 minutes? Sometimes   Are you able to avoid liquid calories? Sometimes           1/7/2025     7:22 AM   Exercise Questions Reviewed With Patient   How often do you exercise? Less than 1 time per week   What is the duration of your exercise (in minutes)? 20 Minutes   What types of exercise do you do? walking   What keeps you from being more active? I should be more active but I just have not gotten around to it    Lack of Time       Social History:      1/7/2025     7:22 AM   --   Are you smoking? No   Are you drinking alcohol? No       Medications:  Current Outpatient Medications   Medication Sig Dispense Refill     buPROPion (WELLBUTRIN XL) 150 MG 24 hr tablet Take 1 tablet (150 mg) by mouth every morning. 90 tablet 3     naltrexone (DEPADE/REVIA) 50 MG tablet Take 1/2 tab around lunch for week one then increase to full tab as tolerated 30 tablet 2     topiramate (TOPAMAX) 25 MG tablet Take 2 tablets (50 mg) by mouth 2 times daily. 120 tablet 3     ciclopirox (PENLAC) 8 % external solution Apply to adjacent skin and affected nails daily.  Remove with alcohol every 7 days, then repeat. (Patient not taking: Reported on 4/18/2024) 6.6 mL 5     desogestrel-ethinyl estradiol (ENSKYCE) 0.15-30 MG-MCG tablet Take 1 tablet by mouth daily 84 tablet 3     escitalopram (LEXAPRO) 20 MG tablet Take 1 tablet (20 mg) by mouth every morning 90 tablet 1     levothyroxine (SYNTHROID/LEVOTHROID) 175 MCG tablet Take 1 tablet (175 mcg) by mouth daily 90 tablet 3     traZODone (DESYREL) 50 MG tablet Take 1 tablet (50 mg) by mouth at bedtime 90 tablet 1     No current facility-administered medications for this visit.         1/7/2025     7:22 AM   --   Do you avoid NSAIDs such as (Ibuprofen, Aleve, Naproxen, Advil)? Yes       ROS:  GI:       1/7/2025     7:22 AM   --   Vomiting No  "  Diarrhea No   Constipation No   Swallowing trouble No   Abdominal pain No   Heartburn No     Skin:       1/7/2025     7:22 AM   BAR RBS ROS - SKIN   Rash in skin folds No     Psych:       1/7/2025     7:22 AM   --   Depression Yes   Anxiety Yes     Female Only:       1/7/2025     7:22 AM   BAR RBS ROS -    Female only None of the above   Stress urinary incontinence No       Office Visit on 04/18/2024   Component Date Value Ref Range Status     TSH 04/18/2024 0.32  0.30 - 4.20 uIU/mL Final             4/1/2014    10:00 AM   RYAN Score (Last Two)   RYAN Raw Score 44   Activation Score 70.8   RYAN Level 4         PHYSICAL EXAM:  Objective   Ht 1.651 m (5' 5\")   Wt 131.5 kg (290 lb)   BMI 48.26 kg/m    Vitals - Patient Reported  Pain Score: No Pain (0)        Physical Exam   GENERAL: alert and no distress  EYES: Eyes grossly normal to inspection.  No discharge or erythema, or obvious scleral/conjunctival abnormalities.  RESP: No audible wheeze, cough, or visible cyanosis.    SKIN: Visible skin clear. No significant rash, abnormal pigmentation or lesions.  NEURO: Cranial nerves grossly intact.  Mentation and speech appropriate for age.  PSYCH: Appropriate affect, tone, and pace of words        Sincerely,    Sol Quick NP      Virtual Visit Details    Type of service:  Video Visit     Originating Location (pt. Location): Home    Distant Location (provider location):  Off-site  Platform used for Video Visit: AmWell      Again, thank you for allowing me to participate in the care of your patient.      Sincerely,    Sol Quick NP    "

## 2025-02-13 DIAGNOSIS — F41.1 GAD (GENERALIZED ANXIETY DISORDER): ICD-10-CM

## 2025-02-13 DIAGNOSIS — G47.00 INSOMNIA, UNSPECIFIED TYPE: ICD-10-CM

## 2025-02-13 DIAGNOSIS — F32.1 MODERATE MAJOR DEPRESSION (H): ICD-10-CM

## 2025-02-13 RX ORDER — TRAZODONE HYDROCHLORIDE 50 MG/1
50 TABLET ORAL AT BEDTIME
Qty: 90 TABLET | Refills: 0 | Status: SHIPPED | OUTPATIENT
Start: 2025-02-13

## 2025-02-26 ENCOUNTER — OFFICE VISIT (OUTPATIENT)
Dept: FAMILY MEDICINE | Facility: CLINIC | Age: 35
End: 2025-02-26
Payer: COMMERCIAL

## 2025-02-26 VITALS
HEART RATE: 51 BPM | RESPIRATION RATE: 16 BRPM | OXYGEN SATURATION: 99 % | SYSTOLIC BLOOD PRESSURE: 113 MMHG | WEIGHT: 293 LBS | HEIGHT: 65 IN | TEMPERATURE: 97.8 F | DIASTOLIC BLOOD PRESSURE: 73 MMHG | BODY MASS INDEX: 48.82 KG/M2

## 2025-02-26 DIAGNOSIS — F32.1 MODERATE MAJOR DEPRESSION (H): ICD-10-CM

## 2025-02-26 DIAGNOSIS — Z13.220 SCREENING, LIPID: ICD-10-CM

## 2025-02-26 DIAGNOSIS — Z13.1 SCREENING FOR DIABETES MELLITUS: ICD-10-CM

## 2025-02-26 DIAGNOSIS — Z13.228 SCREENING FOR ENDOCRINE, NUTRITIONAL, METABOLIC AND IMMUNITY DISORDER: ICD-10-CM

## 2025-02-26 DIAGNOSIS — E03.8 OTHER SPECIFIED HYPOTHYROIDISM: Primary | ICD-10-CM

## 2025-02-26 DIAGNOSIS — Z98.84 S/P LAPAROSCOPIC SLEEVE GASTRECTOMY: ICD-10-CM

## 2025-02-26 DIAGNOSIS — Z13.21 SCREENING FOR ENDOCRINE, NUTRITIONAL, METABOLIC AND IMMUNITY DISORDER: ICD-10-CM

## 2025-02-26 DIAGNOSIS — Z13.0 SCREENING FOR ENDOCRINE, NUTRITIONAL, METABOLIC AND IMMUNITY DISORDER: ICD-10-CM

## 2025-02-26 DIAGNOSIS — Z13.29 SCREENING FOR ENDOCRINE, NUTRITIONAL, METABOLIC AND IMMUNITY DISORDER: ICD-10-CM

## 2025-02-26 DIAGNOSIS — Z13.228 SCREENING FOR METABOLIC DISORDER: ICD-10-CM

## 2025-02-26 DIAGNOSIS — F41.1 GAD (GENERALIZED ANXIETY DISORDER): Chronic | ICD-10-CM

## 2025-02-26 DIAGNOSIS — E66.01 CLASS 3 SEVERE OBESITY DUE TO EXCESS CALORIES WITH SERIOUS COMORBIDITY AND BODY MASS INDEX (BMI) OF 45.0 TO 49.9 IN ADULT (H): ICD-10-CM

## 2025-02-26 DIAGNOSIS — E66.813 CLASS 3 SEVERE OBESITY DUE TO EXCESS CALORIES WITH SERIOUS COMORBIDITY AND BODY MASS INDEX (BMI) OF 45.0 TO 49.9 IN ADULT (H): ICD-10-CM

## 2025-02-26 DIAGNOSIS — Z13.0 SCREENING, ANEMIA, DEFICIENCY, IRON: ICD-10-CM

## 2025-02-26 LAB
BASOPHILS # BLD AUTO: 0.1 10E3/UL (ref 0–0.2)
BASOPHILS NFR BLD AUTO: 1 %
EOSINOPHIL # BLD AUTO: 0.2 10E3/UL (ref 0–0.7)
EOSINOPHIL NFR BLD AUTO: 2 %
ERYTHROCYTE [DISTWIDTH] IN BLOOD BY AUTOMATED COUNT: 12.7 % (ref 10–15)
EST. AVERAGE GLUCOSE BLD GHB EST-MCNC: 97 MG/DL
HBA1C MFR BLD: 5 % (ref 0–5.6)
HCT VFR BLD AUTO: 43.6 % (ref 35–47)
HGB BLD-MCNC: 14.3 G/DL (ref 11.7–15.7)
IMM GRANULOCYTES # BLD: 0 10E3/UL
IMM GRANULOCYTES NFR BLD: 0 %
LYMPHOCYTES # BLD AUTO: 2.6 10E3/UL (ref 0.8–5.3)
LYMPHOCYTES NFR BLD AUTO: 34 %
MCH RBC QN AUTO: 27.9 PG (ref 26.5–33)
MCHC RBC AUTO-ENTMCNC: 32.8 G/DL (ref 31.5–36.5)
MCV RBC AUTO: 85 FL (ref 78–100)
MONOCYTES # BLD AUTO: 0.5 10E3/UL (ref 0–1.3)
MONOCYTES NFR BLD AUTO: 6 %
NEUTROPHILS # BLD AUTO: 4.2 10E3/UL (ref 1.6–8.3)
NEUTROPHILS NFR BLD AUTO: 56 %
PLATELET # BLD AUTO: 272 10E3/UL (ref 150–450)
RBC # BLD AUTO: 5.12 10E6/UL (ref 3.8–5.2)
WBC # BLD AUTO: 7.5 10E3/UL (ref 4–11)

## 2025-02-26 PROCEDURE — 3078F DIAST BP <80 MM HG: CPT | Performed by: FAMILY MEDICINE

## 2025-02-26 PROCEDURE — 84443 ASSAY THYROID STIM HORMONE: CPT | Performed by: FAMILY MEDICINE

## 2025-02-26 PROCEDURE — 82306 VITAMIN D 25 HYDROXY: CPT | Performed by: FAMILY MEDICINE

## 2025-02-26 PROCEDURE — 99214 OFFICE O/P EST MOD 30 MIN: CPT | Performed by: FAMILY MEDICINE

## 2025-02-26 PROCEDURE — 36415 COLL VENOUS BLD VENIPUNCTURE: CPT | Performed by: FAMILY MEDICINE

## 2025-02-26 PROCEDURE — 83036 HEMOGLOBIN GLYCOSYLATED A1C: CPT | Performed by: FAMILY MEDICINE

## 2025-02-26 PROCEDURE — 3074F SYST BP LT 130 MM HG: CPT | Performed by: FAMILY MEDICINE

## 2025-02-26 PROCEDURE — 85025 COMPLETE CBC W/AUTO DIFF WBC: CPT | Performed by: FAMILY MEDICINE

## 2025-02-26 PROCEDURE — 80053 COMPREHEN METABOLIC PANEL: CPT | Performed by: FAMILY MEDICINE

## 2025-02-26 PROCEDURE — 80061 LIPID PANEL: CPT | Performed by: FAMILY MEDICINE

## 2025-02-26 ASSESSMENT — PATIENT HEALTH QUESTIONNAIRE - PHQ9
SUM OF ALL RESPONSES TO PHQ QUESTIONS 1-9: 18
SUM OF ALL RESPONSES TO PHQ QUESTIONS 1-9: 18
10. IF YOU CHECKED OFF ANY PROBLEMS, HOW DIFFICULT HAVE THESE PROBLEMS MADE IT FOR YOU TO DO YOUR WORK, TAKE CARE OF THINGS AT HOME, OR GET ALONG WITH OTHER PEOPLE: VERY DIFFICULT

## 2025-02-26 NOTE — ASSESSMENT & PLAN NOTE
Hypothyroid. Feeling hot/ cold, brittle nails. Will repeat tsh today. Titrate levothyroxine prn.    Orders:    TSH with free T4 reflex; Future

## 2025-02-26 NOTE — ASSESSMENT & PLAN NOTE
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    Stress and its role in weight.  We talked about stress reduction strategies and taking time to \"fill her cup\"    I recommended that she call her insurance company to check on the coverage for Zepbound, Wegovy, Saxenda and Contrave    Plan to do a weight loss intake when able.    Today she stated that she thinks she might have binge eating disorder.  I have recommended that she have an evaluation at [facility] and if she is formally diagnosed with moderate or severe binge eating disorder we could consider initiation of Vyvanse which might also help her to achieve her weight goals.    Plan follow-up for weight loss intake when patient is able         "

## 2025-02-26 NOTE — PROGRESS NOTES
"  Assessment & Plan  Other specified hypothyroidism  Hypothyroid. Feeling hot/ cold, brittle nails. Will repeat tsh today. Titrate levothyroxine prn.    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    Stress and its role in weight.  We talked about stress reduction strategies and taking time to \"fill her cup\"    I recommended that she call her insurance company to check on the coverage for Zepbound, Wegovy, Saxenda and Contrave    Plan to do a weight loss intake when able.    Today she stated that she thinks she might have binge eating disorder.  I have recommended that she have an evaluation at [facility] and if she is formally diagnosed with moderate or severe binge eating disorder we could consider initiation of Vyvanse which might also help her to achieve her weight goals.    Plan follow-up for weight loss intake when " "patient is able         S/P laparoscopic sleeve gastrectomy  The BMI in the problem list.              Tan Benedict is a 34 year old, presenting for the following health issues:  Follow Up (TSH)      2/26/2025     3:31 PM   Additional Questions   Roomed by Jac Chatterjee MA   Accompanied by Self         2/26/2025     3:31 PM   Patient Reported Additional Medications   Patient reports taking the following new medications None     History of Present Illness       Hypothyroidism:     Since last visit, patient describes the following symptoms::  Fatigue, Hair loss and Weight gain    Weight gain::  5 lbs.    She eats 2-3 servings of fruits and vegetables daily.She consumes 0 sweetened beverage(s) daily.She exercises with enough effort to increase her heart rate 9 or less minutes per day.  She exercises with enough effort to increase her heart rate 3 or less days per week. She is missing 1 dose(s) of medications per week.  She is not taking prescribed medications regularly due to remembering to take.     Feeling hot cold and notes her nails are brittle wants her thyroid checked and is also concerned about her weight      Objective    /73 (BP Location: Left arm, Patient Position: Sitting, Cuff Size: Adult Large)   Pulse 51   Temp 97.8  F (36.6  C) (Oral)   Resp 16   Ht 1.651 m (5' 5\")   Wt 133 kg (293 lb 4.8 oz)   LMP 02/21/2025   SpO2 99%   BMI 48.81 kg/m    Body mass index is 48.81 kg/m .  Physical Exam  Constitutional:       Appearance: Normal appearance.   HENT:      Head: Normocephalic and atraumatic.   Cardiovascular:      Rate and Rhythm: Normal rate and regular rhythm.   Pulmonary:      Effort: Pulmonary effort is normal.   Musculoskeletal:         General: Normal range of motion.      Cervical back: Normal range of motion and neck supple.   Neurological:      General: No focal deficit present.      Mental Status: She is alert and oriented to person, place, and time.                Signed " Electronically by: Minoo Romero MD

## 2025-02-27 LAB
ALBUMIN SERPL BCG-MCNC: 4.2 G/DL (ref 3.5–5.2)
ALP SERPL-CCNC: 69 U/L (ref 40–150)
ALT SERPL W P-5'-P-CCNC: 17 U/L (ref 0–50)
ANION GAP SERPL CALCULATED.3IONS-SCNC: 10 MMOL/L (ref 7–15)
AST SERPL W P-5'-P-CCNC: 22 U/L (ref 0–45)
BILIRUB SERPL-MCNC: 0.4 MG/DL
BUN SERPL-MCNC: 21 MG/DL (ref 6–20)
CALCIUM SERPL-MCNC: 9.1 MG/DL (ref 8.8–10.4)
CHLORIDE SERPL-SCNC: 107 MMOL/L (ref 98–107)
CHOLEST SERPL-MCNC: 170 MG/DL
CREAT SERPL-MCNC: 0.76 MG/DL (ref 0.51–0.95)
EGFRCR SERPLBLD CKD-EPI 2021: >90 ML/MIN/1.73M2
FASTING STATUS PATIENT QL REPORTED: NO
FASTING STATUS PATIENT QL REPORTED: NO
GLUCOSE SERPL-MCNC: 84 MG/DL (ref 70–99)
HCO3 SERPL-SCNC: 21 MMOL/L (ref 22–29)
HDLC SERPL-MCNC: 65 MG/DL
LDLC SERPL CALC-MCNC: 93 MG/DL
NONHDLC SERPL-MCNC: 105 MG/DL
POTASSIUM SERPL-SCNC: 4.1 MMOL/L (ref 3.4–5.3)
PROT SERPL-MCNC: 7.2 G/DL (ref 6.4–8.3)
SODIUM SERPL-SCNC: 138 MMOL/L (ref 135–145)
TRIGL SERPL-MCNC: 59 MG/DL
TSH SERPL DL<=0.005 MIU/L-ACNC: 0.31 UIU/ML (ref 0.3–4.2)
VIT D+METAB SERPL-MCNC: 29 NG/ML (ref 20–50)

## 2025-04-22 DIAGNOSIS — F32.1 MODERATE MAJOR DEPRESSION (H): ICD-10-CM

## 2025-04-22 DIAGNOSIS — F41.1 GAD (GENERALIZED ANXIETY DISORDER): ICD-10-CM

## 2025-04-22 RX ORDER — ESCITALOPRAM OXALATE 20 MG/1
20 TABLET ORAL EVERY MORNING
Qty: 30 TABLET | Refills: 0 | Status: SHIPPED | OUTPATIENT
Start: 2025-04-22

## 2025-04-29 ENCOUNTER — OFFICE VISIT (OUTPATIENT)
Dept: FAMILY MEDICINE | Facility: CLINIC | Age: 35
End: 2025-04-29
Payer: COMMERCIAL

## 2025-04-29 VITALS
SYSTOLIC BLOOD PRESSURE: 105 MMHG | DIASTOLIC BLOOD PRESSURE: 70 MMHG | HEIGHT: 65 IN | RESPIRATION RATE: 16 BRPM | BODY MASS INDEX: 48.82 KG/M2 | OXYGEN SATURATION: 98 % | WEIGHT: 293 LBS | HEART RATE: 53 BPM | TEMPERATURE: 97.7 F

## 2025-04-29 DIAGNOSIS — Z00.00 ROUTINE GENERAL MEDICAL EXAMINATION AT A HEALTH CARE FACILITY: Primary | ICD-10-CM

## 2025-04-29 DIAGNOSIS — Z90.3 H/O GASTRIC SLEEVE: ICD-10-CM

## 2025-04-29 DIAGNOSIS — Z11.3 SCREEN FOR STD (SEXUALLY TRANSMITTED DISEASE): ICD-10-CM

## 2025-04-29 DIAGNOSIS — G47.00 INSOMNIA, UNSPECIFIED TYPE: ICD-10-CM

## 2025-04-29 DIAGNOSIS — F32.1 MODERATE MAJOR DEPRESSION (H): ICD-10-CM

## 2025-04-29 DIAGNOSIS — E03.8 OTHER SPECIFIED HYPOTHYROIDISM: ICD-10-CM

## 2025-04-29 DIAGNOSIS — Z80.8 FAMILY HX OF MELANOMA: ICD-10-CM

## 2025-04-29 DIAGNOSIS — F50.812 SEVERE BINGE-EATING DISORDER: ICD-10-CM

## 2025-04-29 DIAGNOSIS — E66.813 CLASS 3 SEVERE OBESITY DUE TO EXCESS CALORIES WITH SERIOUS COMORBIDITY AND BODY MASS INDEX (BMI) OF 50.0 TO 59.9 IN ADULT (H): ICD-10-CM

## 2025-04-29 DIAGNOSIS — N92.1 MENOMETRORRHAGIA: ICD-10-CM

## 2025-04-29 DIAGNOSIS — F41.1 GAD (GENERALIZED ANXIETY DISORDER): ICD-10-CM

## 2025-04-29 DIAGNOSIS — Z98.84 S/P LAPAROSCOPIC SLEEVE GASTRECTOMY: ICD-10-CM

## 2025-04-29 DIAGNOSIS — Z00.00 HEALTH CARE MAINTENANCE: ICD-10-CM

## 2025-04-29 DIAGNOSIS — Z12.4 CERVICAL CANCER SCREENING: ICD-10-CM

## 2025-04-29 PROBLEM — F50.811 BINGE-EATING DISORDER, MODERATE: Status: ACTIVE | Noted: 2025-04-29

## 2025-04-29 PROCEDURE — 87491 CHLMYD TRACH DNA AMP PROBE: CPT | Performed by: FAMILY MEDICINE

## 2025-04-29 PROCEDURE — 36415 COLL VENOUS BLD VENIPUNCTURE: CPT | Performed by: FAMILY MEDICINE

## 2025-04-29 RX ORDER — LEVOTHYROXINE SODIUM 175 UG/1
175 TABLET ORAL DAILY
Qty: 90 TABLET | Refills: 3 | Status: SHIPPED | OUTPATIENT
Start: 2025-04-29

## 2025-04-29 RX ORDER — TOPIRAMATE 25 MG/1
50 TABLET, FILM COATED ORAL 2 TIMES DAILY
Qty: 120 TABLET | Refills: 3 | Status: CANCELLED | OUTPATIENT
Start: 2025-04-29

## 2025-04-29 RX ORDER — ESCITALOPRAM OXALATE 20 MG/1
20 TABLET ORAL EVERY MORNING
Qty: 90 TABLET | Refills: 0 | Status: SHIPPED | OUTPATIENT
Start: 2025-04-29

## 2025-04-29 RX ORDER — NALTREXONE HYDROCHLORIDE 50 MG/1
TABLET, FILM COATED ORAL
Qty: 30 TABLET | Refills: 2 | Status: CANCELLED | OUTPATIENT
Start: 2025-04-29

## 2025-04-29 RX ORDER — TRAZODONE HYDROCHLORIDE 50 MG/1
50 TABLET ORAL AT BEDTIME
Qty: 90 TABLET | Refills: 0 | Status: CANCELLED | OUTPATIENT
Start: 2025-04-29

## 2025-04-29 SDOH — HEALTH STABILITY: PHYSICAL HEALTH: ON AVERAGE, HOW MANY DAYS PER WEEK DO YOU ENGAGE IN MODERATE TO STRENUOUS EXERCISE (LIKE A BRISK WALK)?: 0 DAYS

## 2025-04-29 SDOH — HEALTH STABILITY: PHYSICAL HEALTH: ON AVERAGE, HOW MANY MINUTES DO YOU ENGAGE IN EXERCISE AT THIS LEVEL?: 0 MIN

## 2025-04-29 ASSESSMENT — ANXIETY QUESTIONNAIRES
GAD7 TOTAL SCORE: 6
4. TROUBLE RELAXING: SEVERAL DAYS
3. WORRYING TOO MUCH ABOUT DIFFERENT THINGS: SEVERAL DAYS
GAD7 TOTAL SCORE: 6
IF YOU CHECKED OFF ANY PROBLEMS ON THIS QUESTIONNAIRE, HOW DIFFICULT HAVE THESE PROBLEMS MADE IT FOR YOU TO DO YOUR WORK, TAKE CARE OF THINGS AT HOME, OR GET ALONG WITH OTHER PEOPLE: SOMEWHAT DIFFICULT
8. IF YOU CHECKED OFF ANY PROBLEMS, HOW DIFFICULT HAVE THESE MADE IT FOR YOU TO DO YOUR WORK, TAKE CARE OF THINGS AT HOME, OR GET ALONG WITH OTHER PEOPLE?: SOMEWHAT DIFFICULT
GAD7 TOTAL SCORE: 6
6. BECOMING EASILY ANNOYED OR IRRITABLE: MORE THAN HALF THE DAYS
7. FEELING AFRAID AS IF SOMETHING AWFUL MIGHT HAPPEN: NOT AT ALL
7. FEELING AFRAID AS IF SOMETHING AWFUL MIGHT HAPPEN: NOT AT ALL
2. NOT BEING ABLE TO STOP OR CONTROL WORRYING: SEVERAL DAYS
1. FEELING NERVOUS, ANXIOUS, OR ON EDGE: SEVERAL DAYS
5. BEING SO RESTLESS THAT IT IS HARD TO SIT STILL: NOT AT ALL

## 2025-04-29 ASSESSMENT — PATIENT HEALTH QUESTIONNAIRE - PHQ9
SUM OF ALL RESPONSES TO PHQ QUESTIONS 1-9: 13
10. IF YOU CHECKED OFF ANY PROBLEMS, HOW DIFFICULT HAVE THESE PROBLEMS MADE IT FOR YOU TO DO YOUR WORK, TAKE CARE OF THINGS AT HOME, OR GET ALONG WITH OTHER PEOPLE: VERY DIFFICULT
SUM OF ALL RESPONSES TO PHQ QUESTIONS 1-9: 13

## 2025-04-29 ASSESSMENT — SOCIAL DETERMINANTS OF HEALTH (SDOH): HOW OFTEN DO YOU GET TOGETHER WITH FRIENDS OR RELATIVES?: TWICE A WEEK

## 2025-04-29 NOTE — ASSESSMENT & PLAN NOTE
History of gastric sleeve 3/14/2023.   bmi 50.39. w chronic comorbid weight related hypothyroidism, family history of melanoma depression, and anxiety  Scheduled for physician assisted weight loss at the end of the month.    Natrexone 25-50mg po q hs - no longer taking - didn't think it helped.   Topamax 25mg bid - refill declined as she doesn't feel it is working.   Wellbutrin - caused increased hunger in the past.

## 2025-04-29 NOTE — ASSESSMENT & PLAN NOTE
Fam hx melanoma in mom, derm referral for skin check ordered.     Orders:    Adult Dermatology  Referral; Future

## 2025-04-29 NOTE — ASSESSMENT & PLAN NOTE
Hypothyroidism - refilled levothyroxine 175mcg daily. Tsh was normal recently.  Orders:    levothyroxine (SYNTHROID/LEVOTHROID) 175 MCG tablet; Take 1 tablet (175 mcg) by mouth daily.

## 2025-04-29 NOTE — PATIENT INSTRUCTIONS
Patient Education   Preventive Care Advice   This is general advice given by our system to help you stay healthy. However, your care team may have specific advice just for you. Please talk to your care team about your preventive care needs.  Nutrition  Eat 5 or more servings of fruits and vegetables each day.  Try wheat bread, brown rice and whole grain pasta (instead of white bread, rice, and pasta).  Get enough calcium and vitamin D. Check the label on foods and aim for 100% of the RDA (recommended daily allowance).  Lifestyle  Exercise at least 150 minutes each week  (30 minutes a day, 5 days a week).  Do muscle strengthening activities 2 days a week. These help control your weight and prevent disease.  No smoking.  Wear sunscreen to prevent skin cancer.  Have a dental exam and cleaning every 6 months.  Yearly exams  See your health care team every year to talk about:  Any changes in your health.  Any medicines your care team has prescribed.  Preventive care, family planning, and ways to prevent chronic diseases.  Shots (vaccines)   HPV shots (up to age 26), if you've never had them before.  Hepatitis B shots (up to age 59), if you've never had them before.  COVID-19 shot: Get this shot when it's due.  Flu shot: Get a flu shot every year.  Tetanus shot: Get a tetanus shot every 10 years.  Pneumococcal, hepatitis A, and RSV shots: Ask your care team if you need these based on your risk.  Shingles shot (for age 50 and up)  General health tests  Diabetes screening:  Starting at age 35, Get screened for diabetes at least every 3 years.  If you are younger than age 35, ask your care team if you should be screened for diabetes.  Cholesterol test: At age 39, start having a cholesterol test every 5 years, or more often if advised.  Bone density scan (DEXA): At age 50, ask your care team if you should have this scan for osteoporosis (brittle bones).  Hepatitis C: Get tested at least once in your life.  STIs (sexually  transmitted infections)  Before age 24: Ask your care team if you should be screened for STIs.  After age 24: Get screened for STIs if you're at risk. You are at risk for STIs (including HIV) if:  You are sexually active with more than one person.  You don't use condoms every time.  You or a partner was diagnosed with a sexually transmitted infection.  If you are at risk for HIV, ask about PrEP medicine to prevent HIV.  Get tested for HIV at least once in your life, whether you are at risk for HIV or not.  Cancer screening tests  Cervical cancer screening: If you have a cervix, begin getting regular cervical cancer screening tests starting at age 21.  Breast cancer scan (mammogram): If you've ever had breasts, begin having regular mammograms starting at age 40. This is a scan to check for breast cancer.  Colon cancer screening: It is important to start screening for colon cancer at age 45.  Have a colonoscopy test every 10 years (or more often if you're at risk) Or, ask your provider about stool tests like a FIT test every year or Cologuard test every 3 years.  To learn more about your testing options, visit:   .  For help making a decision, visit:   https://bit.ly/de87596.  Prostate cancer screening test: If you have a prostate, ask your care team if a prostate cancer screening test (PSA) at age 55 is right for you.  Lung cancer screening: If you are a current or former smoker ages 50 to 80, ask your care team if ongoing lung cancer screenings are right for you.  For informational purposes only. Not to replace the advice of your health care provider. Copyright   2023 [facility]. All rights reserved. Clinically reviewed by the [facility]. [employer] [id number] - REV 01/24.  Learning About Depression Screening  What is depression screening?  Depression screening is a way to see if you have depression symptoms. It may be done by a doctor or counselor. It's often part of a routine  "checkup. That's because your mental health is just as important as your physical health.  Depression is a mental health condition that affects how you feel, think, and act. You may:  Have less energy.  Lose interest in your daily activities.  Feel sad and grouchy for a long time.  Depression is very common. It affects people of all ages.  Many things can lead to depression. Some people become depressed after they have a stroke or find out they have a major illness like cancer or heart disease. The death of a loved one or a breakup may lead to depression. It can run in families. Most experts believe that a combination of inherited genes and stressful life events can cause it.  What happens during screening?  You may be asked to fill out a form about your depression symptoms. You and the doctor will discuss your answers. The doctor may ask you more questions to learn more about how you think, act, and feel.  What happens after screening?  If you have symptoms of depression, your doctor will talk to you about your options.  Doctors usually treat depression with medicines or counseling. Often, combining the two works best. Many people don't get help because they think that they'll get over the depression on their own. But people with depression may not get better unless they get treatment.  The cause of depression is not well understood. There may be many factors involved. But if you have depression, it's not your fault.  A serious symptom of depression is thinking about death or suicide. If you or someone you care about talks about this or about feeling hopeless, get help right away.  It's important to know that depression can be treated. Medicine, counseling, and self-care may help.  Where can you learn more?  Go to https://www.healthwise.net/patiented  Enter T185 in the search box to learn more about \"Learning About Depression Screening.\"  Current as of: July 31, 2024  Content Version: 14.4    5917-3769 Aleyda " [employer].   Care instructions adapted under license by your healthcare professional. If you have questions about a medical condition or this instruction, always ask your healthcare professional. [facility] [employer] disclaims any warranty or liability for your use of this information.

## 2025-04-29 NOTE — ASSESSMENT & PLAN NOTE
Depression, insomnia and anxiety - phq 9 indicates poor control   Currently on lexapro 20 g po q day  Collaborative psychiatry consult offered. Declined for now, will consider.  Counseling offered - she says she already sees Jenae Cote - (relationship x 6 years).  discussed adding wellbutrin. She says she had increased binging on this.   Trazadone 25-50 mg po q day- takes prn (usually only uses 25 mg)   Follow up in 3 months.

## 2025-04-29 NOTE — PROGRESS NOTES
Preventive Care Visit  [facility]  Minoo Romero MD, Family Medicine  Apr 29, 2025    Assessment & Plan  Routine general medical examination at a health care facility         Health care maintenance  Social history updated: yes.   Contraception - abstains. No plan to have sex, but she does have ocp rx by gynecologist.   Recommended vaccines:  tdap, flu, covid  Pap: nl pap - due today, ordered.   Mammo:   There is no family or personal history, not indicated    Colonoscopy:   There is no first degree family or personal history, not indicated    Std testing desired:  offered  Osteoporosis prevention discussed. Recommend daily calcium and vitamin d intake to keep good bone health. Recommend weight bearing exercise , no tobacco, and limit caffeine  and alcohol   Dexa no indication  Recommend sunscreen, exercise, & healthy diet.   Labs 2/26/2025 reviewed.   Fasting: no  I have had an Advance Directives discussion with the patient.   Body mass index is 50.39 kg/m .   mychart active.       Stress - declined med changes, psych consult, says she will continue with counseling.   Calcium info added to chart.   Fruits - info added to chart.   Physical activity - plan to discuss at weight loss intake which is scheduled soon  Dentist - discussed.        Cervical cancer screening  Pap ordered.  Orders:    HPV and Gynecologic Cytology Panel - Recommended Age 30 - 65 Years    Family hx of melanoma  Fam hx melanoma in mom, derm referral for skin check ordered.     Orders:    Adult Dermatology  Referral; Future    Class 3 severe obesity due to excess calories with serious comorbidity and body mass index (BMI) of 50.0 to 59.9 in adult (H)  History of gastric sleeve 3/14/2023.   bmi 50.39. w chronic comorbid weight related hypothyroidism, family history of melanoma depression, and anxiety  Scheduled for physician assisted weight loss at the end of the month.    Natrexone 25-50mg po q hs - no longer  taking - didn't think it helped.   Topamax 25mg bid - refill declined as she doesn't feel it is working.   Wellbutrin - caused increased hunger in the past.        Other specified hypothyroidism  Hypothyroidism - refilled levothyroxine 175mcg daily. Tsh was normal recently.  Orders:    levothyroxine (SYNTHROID/LEVOTHROID) 175 MCG tablet; Take 1 tablet (175 mcg) by mouth daily.    OSCAR (generalized anxiety disorder)  Depression, insomnia and anxiety - phq 9 indicates poor control   Currently on lexapro 20 g po q day  Collaborative psychiatry consult offered. Declined for now, will consider.  Counseling offered - she says she already sees Jenae Cote - (relationship x 6 years).  discussed adding wellbutrin. She says she had increased binging on this.   Trazadone 25-50 mg po q day- takes prn (usually only uses 25 mg)   Follow up in 3 months  Orders:    escitalopram (LEXAPRO) 20 MG tablet; Take 1 tablet (20 mg) by mouth every morning. Needs appt with PCP for continued refills.    Moderate major depression (H)  Depression, insomnia and anxiety - phq 9 indicates poor control   Currently on lexapro 20 g po q day  Collaborative psychiatry consult offered. Declined for now, will consider.  Counseling offered - she says she already sees Jenae Cote - (relationship x 6 years).  discussed adding wellbutrin. She says she had increased binging on this.   Trazadone 25-50 mg po q day- takes prn (usually only uses 25 mg)   Follow up in 3 months  Orders:    escitalopram (LEXAPRO) 20 MG tablet; Take 1 tablet (20 mg) by mouth every morning. Needs appt with PCP for continued refills.    S/P laparoscopic sleeve gastrectomy         Insomnia, unspecified type  Depression, insomnia and anxiety - phq 9 indicates poor control   Currently on lexapro 20 g po q day  Collaborative psychiatry consult offered. Declined for now, will consider.  Counseling offered - she says she already sees Jenae Cote - (relationship x 6 years).  discussed adding  wellbutrin. She says she had increased binging on this.   Trazadone 25-50 mg po q day- takes prn (usually only uses 25 mg)   Follow up in 3 months.        Menometrorrhagia  Resolved, took ocp in the past but not currently. Saw gyn Dr. Jaimes in the past but no longer has problems so not seeing gyn anymore.          H/O gastric sleeve                    Tan Benedict is a 34 year old, presenting for the following:  Physical and Establish Care        4/29/2025     1:50 PM   Additional Questions   Roomed by as   Accompanied by self         4/29/2025     1:50 PM   Patient Reported Additional Medications   Patient reports taking the following new medications no          HPI  Here for annual exam   Advance Care Planning    Discussed advance care planning with patient; however, patient declined at this time.        4/29/2025   General Health   How would you rate your overall physical health? (!) POOR   Feel stress (tense, anxious, or unable to sleep) Only a little   (!) STRESS CONCERN - discussed.       4/29/2025   Nutrition   Three or more servings of calcium each day? (!) NO info added to chart   Diet: Regular (no restrictions)    Carbohydrate counting   How many servings of fruit and vegetables per day? (!) 0-1 - info added to chart   How many sweetened beverages each day? 0-1       Multiple values from one day are sorted in reverse-chronological order         4/29/2025   Exercise   Days per week of moderate/strenous exercise 0 days   Average minutes spent exercising at this level 0 min   (!) EXERCISE CONCERN      4/29/2025   Social Factors   Frequency of gathering with friends or relatives Twice a week   Worry food won't last until get money to buy more No   Food not last or not have enough money for food? No   Do you have housing? (Housing is defined as stable permanent housing and does not include staying outside in a car, in a tent, in an abandoned building, in an overnight shelter, or couch-surfing.) Yes  "  Are you worried about losing your housing? No   Lack of transportation? No   Unable to get utilities (heat,electricity)? No         4/29/2025   Dental   Dentist two times every year? (!) NO - discussed.        Today's PHQ-9 Score:       4/29/2025     1:17 PM   PHQ-9 SCORE   PHQ-9 Total Score MyChart 13 (Moderate depression)   PHQ-9 Total Score 13        Patient-reported         4/29/2025   Substance Use   Alcohol more than 3/day or more than 7/wk Not Applicable   Do you use any other substances recreationally? No     Social History     Tobacco Use    Smoking status: Never    Smokeless tobacco: Never   Vaping Use    Vaping status: Never Used   Substance Use Topics    Alcohol use: No    Drug use: No          Mammogram Screening - Patient under 40 years of age: Routine Mammogram Screening not recommended.         4/29/2025   STI Screening   New sexual partner(s) since last STI/HIV test? No     History of abnormal Pap smear: No - age 30- 64 PAP with HPV every 5 years recommended        Latest Ref Rng & Units 12/9/2021     8:58 AM 8/8/2017     9:28 AM 8/8/2017     9:26 AM   PAP / HPV   PAP  Negative for Intraepithelial Lesion or Malignancy (NILM)      PAP (Historical)   ASC-US     HPV 16 DNA NEG^Negative   Negative    HPV 18 DNA NEG^Negative   Negative    Other HR HPV NEG^Negative   Negative            4/29/2025   Contraception/Family Planning   Questions about contraception or family planning No       Reviewed and updated as needed this visit by Provider                       Objective    Exam  /70 (BP Location: Left arm, Patient Position: Left side, Cuff Size: Adult Large)   Pulse 53   Temp 97.7  F (36.5  C) (Oral)   Resp 16   Ht 1.651 m (5' 5\")   Wt (!) 137.3 kg (302 lb 12.8 oz)   LMP 04/19/2025 (Approximate)   SpO2 98%   BMI 50.39 kg/m     Estimated body mass index is 50.39 kg/m  as calculated from the following:    Height as of this encounter: 1.651 m (5' 5\").    Weight as of this encounter: 137.3 kg " (302 lb 12.8 oz).    Physical Exam  Constitutional:       Appearance: Normal appearance.   HENT:      Head: Normocephalic and atraumatic.      Right Ear: Tympanic membrane, ear canal and external ear normal.      Left Ear: Tympanic membrane, ear canal and external ear normal.      Nose: Nose normal.      Mouth/Throat:      Mouth: Mucous membranes are moist.      Pharynx: Oropharynx is clear.   Eyes:      Extraocular Movements: Extraocular movements intact.      Conjunctiva/sclera: Conjunctivae normal.      Pupils: Pupils are equal, round, and reactive to light.   Cardiovascular:      Rate and Rhythm: Normal rate and regular rhythm.      Heart sounds: Normal heart sounds.   Pulmonary:      Effort: Pulmonary effort is normal.      Breath sounds: Normal breath sounds.   Chest:      Comments: declined  Abdominal:      General: Bowel sounds are normal.      Palpations: Abdomen is soft.   Genitourinary:     General: Normal vulva.      Exam position: Lithotomy position.      Vagina: Normal.      Cervix: Normal.      Uterus: Normal.       Adnexa: Right adnexa normal and left adnexa normal.      Rectum: Normal.   Musculoskeletal:         General: Normal range of motion.      Cervical back: Normal range of motion and neck supple.   Skin:     General: Skin is warm and dry.      Capillary Refill: Capillary refill takes less than 2 seconds.   Neurological:      General: No focal deficit present.      Mental Status: She is alert and oriented to person, place, and time.   Psychiatric:         Mood and Affect: Mood normal.         Behavior: Behavior normal.         Thought Content: Thought content normal.         Judgment: Judgment normal.               Signed Electronically by: Minoo Romero MD    Answers submitted by the patient for this visit:  Patient Health Questionnaire (Submitted on 4/29/2025)  If you checked off any problems, how difficult have these problems made it for you to do your work, take care of things at home,  or get along with other people?: Very difficult  PHQ9 TOTAL SCORE: 13  Patient Health Questionnaire (G7) (Submitted on 4/29/2025)  OSCAR 7 TOTAL SCORE: 6

## 2025-04-29 NOTE — ASSESSMENT & PLAN NOTE
Depression, insomnia and anxiety - phq 9 indicates poor control   Currently on lexapro 20 g po q day  Collaborative psychiatry consult offered. Declined for now, will consider.  Counseling offered - she says she already sees Jenae Cote - (relationship x 6 years).  discussed adding wellbutrin. She says she had increased binging on this.   Trazadone 25-50 mg po q day- takes prn (usually only uses 25 mg)   Follow up in 3 months  Orders:    escitalopram (LEXAPRO) 20 MG tablet; Take 1 tablet (20 mg) by mouth every morning. Needs appt with PCP for continued refills.

## 2025-04-29 NOTE — ASSESSMENT & PLAN NOTE
Severe binge eating disorder noted by Patricia Pina. Had gastric sleeve. Lost 100 lbs and regained 30.

## 2025-04-29 NOTE — ASSESSMENT & PLAN NOTE
Social history updated: yes.   Contraception - abstains. No plan to have sex, but she does have ocp rx by gynecologist.   Recommended vaccines:  tdap, flu, covid  Pap: nl pap - due today, ordered.   Mammo:   There is no family or personal history, not indicated    Colonoscopy:   There is no first degree family or personal history, not indicated    Std testing desired:  offered  Osteoporosis prevention discussed. Recommend daily calcium and vitamin d intake to keep good bone health. Recommend weight bearing exercise , no tobacco, and limit caffeine  and alcohol   Dexa no indication  Recommend sunscreen, exercise, & healthy diet.   Labs 2/26/2025 reviewed.   Fasting: no  I have had an Advance Directives discussion with the patient.   Body mass index is 50.39 kg/m .   mychart active.       Stress - declined med changes, psych consult, says she will continue with counseling.   Calcium info added to chart.   Fruits - info added to chart.   Physical activity - plan to discuss at weight loss intake which is scheduled soon  Dentist - discussed.

## 2025-04-29 NOTE — ASSESSMENT & PLAN NOTE
Resolved, took ocp in the past but not currently. Saw gyn Dr. Jaimes in the past but no longer has problems so not seeing gyn anymore.

## 2025-04-30 ENCOUNTER — PATIENT OUTREACH (OUTPATIENT)
Dept: CARE COORDINATION | Facility: CLINIC | Age: 35
End: 2025-04-30
Payer: COMMERCIAL

## 2025-04-30 LAB
C TRACH DNA SPEC QL NAA+PROBE: NEGATIVE
HBV SURFACE AG SERPL QL IA: NONREACTIVE
HCV AB SERPL QL IA: NONREACTIVE
HIV 1+2 AB+HIV1 P24 AG SERPL QL IA: NONREACTIVE
N GONORRHOEA DNA SPEC QL NAA+PROBE: NEGATIVE
SPECIMEN TYPE: NORMAL
SPECIMEN TYPE: NORMAL
T PALLIDUM AB SER QL: NONREACTIVE

## 2025-05-01 ENCOUNTER — MYC MEDICAL ADVICE (OUTPATIENT)
Dept: FAMILY MEDICINE | Facility: CLINIC | Age: 35
End: 2025-05-01
Payer: COMMERCIAL

## 2025-05-01 LAB
HPV HR 12 DNA CVX QL NAA+PROBE: POSITIVE
HPV16 DNA CVX QL NAA+PROBE: NEGATIVE
HPV18 DNA CVX QL NAA+PROBE: NEGATIVE
HUMAN PAPILLOMA VIRUS FINAL DIAGNOSIS: ABNORMAL

## 2025-05-05 LAB
BKR AP ASSOCIATED HPV REPORT: ABNORMAL
BKR LAB AP GYN ADEQUACY: ABNORMAL
BKR LAB AP GYN INTERPRETATION: ABNORMAL
BKR LAB AP PREVIOUS ABNORMAL: ABNORMAL
PATH REPORT.COMMENTS IMP SPEC: ABNORMAL
PATH REPORT.COMMENTS IMP SPEC: ABNORMAL
PATH REPORT.RELEVANT HX SPEC: ABNORMAL

## 2025-05-06 ENCOUNTER — PATIENT OUTREACH (OUTPATIENT)
Dept: FAMILY MEDICINE | Facility: CLINIC | Age: 35
End: 2025-05-06
Payer: COMMERCIAL

## 2025-05-06 PROBLEM — R87.610 ASCUS WITH POSITIVE HIGH RISK HPV CERVICAL: Status: ACTIVE | Noted: 2025-05-06

## 2025-05-06 PROBLEM — R87.810 ASCUS WITH POSITIVE HIGH RISK HPV CERVICAL: Status: ACTIVE | Noted: 2025-05-06

## 2025-05-12 DIAGNOSIS — F41.1 GAD (GENERALIZED ANXIETY DISORDER): ICD-10-CM

## 2025-05-12 DIAGNOSIS — G47.00 INSOMNIA, UNSPECIFIED TYPE: ICD-10-CM

## 2025-05-12 DIAGNOSIS — F32.1 MODERATE MAJOR DEPRESSION (H): ICD-10-CM

## 2025-05-13 ENCOUNTER — OFFICE VISIT (OUTPATIENT)
Dept: FAMILY MEDICINE | Facility: CLINIC | Age: 35
End: 2025-05-13
Payer: COMMERCIAL

## 2025-05-13 VITALS
BODY MASS INDEX: 48.82 KG/M2 | RESPIRATION RATE: 19 BRPM | HEIGHT: 65 IN | SYSTOLIC BLOOD PRESSURE: 118 MMHG | DIASTOLIC BLOOD PRESSURE: 73 MMHG | HEART RATE: 56 BPM | WEIGHT: 293 LBS | TEMPERATURE: 97.5 F

## 2025-05-13 DIAGNOSIS — R87.610 ASCUS WITH POSITIVE HIGH RISK HPV CERVICAL: Primary | ICD-10-CM

## 2025-05-13 DIAGNOSIS — R87.810 ASCUS WITH POSITIVE HIGH RISK HPV CERVICAL: Primary | ICD-10-CM

## 2025-05-13 PROCEDURE — 57454 BX/CURETT OF CERVIX W/SCOPE: CPT | Performed by: FAMILY MEDICINE

## 2025-05-13 PROCEDURE — 3078F DIAST BP <80 MM HG: CPT | Performed by: FAMILY MEDICINE

## 2025-05-13 PROCEDURE — 3074F SYST BP LT 130 MM HG: CPT | Performed by: FAMILY MEDICINE

## 2025-05-13 RX ORDER — TRAZODONE HYDROCHLORIDE 50 MG/1
TABLET ORAL
Qty: 90 TABLET | Refills: 0 | Status: SHIPPED | OUTPATIENT
Start: 2025-05-13
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
## 2025-05-15 ENCOUNTER — RESULTS FOLLOW-UP (OUTPATIENT)
Dept: FAMILY MEDICINE | Facility: CLINIC | Age: 35
End: 2025-05-15

## 2025-05-15 LAB
PATH REPORT.COMMENTS IMP SPEC: NORMAL
PATH REPORT.FINAL DX SPEC: NORMAL
PATH REPORT.GROSS SPEC: NORMAL
PATH REPORT.MICROSCOPIC SPEC OTHER STN: NORMAL
PATH REPORT.RELEVANT HX SPEC: NORMAL
PHOTO IMAGE: NORMAL

## 2025-05-21 ENCOUNTER — TELEPHONE (OUTPATIENT)
Dept: ENDOCRINOLOGY | Facility: CLINIC | Age: 35
End: 2025-05-21

## 2025-05-21 DIAGNOSIS — E66.813 CLASS 3 SEVERE OBESITY WITH SERIOUS COMORBIDITY AND BODY MASS INDEX (BMI) OF 45.0 TO 49.9 IN ADULT, UNSPECIFIED OBESITY TYPE (H): ICD-10-CM

## 2025-05-21 RX ORDER — TOPIRAMATE 25 MG/1
50 TABLET, FILM COATED ORAL 2 TIMES DAILY
Qty: 120 TABLET | Refills: 3 | OUTPATIENT
Start: 2025-05-21

## 2025-05-21 NOTE — TELEPHONE ENCOUNTER
Refill denied at this time. Patient needs appointment with Sol Quick CNP. [employer] message sent to patient to schedule appointment.

## 2025-05-21 NOTE — TELEPHONE ENCOUNTER
"Last Written Prescription:   topiramate (TOPAMAX) 25 MG [id number]/2025  Sig - Route: Take 2 tablets (50 mg) by mouth 2 times daily. - Oral     ----------------------  Last Visit Date:  1/7/2025  [facility] Sol Taylor, NP   \" Continue topiramate 50 twice daily   Add bupropion in the AM   Add naltrexone 1/2 tab around lunch for the first week then increase to full tab as tolerated. Okay to take in the AM too  Great work planning to prep protein ahead of time   Great work prioritizing hydration   Consider trazadone nightly for a couple of weeks before trying to go just as needed   Follow up March 2025 \"    Future Visit Date: None  ----------------------         Refill decision: Medication unable to be refilled by RN due to: Other:Received refill request for    topiramate (TOPAMAX) 25 MG tablet. Patient needs appointment scheduled prior to any refills. Clinic Coordinator notified and will follow up with the patient as appropriate. The pharmacy has been notified that the medication will not be refilled prior to an appointment being scheduled.             Request from pharmacy:  Requested Prescriptions   Pending Prescriptions Disp Refills    topiramate (TOPAMAX) 25 MG tablet 120 tablet 3     Sig: Take 2 tablets (50 mg) by mouth 2 times daily.       Anti-Seizure Meds Protocol  Failed - 5/21/2025  9:47 AM        Failed - Review Authorizing provider's last note.      Refer to last progress notes: confirm request is for original authorizing provider (cannot be through other providers).          Failed - Medication indicated for associated diagnosis     Medication is associated with one or more of the following diagnoses:     Bipolar   Dementia   Depression   Epilepsy   Migraine   Seizure   Trigeminal Neuralgia   Cyclothymia          Passed - Normal ALT or AST on file in past 26 months     Recent Labs   Lab Test 02/26/25  1637   ALT 17     Recent Labs   Lab Test " 02/26/25  1637   AST 22             Passed - Medication is active on med list and the sig matches. RN to manually verify dose and sig if red X/fail.     If the protocol passes (green check), you do not need to verify med dose and sig.    A prescription matches if they are the same clinical intention.    For Example: once daily and every morning are the same.    The protocol can not identify upper and lower case letters as matching and will fail.     For Example: Take 1 tablet (50 mg) by mouth daily     TAKE 1 TABLET (50 MG) BY MOUTH DAILY    For all fails (red x), verify dose and sig.    If the refill does match what is on file, the RN can still proceed to approve the refill request.       If they do not match, route to the appropriate provider.             Passed - Has GFR on file in past 12 months and most recent value is normal        Passed - Recent (12 month) or future (90 days) visit with authorizing provider's specialty (provided they have been seen in the past 15 months)     The patient must have completed an in-person or virtual visit within the past 12 months or has a future visit scheduled within the next 90 days with the authorizing provider s specialty.  Urgent care and e-visits do not qualify as an office visit for this protocol.          Passed - No active pregnancy on record        Passed - No positive pregnancy test in last 12 months

## 2025-06-23 ENCOUNTER — PATIENT OUTREACH (OUTPATIENT)
Dept: FAMILY MEDICINE | Facility: CLINIC | Age: 35
End: 2025-06-23
Payer: COMMERCIAL

## 2025-06-23 NOTE — TELEPHONE ENCOUNTER
5/13/25 [place]: low grade changes on biopsy. ECC negative  Plan: repeat cotest in one year due 05/13/26

## (undated) DEVICE — STPL SKIN 35W ROTATING HEAD PRW35

## (undated) DEVICE — ESU LIGASURE MARYLAND VESSEL LAP 44CM XLONG LF1944

## (undated) DEVICE — TUBING SMOKE EVAC PNEUMOCLEAR HIGH FLOW 0620050250

## (undated) DEVICE — SYSTEM CALIBRATION GASTRECTOMY SLEEVE VISIGI 3D 40FR 5240

## (undated) DEVICE — ENDO POUCH UNIVERSAL RETRIEVAL SYSTEM INZII 12/15MM CD004

## (undated) DEVICE — NDL INSUFFLATION 13GA 150MM C2202

## (undated) DEVICE — ENDO TROCAR SLEEVE KII Z-THREADED 05X100MM CTS02

## (undated) DEVICE — STPL POWERED ECHELON LONG 60MM PLEE60A

## (undated) DEVICE — STPL RELOAD REG TISSUE ECHELON 60 X 3.6MM BLUE GST60B

## (undated) DEVICE — NDL SPINAL 22GA 3.5" QUINCKE 405181

## (undated) DEVICE — CLIP APPLIER ENDO 5MM M/L LIGAMAX EL5ML

## (undated) DEVICE — ESU GROUND PAD ADULT REM W/15' CORD E7507DB

## (undated) DEVICE — ANTIFOG SOLUTION W/FOAM PAD 31142527

## (undated) DEVICE — SOL WATER IRRIG 1000ML BOTTLE 2F7114

## (undated) DEVICE — ENDO TROCAR SLEEVE KII ADV FIXATION 05X100MM CFS02

## (undated) DEVICE — POSITIONER ARMBOARD FOAM 1PAIR LF FP-ARMB1

## (undated) DEVICE — PREP CHLORAPREP 26ML TINTED HI-LITE ORANGE 930815

## (undated) DEVICE — ENDO TROCAR FIRST ENTRY KII FIOS ADV FIX 05X100MM CFF03

## (undated) DEVICE — LINEN TOWEL PACK X30 5481

## (undated) DEVICE — LINEN TOWEL PACK X6 WHITE 5487

## (undated) DEVICE — ENDO TROCAR OPTICAL ACCESS KII Z-THRD 15X100MM C0R37

## (undated) DEVICE — Device

## (undated) RX ORDER — PROPOFOL 10 MG/ML
INJECTION, EMULSION INTRAVENOUS
Status: DISPENSED
Start: 2023-03-14

## (undated) RX ORDER — HYDROMORPHONE HCL IN WATER/PF 6 MG/30 ML
PATIENT CONTROLLED ANALGESIA SYRINGE INTRAVENOUS
Status: DISPENSED
Start: 2023-03-14

## (undated) RX ORDER — APREPITANT 40 MG/1
CAPSULE ORAL
Status: DISPENSED
Start: 2023-03-14

## (undated) RX ORDER — DEXAMETHASONE SODIUM PHOSPHATE 4 MG/ML
INJECTION, SOLUTION INTRA-ARTICULAR; INTRALESIONAL; INTRAMUSCULAR; INTRAVENOUS; SOFT TISSUE
Status: DISPENSED
Start: 2023-03-14

## (undated) RX ORDER — ONDANSETRON 2 MG/ML
INJECTION INTRAMUSCULAR; INTRAVENOUS
Status: DISPENSED
Start: 2023-03-14

## (undated) RX ORDER — FENTANYL CITRATE 50 UG/ML
INJECTION, SOLUTION INTRAMUSCULAR; INTRAVENOUS
Status: DISPENSED
Start: 2023-03-14

## (undated) RX ORDER — ACETAMINOPHEN 325 MG/1
TABLET ORAL
Status: DISPENSED
Start: 2023-03-14

## (undated) RX ORDER — KETOROLAC TROMETHAMINE 30 MG/ML
INJECTION, SOLUTION INTRAMUSCULAR; INTRAVENOUS
Status: DISPENSED
Start: 2023-03-14

## (undated) RX ORDER — SCOLOPAMINE TRANSDERMAL SYSTEM 1 MG/1
PATCH, EXTENDED RELEASE TRANSDERMAL
Status: DISPENSED
Start: 2023-03-14

## (undated) RX ORDER — CEFAZOLIN SODIUM/WATER 3 G/30 ML
SYRINGE (ML) INTRAVENOUS
Status: DISPENSED
Start: 2023-03-14

## (undated) RX ORDER — ENOXAPARIN SODIUM 100 MG/ML
INJECTION SUBCUTANEOUS
Status: DISPENSED
Start: 2023-03-14